# Patient Record
Sex: MALE | Race: WHITE | Employment: OTHER | URBAN - METROPOLITAN AREA
[De-identification: names, ages, dates, MRNs, and addresses within clinical notes are randomized per-mention and may not be internally consistent; named-entity substitution may affect disease eponyms.]

---

## 2017-01-20 ENCOUNTER — OFFICE VISIT (OUTPATIENT)
Dept: INTERNAL MEDICINE CLINIC | Age: 72
End: 2017-01-20

## 2017-01-20 VITALS
SYSTOLIC BLOOD PRESSURE: 134 MMHG | HEART RATE: 92 BPM | BODY MASS INDEX: 29.66 KG/M2 | WEIGHT: 223.8 LBS | TEMPERATURE: 98.2 F | HEIGHT: 73 IN | DIASTOLIC BLOOD PRESSURE: 88 MMHG | RESPIRATION RATE: 16 BRPM

## 2017-01-20 DIAGNOSIS — F32.A DEPRESSION, UNSPECIFIED DEPRESSION TYPE: ICD-10-CM

## 2017-01-20 DIAGNOSIS — G25.81 RESTLESS LEG SYNDROME: ICD-10-CM

## 2017-01-20 DIAGNOSIS — G89.29 CHRONIC BACK PAIN, UNSPECIFIED BACK LOCATION, UNSPECIFIED BACK PAIN LATERALITY: Primary | ICD-10-CM

## 2017-01-20 DIAGNOSIS — E78.5 HYPERLIPIDEMIA, UNSPECIFIED HYPERLIPIDEMIA TYPE: ICD-10-CM

## 2017-01-20 DIAGNOSIS — I10 ESSENTIAL HYPERTENSION: ICD-10-CM

## 2017-01-20 DIAGNOSIS — M54.9 CHRONIC BACK PAIN, UNSPECIFIED BACK LOCATION, UNSPECIFIED BACK PAIN LATERALITY: Primary | ICD-10-CM

## 2017-01-20 DIAGNOSIS — C67.9 MALIGNANT NEOPLASM OF URINARY BLADDER, UNSPECIFIED SITE (HCC): ICD-10-CM

## 2017-01-20 DIAGNOSIS — M19.90 ARTHRITIS: ICD-10-CM

## 2017-01-20 DIAGNOSIS — C67.9 RECURRENT BLADDER TRANSITIONAL CELL CARCINOMA (HCC): ICD-10-CM

## 2017-01-20 RX ORDER — TRAMADOL HYDROCHLORIDE 50 MG/1
100 TABLET ORAL
Qty: 120 TAB | Refills: 3 | Status: SHIPPED | OUTPATIENT
Start: 2017-01-20 | End: 2017-05-05 | Stop reason: SDUPTHER

## 2017-01-20 RX ORDER — BUPROPION HYDROCHLORIDE 150 MG/1
150 TABLET ORAL
Qty: 30 TAB | Refills: 5 | Status: SHIPPED | OUTPATIENT
Start: 2017-01-20 | End: 2017-05-24 | Stop reason: SDUPTHER

## 2017-01-20 RX ORDER — MELOXICAM 15 MG/1
15 TABLET ORAL DAILY
Qty: 90 TAB | Refills: 3 | Status: SHIPPED | OUTPATIENT
Start: 2017-01-20 | End: 2018-02-02 | Stop reason: SDUPTHER

## 2017-01-20 NOTE — MR AVS SNAPSHOT
Visit Information Date & Time Provider Department Dept. Phone Encounter #  
 1/20/2017  2:00 PM Prabhu Pena MD [facility] [phone] [id number] Follow-up Instructions Return in about 3 months (around 4/20/2017), or if symptoms worsen or fail to improve, for Medicare Wellness Visit. Upcoming Health Maintenance Date Due  
 GLAUCOMA SCREENING Q2Y 4/1/2017 MEDICARE YEARLY EXAM 4/21/2017 DTaP/Tdap/Td series (2 - Td) 11/19/2023 COLONOSCOPY 6/5/2024 Allergies as of 1/20/2017  Review Complete On: 1/20/2017 By: Prabhu Pena MD  
 No Known Allergies Current Immunizations  Reviewed on 1/20/2017 Name Date Influenza High Dose Vaccine PF 9/12/2016 Influenza Vaccine 9/9/2014, 11/20/2013, 9/11/2013 Influenza Vaccine (Quad) PF 8/12/2015 Pneumococcal Conjugate (PCV-13) 5/4/2015 Pneumococcal Vaccine (Unspecified Type) 10/29/2013 Tdap 11/19/2013 Zoster Vaccine, Live 9/20/2013 Reviewed by Prabhu Pena MD on 1/20/2017 at  3:06 PM  
You Were Diagnosed With   
  
 Codes Comments Chronic back pain, unspecified back location, unspecified back pain laterality    -  Primary ICD-10-CM: M54.9, G89.29 ICD-9-CM: 724.5, 338.29 Arthritis     ICD-10-CM: M19.90 ICD-9-CM: 716.90 Essential hypertension     ICD-10-CM: I10 
ICD-9-CM: 401.9 Hyperlipidemia, unspecified hyperlipidemia type     ICD-10-CM: E78.5 ICD-9-CM: 272.4 Restless leg syndrome     ICD-10-CM: G25.81 ICD-9-CM: 333.94 Malignant neoplasm of urinary bladder, unspecified site [facility])     ICD-10-CM: C67.9 ICD-9-CM: 188. 9 Recurrent bladder transitional cell carcinoma (HCC)     ICD-10-CM: C67.9 ICD-9-CM: 188. 9 Depression, unspecified depression type     ICD-10-CM: F32.9 ICD-9-CM: 388 Vitals BP Pulse Temp Resp Height(growth percentile) Weight(growth percentile) 134/88 92 98.2 °F (36.8 °C) (Oral) 16 6' 1\" (1.854 m) 223 lb 12.8 oz (101.5 kg) BMI Smoking Status 29.53 kg/m2 Former Smoker Vitals History BMI and BSA Data Body Mass Index Body Surface Area  
 29.53 kg/m 2 2.29 m 2 Preferred Pharmacy Pharmacy Name Phone [phone] [phone] Your Updated Medication List  
  
   
This list is accurate as of: 1/20/17  3:35 PM.  Always use your most recent med list.  
  
  
  
  
 ALPRAZolam 0.25 mg tablet Commonly known as:  Cody Hurtado Take 1 Tab by mouth two (2) times daily as needed for Anxiety. azelastine 137 mcg (0.1 %) nasal spray Commonly known as:  ASTELIN  
1 Spray by Both Nostrils route two (2) times a day. Use in each nostril as directed buPROPion  mg tablet Commonly known as:  Huang Mast Take 1 Tab by mouth every morning. escitalopram oxalate 20 mg tablet Commonly known as:  [place] Denier Take 1 Tab by mouth daily. fluticasone 50 mcg/actuation nasal spray Commonly known as:  Liz Shames 2 Sprays by Both Nostrils route daily. hydrOXYzine HCl 25 mg tablet Commonly known as:  ATARAX Take 1 Tab by mouth three (3) times daily as needed for Itching.  
  
 ketoconazole 2 % topical cream  
Commonly known as:  NIZORAL Apply  to affected area daily. lidocaine 5 % Commonly known as:  LIDODERM  
1 Patch by TransDERmal route every twenty-four (24) hours. lisinopril 10 mg tablet Commonly known as:  Arsen Sesar Take 1 Tab by mouth daily. meloxicam 15 mg tablet Commonly known as:  MOBIC Take 1 Tab by mouth daily. pramipexole 0.5 mg tablet Commonly known as:  MIRAPEX Take 1 Tab by mouth nightly. traMADol 50 mg tablet Commonly known as:  ULTRAM  
Take 2 Tabs by mouth every eight (8) hours as needed for Pain. Max Daily Amount: 300 mg.  
  
 traZODone 50 mg tablet Commonly known as:  Kai Serra Take 2 Tabs by mouth nightly. TYLENOL EXTRA STRENGTH PO Take  by mouth. VIAGRA 100 mg tablet Generic drug:  sildenafil citrate Prescriptions Printed Refills  
 traMADol (ULTRAM) 50 mg tablet 3 Sig: Take 2 Tabs by mouth every eight (8) hours as needed for Pain. Max Daily Amount: 300 mg. Class: Print Route: Oral  
  
Prescriptions Sent to Pharmacy Refills  
 meloxicam (MOBIC) 15 mg tablet 3 Sig: Take 1 Tab by mouth daily. Class: Normal  
 Pharmacy: [facility] [address] [facility] [address], [address] [place] [address] Ph #: [phone] Route: Oral  
 buPROPion XL (WELLBUTRIN XL) 150 mg tablet 5 Sig: Take 1 Tab by mouth every morning. Class: Normal  
 Pharmacy: [facility] [address] [facility] [address], [address] [place] [address] Ph #: [phone] Route: Oral  
  
Follow-up Instructions Return in about 3 months (around 4/20/2017), or if symptoms worsen or fail to improve, for Medicare Wellness Visit. Patient Instructions Maureen Donovan 1727 What is a living will? A living will is a legal form you use to write down the kind of care you want at the end of your life. It is used by the health professionals who will treat you if you aren't able to decide for yourself. If you put your wishes in writing, your loved ones and others will know what kind of care you want. They won't need to guess. This can ease your mind and be helpful to others. A living will is not the same as an estate or property will. An estate will explains what you want to happen with your money and property after you die. Is a living will a legal document? A living will is a legal document. Each state has its own laws about living pate. If you move to another state, make sure that your living will is legal in the state where you now live.  Or you might use a universal form that has been approved by many states. This kind of form can sometimes be completed and stored online. Your electronic copy will then be available wherever you have a connection to the Internet. In most cases, doctors will respect your wishes even if you have a form from a different state. · You don't need an  to complete a living will. But legal advice can be helpful if your state's laws are unclear, your health history is complicated, or your family can't agree on what should be in your living will. · You can change your living will at any time. Some people find that their wishes about end-of-life care change as their health changes. · In addition to making a living will, think about completing a medical power of  form. This form lets you name the person you want to make end-of-life treatment decisions for you (your \"health care agent\") if you're not able to. Many hospitals and nursing homes will give you the forms you need to complete a living will and a medical power of . · Your living will is used only if you can't make or communicate decisions for yourself anymore. If you become able to make decisions again, you can accept or refuse any treatment, no matter what you wrote in your living will. · Your state may offer an online registry. This is a place where you can store your living will online so the doctors and nurses who need to treat you can find it right away. What should you think about when creating a living will? Talk about your end-of-life wishes with your family members and your doctor. Let them know what you want. That way the people making decisions for you won't be surprised by your choices. Think about these questions as you make your living will: · Do you know enough about life support methods that might be used?  If not, talk to your doctor so you know what might be done if you can't breathe on your own, your heart stops, or you're unable to swallow. · What things would you still want to be able to do after you receive life-support methods? Would you want to be able to walk? To speak? To eat on your own? To live without the help of machines? · If you have a choice, where do you want to be cared for? In your home? At a hospital or nursing home? · Do you want certain Spiritism practices performed if you become very ill? · If you have a choice at the end of your life, where would you prefer to die? At home? In a hospital or nursing home? Somewhere else? · Would you prefer to be buried or cremated? · Do you want your organs to be donated after you die? What should you do with your living will? · Make sure that your family members and your health care agent have copies of your living will. · Give your doctor a copy of your living will to keep in your medical record. If you have more than one doctor, make sure that each one has a copy. · You may want to put a copy of your living will where it can be easily found. Where can you learn more? Go to http://julissa-jannette.info/. Enter L400 in the search box to learn more about \"Learning About Living Perroy. \" Current as of: February 24, 2016 Content Version: 11.1 © 3770-7303 [employer]. Care instructions adapted under license by [employer] (which disclaims liability or warranty for this information). If you have questions about a medical condition or this instruction, always ask your healthcare professional. [place] [address] any warranty or liability for your use of this information. Introducing \[facility]\"" & HEALTH SERVICES! Dear Meet Chapman: 
Thank you for requesting a [employer] account. Our records indicate that you already have an active [employer] account. You can access your account anytime at [email or web address] Did you know that you can access your hospital and ER discharge instructions at any time in Pixable? You can also review all of your test results from your hospital stay or ER visit. Additional Information If you have questions, please visit the Frequently Asked Questions section of the [employer]able website at [email or web address] Remember, Pixable is NOT to be used for urgent needs. For medical emergencies, dial 911. Now available from your iPhone and Android! Please provide this summary of care documentation to your next provider. Your primary care clinician is listed as [id number] If you have any questions after today's visit, please call [phone].

## 2017-01-20 NOTE — PROGRESS NOTES
HISTORY OF PRESENT ILLNESS  Jacky Dudley is a 66 y.o. male. HPI  Presents for f/u HTN, lipids, etc    Had  surgery - has followed up and result reported as good    Lidocaine patches denied again    Chronic back pain  Lidocaine helps, tramadol helps some  But, can be severe. Inquires re: adjustment in regimen  Current bid tramadol at 50 mg dose    Pt reported worsened depression following surgery  Exercise has not fully helped. Past medical, Social, and Family history reviewed  Medications reviewed and updated. ROS  Complete ROS reviewed and negative or stable except as noted in HPI. Physical Exam   Constitutional: He is oriented to person, place, and time. He appears well-nourished. No distress. HENT:   Head: Normocephalic and atraumatic. Mouth/Throat: Oropharynx is clear and moist. No oropharyngeal exudate. Eyes: EOM are normal. Pupils are equal, round, and reactive to light. No scleral icterus. Neck: Normal range of motion. Neck supple. No JVD present. No thyromegaly present. Cardiovascular: Normal rate, regular rhythm and normal heart sounds. Exam reveals no gallop and no friction rub. No murmur heard. Pulmonary/Chest: Effort normal and breath sounds normal. No respiratory distress. He has no wheezes. He has no rales. Abdominal: Soft. Bowel sounds are normal. He exhibits no distension. There is no tenderness. Musculoskeletal: Normal range of motion. He exhibits no edema. Lymphadenopathy:     He has no cervical adenopathy. Neurological: He is alert and oriented to person, place, and time. He exhibits normal muscle tone. Coordination normal.   Skin: Skin is warm. No rash noted. Psychiatric: He has a normal mood and affect. Nursing note and vitals reviewed. Prior labs reviewed. ASSESSMENT and PLAN    ICD-10-CM ICD-9-CM    1.  Chronic back pain, unspecified back location, unspecified back pain laterality M54.9 724.5 meloxicam (MOBIC) 15 mg tablet    G89.29 338.29 traMADol (ULTRAM) 50 mg tablet   2. Arthritis M19.90 716.90 meloxicam (MOBIC) 15 mg tablet      traMADol (ULTRAM) 50 mg tablet   3. Essential hypertension I10 401.9    4. Hyperlipidemia, unspecified hyperlipidemia type E78.5 272.4    5. Restless leg syndrome G25.81 333.94    6. Malignant neoplasm of urinary bladder, unspecified site (HCC) C67.9 188.9    7. Recurrent bladder transitional cell carcinoma (HCC) C67.9 188.9    8. Depression, unspecified depression type F32.9 311 buPROPion XL (WELLBUTRIN XL) 150 mg tablet     Follow-up Disposition:  Return in about 3 months (around 4/20/2017), or if symptoms worsen or fail to improve, for Medicare Wellness Visit. results and schedule of future studies reviewed with patient  reviewed diet, exercise and weight    cardiovascular risk and specific lipid/LDL goals reviewed  reviewed medications and side effects in detail   Increase mobic to 15 mg   May take tramadol at 100mg bid  Get PA for lidocaine patches  wellbutrin added to lexapro  Pt defers counseling at this point.   Monitor BP, adjust prn

## 2017-01-20 NOTE — PROGRESS NOTES
RM 15  Pt had surgery in October with Dr. Gisselle Marie at [facility]   Pt filled out medical release form  Chief Complaint   Patient presents with    Hypertension     follow up    Back Pain     follow up       1. Have you been to the ER, urgent care clinic since your last visit? Hospitalized since your last visit? No    2. Have you seen or consulted any other health care providers outside of the [address] [place] [address] since your last visit? Include any pap smears or colon screening.  Yes Where: [facility]      Health Maintenance Due   Topic Date Due    INFLUENZA AGE 9 TO ADULT  08/01/2016     Pt had flu vaccine at [facility]  9/16    Living will sent to pt AVS

## 2017-01-20 NOTE — PATIENT INSTRUCTIONS
Learning About [facility]  What is a living will? A living will is a legal form you use to write down the kind of care you want at the end of your life. It is used by the health professionals who will treat you if you aren't able to decide for yourself. If you put your wishes in writing, your loved ones and others will know what kind of care you want. They won't need to guess. This can ease your mind and be helpful to others. A living will is not the same as an estate or property will. An estate will explains what you want to happen with your money and property after you die. Is a living will a legal document? A living will is a legal document. Each state has its own laws about living pate. If you move to another state, make sure that your living will is legal in the state where you now live. Or you might use a universal form that has been approved by many states. This kind of form can sometimes be completed and stored online. Your electronic copy will then be available wherever you have a connection to the Internet. In most cases, doctors will respect your wishes even if you have a form from a different state. · You don't need an  to complete a living will. But legal advice can be helpful if your state's laws are unclear, your health history is complicated, or your family can't agree on what should be in your living will. · You can change your living will at any time. Some people find that their wishes about end-of-life care change as their health changes. · In addition to making a living will, think about completing a medical power of  form. This form lets you name the person you want to make end-of-life treatment decisions for you (your \"health care agent\") if you're not able to. Many hospitals and nursing homes will give you the forms you need to complete a living will and a medical power of .   · Your living will is used only if you can't make or communicate decisions for yourself anymore. If you become able to make decisions again, you can accept or refuse any treatment, no matter what you wrote in your living will. · Your state may offer an online registry. This is a place where you can store your living will online so the doctors and nurses who need to treat you can find it right away. What should you think about when creating a living will? Talk about your end-of-life wishes with your family members and your doctor. Let them know what you want. That way the people making decisions for you won't be surprised by your choices. Think about these questions as you make your living will:  · Do you know enough about life support methods that might be used? If not, talk to your doctor so you know what might be done if you can't breathe on your own, your heart stops, or you're unable to swallow. · What things would you still want to be able to do after you receive life-support methods? Would you want to be able to walk? To speak? To eat on your own? To live without the help of machines? · If you have a choice, where do you want to be cared for? In your home? At a hospital or nursing home? · Do you want certain Yazdanism practices performed if you become very ill? · If you have a choice at the end of your life, where would you prefer to die? At home? In a hospital or nursing home? Somewhere else? · Would you prefer to be buried or cremated? · Do you want your organs to be donated after you die? What should you do with your living will? · Make sure that your family members and your health care agent have copies of your living will. · Give your doctor a copy of your living will to keep in your medical record. If you have more than one doctor, make sure that each one has a copy. · You may want to put a copy of your living will where it can be easily found. Where can you learn more? Go to http://julissa-jannette.info/.   Enter H769 in the search box to learn more about \"Learning About Living Perroy. \"  Current as of: February 24, 2016  Content Version: 11.1  © 3632-3597 [employer]. Care instructions adapted under license by [employer] (which disclaims liability or warranty for this information). If you have questions about a medical condition or this instruction, always ask your healthcare professional. [address] any warranty or liability for your use of this information.

## 2017-02-20 RX ORDER — TRAZODONE HYDROCHLORIDE 50 MG/1
100 TABLET ORAL
Qty: 180 TAB | Refills: 3 | Status: SHIPPED | OUTPATIENT
Start: 2017-02-20 | End: 2018-02-04 | Stop reason: SDUPTHER

## 2017-05-05 DIAGNOSIS — M19.90 ARTHRITIS: ICD-10-CM

## 2017-05-05 DIAGNOSIS — M54.9 CHRONIC BACK PAIN, UNSPECIFIED BACK LOCATION, UNSPECIFIED BACK PAIN LATERALITY: ICD-10-CM

## 2017-05-05 DIAGNOSIS — G89.29 CHRONIC BACK PAIN, UNSPECIFIED BACK LOCATION, UNSPECIFIED BACK PAIN LATERALITY: ICD-10-CM

## 2017-05-06 RX ORDER — TRAMADOL HYDROCHLORIDE 50 MG/1
100 TABLET ORAL
Qty: 120 TAB | Refills: 3 | Status: SHIPPED | OUTPATIENT
Start: 2017-05-06 | End: 2017-08-05 | Stop reason: SDUPTHER

## 2017-05-08 ENCOUNTER — OFFICE VISIT (OUTPATIENT)
Dept: INTERNAL MEDICINE CLINIC | Age: 72
End: 2017-05-08

## 2017-05-24 ENCOUNTER — OFFICE VISIT (OUTPATIENT)
Dept: INTERNAL MEDICINE CLINIC | Age: 72
End: 2017-05-24

## 2017-05-24 VITALS
HEIGHT: 73 IN | TEMPERATURE: 97.8 F | HEART RATE: 93 BPM | RESPIRATION RATE: 16 BRPM | SYSTOLIC BLOOD PRESSURE: 149 MMHG | BODY MASS INDEX: 29.95 KG/M2 | WEIGHT: 226 LBS | OXYGEN SATURATION: 95 % | DIASTOLIC BLOOD PRESSURE: 92 MMHG

## 2017-05-24 DIAGNOSIS — F32.A DEPRESSION, UNSPECIFIED DEPRESSION TYPE: ICD-10-CM

## 2017-05-24 DIAGNOSIS — L30.9 ECZEMA, UNSPECIFIED TYPE: ICD-10-CM

## 2017-05-24 DIAGNOSIS — M48.062 LUMBAR STENOSIS WITH NEUROGENIC CLAUDICATION: Primary | ICD-10-CM

## 2017-05-24 DIAGNOSIS — R21 RASH AND NONSPECIFIC SKIN ERUPTION: ICD-10-CM

## 2017-05-24 DIAGNOSIS — M19.90 ARTHRITIS: ICD-10-CM

## 2017-05-24 RX ORDER — BUPROPION HYDROCHLORIDE 150 MG/1
150 TABLET ORAL
Qty: 30 TAB | Refills: 5 | Status: SHIPPED | OUTPATIENT
Start: 2017-05-24 | End: 2017-08-08

## 2017-05-24 RX ORDER — TRIAMCINOLONE ACETONIDE 1 MG/G
CREAM TOPICAL 2 TIMES DAILY
Qty: 60 G | Refills: 5 | Status: SHIPPED | OUTPATIENT
Start: 2017-05-24 | End: 2018-08-07

## 2017-05-24 NOTE — PROGRESS NOTES
HISTORY OF PRESENT ILLNESS  Loretta Nolasco is a 66 y.o. male. HPI  Presents for f/u back pain    Pt reports back pain has worsened  Also, worsening leg weakness  Slow and unsteady    Daughter encouraged pt to see pain specialist  Has appt tomorrow with Dr. Harley Alcaraz    Pt felt fuzzy on wellbutrin so stopped it  Pt still needs some additional depression control  Still on lexapro    Pt takes hydroxyzine for itch  Rash on trunk primarily    Past medical, Social, and Family history reviewed  Medications reviewed and updated. ROS  Complete ROS reviewed and negative or stable except as noted in HPI. Physical Exam   Constitutional: He is oriented to person, place, and time. He appears well-nourished. No distress. HENT:   Head: Normocephalic and atraumatic. Mouth/Throat: Oropharynx is clear and moist. No oropharyngeal exudate. Eyes: EOM are normal. Pupils are equal, round, and reactive to light. No scleral icterus. Neck: Normal range of motion. Neck supple. No JVD present. No thyromegaly present. Cardiovascular: Normal rate, regular rhythm and normal heart sounds. Exam reveals no gallop and no friction rub. No murmur heard. Pulmonary/Chest: Effort normal and breath sounds normal. No respiratory distress. He has no wheezes. He has no rales. Abdominal: Soft. Bowel sounds are normal. He exhibits no distension. There is no tenderness. Musculoskeletal: Normal range of motion. He exhibits no edema. Lymphadenopathy:     He has no cervical adenopathy. Neurological: He is alert and oriented to person, place, and time. He exhibits normal muscle tone. Coordination normal.   Skin: Skin is warm. Rash (eczematous appearing eruption on trunk) noted. Psychiatric: He has a normal mood and affect. Nursing note and vitals reviewed. Prior labs reviewed. ASSESSMENT and PLAN    ICD-10-CM ICD-9-CM    1. Lumbar stenosis with neurogenic claudication M48.06 724.03 MRI LUMB SPINE WO CONT   2.  Rash and nonspecific skin eruption R21 782.1    3. Eczema, unspecified type L30.9 692.9 triamcinolone acetonide (KENALOG) 0.1 % topical cream   4. Arthritis M19.90 716.90    5. Depression, unspecified depression type F32.9 311 buPROPion XL (WELLBUTRIN XL) 150 mg tablet     Follow-up Disposition:  Return in about 1 month (around 6/24/2017), or if symptoms worsen or fail to improve, for wellness visit, blood pressure, mood, skin.   results and schedule of future studies reviewed with patient  reviewed diet, exercise and weight     reviewed medications and side effects in detail   L spine MRI  See PM&R  Pt agrees to try wellbutrin again  Consider ref to psych  Topical steroid   skin care

## 2017-05-24 NOTE — PATIENT INSTRUCTIONS
Advance Care Planning: Care Instructions  Your Care Instructions  It can be hard to live with an illness that cannot be cured. But if your health is getting worse, you may want to make decisions about end-of-life care. Planning for the end of your life does not mean that you are giving up. It is a way to make sure that your wishes are met. Clearly stating your wishes can make it easier for your loved ones. Making plans while you are still able may also ease your mind and make your final days less stressful and more meaningful. Follow-up care is a key part of your treatment and safety. Be sure to make and go to all appointments, and call your doctor if you are having problems. It's also a good idea to know your test results and keep a list of the medicines you take. What can you do to plan for the end of life? · You can bring these issues up with your doctor. You do not need to wait until your doctor starts the conversation. You might start with \"I would not be willing to live with . Sidney Barthel Sidney Barthel Sidney Barthel \" When you complete this sentence it helps your doctor understand your wishes. · Talk openly and honestly with your doctor. This is the best way to understand the decisions you will need to make as your health changes. Know that you can always change your mind. · Ask your doctor about commonly used life-support measures. These include tube feedings, breathing machines, and fluids given through a vein (IV). Understanding these treatments will help you decide whether you want them. · You may choose to have these life-supporting treatments for a limited time. This allows a trial period to see whether they will help you. You may also decide that you want your doctor to take only certain measures to keep you alive. It is important to spell out these conditions so that your doctor and family understand them. · Talk to your doctor about how long you are likely to live.  He or she may be able to give you an idea of what usually happens with your specific illness. · Think about preparing papers that state your wishes. This way there will not be any confusion about what you want. You can change your instructions at any time. Which papers should you prepare? Advance directives are legal papers that tell doctors how you want to be cared for at the end of your life. You do not need a  to write these papers. Ask your doctor or your state health department for information on how to write your advance directives. They may have the forms for each of these types of papers. Make sure your doctor has a copy of these on file, and give a copy to a family member or close friend. · Consider a do-not-resuscitate order (DNR). This order asks that no extra treatments be done if your heart stops or you stop breathing. Extra treatments may include cardiopulmonary resuscitation (CPR), electrical shock to restart your heart, or a machine to breathe for you. If you decide to have a DNR order, ask your doctor to explain and write it. Place the order in your home where everyone can easily see it. · Consider a living will. A living will explains your wishes about life support and other treatments at the end of your life if you become unable to speak for yourself. Living pate tell doctors to use or not use treatments that would keep you alive. You must have one or two witnesses or a notary present when you sign this form. · Consider a durable power of  for health care. This allows you to name a person to make decisions about your care if you are not able to. Most people ask a close friend or family member. Talk to this person about the kinds of treatments you want and those that you do not want. Make sure this person understands your wishes. These legal papers are simple to change. Tell your doctor what you want to change, and ask him or her to make a note in your medical file. Give your family updated copies of the papers.   Where can you learn more?  Go to http://julissa-jannette.info/. Enter P184 in the search box to learn more about \"Advance Care Planning: Care Instructions. \"  Current as of: November 17, 2016  Content Version: 11.2  © 7026-2938 Portal[employer]. Care instructions adapted under license by [employer] (which disclaims liability or warranty for this information). If you have questions about a medical condition or this instruction, always ask your healthcare professional. [facility] [address] any warranty or liability for your use of this information. Atopic Dermatitis: Care Instructions  Your Care Instructions  Atopic dermatitis (also called eczema) is a skin problem that causes intense itching and a red, raised rash. In severe cases, the rash develops clear fluidfilled blisters. The rash is not contagious. People with this condition seem to have very sensitive immune systems that are likely to react to things that cause allergies. The immune system is the body's way of fighting infection. There is no cure for atopic dermatitis, but you may be able to control it with care at home. Follow-up care is a key part of your treatment and safety. Be sure to make and go to all appointments, and call your doctor if you are having problems. It's also a good idea to know your test results and keep a list of the medicines you take. How can you care for yourself at home? · Use moisturizer at least twice a day. · If your doctor prescribes a cream, use it as directed. If your doctor prescribes other medicine, take it exactly as directed. · Wash the affected area with water only. Soap can make dryness and itching worse. Pat dry. · Apply a moisturizer after bathing. Use a cream such as Lubriderm, Moisturel, or Cetaphil that does not irritate the skin or cause a rash. Apply the cream while your skin is still damp after lightly drying with a towel.   · Use cold, wet cloths to reduce itching. · Keep cool, and stay out of the sun. · If itching affects your normal activities, an over-the-counter antihistamine, such as diphenhydramine (Benadryl) or loratadine (Claritin) may help. Read and follow all instructions on the label. When should you call for help? Call your doctor now or seek immediate medical care if:  · Your rash gets worse and you have a fever. · You have new blisters or bruises, or the rash spreads and looks like a sunburn. · You have signs of infection, such as:  ¨ Increased pain, swelling, warmth, or redness. ¨ Red streaks leading from the rash. ¨ Pus draining from the rash. ¨ A fever. · You have crusting or oozing sores. · You have joint aches or body aches along with your rash. Watch closely for changes in your health, and be sure to contact your doctor if:  · Your rash does not clear up after 2 to 3 weeks of home treatment. · Itching interferes with your sleep or daily activities. Where can you learn more? Go to http://julissa-jannette.info/. Enter E150 in the search box to learn more about \"Atopic Dermatitis: Care Instructions. \"  Current as of: October 13, 2016  Content Version: 11.2  © 4530-8647 [employer]. Care instructions adapted under license by [employer] (which disclaims liability or warranty for this information). If you have questions about a medical condition or this instruction, always ask your healthcare professional. [place] [address] any warranty or liability for your use of this information.

## 2017-05-24 NOTE — MR AVS SNAPSHOT
Visit Information Date & Time Provider Department Dept. Phone Encounter #  
 5/24/2017  1:30 PM Roberto Carlos Smith, [address] [place] [address] and Internal Medicine [phone] [id number] Follow-up Instructions Return in about 1 month (around 6/24/2017), or if symptoms worsen or fail to improve, for wellness visit, blood pressure, mood, skin. Upcoming Health Maintenance Date Due  
 GLAUCOMA SCREENING Q2Y 4/1/2017 MEDICARE YEARLY EXAM 4/21/2017 INFLUENZA AGE 9 TO ADULT 8/1/2017 DTaP/Tdap/Td series (2 - Td) 11/19/2023 COLONOSCOPY 6/5/2024 Allergies as of 5/24/2017  Review Complete On: 5/24/2017 By: Roberto Carlos Smith MD  
 No Known Allergies Current Immunizations  Reviewed on 1/20/2017 Name Date Influenza High Dose Vaccine PF 9/12/2016 Influenza Vaccine 9/9/2014, 11/20/2013, 9/11/2013 Influenza Vaccine (Quad) PF 8/12/2015 Pneumococcal Conjugate (PCV-13) 5/4/2015 Pneumococcal Vaccine (Unspecified Type) 10/29/2013 Tdap 11/19/2013 Zoster Vaccine, Live 9/20/2013 Not reviewed this visit You Were Diagnosed With   
  
 Codes Comments Lumbar stenosis with neurogenic claudication    -  Primary ICD-10-CM: M48.06 
ICD-9-CM: 724.03 Rash and nonspecific skin eruption     ICD-10-CM: R21 
ICD-9-CM: 782.1 Eczema, unspecified type     ICD-10-CM: L30.9 ICD-9-CM: 692.9 Arthritis     ICD-10-CM: M19.90 ICD-9-CM: 716.90 Depression, unspecified depression type     ICD-10-CM: F32.9 ICD-9-CM: 163 Vitals BP Pulse Temp Resp Height(growth percentile) Weight(growth percentile) (!) 149/92 (BP 1 Location: Left arm, BP Patient Position: Sitting) 93 97.8 °F (36.6 °C) (Oral) 16 6' 1\" (1.854 m) 226 lb (102.5 kg) SpO2 BMI Smoking Status 95% 29.82 kg/m2 Former Smoker BMI and BSA Data Body Mass Index Body Surface Area  
 29.82 kg/m 2 2.3 m 2 Preferred Pharmacy Pharmacy Name Phone [facility] [phone] Your Updated Medication List  
  
   
This list is accurate as of: 5/24/17  2:34 PM.  Always use your most recent med list.  
  
  
  
  
 ALPRAZolam 0.25 mg tablet Commonly known as:  Chryl Rumble Take 1 Tab by mouth two (2) times daily as needed for Anxiety. azelastine 137 mcg (0.1 %) nasal spray Commonly known as:  ASTELIN  
1 Spray by Both Nostrils route two (2) times a day. Use in each nostril as directed buPROPion  mg tablet Commonly known as:  Almon Cocker Take 1 Tab by mouth every morning. escitalopram oxalate 20 mg tablet Commonly known as:  Allen Hane Take 1 Tab by mouth daily. fluticasone 50 mcg/actuation nasal spray Commonly known as:  Creasie Granite 2 Sprays by Both Nostrils route daily. hydrOXYzine HCl 25 mg tablet Commonly known as:  ATARAX Take 1 Tab by mouth three (3) times daily as needed for Itching.  
  
 ketoconazole 2 % topical cream  
Commonly known as:  NIZORAL Apply  to affected area daily. lidocaine 5 % Commonly known as:  LIDODERM  
1 Patch by TransDERmal route every twenty-four (24) hours. lisinopril 10 mg tablet Commonly known as:  Shirlie Holes Take 1 Tab by mouth daily. meloxicam 15 mg tablet Commonly known as:  MOBIC Take 1 Tab by mouth daily. pramipexole 0.5 mg tablet Commonly known as:  MIRAPEX Take 1 Tab by mouth nightly. traMADol 50 mg tablet Commonly known as:  ULTRAM  
Take 2 Tabs by mouth every eight (8) hours as needed for Pain. Max Daily Amount: 300 mg.  
  
 traZODone 50 mg tablet Commonly known as:  Shelia Foil Take 2 Tabs by mouth nightly. triamcinolone acetonide 0.1 % topical cream  
Commonly known as:  KENALOG Apply  to affected area two (2) times a day. use thin layer TYLENOL EXTRA STRENGTH PO Take  by mouth. VIAGRA 100 mg tablet Generic drug:  sildenafil citrate Prescriptions Sent to Pharmacy Refills  
 triamcinolone acetonide (KENALOG) 0.1 % topical cream 5 Sig: Apply  to affected area two (2) times a day. use thin layer Class: Normal  
 Pharmacy: [facility] [address], [address] Ph #: [phone] Route: Topical  
 buPROPion XL (WELLBUTRIN XL) 150 mg tablet 5 Sig: Take 1 Tab by mouth every morning. Class: Normal  
 Pharmacy: [facility] [address], [address] Ph #: [phone] Route: Oral  
  
Follow-up Instructions Return in about 1 month (around 6/24/2017), or if symptoms worsen or fail to improve, for wellness visit, blood pressure, mood, skin. To-Do List   
 05/26/2017 Imaging:  MRI LUMB SPINE WO CONT Patient Instructions Advance Care Planning: Care Instructions Your Care Instructions It can be hard to live with an illness that cannot be cured. But if your health is getting worse, you may want to make decisions about end-of-life care. Planning for the end of your life does not mean that you are giving up. It is a way to make sure that your wishes are met. Clearly stating your wishes can make it easier for your loved ones. Making plans while you are still able may also ease your mind and make your final days less stressful and more meaningful. Follow-up care is a key part of your treatment and safety. Be sure to make and go to all appointments, and call your doctor if you are having problems. It's also a good idea to know your test results and keep a list of the medicines you take. What can you do to plan for the end of life? · You can bring these issues up with your doctor. You do not need to wait until your doctor starts the conversation. You might start with \"I would not be willing to live with . Aleyda Manzo \" When you complete this sentence it helps your doctor understand your wishes. · Talk openly and honestly with your doctor. This is the best way to understand the decisions you will need to make as your health changes. Know that you can always change your mind. · Ask your doctor about commonly used life-support measures. These include tube feedings, breathing machines, and fluids given through a vein (IV). Understanding these treatments will help you decide whether you want them. · You may choose to have these life-supporting treatments for a limited time. This allows a trial period to see whether they will help you. You may also decide that you want your doctor to take only certain measures to keep you alive. It is important to spell out these conditions so that your doctor and family understand them. · Talk to your doctor about how long you are likely to live. He or she may be able to give you an idea of what usually happens with your specific illness. · Think about preparing papers that state your wishes. This way there will not be any confusion about what you want. You can change your instructions at any time. Which papers should you prepare? Advance directives are legal papers that tell doctors how you want to be cared for at the end of your life. You do not need a  to write these papers. Ask your doctor or your state [facility] department for information on how to write your advance directives. They may have the forms for each of these types of papers. Make sure your doctor has a copy of these on file, and give a copy to a family member or close friend. · Consider a do-not-resuscitate order (DNR). This order asks that no extra treatments be done if your heart stops or you stop breathing. Extra treatments may include cardiopulmonary resuscitation (CPR), electrical shock to restart your heart, or a machine to breathe for you. If you decide to have a DNR order, ask your doctor to explain and write it. Place the order in your home where everyone can easily see it. · Consider a living will. A living will explains your wishes about life support and other treatments at the end of your life if you become unable to speak for yourself. Living pate tell doctors to use or not use treatments that would keep you alive. You must have one or two witnesses or a notary present when you sign this form. · Consider a durable power of  for health care. This allows you to name a person to make decisions about your care if you are not able to. Most people ask a close friend or family member. Talk to this person about the kinds of treatments you want and those that you do not want. Make sure this person understands your wishes. These legal papers are simple to change. Tell your doctor what you want to change, and ask him or her to make a note in your medical file. Give your family updated copies of the papers. Where can you learn more? Go to http://julissa-jannette.info/. Enter P184 in the search box to learn more about \"Advance Care Planning: Care Instructions. \" Current as of: November 17, 2016 Content Version: 11.2 © 9940-4801 ev-social. Care instructions adapted under license by [employer] (which disclaims liability or warranty for this information). If you have questions about a medical condition or this instruction, always ask your healthcare professional. [address] any warranty or liability for your use of this information. Atopic Dermatitis: Care Instructions Your Care Instructions Atopic dermatitis (also called eczema) is a skin problem that causes intense itching and a red, raised rash. In severe cases, the rash develops clear fluidfilled blisters. The rash is not contagious. People with this condition seem to have very sensitive immune systems that are likely to react to things that cause allergies. The immune system is the body's way of fighting infection. There is no cure for atopic dermatitis, but you may be able to control it with care at home. Follow-up care is a key part of your treatment and safety. Be sure to make and go to all appointments, and call your doctor if you are having problems. It's also a good idea to know your test results and keep a list of the medicines you take. How can you care for yourself at home? · Use moisturizer at least twice a day. · If your doctor prescribes a cream, use it as directed. If your doctor prescribes other medicine, take it exactly as directed. · Wash the affected area with water only. Soap can make dryness and itching worse. Pat dry. · Apply a moisturizer after bathing. Use a cream such as Lubriderm, Moisturel, or Cetaphil that does not irritate the skin or cause a rash. Apply the cream while your skin is still damp after lightly drying with a towel. · Use cold, wet cloths to reduce itching. · Keep cool, and stay out of the sun. · If itching affects your normal activities, an over-the-counter antihistamine, such as diphenhydramine (Benadryl) or loratadine (Claritin) may help. Read and follow all instructions on the label. When should you call for help? Call your doctor now or seek immediate medical care if: 
· Your rash gets worse and you have a fever. · You have new blisters or bruises, or the rash spreads and looks like a sunburn. · You have signs of infection, such as: 
¨ Increased pain, swelling, warmth, or redness. ¨ Red streaks leading from the rash. ¨ Pus draining from the rash. ¨ A fever. · You have crusting or oozing sores. · You have joint aches or body aches along with your rash. Watch closely for changes in your health, and be sure to contact your doctor if: 
· Your rash does not clear up after 2 to 3 weeks of home treatment. · Itching interferes with your sleep or daily activities. Where can you learn more? Go to http://mamta.info/. Enter J238 in the search box to learn more about \"Atopic Dermatitis: Care Instructions. \" Current as of: October 13, 2016 Content Version: 11.2 © 6875-8419 KeraNetics. Care instructions adapted under license by [employer] (which disclaims liability or warranty for this information). If you have questions about a medical condition or this instruction, always ask your healthcare professional. [address] any warranty or liability for your use of this information. Introducing [facility]! Dear Rossi Vera: 
Thank you for requesting a Socialtyze account. Our records indicate that you already have an active Socialtyze account. You can access your account anytime at [email or web address] Did you know that you can access your hospital and ER discharge instructions at any time in Socialtyze? You can also review all of your test results from your hospital stay or ER visit. Additional Information If you have questions, please visit the Frequently Asked Questions section of the Socialtyze website at [email or web address] Remember, Socialtyze is NOT to be used for urgent needs. For medical emergencies, dial 911. Now available from your iPhone and Android! Please provide this summary of care documentation to your next provider. Your primary care clinician is listed as [address] If you have any questions after today's visit, please call [phone].

## 2017-05-24 NOTE — PROGRESS NOTES
rm 15  Chief Complaint   Patient presents with    Back Pain    Medication Management     1. Have you been to the ER, urgent care clinic since your last visit? Hospitalized since your last visit? No    2. Have you seen or consulted any other health care providers outside of the [address] [place] [address] since your last visit? Include any pap smears or colon screening. No    Health Maintenance Due   Topic Date Due    GLAUCOMA SCREENING Q2Y  04/01/2017    MEDICARE YEARLY EXAM  04/21/2017     Fall Risk Assessment, last 12 mths 5/24/2017   Able to walk? Yes   Fall in past 12 months?  No     PHQ over the last two weeks 5/24/2017   Little interest or pleasure in doing things Not at all   Feeling down, depressed or hopeless Not at all   Total Score PHQ 2 0         No living will on file, info given with AVS

## 2017-06-02 ENCOUNTER — HOSPITAL ENCOUNTER (OUTPATIENT)
Dept: MRI IMAGING | Age: 72
Discharge: HOME | End: 2017-06-02
Attending: INTERNAL MEDICINE
Payer: MEDICARE

## 2017-06-02 DIAGNOSIS — M48.062 LUMBAR STENOSIS WITH NEUROGENIC CLAUDICATION: ICD-10-CM

## 2017-06-02 PROCEDURE — 72148 MRI LUMBAR SPINE W/O DYE: CPT

## 2017-06-06 ENCOUNTER — TELEPHONE (OUTPATIENT)
Dept: INTERNAL MEDICINE CLINIC | Age: 72
End: 2017-06-06

## 2017-06-07 ENCOUNTER — OFFICE VISIT (OUTPATIENT)
Dept: INTERNAL MEDICINE CLINIC | Age: 72
End: 2017-06-07

## 2017-06-07 VITALS
TEMPERATURE: 98 F | HEIGHT: 73 IN | WEIGHT: 217.8 LBS | RESPIRATION RATE: 16 BRPM | BODY MASS INDEX: 28.86 KG/M2 | HEART RATE: 87 BPM | DIASTOLIC BLOOD PRESSURE: 79 MMHG | OXYGEN SATURATION: 93 % | SYSTOLIC BLOOD PRESSURE: 128 MMHG

## 2017-06-07 DIAGNOSIS — I10 ESSENTIAL HYPERTENSION: ICD-10-CM

## 2017-06-07 DIAGNOSIS — M48.061 SPINAL STENOSIS OF LUMBAR REGION: Primary | ICD-10-CM

## 2017-06-07 DIAGNOSIS — G89.29 CHRONIC BACK PAIN, UNSPECIFIED BACK LOCATION, UNSPECIFIED BACK PAIN LATERALITY: ICD-10-CM

## 2017-06-07 DIAGNOSIS — M54.9 CHRONIC BACK PAIN, UNSPECIFIED BACK LOCATION, UNSPECIFIED BACK PAIN LATERALITY: ICD-10-CM

## 2017-06-07 DIAGNOSIS — E78.5 HYPERLIPIDEMIA, UNSPECIFIED HYPERLIPIDEMIA TYPE: ICD-10-CM

## 2017-06-07 NOTE — TELEPHONE ENCOUNTER
I called to review. Pt prefers an appt to review. Please make a follow up for office visit appt for today or tomorrow.

## 2017-06-07 NOTE — PATIENT INSTRUCTIONS
Learning About Living Bryon  What is a living will? A living will is a legal form you use to write down the kind of care you want at the end of your life. It is used by the health professionals who will treat you if you aren't able to decide for yourself. If you put your wishes in writing, your loved ones and others will know what kind of care you want. They won't need to guess. This can ease your mind and be helpful to others. A living will is not the same as an estate or property will. An estate will explains what you want to happen with your money and property after you die. Is a living will a legal document? A living will is a legal document. Each state has its own laws about living pate. If you move to another state, make sure that your living will is legal in the state where you now live. Or you might use a universal form that has been approved by many states. This kind of form can sometimes be completed and stored online. Your electronic copy will then be available wherever you have a connection to the Internet. In most cases, doctors will respect your wishes even if you have a form from a different state. · You don't need an  to complete a living will. But legal advice can be helpful if your state's laws are unclear, your health history is complicated, or your family can't agree on what should be in your living will. · You can change your living will at any time. Some people find that their wishes about end-of-life care change as their health changes. · In addition to making a living will, think about completing a medical power of  form. This form lets you name the person you want to make end-of-life treatment decisions for you (your \"health care agent\") if you're not able to. Many hospitals and nursing homes will give you the forms you need to complete a living will and a medical power of .   · Your living will is used only if you can't make or communicate decisions for yourself anymore. If you become able to make decisions again, you can accept or refuse any treatment, no matter what you wrote in your living will. · Your state may offer an online registry. This is a place where you can store your living will online so the doctors and nurses who need to treat you can find it right away. What should you think about when creating a living will? Talk about your end-of-life wishes with your family members and your doctor. Let them know what you want. That way the people making decisions for you won't be surprised by your choices. Think about these questions as you make your living will:  · Do you know enough about life support methods that might be used? If not, talk to your doctor so you know what might be done if you can't breathe on your own, your heart stops, or you're unable to swallow. · What things would you still want to be able to do after you receive life-support methods? Would you want to be able to walk? To speak? To eat on your own? To live without the help of machines? · If you have a choice, where do you want to be cared for? In your home? At a hospital or nursing home? · Do you want certain Anabaptist practices performed if you become very ill? · If you have a choice at the end of your life, where would you prefer to die? At home? In a hospital or nursing home? Somewhere else? · Would you prefer to be buried or cremated? · Do you want your organs to be donated after you die? What should you do with your living will? · Make sure that your family members and your health care agent have copies of your living will. · Give your doctor a copy of your living will to keep in your medical record. If you have more than one doctor, make sure that each one has a copy. · You may want to put a copy of your living will where it can be easily found. Where can you learn more? Go to http://julissa-jannette.info/.   Enter E802 in the search box to learn more about \"Learning About Living Annette Campuzano. \"  Current as of: February 24, 2016  Content Version: 11.2  © 4085-6915 [employer]. Care instructions adapted under license by [employer] (which disclaims liability or warranty for this information). If you have questions about a medical condition or this instruction, always ask your healthcare professional. [address] any warranty or liability for your use of this information.

## 2017-06-07 NOTE — MR AVS SNAPSHOT
Visit Information Date & Time Provider Department Dept. Phone Encounter #  
 6/7/2017 11:30 AM Holly Victoria MD [address] [facility] and Internal Medicine [phone] [id number] Follow-up Instructions Return if symptoms worsen or fail to improve. Upcoming Health Maintenance Date Due  
 GLAUCOMA SCREENING Q2Y 4/1/2017 MEDICARE YEARLY EXAM 4/21/2017 INFLUENZA AGE 9 TO ADULT 8/1/2017 DTaP/Tdap/Td series (2 - Td) 11/19/2023 COLONOSCOPY 6/5/2024 Allergies as of 6/7/2017  Review Complete On: 6/7/2017 By: Holly Victoria MD  
 No Known Allergies Current Immunizations  Reviewed on 1/20/2017 Name Date Influenza High Dose Vaccine PF 9/12/2016 Influenza Vaccine 9/9/2014, 11/20/2013, 9/11/2013 Influenza Vaccine (Quad) PF 8/12/2015 Pneumococcal Conjugate (PCV-13) 5/4/2015 Pneumococcal Vaccine (Unspecified Type) 10/29/2013 Tdap 11/19/2013 Zoster Vaccine, Live 9/20/2013 Not reviewed this visit You Were Diagnosed With   
  
 Codes Comments Spinal stenosis of lumbar region    -  Primary ICD-10-CM: M48.06 
ICD-9-CM: 724.02 Chronic back pain, unspecified back location, unspecified back pain laterality     ICD-10-CM: M54.9, G89.29 ICD-9-CM: 724.5, 338.29 Essential hypertension     ICD-10-CM: I10 
ICD-9-CM: 401.9 Hyperlipidemia, unspecified hyperlipidemia type     ICD-10-CM: E78.5 ICD-9-CM: 272.4 Vitals BP Pulse Temp Resp Height(growth percentile) Weight(growth percentile) 128/79 (BP 1 Location: Left arm, BP Patient Position: Sitting) 87 98 °F (36.7 °C) (Oral) 16 6' 1\" (1.854 m) 217 lb 12.8 oz (98.8 kg) SpO2 BMI Smoking Status 93% 28.74 kg/m2 Former Smoker Vitals History BMI and BSA Data Body Mass Index Body Surface Area 28.74 kg/m 2 2.26 m 2 Preferred Pharmacy Pharmacy Name Phone [facility], [address] [phone] Your Updated Medication List  
  
   
This list is accurate as of: 6/7/17 11:59 AM.  Always use your most recent med list.  
  
  
  
  
 ALPRAZolam 0.25 mg tablet Commonly known as:  Marietta Wayzata Take 1 Tab by mouth two (2) times daily as needed for Anxiety. azelastine 137 mcg (0.1 %) nasal spray Commonly known as:  ASTELIN  
1 Spray by Both Nostrils route two (2) times a day. Use in each nostril as directed buPROPion  mg tablet Commonly known as:  Merkenzie Ceci Take 1 Tab by mouth every morning. escitalopram oxalate 20 mg tablet Commonly known as:  Shan Suches Take 1 Tab by mouth daily. fluticasone 50 mcg/actuation nasal spray Commonly known as:  Snow Earnest 2 Sprays by Both Nostrils route daily. hydrOXYzine HCl 25 mg tablet Commonly known as:  ATARAX Take 1 Tab by mouth three (3) times daily as needed for Itching.  
  
 ketoconazole 2 % topical cream  
Commonly known as:  NIZORAL Apply  to affected area daily. lidocaine 5 % Commonly known as:  LIDODERM  
1 Patch by TransDERmal route every twenty-four (24) hours. lisinopril 10 mg tablet Commonly known as:  Sydna Lies Take 1 Tab by mouth daily. meloxicam 15 mg tablet Commonly known as:  MOBIC Take 1 Tab by mouth daily. pramipexole 0.5 mg tablet Commonly known as:  MIRAPEX Take 1 Tab by mouth nightly. traMADol 50 mg tablet Commonly known as:  ULTRAM  
Take 2 Tabs by mouth every eight (8) hours as needed for Pain. Max Daily Amount: 300 mg.  
  
 traZODone 50 mg tablet Commonly known as:  Mordecatania Hoang Take 2 Tabs by mouth nightly. triamcinolone acetonide 0.1 % topical cream  
Commonly known as:  KENALOG Apply  to affected area two (2) times a day. use thin layer TYLENOL EXTRA STRENGTH PO Take  by mouth. VIAGRA 100 mg tablet Generic drug:  sildenafil citrate We Performed the Following REFERRAL TO PHYSICAL THERAPY [TMQ76 Custom] Comments:  
 Please evaluate patient for spinal stenosis and neurogenic claudication. Follow-up Instructions Return if symptoms worsen or fail to improve. Referral Information Referral ID Referred By Referred To  
  
 [id number] Amy Conrad Not Available Visits Status Start Date End Date 1 New Request 6/7/17 6/7/18 If your referral has a status of pending review or denied, additional information will be sent to support the outcome of this decision. Patient Instructions Maureen Donovan 1721 What is a living will? A living will is a legal form you use to write down the kind of care you want at the end of your life. It is used by the health professionals who will treat you if you aren't able to decide for yourself. If you put your wishes in writing, your loved ones and others will know what kind of care you want. They won't need to guess. This can ease your mind and be helpful to others. A living will is not the same as an estate or property will. An estate will explains what you want to happen with your money and property after you die. Is a living will a legal document? A living will is a legal document. Each state has its own laws about living pate. If you move to another state, make sure that your living will is legal in the state where you now live. Or you might use a universal form that has been approved by many states. This kind of form can sometimes be completed and stored online. Your electronic copy will then be available wherever you have a connection to the Internet. In most cases, doctors will respect your wishes even if you have a form from a different state. · You don't need an  to complete a living will. But legal advice can be helpful if your state's laws are unclear, your health history is complicated, or your family can't agree on what should be in your living will. · You can change your living will at any time. Some people find that their wishes about end-of-life care change as their health changes. · In addition to making a living will, think about completing a medical power of  form. This form lets you name the person you want to make end-of-life treatment decisions for you (your \"health care agent\") if you're not able to. Many hospitals and nursing homes will give you the forms you need to complete a living will and a medical power of . · Your living will is used only if you can't make or communicate decisions for yourself anymore. If you become able to make decisions again, you can accept or refuse any treatment, no matter what you wrote in your living will. · Your state may offer an online registry. This is a place where you can store your living will online so the doctors and nurses who need to treat you can find it right away. What should you think about when creating a living will? Talk about your end-of-life wishes with your family members and your doctor. Let them know what you want. That way the people making decisions for you won't be surprised by your choices. Think about these questions as you make your living will: · Do you know enough about life support methods that might be used? If not, talk to your doctor so you know what might be done if you can't breathe on your own, your heart stops, or you're unable to swallow. · What things would you still want to be able to do after you receive life-support methods? Would you want to be able to walk? To speak? To eat on your own? To live without the help of machines? · If you have a choice, where do you want to be cared for? In your home? At a hospital or nursing home? · Do you want certain Hindu practices performed if you become very ill? · If you have a choice at the end of your life, where would you prefer to die? At home? In a hospital or nursing home? Somewhere else? · Would you prefer to be buried or cremated? · Do you want your organs to be donated after you die? What should you do with your living will? · Make sure that your family members and your health care agent have copies of your living will. · Give your doctor a copy of your living will to keep in your medical record. If you have more than one doctor, make sure that each one has a copy. · You may want to put a copy of your living will where it can be easily found. Where can you learn more? Go to http://julissa-jannette.info/. Enter F590 in the search box to learn more about \"Learning About Living [place]. \" Current as of: February 24, 2016 Content Version: 11.2 © 6015-5695 [employer]. Care instructions adapted under license by [employer] (which disclaims liability or warranty for this information). If you have questions about a medical condition or this instruction, always ask your healthcare professional. [place] [address] any warranty or liability for your use of this information. Introducing [facility] & HEALTH SERVICES! Dear Cole Mendiola: 
Thank you for requesting a [employer] account. Our records indicate that you already have an active [employer] account. You can access your account anytime at [email or web address] Did you know that you can access your hospital and ER discharge instructions at any time in [employer]? You can also review all of your test results from your hospital stay or ER visit. Additional Information If you have questions, please visit the Frequently Asked Questions section of the [employer] website at [email or web address] Remember, [employer] is NOT to be used for urgent needs. For medical emergencies, dial 911. Now available from your iPhone and Android! Please provide this summary of care documentation to your next provider. Your primary care clinician is listed as [address] If you have any questions after today's visit, please call [phone].

## 2017-06-07 NOTE — PROGRESS NOTES
RM 13    Chief Complaint   Patient presents with    Results     discuss MRI results       1. Have you been to the ER, urgent care clinic since your last visit? Hospitalized since your last visit? No    2. Have you seen or consulted any other health care providers outside of the [facility] since your last visit? Include any pap smears or colon screening.  No    Health Maintenance Due   Topic Date Due    GLAUCOMA SCREENING Q2Y  04/01/2017    MEDICARE YEARLY EXAM  04/21/2017     Living will sent to alejandra KAPOOR

## 2017-06-07 NOTE — PROGRESS NOTES
HISTORY OF PRESENT ILLNESS  Johnnie Winslow is a 66 y.o. male. HPI  Presents for f/u results    Pt had MRI  Desires review in person    Past medical, Social, and Family history reviewed  Medications reviewed and updated. ROS  Complete ROS reviewed and negative or stable except as noted in HPI. Physical Exam   Constitutional: He is oriented to person, place, and time. He appears well-nourished. No distress. HENT:   Head: Normocephalic and atraumatic. Mouth/Throat: Oropharynx is clear and moist. No oropharyngeal exudate. Eyes: EOM are normal. Pupils are equal, round, and reactive to light. No scleral icterus. Neck: Normal range of motion. Neck supple. No JVD present. No thyromegaly present. Cardiovascular: Normal rate, regular rhythm and normal heart sounds. Exam reveals no gallop and no friction rub. No murmur heard. Pulmonary/Chest: Effort normal and breath sounds normal. No respiratory distress. He has no wheezes. He has no rales. Abdominal: Soft. Bowel sounds are normal. He exhibits no distension. There is no tenderness. Musculoskeletal: Normal range of motion. He exhibits no edema. Lymphadenopathy:     He has no cervical adenopathy. Neurological: He is alert and oriented to person, place, and time. He exhibits normal muscle tone. Coordination normal.   Skin: Skin is warm. No rash noted. Psychiatric: He has a normal mood and affect. Nursing note and vitals reviewed. Reviewed images with pt  Prior labs reviewed. ASSESSMENT and PLAN    ICD-10-CM ICD-9-CM    1. Spinal stenosis of lumbar region M48.06 724.02 REFERRAL TO PHYSICAL THERAPY   2. Chronic back pain, unspecified back location, unspecified back pain laterality M54.9 724.5     G89.29 338.29    3. Essential hypertension I10 401.9    4. Hyperlipidemia, unspecified hyperlipidemia type E78.5 272.4      Follow-up Disposition:  Return if symptoms worsen or fail to improve.   results and schedule of future studies reviewed with patient  reviewed diet, exercise and weight    cardiovascular risk and specific lipid/LDL goals reviewed  reviewed medications and side effects in detail   Ref to PT  Pt defers surgical consultation at this time       >25 minutes time spent with >50% in counseling and coordination of care

## 2017-06-27 ENCOUNTER — HOSPITAL ENCOUNTER (OUTPATIENT)
Dept: LAB | Age: 72
Discharge: HOME | End: 2017-06-27
Payer: MEDICARE

## 2017-06-27 ENCOUNTER — OFFICE VISIT (OUTPATIENT)
Dept: INTERNAL MEDICINE CLINIC | Age: 72
End: 2017-06-27

## 2017-06-27 VITALS
DIASTOLIC BLOOD PRESSURE: 81 MMHG | HEIGHT: 73 IN | BODY MASS INDEX: 27.7 KG/M2 | RESPIRATION RATE: 18 BRPM | TEMPERATURE: 98.5 F | SYSTOLIC BLOOD PRESSURE: 119 MMHG | HEART RATE: 96 BPM | OXYGEN SATURATION: 93 % | WEIGHT: 209 LBS

## 2017-06-27 DIAGNOSIS — R82.90 ABNORMAL URINE FINDINGS: ICD-10-CM

## 2017-06-27 DIAGNOSIS — M54.50 ACUTE BILATERAL LOW BACK PAIN WITHOUT SCIATICA: ICD-10-CM

## 2017-06-27 DIAGNOSIS — I10 ESSENTIAL HYPERTENSION: ICD-10-CM

## 2017-06-27 DIAGNOSIS — R39.89 ABNORMAL URINE COLOR: ICD-10-CM

## 2017-06-27 DIAGNOSIS — R32 URINARY INCONTINENCE, UNSPECIFIED TYPE: Primary | ICD-10-CM

## 2017-06-27 DIAGNOSIS — E87.20 ACIDOSIS: ICD-10-CM

## 2017-06-27 DIAGNOSIS — K75.9 HEPATITIS: ICD-10-CM

## 2017-06-27 DIAGNOSIS — R63.4 WEIGHT LOSS: ICD-10-CM

## 2017-06-27 LAB
BILIRUB UR QL STRIP: NEGATIVE
GLUCOSE UR-MCNC: NEGATIVE MG/DL
KETONES P FAST UR STRIP-MCNC: NORMAL MG/DL
PH UR STRIP: 5.5 [PH] (ref 4.6–8)
PROT UR QL STRIP: NORMAL MG/DL
SP GR UR STRIP: 1.01 (ref 1–1.03)
UA UROBILINOGEN AMB POC: NORMAL (ref 0.2–1)
URINALYSIS CLARITY POC: NORMAL
URINALYSIS COLOR POC: NORMAL
URINE BLOOD POC: NORMAL
URINE LEUKOCYTES POC: NORMAL
URINE NITRITES POC: NEGATIVE

## 2017-06-27 PROCEDURE — 80074 ACUTE HEPATITIS PANEL: CPT

## 2017-06-27 PROCEDURE — 87086 URINE CULTURE/COLONY COUNT: CPT

## 2017-06-27 PROCEDURE — 87088 URINE BACTERIA CULTURE: CPT

## 2017-06-27 RX ORDER — CIPROFLOXACIN 500 MG/1
500 TABLET ORAL 2 TIMES DAILY
Qty: 21 TAB | Refills: 0 | Status: SHIPPED | OUTPATIENT
Start: 2017-06-27 | End: 2017-07-07

## 2017-06-27 NOTE — MR AVS SNAPSHOT
Visit Information Date & Time Provider Department Dept. Phone Encounter #  
 6/27/2017  2:15 PM Krzysztof Gavin, [address] [place] [address] and Internal Medicine [phone] [id number] Follow-up Instructions Return in about 4 weeks (around 7/25/2017) for back pain, weight. Upcoming Health Maintenance Date Due  
 GLAUCOMA SCREENING Q2Y 4/1/2017 MEDICARE YEARLY EXAM 4/21/2017 INFLUENZA AGE 9 TO ADULT 8/1/2017 DTaP/Tdap/Td series (2 - Td) 11/19/2023 COLONOSCOPY 6/5/2024 Allergies as of 6/27/2017  Review Complete On: 6/27/2017 By: Sudha Amaya No Known Allergies Current Immunizations  Reviewed on 1/20/2017 Name Date Influenza High Dose Vaccine PF 9/12/2016 Influenza Vaccine 9/9/2014, 11/20/2013, 9/11/2013 Influenza Vaccine (Quad) PF 8/12/2015 Pneumococcal Conjugate (PCV-13) 5/4/2015 Pneumococcal Vaccine (Unspecified Type) 10/29/2013 Tdap 11/19/2013 Zoster Vaccine, Live 9/20/2013 Not reviewed this visit You Were Diagnosed With   
  
 Codes Comments Urinary tract infection with hematuria, site unspecified    -  Primary ICD-10-CM: N39.0, R31.9 ICD-9-CM: 599.0 Weight loss     ICD-10-CM: R63.4 ICD-9-CM: 783.21 Essential hypertension     ICD-10-CM: I10 
ICD-9-CM: 401.9 Vitals BP Pulse Temp Resp Height(growth percentile) Weight(growth percentile) 119/81 (BP 1 Location: Right arm, BP Patient Position: Sitting) 96 98.5 °F (36.9 °C) (Oral) 18 6' 0.99\" (1.854 m) 209 lb (94.8 kg) SpO2 BMI Smoking Status 93% 27.58 kg/m2 Former Smoker BMI and BSA Data Body Mass Index Body Surface Area  
 27.58 kg/m 2 2.21 m 2 Preferred Pharmacy Pharmacy Name Phone [facility], [address] [phone] Your Updated Medication List  
  
   
This list is accurate as of: 6/27/17  3:12 PM.  Always use your most recent med list.  
  
  
  
  
 ALPRAZolam 0.25 mg tablet Commonly known as:  Larines Moreno Take 1 Tab by mouth two (2) times daily as needed for Anxiety. azelastine 137 mcg (0.1 %) nasal spray Commonly known as:  ASTELIN  
1 Spray by Both Nostrils route two (2) times a day. Use in each nostril as directed buPROPion  mg tablet Commonly known as:  Graceann Jeans Take 1 Tab by mouth every morning. ciprofloxacin HCl 500 mg tablet Commonly known as:  CIPRO Take 1 Tab by mouth two (2) times a day for 10 days. escitalopram oxalate 20 mg tablet Commonly known as:  Anya Luna Take 1 Tab by mouth daily. fluticasone 50 mcg/actuation nasal spray Commonly known as:  Ruthe Para 2 Sprays by Both Nostrils route daily. hydrOXYzine HCl 25 mg tablet Commonly known as:  ATARAX Take 1 Tab by mouth three (3) times daily as needed for Itching.  
  
 ketoconazole 2 % topical cream  
Commonly known as:  NIZORAL Apply  to affected area daily. lidocaine 5 % Commonly known as:  LIDODERM  
1 Patch by TransDERmal route every twenty-four (24) hours. lisinopril 10 mg tablet Commonly known as:  Velton Pike Take 1 Tab by mouth daily. meloxicam 15 mg tablet Commonly known as:  MOBIC Take 1 Tab by mouth daily. pramipexole 0.5 mg tablet Commonly known as:  MIRAPEX Take 1 Tab by mouth nightly. traMADol 50 mg tablet Commonly known as:  ULTRAM  
Take 2 Tabs by mouth every eight (8) hours as needed for Pain. Max Daily Amount: 300 mg.  
  
 traZODone 50 mg tablet Commonly known as:  Samule Grills Take 2 Tabs by mouth nightly. triamcinolone acetonide 0.1 % topical cream  
Commonly known as:  KENALOG Apply  to affected area two (2) times a day. use thin layer TYLENOL EXTRA STRENGTH PO Take  by mouth. VIAGRA 100 mg tablet Generic drug:  sildenafil citrate Prescriptions Sent to Pharmacy  Refills  
 ciprofloxacin HCl (CIPRO) 500 mg tablet 0  
 Sig: Take 1 Tab by mouth two (2) times a day for 10 days. Class: Normal  
 Pharmacy: [id number] [address], [address] [place] [address] #: [phone] Route: Oral  
  
We Performed the Following AMB POC URINALYSIS DIP STICK AUTO W/O MICRO [83263 CPT(R)] CBC WITH AUTOMATED DIFF [79960 CPT(R)] CULTURE, URINE [id number] CPT(R)] METABOLIC PANEL, COMPREHENSIVE [42234 CPT(R)] Follow-up Instructions Return in about 4 weeks (around 7/25/2017) for back pain, weight. Patient Instructions Do not take hydroxyzine while you are on antibiotic Increase water intake to a least 6 glasses daily Introducing [facility] & HEALTH SERVICES! Dear Anette Boswell: 
Thank you for requesting a [employer] account. Our records indicate that you already have an active [employer] account. You can access your account anytime at [email or web address] Guides Did you know that you can access your hospital and ER discharge instructions at any time in [employer]? You can also review all of your test results from your hospital stay or ER visit. Additional Information If you have questions, please visit the Frequently Asked Questions section of the [employer] website at [email or web address] Remember, [employer] is NOT to be used for urgent needs. For medical emergencies, dial 911. Now available from your iPhone and Android! Please provide this summary of care documentation to your next provider. Your primary care clinician is listed as [address] If you have any questions after today's visit, please call [phone].

## 2017-06-27 NOTE — PATIENT INSTRUCTIONS
Do not take hydroxyzine while you are on antibiotic    Increase water intake to a least 6 glasses daily

## 2017-06-27 NOTE — PROGRESS NOTES
RM#7  Chief Complaint   Patient presents with    Enuresis     brownish urine generally not feeling well     Results for orders placed or performed in visit on 06/27/17   AMB POC URINALYSIS DIP STICK AUTO W/O MICRO   Result Value Ref Range    Color (UA POC) Red     Clarity (UA POC) Cloudy     Glucose (UA POC) Negative Negative    Bilirubin (UA POC) Negative Negative    Ketones (UA POC) 4+ Negative    Specific gravity (UA POC) 1.015 1.001 - 1.035    Blood (UA POC) Trace Negative    pH (UA POC) 5.5 4.6 - 8.0    Protein (UA POC) Trace Negative mg/dL    Urobilinogen (UA POC) 4 mg/dL 0.2 - 1    Nitrites (UA POC) Negative Negative    Leukocyte esterase (UA POC) 2+ Negative       1. Have you been to the ER, urgent care clinic since your last visit? Hospitalized since your last visit? No    2. Have you seen or consulted any other health care providers outside of the [address] [place] [address] since your last visit? Include any pap smears or colon screening.  No  Health Maintenance Due   Topic Date Due    GLAUCOMA SCREENING Q2Y  04/01/2017    MEDICARE YEARLY EXAM  04/21/2017

## 2017-06-28 LAB
ALBUMIN SERPL-MCNC: 4.8 G/DL (ref 3.5–4.8)
ALBUMIN/GLOB SERPL: 1.7 {RATIO} (ref 1.2–2.2)
ALP SERPL-CCNC: 194 IU/L (ref 39–117)
ALT SERPL-CCNC: 133 IU/L (ref 0–44)
AST SERPL-CCNC: 265 IU/L (ref 0–40)
BASOPHILS # BLD AUTO: 0 X10E3/UL (ref 0–0.2)
BASOPHILS NFR BLD AUTO: 1 %
BILIRUB SERPL-MCNC: 1.3 MG/DL (ref 0–1.2)
BUN SERPL-MCNC: 10 MG/DL (ref 8–27)
BUN/CREAT SERPL: 12 (ref 10–24)
CALCIUM SERPL-MCNC: 9.7 MG/DL (ref 8.6–10.2)
CHLORIDE SERPL-SCNC: 92 MMOL/L (ref 96–106)
CO2 SERPL-SCNC: 13 MMOL/L (ref 18–29)
CREAT SERPL-MCNC: 0.85 MG/DL (ref 0.76–1.27)
EOSINOPHIL # BLD AUTO: 0 X10E3/UL (ref 0–0.4)
EOSINOPHIL NFR BLD AUTO: 1 %
ERYTHROCYTE [DISTWIDTH] IN BLOOD BY AUTOMATED COUNT: 14.3 % (ref 12.3–15.4)
GLOBULIN SER CALC-MCNC: 2.8 G/DL (ref 1.5–4.5)
GLUCOSE SERPL-MCNC: 80 MG/DL (ref 65–99)
HCT VFR BLD AUTO: 42.7 % (ref 37.5–51)
HGB BLD-MCNC: 15 G/DL (ref 12.6–17.7)
IMM GRANULOCYTES # BLD: 0 X10E3/UL (ref 0–0.1)
IMM GRANULOCYTES NFR BLD: 1 %
LYMPHOCYTES # BLD AUTO: 1.2 X10E3/UL (ref 0.7–3.1)
LYMPHOCYTES NFR BLD AUTO: 19 %
MCH RBC QN AUTO: 33.8 PG (ref 26.6–33)
MCHC RBC AUTO-ENTMCNC: 35.1 G/DL (ref 31.5–35.7)
MCV RBC AUTO: 96 FL (ref 79–97)
MONOCYTES # BLD AUTO: 0.6 X10E3/UL (ref 0.1–0.9)
MONOCYTES NFR BLD AUTO: 10 %
NEUTROPHILS # BLD AUTO: 4.4 X10E3/UL (ref 1.4–7)
NEUTROPHILS NFR BLD AUTO: 68 %
PLATELET # BLD AUTO: 231 X10E3/UL (ref 150–379)
POTASSIUM SERPL-SCNC: 4.5 MMOL/L (ref 3.5–5.2)
PROT SERPL-MCNC: 7.6 G/DL (ref 6–8.5)
RBC # BLD AUTO: 4.44 X10E6/UL (ref 4.14–5.8)
SODIUM SERPL-SCNC: 136 MMOL/L (ref 134–144)
WBC # BLD AUTO: 6.4 X10E3/UL (ref 3.4–10.8)

## 2017-06-28 NOTE — PROGRESS NOTES
HISTORY OF PRESENT ILLNESS  Felisa Forrester is a 66 y.o. male presents for acute visit  HPI  He reports malaise, weight loss, poor appetite, urinary incontinence and low back pain unlike pain r/t spinal stenosis and dark urine for > 1 week    Denies fever, chills, new medication       Past Medical History:   Diagnosis Date    Alcohol abuse, in remission     Arthritis     Back pain     Bladder cancer (HCC)      - in [place], [place]    C. difficile colitis 2010    Cancer involving bladder by direct extension from endometrium ([facility] 10/2016    Depression     Horseshoe kidney     HTN (hypertension) 12/5/2013    Hyperlipidemia     Recurrent bladder transitional cell carcinoma (HCC)     Restless leg syndrome     Situational anxiety     ie air travel    Spinal stenosis, lumbar     Thrombocytopenia (HCC)     Transitional cell carcinoma of left ureter ([facility]     Dr. Marzena Hurd       Current Outpatient Prescriptions on File Prior to Visit   Medication Sig Dispense Refill    triamcinolone acetonide (KENALOG) 0.1 % topical cream Apply  to affected area two (2) times a day. use thin layer 60 g 5    buPROPion XL (WELLBUTRIN XL) 150 mg tablet Take 1 Tab by mouth every morning. 30 Tab 5    traMADol (ULTRAM) 50 mg tablet Take 2 Tabs by mouth every eight (8) hours as needed for Pain. Max Daily Amount: 300 mg. 120 Tab 3    hydrOXYzine HCl (ATARAX) 25 mg tablet Take 1 Tab by mouth three (3) times daily as needed for Itching. 90 Tab 3    traZODone (DESYREL) 50 mg tablet Take 2 Tabs by mouth nightly. 180 Tab 3    meloxicam (MOBIC) 15 mg tablet Take 1 Tab by mouth daily. 90 Tab 3    pramipexole (MIRAPEX) 0.5 mg tablet Take 1 Tab by mouth nightly. 90 Tab 3    lidocaine (LIDODERM) 5 % 1 Patch by TransDERmal route every twenty-four (24) hours. 30 Each 5    azelastine (ASTELIN) 137 mcg (0.1 %) nasal spray 1 [place] by Both Nostrils route two (2) times a day.  Use in each nostril as directed 1 Bottle 5    escitalopram oxalate (LEXAPRO) 20 mg tablet Take 1 Tab by mouth daily. 90 Tab 3    lisinopril (PRINIVIL, ZESTRIL) 10 mg tablet Take 1 Tab by mouth daily. 90 Tab 3    ketoconazole (NIZORAL) 2 % topical cream Apply  to affected area daily. 30 g 2    ALPRAZolam (XANAX) 0.25 mg tablet Take 1 Tab by mouth two (2) times daily as needed for Anxiety. 15 Tab 1    VIAGRA 100 mg tablet   0    fluticasone (FLONASE) 50 mcg/actuation nasal spray 2 Sprays by Both Nostrils route daily. 1 Bottle 5    ACETAMINOPHEN (TYLENOL EXTRA STRENGTH PO) Take  by mouth. No current facility-administered medications on file prior to visit. Review of Systems   Constitutional: Positive for malaise/fatigue and weight loss. Negative for chills, diaphoresis and fever. Eyes: Negative. Respiratory: Negative. Cardiovascular: Negative. Gastrointestinal: Negative. Genitourinary: Negative for dysuria, flank pain, frequency, hematuria and urgency. Musculoskeletal: Positive for back pain and myalgias. Skin: Negative. Neurological: Negative for dizziness and weakness. Physical Exam   Constitutional: He is oriented to person, place, and time. He appears well-developed and well-nourished. He appears ill. No distress. HENT:   Right Ear: External ear normal.   Left Ear: External ear normal.   Nose: Nose normal.   Mouth/Throat: Oropharynx is clear and moist. No oropharyngeal exudate. Eyes: Conjunctivae are normal. Right eye exhibits no discharge. Left eye exhibits no discharge. No scleral icterus. Cardiovascular: Normal rate, regular rhythm and normal heart sounds. Pulmonary/Chest: Effort normal and breath sounds normal.   Abdominal: Soft. Bowel sounds are normal. He exhibits no distension and no mass. There is no tenderness. There is no rebound and no guarding. Musculoskeletal: He exhibits no edema, tenderness or deformity. Lymphadenopathy:     He has no cervical adenopathy.    Neurological: He is alert and oriented to person, place, and time. No cranial nerve deficit. Skin: Skin is warm and dry. No rash noted. He is not diaphoretic. No erythema. Psychiatric: He has a normal mood and affect. His behavior is normal. Judgment and thought content normal.       ASSESSMENT and PLAN    ICD-10-CM ICD-9-CM    1. Urinary incontinence, unspecified type R32 788.30 AMB POC URINALYSIS DIP STICK AUTO W/O MICRO   2. Abnormal urine color R39.89 791.9 AMB POC URINALYSIS DIP STICK AUTO W/O MICRO   3. Acute bilateral low back pain without sciatica M54.5 724.2 AMB POC URINALYSIS DIP STICK AUTO W/O MICRO     338.19    4. Weight loss R63.4 783.21 CBC WITH AUTOMATED DIFF      METABOLIC PANEL, COMPREHENSIVE   5. Abnormal urine findings R82.90 791.9 CULTURE, URINE      ciprofloxacin HCl (CIPRO) 500 mg tablet   6. Essential hypertension W07 928.1 METABOLIC PANEL, COMPREHENSIVE   7. Hepatitis K75.9 573.3 US ABD COMP      HEPATITIS PANEL, ACUTE      ACETAMINOPHEN   8. Acidosis  E87.2 276.2 ACETAMINOPHEN     Follow-up Disposition:  Return in about 4 weeks (around 7/25/2017) for back pain, weight. current treatment plan is effective, no change in therapy  lab results and schedule of future lab studies reviewed with patient  reviewed medications and side effects in detail  radiology results and schedule of future radiology studies reviewed with patient    Urine dip 2+ leukocytes, 4+ ketones. Discussed with patient possible bladder infection. He elects to be treated before urine culture results.  Strongly encouraged to increase hydration    Addendum: abnormal liver enzymes and abdominal US and hepatitis screening ordered

## 2017-06-29 LAB
HAV IGM SERPL QL IA: NEGATIVE
HBV CORE IGM SERPL QL IA: NEGATIVE
HBV SURFACE AG SERPL QL IA: NEGATIVE
HCV AB S/CO SERPL IA: 0.1 S/CO RATIO (ref 0–0.9)
SPECIMEN STATUS REPORT, ROLRST: NORMAL

## 2017-06-30 ENCOUNTER — TELEPHONE (OUTPATIENT)
Dept: INTERNAL MEDICINE CLINIC | Age: 72
End: 2017-06-30

## 2017-06-30 LAB — BACTERIA UR CULT: ABNORMAL

## 2017-06-30 NOTE — TELEPHONE ENCOUNTER
Patient advised of urine culture results. He reports feeling better, believes antibiotic is working. He has not checked messages from Dr Keila Cornejo on [address] Messages read to patient. Advise will consult with Dr. Keila Cornejo re. further use of antibiotic in light of urine culture result and get back with him. Dr. Keila Cornejo recommends competion of antibiotic therapy and for patient to get abdominal ultrasound    Spouse advised. Patient was called to schedule ultrasound but did not because. he was unaware this was ordered.      Office staff will call central scheduling on Monday to request that they call patient

## 2017-07-03 ENCOUNTER — DOCUMENTATION ONLY (OUTPATIENT)
Dept: INTERNAL MEDICINE CLINIC | Age: 72
End: 2017-07-03

## 2017-07-03 NOTE — PROGRESS NOTES
Called scheduling @[phone] and advised that pt needs to schedule abdominal ultrasound, April from scheduling states she will call Mr. Ramesh Mcelroy to set up appt.

## 2017-07-06 ENCOUNTER — HOSPITAL ENCOUNTER (OUTPATIENT)
Dept: ULTRASOUND IMAGING | Age: 72
Discharge: HOME | End: 2017-07-06
Payer: MEDICARE

## 2017-07-06 DIAGNOSIS — K75.9 HEPATITIS: ICD-10-CM

## 2017-07-06 PROCEDURE — 76700 US EXAM ABDOM COMPLETE: CPT

## 2017-07-27 DIAGNOSIS — N39.0 URINARY TRACT INFECTION WITHOUT HEMATURIA, SITE UNSPECIFIED: Primary | ICD-10-CM

## 2017-07-27 RX ORDER — CIPROFLOXACIN 500 MG/1
500 TABLET ORAL 2 TIMES DAILY
Qty: 20 TAB | Refills: 0 | Status: SHIPPED | OUTPATIENT
Start: 2017-07-27 | End: 2017-08-01 | Stop reason: SDUPTHER

## 2017-07-31 LAB
BACTERIA UR CULT: ABNORMAL
BACTERIA UR CULT: ABNORMAL

## 2017-08-01 ENCOUNTER — LAB ONLY (OUTPATIENT)
Dept: INTERNAL MEDICINE CLINIC | Age: 72
End: 2017-08-01

## 2017-08-01 DIAGNOSIS — R35.0 FREQUENCY OF URINATION: Primary | ICD-10-CM

## 2017-08-01 DIAGNOSIS — N39.0 URINARY TRACT INFECTION WITHOUT HEMATURIA, SITE UNSPECIFIED: ICD-10-CM

## 2017-08-01 LAB
BILIRUB UR QL STRIP: NEGATIVE
GLUCOSE UR-MCNC: NEGATIVE MG/DL
KETONES P FAST UR STRIP-MCNC: NEGATIVE MG/DL
PH UR STRIP: 6.5 [PH] (ref 4.6–8)
PROT UR QL STRIP: NEGATIVE MG/DL
SP GR UR STRIP: 1.01 (ref 1–1.03)
UA UROBILINOGEN AMB POC: NORMAL (ref 0.2–1)
URINALYSIS CLARITY POC: NORMAL
URINALYSIS COLOR POC: YELLOW
URINE BLOOD POC: NORMAL
URINE LEUKOCYTES POC: NORMAL
URINE NITRITES POC: NEGATIVE

## 2017-08-01 RX ORDER — CIPROFLOXACIN 500 MG/1
500 TABLET ORAL 2 TIMES DAILY
Qty: 28 TAB | Refills: 0 | Status: SHIPPED | OUTPATIENT
Start: 2017-08-01 | End: 2017-08-02 | Stop reason: ALTCHOICE

## 2017-08-02 DIAGNOSIS — N39.0 URINARY TRACT INFECTION WITHOUT HEMATURIA, SITE UNSPECIFIED: ICD-10-CM

## 2017-08-02 LAB
ALBUMIN SERPL-MCNC: 3.9 G/DL (ref 3.5–4.8)
ALBUMIN/GLOB SERPL: 1.3 {RATIO} (ref 1.2–2.2)
ALP SERPL-CCNC: 184 IU/L (ref 39–117)
ALT SERPL-CCNC: 35 IU/L (ref 0–44)
AST SERPL-CCNC: 52 IU/L (ref 0–40)
BASOPHILS # BLD AUTO: 0 X10E3/UL (ref 0–0.2)
BASOPHILS NFR BLD AUTO: 0 %
BILIRUB SERPL-MCNC: 0.6 MG/DL (ref 0–1.2)
BUN SERPL-MCNC: 11 MG/DL (ref 8–27)
BUN/CREAT SERPL: 14 (ref 10–24)
CALCIUM SERPL-MCNC: 9.6 MG/DL (ref 8.6–10.2)
CHLORIDE SERPL-SCNC: 99 MMOL/L (ref 96–106)
CO2 SERPL-SCNC: 23 MMOL/L (ref 18–29)
CREAT SERPL-MCNC: 0.8 MG/DL (ref 0.76–1.27)
EOSINOPHIL # BLD AUTO: 0.2 X10E3/UL (ref 0–0.4)
EOSINOPHIL NFR BLD AUTO: 2 %
ERYTHROCYTE [DISTWIDTH] IN BLOOD BY AUTOMATED COUNT: 14.2 % (ref 12.3–15.4)
GLOBULIN SER CALC-MCNC: 3.1 G/DL (ref 1.5–4.5)
GLUCOSE SERPL-MCNC: 78 MG/DL (ref 65–99)
HCT VFR BLD AUTO: 39.7 % (ref 37.5–51)
HGB BLD-MCNC: 13.7 G/DL (ref 12.6–17.7)
IMM GRANULOCYTES # BLD: 0 X10E3/UL (ref 0–0.1)
IMM GRANULOCYTES NFR BLD: 0 %
LYMPHOCYTES # BLD AUTO: 1.3 X10E3/UL (ref 0.7–3.1)
LYMPHOCYTES NFR BLD AUTO: 14 %
MCH RBC QN AUTO: 33.6 PG (ref 26.6–33)
MCHC RBC AUTO-ENTMCNC: 34.5 G/DL (ref 31.5–35.7)
MCV RBC AUTO: 97 FL (ref 79–97)
MONOCYTES # BLD AUTO: 0.7 X10E3/UL (ref 0.1–0.9)
MONOCYTES NFR BLD AUTO: 7 %
NEUTROPHILS # BLD AUTO: 6.9 X10E3/UL (ref 1.4–7)
NEUTROPHILS NFR BLD AUTO: 77 %
PLATELET # BLD AUTO: 367 X10E3/UL (ref 150–379)
POTASSIUM SERPL-SCNC: 5 MMOL/L (ref 3.5–5.2)
PROT SERPL-MCNC: 7 G/DL (ref 6–8.5)
RBC # BLD AUTO: 4.08 X10E6/UL (ref 4.14–5.8)
SODIUM SERPL-SCNC: 139 MMOL/L (ref 134–144)
WBC # BLD AUTO: 9.1 X10E3/UL (ref 3.4–10.8)

## 2017-08-02 RX ORDER — FLUCONAZOLE 200 MG/1
200 TABLET ORAL DAILY
Qty: 14 TAB | Refills: 0 | Status: SHIPPED | OUTPATIENT
Start: 2017-08-02 | End: 2017-08-16

## 2017-08-03 DIAGNOSIS — N39.0 URINARY TRACT INFECTION WITHOUT HEMATURIA, SITE UNSPECIFIED: Primary | ICD-10-CM

## 2017-08-03 LAB
BACTERIA UR CULT: ABNORMAL
BACTERIA UR CULT: ABNORMAL

## 2017-08-03 RX ORDER — AMOXICILLIN 500 MG/1
500 CAPSULE ORAL 3 TIMES DAILY
Qty: 21 CAP | Refills: 0 | Status: SHIPPED | OUTPATIENT
Start: 2017-08-03 | End: 2017-08-10

## 2017-08-08 ENCOUNTER — OFFICE VISIT (OUTPATIENT)
Dept: INTERNAL MEDICINE CLINIC | Age: 72
End: 2017-08-08

## 2017-08-08 VITALS
TEMPERATURE: 98.5 F | HEART RATE: 98 BPM | OXYGEN SATURATION: 95 % | BODY MASS INDEX: 27.16 KG/M2 | HEIGHT: 71 IN | DIASTOLIC BLOOD PRESSURE: 72 MMHG | SYSTOLIC BLOOD PRESSURE: 108 MMHG | WEIGHT: 194 LBS

## 2017-08-08 DIAGNOSIS — Z00.00 ROUTINE GENERAL MEDICAL EXAMINATION AT A HEALTH CARE FACILITY: Primary | ICD-10-CM

## 2017-08-08 DIAGNOSIS — M54.9 CHRONIC BACK PAIN, UNSPECIFIED BACK LOCATION, UNSPECIFIED BACK PAIN LATERALITY: ICD-10-CM

## 2017-08-08 DIAGNOSIS — Z91.81 RISK FOR FALLS: ICD-10-CM

## 2017-08-08 DIAGNOSIS — F41.8 SITUATIONAL ANXIETY: ICD-10-CM

## 2017-08-08 DIAGNOSIS — G89.29 CHRONIC BACK PAIN, UNSPECIFIED BACK LOCATION, UNSPECIFIED BACK PAIN LATERALITY: ICD-10-CM

## 2017-08-08 DIAGNOSIS — C67.9 MALIGNANT NEOPLASM OF URINARY BLADDER, UNSPECIFIED SITE (HCC): ICD-10-CM

## 2017-08-08 DIAGNOSIS — E78.5 HYPERLIPIDEMIA, UNSPECIFIED HYPERLIPIDEMIA TYPE: ICD-10-CM

## 2017-08-08 DIAGNOSIS — Z13.39 SCREENING FOR ALCOHOLISM: ICD-10-CM

## 2017-08-08 DIAGNOSIS — M48.061 SPINAL STENOSIS, LUMBAR: ICD-10-CM

## 2017-08-08 DIAGNOSIS — I10 ESSENTIAL HYPERTENSION: ICD-10-CM

## 2017-08-08 RX ORDER — CIPROFLOXACIN 500 MG/1
500 TABLET ORAL 2 TIMES DAILY
Refills: 0 | COMMUNITY
Start: 2017-07-27 | End: 2017-08-08 | Stop reason: ALTCHOICE

## 2017-08-08 NOTE — PROGRESS NOTES
This is an Initial Medicare Annual Wellness Exam (AWV) (Performed 12 months after IPPE or effective date of Medicare Part B enrollment, Once in a lifetime)    I have reviewed the patient's medical history in detail and updated the computerized patient record. History     Past Medical History:   Diagnosis Date    Alcohol abuse, in remission     Arthritis     Back pain     Bladder cancer (HCC)      - in [place], [place]    C. difficile colitis 2010    Cancer involving bladder by direct extension from endometrium ([facility] 10/2016    Depression     Horseshoe kidney     HTN (hypertension) 12/5/2013    Hyperlipidemia     Recurrent bladder transitional cell carcinoma (HCC)     Restless leg syndrome     Situational anxiety     ie air travel    Spinal stenosis, lumbar     Thrombocytopenia (HCC)     Transitional cell carcinoma of left ureter ([facility]     Dr. Musa Chen      Past Surgical History:   Procedure Laterality Date    BLADDER CANCER FISH      endoscopic    HX UROLOGICAL       Current Outpatient Prescriptions   Medication Sig Dispense Refill    traMADol (ULTRAM) 50 mg tablet take 2 tablets by mouth every 8 hours if needed for pain 120 Tab 3    amoxicillin (AMOXIL) 500 mg capsule Take 1 Cap by mouth three (3) times daily for 7 days. 21 Cap 0    fluconazole (DIFLUCAN) 200 mg tablet Take 1 Tab by mouth daily for 14 days. 14 Tab 0    triamcinolone acetonide (KENALOG) 0.1 % topical cream Apply  to affected area two (2) times a day. use thin layer 60 g 5    traZODone (DESYREL) 50 mg tablet Take 2 Tabs by mouth nightly. 180 Tab 3    meloxicam (MOBIC) 15 mg tablet Take 1 Tab by mouth daily. 90 Tab 3    pramipexole (MIRAPEX) 0.5 mg tablet Take 1 Tab by mouth nightly. 90 Tab 3    lidocaine (LIDODERM) 5 % 1 Patch by TransDERmal route every twenty-four (24) hours. 30 Each 5    azelastine (ASTELIN) 137 mcg (0.1 %) nasal spray 1 [place] by Both Nostrils route two (2) times a day.  Use in each nostril as directed 1 Bottle 5    escitalopram oxalate (LEXAPRO) 20 mg tablet Take 1 Tab by mouth daily. 90 Tab 3    lisinopril (PRINIVIL, ZESTRIL) 10 mg tablet Take 1 Tab by mouth daily. 90 Tab 3    ALPRAZolam (XANAX) 0.25 mg tablet Take 1 Tab by mouth two (2) times daily as needed for Anxiety. 15 Tab 1    VIAGRA 100 mg tablet   0    ketoconazole (NIZORAL) 2 % topical cream Apply  to affected area daily. 30 g 2    fluticasone (FLONASE) 50 mcg/actuation nasal spray 2 Sprays by Both Nostrils route daily. 1 Bottle 5    ACETAMINOPHEN (TYLENOL EXTRA STRENGTH PO) Take  by mouth. No Known Allergies  Family History   Problem Relation Age of Onset    Heart Disease Mother     Hypertension Mother     Cancer Father      lung ca     Social History   Substance Use Topics    Smoking status: Former Smoker    Smokeless tobacco: Never Used    Alcohol use No     Patient Active Problem List   Diagnosis Code    Depression F32.9    Arthritis M19.90    Situational anxiety F41.8    Restless leg syndrome G25.81    Hyperlipidemia E78.5    HTN (hypertension) I10    Bladder cancer ([facility] [facility] C67.9    Back pain M54.9    Advanced directives, counseling/discussion Z71.89    Recurrent bladder transitional cell carcinoma ([facility] [address] [facility] C67.9    Horseshoe kidney Q63.1    Spinal stenosis, lumbar M48.06    Spinal stenosis of lumbar region M48.06         Depression Risk Factor Screening:     PHQ over the last two weeks 8/8/2017   Little interest or pleasure in doing things Not at all   Feeling down, depressed or hopeless Several days   Total Score PHQ 2 1     Alcohol Risk Factor Screening: On any occasion during the past 3 months, have you had more than 4 drinks containing alcohol? No    Do you average more than 14 drinks per week? No    But, +prior history and recent episode of self medicating with etoh    Functional Ability and Level of Safety:     Hearing Loss   normal-to-mild    Activities of Daily Living   Self-care.    Requires assistance with: using cane but no ADLs limitation    Fall Risk     Fall Risk Assessment, last 12 mths 8/8/2017   Able to walk? Yes   Fall in past 12 months? No     Using cane along with slow deliberate gait due to weakness from spinal stenosis. Abuse Screen   Patient is not abused    Review of Systems   A comprehensive review of systems was negative except for that written in the HPI. See other    Physical Examination     No exam data present    Evaluation of Cognitive Function:  Mood/affect:  neutral  Appearance: age appropriate  Family member/caregiver input: NA    Visit Vitals    /72 (BP 1 Location: Left arm, BP Patient Position: Sitting)    Pulse 98    Temp 98.5 °F (36.9 °C) (Oral)    Ht 5' 10.5\" (1.791 m)    Wt 194 lb (88 kg)    SpO2 95%    BMI 27.44 kg/m2     General appearance: alert, cooperative, no distress, appears stated age  Lungs: clear to auscultation bilaterally  Heart: regular rate and rhythm, S1, S2 normal, no murmur, click, rub or gallop  Abdomen: soft, non-tender. Bowel sounds normal. No masses,  no organomegaly  Neurologic: Gait: Abnormal    Patient Care Team:  Kenrick Moody MD as PCP - General (Internal Medicine)  Mono Martinez MD (Urology)    Advice/Referrals/Counseling   Education and counseling provided:  Are appropriate based on today's review and evaluation      Assessment/Plan       ICD-10-CM ICD-9-CM    1. Routine general medical examination at a health care facility Z00.00 V70.0    2. Malignant neoplasm of urinary bladder, unspecified site (HCC) C67.9 188.9    3. Situational anxiety F41.8 300.09    4. Essential hypertension I10 401.9    5. Hyperlipidemia, unspecified hyperlipidemia type E78.5 272.4    6. Spinal stenosis, lumbar M48.06 724.02 REFERRAL TO ORTHOPEDICS   7. Screening for alcoholism Z13.89 V79.1    8. Risk for falls Z91.81 V15.88      Follow-up Disposition:  Return in about 3 months (around 11/8/2017) for back pain.     results and schedule of future studies reviewed with patient  reviewed diet  and weight    cardiovascular risk and specific lipid/LDL goals reviewed  reviewed medications and side effects in detail.

## 2017-08-08 NOTE — PROGRESS NOTES
Rm #15    Chief Complaint   Patient presents with    Annual Wellness Visit     Fall Risk Assessment, last 12 mths 8/8/2017   Able to walk? Yes   Fall in past 12 months? No     Abuse Screening Questionnaire 8/8/2017   Do you ever feel afraid of your partner? N   Are you in a relationship with someone who physically or mentally threatens you? N   Is it safe for you to go home? Y     ADL Assessment 8/8/2017   Feeding yourself No Help Needed   Getting from bed to chair No Help Needed   Getting dressed No Help Needed   Bathing or showering No Help Needed   Walk across the room (includes cane/walker) No Help Needed   Using the telphone No Help Needed   Taking your medications No Help Needed   Preparing meals No Help Needed   Managing money (expenses/bills) No Help Needed   Moderately strenuous housework (laundry) No Help Needed   Shopping for personal items (toiletries/medicines) No Help Needed   Shopping for groceries No Help Needed   Driving No Help Needed   Climbing a flight of stairs No Help Needed   Getting to places beyond walking distances No Help Needed     PHQ over the last two weeks 8/8/2017   Little interest or pleasure in doing things Not at all   Feeling down, depressed or hopeless Not at all   Total Score PHQ 2 0     Patient states had Cologuard done approximately 1 year ago and last  eye exam done last year. Patient does have advance directive in place, asked to provide copy to this practice.

## 2017-08-08 NOTE — PROGRESS NOTES
HISTORY OF PRESENT ILLNESS  Lonne Kussmaul is a 66 y.o. male. HPI  Presents for f/u back pain, spinal stenosis, UTI, etc    Taking diflucan and amox for UTI    Stopped tylenol and etoh    Still having back pain - but a little better  Still has leg weakness  Getting PT and getting stronger and more functional, though. Tramadol helps with the pain - 2 tab 2-3 x per day    Pt had a cruise scheduled and had to cancel due to leg weakness and fall risk, etc    Mood improved as well despite not taking the wellbutrin    Past medical, Social, and Family history reviewed  Medications reviewed and updated. ROS  Complete ROS reviewed and negative or stable except as noted in HPI. Physical Exam   Constitutional: He is oriented to person, place, and time. He appears well-nourished. No distress. HENT:   Head: Normocephalic and atraumatic. Mouth/Throat: Oropharynx is clear and moist. No oropharyngeal exudate. Eyes: EOM are normal. Pupils are equal, round, and reactive to light. No scleral icterus. Neck: Normal range of motion. Neck supple. No JVD present. No thyromegaly present. Cardiovascular: Normal rate, regular rhythm and normal heart sounds. Exam reveals no gallop and no friction rub. No murmur heard. Pulmonary/Chest: Effort normal and breath sounds normal. No respiratory distress. He has no wheezes. He has no rales. Abdominal: Soft. Bowel sounds are normal. He exhibits no distension. There is no tenderness. Musculoskeletal: Normal range of motion. He exhibits no edema. Slow deliberate gait, use of cane required for ambulation. Lymphadenopathy:     He has no cervical adenopathy. Neurological: He is alert and oriented to person, place, and time. He exhibits normal muscle tone. Coordination normal.   Skin: Skin is warm. No rash noted. Psychiatric: He has a normal mood and affect. Nursing note and vitals reviewed. Prior labs reviewed.   Reviewed prior imaging report    ASSESSMENT and PLAN ICD-10-CM ICD-9-CM    1. Spinal stenosis, lumbar M48.06 724.02 REFERRAL TO ORTHOPEDICS   2. Malignant neoplasm of urinary bladder, unspecified site (HCC) C67.9 188.9    3. Situational anxiety F41.8 300.09    4. Essential hypertension I10 401.9    5. Hyperlipidemia, unspecified hyperlipidemia type E78.5 272.4    6. Routine general medical examination at a health care facility Z00.00 V70.0    7. Screening for alcoholism Z13.89 V79.1    8. Risk for falls Z91.81 V15.88      Follow-up Disposition:  Return in about 3 months (around 11/8/2017) for back pain. results and schedule of future studies reviewed with patient  reviewed diet  and weight   cardiovascular risk and specific lipid/LDL goals reviewed  reviewed medications and side effects in detail   Dc cipro  Complete amox and diflucan  Review UTI with urology in Sept as scheduled  Complete PT  Consider ortho spine referral pending PT benefit  Fill out physician statement re: cruise and vacation insurance  I agree that travel on the cruise in his condition was not medically advisable and that his condition abruptly worsened prompting this decision.

## 2017-08-08 NOTE — MR AVS SNAPSHOT
Visit Information Date & Time Provider Department Dept. Phone Encounter #  
 8/8/2017  4:00 PM Vineet Mcelroy, [address] [place] [address] and Internal Medicine [phone] [id number] Follow-up Instructions Return in about 3 months (around 11/8/2017) for back pain. Upcoming Health Maintenance Date Due  
 MEDICARE YEARLY EXAM 4/21/2017 INFLUENZA AGE 9 TO ADULT 8/1/2017 GLAUCOMA SCREENING Q2Y 4/1/2018 DTaP/Tdap/Td series (2 - Td) 11/19/2023 COLONOSCOPY 6/5/2024 Allergies as of 8/8/2017  Review Complete On: 8/8/2017 By: Vineet Mcelroy MD  
 No Known Allergies Current Immunizations  Reviewed on 8/8/2017 Name Date Influenza High Dose Vaccine PF 9/12/2016 Influenza Vaccine 9/9/2014, 11/20/2013, 9/11/2013 Influenza Vaccine (Quad) PF 8/12/2015 Pneumococcal Conjugate (PCV-13) 5/4/2015 Pneumococcal Vaccine (Unspecified Type) 10/29/2013 Tdap 11/19/2013 Zoster Vaccine, Live 9/20/2013 Reviewed by Vineet Mcelroy MD on 8/8/2017 at  5:37 PM  
You Were Diagnosed With   
  
 Codes Comments Spinal stenosis, lumbar    -  Primary ICD-10-CM: M48.06 
ICD-9-CM: 724.02 Malignant neoplasm of urinary bladder, unspecified site [facility])     ICD-10-CM: C67.9 ICD-9-CM: 188.9 Situational anxiety     ICD-10-CM: F41.8 ICD-9-CM: 300.09 Essential hypertension     ICD-10-CM: I10 
ICD-9-CM: 401.9 Hyperlipidemia, unspecified hyperlipidemia type     ICD-10-CM: E78.5 ICD-9-CM: 272.4 Routine general medical examination at a health care facility     ICD-10-CM: Z00.00 ICD-9-CM: V70.0 Screening for alcoholism     ICD-10-CM: Z13.89 ICD-9-CM: V79.1 Vitals BP Pulse Temp Height(growth percentile) Weight(growth percentile) SpO2  
 108/72 (BP 1 Location: Left arm, BP Patient Position: Sitting) 98 98.5 °F (36.9 °C) (Oral) 5' 10.5\" (1.791 m) 194 lb (88 kg) 95% BMI Smoking Status 27.44 kg/m2 Former Smoker BMI and BSA Data Body Mass Index Body Surface Area  
 27.44 kg/m 2 2.09 m 2 Preferred Pharmacy Pharmacy Name Phone [facility] [phone] Your Updated Medication List  
  
   
This list is accurate as of: 8/8/17  5:50 PM.  Always use your most recent med list.  
  
  
  
  
 ALPRAZolam 0.25 mg tablet Commonly known as:  Sanders Marlon Take 1 Tab by mouth two (2) times daily as needed for Anxiety. amoxicillin 500 mg capsule Commonly known as:  AMOXIL Take 1 Cap by mouth three (3) times daily for 7 days. azelastine 137 mcg (0.1 %) nasal spray Commonly known as:  ASTELIN  
1 Spray by Both Nostrils route two (2) times a day. Use in each nostril as directed  
  
 escitalopram oxalate 20 mg tablet Commonly known as:  Vanesa Roof Take 1 Tab by mouth daily. fluconazole 200 mg tablet Commonly known as:  DIFLUCAN Take 1 Tab by mouth daily for 14 days. fluticasone 50 mcg/actuation nasal spray Commonly known as:  [place] Hoot 2 Sprays by Both Nostrils route daily. ketoconazole 2 % topical cream  
Commonly known as:  NIZORAL Apply  to affected area daily. lidocaine 5 % Commonly known as:  LIDODERM  
1 Patch by TransDERmal route every twenty-four (24) hours. lisinopril 10 mg tablet Commonly known as:  Allen Handing Take 1 Tab by mouth daily. meloxicam 15 mg tablet Commonly known as:  MOBIC Take 1 Tab by mouth daily. pramipexole 0.5 mg tablet Commonly known as:  MIRAPEX Take 1 Tab by mouth nightly. traMADol 50 mg tablet Commonly known as:  ULTRAM  
take 2 tablets by mouth every 8 hours if needed for pain  
  
 traZODone 50 mg tablet Commonly known as:  Eward Sheets Take 2 Tabs by mouth nightly. triamcinolone acetonide 0.1 % topical cream  
Commonly known as:  KENALOG Apply  to affected area two (2) times a day. use thin layer TYLENOL EXTRA STRENGTH PO Take  by mouth. VIAGRA 100 mg tablet Generic drug:  sildenafil citrate We Performed the Following REFERRAL TO ORTHOPEDICS [KXU285 Custom] Comments:  
 Spinal stenosis Follow-up Instructions Return in about 3 months (around 11/8/2017) for back pain. Referral Information Referral ID Referred By Referred To  
  
 [id number] [facility] [id number], [address], [address] [place] Visits Status Start Date End Date 1 New Request 8/8/17 8/8/18 If your referral has a status of pending review or denied, additional information will be sent to support the outcome of this decision. Introducing [facility] & HEALTH SERVICES! Dear Angeline Purcell: 
Thank you for requesting a Eye-Q account. Our records indicate that you already have an active Eye-Q account. You can access your account anytime at [email or web address] Did you know that you can access your hospital and ER discharge instructions at any time in Eye-Q? You can also review all of your test results from your hospital stay or ER visit. Additional Information If you have questions, please visit the Frequently Asked Questions section of the Eye-Q website at [email or web address] Remember, Eye-Q is NOT to be used for urgent needs. For medical emergencies, dial 911. Now available from your iPhone and Android! Please provide this summary of care documentation to your next provider. Your primary care clinician is listed as [address] If you have any questions after today's visit, please call [phone].

## 2017-08-10 ENCOUNTER — DOCUMENTATION ONLY (OUTPATIENT)
Dept: INTERNAL MEDICINE CLINIC | Age: 72
End: 2017-08-10

## 2017-09-06 ENCOUNTER — PATIENT MESSAGE (OUTPATIENT)
Dept: INTERNAL MEDICINE CLINIC | Age: 72
End: 2017-09-06

## 2017-09-06 DIAGNOSIS — M48.061 SPINAL STENOSIS, LUMBAR: Primary | ICD-10-CM

## 2017-09-06 DIAGNOSIS — G89.29 CHRONIC BACK PAIN, UNSPECIFIED BACK LOCATION, UNSPECIFIED BACK PAIN LATERALITY: ICD-10-CM

## 2017-09-06 DIAGNOSIS — M19.90 ARTHRITIS: ICD-10-CM

## 2017-09-06 DIAGNOSIS — M54.9 CHRONIC BACK PAIN, UNSPECIFIED BACK LOCATION, UNSPECIFIED BACK PAIN LATERALITY: ICD-10-CM

## 2017-09-11 DIAGNOSIS — M19.90 ARTHRITIS: ICD-10-CM

## 2017-09-12 ENCOUNTER — TELEPHONE (OUTPATIENT)
Dept: INTERNAL MEDICINE CLINIC | Age: 72
End: 2017-09-12

## 2017-09-12 RX ORDER — LIDOCAINE 50 MG/G
PATCH TOPICAL
Qty: 30 PATCH | Refills: 5 | Status: SHIPPED | OUTPATIENT
Start: 2017-09-12 | End: 2018-08-07

## 2017-09-12 NOTE — TELEPHONE ENCOUNTER
Kye Kang  [phone]    Mr. Tiana Baez dropped off a pre-op form from [address] [place] [address] but declined scheduling a pre-op exam.  He stated that he wants to verify with Dr. Kamaljit Roldan that he will need a pre-op prior to scheduling. Pre-op form scanned in [facility].

## 2017-09-12 NOTE — TELEPHONE ENCOUNTER
[employer] message sent    Please verify pt received it and understands that this is a regulatory requirement intended for patient safety.

## 2017-09-14 NOTE — TELEPHONE ENCOUNTER
Spoke with patient after verifying name and  regarding Dr. Viraj Luke recommendation. Patient stated he will go to the hospital and complete the process. Patient given an opportunity to ask questions, repeated information, and verbalized understanding. Per my chart message Last read by Jose Miguel Reddy at 8:22 PM on 2017.

## 2017-09-26 DIAGNOSIS — I10 ESSENTIAL HYPERTENSION WITH GOAL BLOOD PRESSURE LESS THAN 140/90: ICD-10-CM

## 2017-09-27 RX ORDER — LISINOPRIL 10 MG/1
TABLET ORAL
Qty: 90 TAB | Refills: 3 | Status: SHIPPED | OUTPATIENT
Start: 2017-09-27 | End: 2018-11-19 | Stop reason: SDUPTHER

## 2017-10-18 ENCOUNTER — OFFICE VISIT (OUTPATIENT)
Dept: INTERNAL MEDICINE CLINIC | Age: 72
End: 2017-10-18

## 2017-10-18 VITALS
HEIGHT: 71 IN | WEIGHT: 219.6 LBS | TEMPERATURE: 99.1 F | HEART RATE: 81 BPM | RESPIRATION RATE: 16 BRPM | SYSTOLIC BLOOD PRESSURE: 126 MMHG | OXYGEN SATURATION: 95 % | BODY MASS INDEX: 30.74 KG/M2 | DIASTOLIC BLOOD PRESSURE: 77 MMHG

## 2017-10-18 DIAGNOSIS — G25.81 RESTLESS LEG SYNDROME: ICD-10-CM

## 2017-10-18 DIAGNOSIS — I10 ESSENTIAL HYPERTENSION: ICD-10-CM

## 2017-10-18 DIAGNOSIS — M54.9 CHRONIC BACK PAIN, UNSPECIFIED BACK LOCATION, UNSPECIFIED BACK PAIN LATERALITY: ICD-10-CM

## 2017-10-18 DIAGNOSIS — M48.062 SPINAL STENOSIS OF LUMBAR REGION WITH NEUROGENIC CLAUDICATION: Primary | ICD-10-CM

## 2017-10-18 DIAGNOSIS — G89.29 CHRONIC BACK PAIN, UNSPECIFIED BACK LOCATION, UNSPECIFIED BACK PAIN LATERALITY: ICD-10-CM

## 2017-10-18 DIAGNOSIS — E78.5 HYPERLIPIDEMIA, UNSPECIFIED HYPERLIPIDEMIA TYPE: ICD-10-CM

## 2017-10-18 DIAGNOSIS — Q63.1 HORSESHOE KIDNEY: ICD-10-CM

## 2017-10-18 DIAGNOSIS — Z01.818 PREOP EXAMINATION: ICD-10-CM

## 2017-10-18 DIAGNOSIS — M19.90 ARTHRITIS: ICD-10-CM

## 2017-10-18 NOTE — PROGRESS NOTES
Rm 14    Chief Complaint   Patient presents with    Pre-op Exam     back surgery 10/23/17   form to be completed by doctor      1. Have you been to the ER, urgent care clinic since your last visit? Hospitalized since your last visit? No    2. Have you seen or consulted any other health care providers outside of the [facility] since your last visit? Include any pap smears or colon screening. No    Health Maintenance Due   Topic Date Due    INFLUENZA AGE 9 TO ADULT  08/01/2017     Flu shot done at [facility] 8/10/17  Fall Risk Assessment, last 12 mths 8/8/2017   Able to walk? Yes   Fall in past 12 months?  No

## 2017-10-18 NOTE — MR AVS SNAPSHOT
Visit Information Date & Time Provider Department Dept. Phone Encounter #  
 10/18/2017 11:45 AM Eric Wood, [address] [place] [address] and Internal Medicine [phone] [id number] Follow-up Instructions Return in about 4 months (around 2/18/2018), or if symptoms worsen or fail to improve, for blood pressure. Upcoming Health Maintenance Date Due  
 GLAUCOMA SCREENING Q2Y 4/1/2018 MEDICARE YEARLY EXAM 8/9/2018 DTaP/Tdap/Td series (2 - Td) 11/19/2023 COLONOSCOPY 6/5/2024 Allergies as of 10/18/2017  Review Complete On: 10/18/2017 By: Eric Wood MD  
 No Known Allergies Current Immunizations  Reviewed on 10/18/2017 Name Date Influenza High Dose Vaccine PF 8/10/2017, 9/12/2016 Influenza Vaccine 9/9/2014, 11/20/2013, 9/11/2013 Influenza Vaccine (Quad) PF 8/12/2015 Pneumococcal Conjugate (PCV-13) 5/4/2015 Pneumococcal Vaccine (Unspecified Type) 10/29/2013 Tdap 11/19/2013 Zoster Vaccine, Live 9/20/2013 Reviewed by Eric Wood MD on 10/18/2017 at 12:34 PM  
Vitals BP Pulse Temp Resp Height(growth percentile) 126/77 (BP 1 Location: Right arm, BP Patient Position: Sitting) 81 99.1 °F (37.3 °C) (Temporal) 16 5' 10.5\" (1.791 m) Weight(growth percentile) SpO2 BMI Smoking Status 219 lb 9.6 oz (99.6 kg) 95% 31.06 kg/m2 Former Smoker BMI and BSA Data Body Mass Index Body Surface Area 31.06 kg/m 2 2.23 m 2 Preferred Pharmacy Pharmacy Name Phone [phone], [address] [phone] Your Updated Medication List  
  
   
This list is accurate as of: 10/18/17 12:36 PM.  Always use your most recent med list.  
  
  
  
  
 ALPRAZolam 0.25 mg tablet Commonly known as:  Brinaemmanuel Benitezace Take 1 Tab by mouth two (2) times daily as needed for Anxiety. azelastine 137 mcg (0.1 %) nasal spray Commonly known as:  ASTELIN  
 1 [place] by Both Nostrils route two (2) times a day. Use in each nostril as directed  
  
 escitalopram oxalate 20 mg tablet Commonly known as:  Lukas Legacy Take 1 Tab by mouth daily. fluticasone 50 mcg/actuation nasal spray Commonly known as:  Vanessa Jumper 2 Sprays by Both Nostrils route daily. ketoconazole 2 % topical cream  
Commonly known as:  NIZORAL Apply  to affected area daily. lidocaine 5 % Commonly known as:  LIDODERM  
apply 1 patch every 24 hours  
  
 lisinopril 10 mg tablet Commonly known as:  PRINIVIL, ZESTRIL  
take 1 tablet by mouth once daily  
  
 meloxicam 15 mg tablet Commonly known as:  MOBIC Take 1 Tab by mouth daily. pramipexole 0.5 mg tablet Commonly known as:  MIRAPEX Take 1 Tab by mouth nightly. traMADol 50 mg tablet Commonly known as:  ULTRAM  
take 2 tablets by mouth every 8 hours if needed for pain  
  
 traZODone 50 mg tablet Commonly known as:  Laura Singh Take 2 Tabs by mouth nightly. triamcinolone acetonide 0.1 % topical cream  
Commonly known as:  KENALOG Apply  to affected area two (2) times a day. use thin layer TYLENOL EXTRA STRENGTH PO Take  by mouth. VIAGRA 100 mg tablet Generic drug:  sildenafil citrate Follow-up Instructions Return in about 4 months (around 2/18/2018), or if symptoms worsen or fail to improve, for blood pressure. Introducing [facility] & HEALTH SERVICES! Dear Abril Pollock: 
Thank you for requesting a [employer] account. Our records indicate that you already have an active [employer] account. You can access your account anytime at [email or web address] Did you know that you can access your hospital and ER discharge instructions at any time in [employer]? You can also review all of your test results from your hospital stay or ER visit. Additional Information If you have questions, please visit the Frequently Asked Questions section of the appAttach website at [email or web address] Remember, appAttach is NOT to be used for urgent needs. For medical emergencies, dial 911. Now available from your iPhone and Android! Please provide this summary of care documentation to your next provider. Your primary care clinician is listed as [address] If you have any questions after today's visit, please call [phone].

## 2017-10-18 NOTE — PROGRESS NOTES
HISTORY OF PRESENT ILLNESS  Jacky Dudley is a 66 y.o. male. HPI  Presents for f/u pre-op for back surgery    Pre-op testing last week at hospital    Plan for laminectomy 10/23/17    Pt looking forward to the prospect of better function and less back pain    No illness  No CP, SOB, neuro sx     Had general anesthesia for bladder surgery  No anesthesia or bleeding problem  No latex allergy    Past medical, Social, and Family history reviewed  Medications reviewed and updated. ROS  Complete ROS reviewed and negative or stable except as noted in HPI. Physical Exam   Constitutional: He is oriented to person, place, and time. He appears well-nourished. No distress. HENT:   Head: Normocephalic and atraumatic. Mouth/Throat: Oropharynx is clear and moist. No oropharyngeal exudate. Eyes: EOM are normal. Pupils are equal, round, and reactive to light. No scleral icterus. Neck: Normal range of motion. Neck supple. No JVD present. No thyromegaly present. Cardiovascular: Normal rate, regular rhythm and normal heart sounds. Exam reveals no gallop and no friction rub. No murmur heard. Pulmonary/Chest: Effort normal and breath sounds normal. No respiratory distress. He has no wheezes. He has no rales. Abdominal: Soft. Bowel sounds are normal. He exhibits no distension. There is no tenderness. Musculoskeletal: Normal range of motion. He exhibits no edema. Slow deliberate gait, use of cane required for ambulation. Lymphadenopathy:     He has no cervical adenopathy. Neurological: He is alert and oriented to person, place, and time. He exhibits normal muscle tone. Coordination normal.   Skin: Skin is warm. No rash noted. Psychiatric: He has a normal mood and affect. Nursing note and vitals reviewed. Prior labs reviewed. Pre-op labs - stable  EKG - reviewed - NSR, no ST/T changes, +/- inferior Q's - no prior to compare    ASSESSMENT and PLAN    ICD-10-CM ICD-9-CM    1.  Spinal stenosis of lumbar region with neurogenic claudication M48.062 724.03    2. Preop examination Z01.818 V72.84    3. 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 Essential hypertension I10 401.9    5. Hyperlipidemia, unspecified hyperlipidemia type E78.5 272.4    6. Restless leg syndrome G25.81 333.94    7. Chronic back pain, unspecified back location, unspecified back pain laterality M54.9 724.5     G89.29 338.29    8. Arthritis M19.90 716.90      Follow-up Disposition:  Return in about 4 months (around 2/18/2018), or if symptoms worsen or fail to improve, for blood pressure.   results and schedule of future studies reviewed with patient  reviewed diet and weight   cardiovascular risk and specific lipid/LDL goals reviewed  reviewed medications and side effects in detail   Stable for procedure  Continue current medications

## 2017-11-06 DIAGNOSIS — M19.90 ARTHRITIS: ICD-10-CM

## 2017-11-06 DIAGNOSIS — G89.29 CHRONIC BACK PAIN, UNSPECIFIED BACK LOCATION, UNSPECIFIED BACK PAIN LATERALITY: ICD-10-CM

## 2017-11-06 DIAGNOSIS — M54.9 CHRONIC BACK PAIN, UNSPECIFIED BACK LOCATION, UNSPECIFIED BACK PAIN LATERALITY: ICD-10-CM

## 2017-11-07 RX ORDER — TRAMADOL HYDROCHLORIDE 50 MG/1
TABLET ORAL
Qty: 120 TAB | Refills: 3 | Status: SHIPPED | OUTPATIENT
Start: 2017-11-07 | End: 2018-02-28 | Stop reason: SDUPTHER

## 2017-11-07 NOTE — TELEPHONE ENCOUNTER
Rx called into pharmacy on file per Dr. Victor Manuel Prasad. Hard copy voided and prescription destroyed by this nurse.

## 2017-11-12 DIAGNOSIS — F32.A DEPRESSION, UNSPECIFIED DEPRESSION TYPE: ICD-10-CM

## 2017-11-13 RX ORDER — ESCITALOPRAM OXALATE 20 MG/1
TABLET ORAL
Qty: 90 TAB | Refills: 3 | Status: SHIPPED | OUTPATIENT
Start: 2017-11-13 | End: 2018-11-14 | Stop reason: SDUPTHER

## 2017-11-20 DIAGNOSIS — G25.81 RESTLESS LEG SYNDROME: ICD-10-CM

## 2017-11-20 RX ORDER — PRAMIPEXOLE DIHYDROCHLORIDE 0.5 MG/1
TABLET ORAL
Qty: 90 TAB | Refills: 3 | Status: SHIPPED | OUTPATIENT
Start: 2017-11-20 | End: 2018-04-05 | Stop reason: SDUPTHER

## 2018-01-31 ENCOUNTER — OFFICE VISIT (OUTPATIENT)
Dept: INTERNAL MEDICINE CLINIC | Age: 73
End: 2018-01-31

## 2018-01-31 VITALS
SYSTOLIC BLOOD PRESSURE: 132 MMHG | DIASTOLIC BLOOD PRESSURE: 74 MMHG | HEIGHT: 71 IN | RESPIRATION RATE: 16 BRPM | WEIGHT: 227.4 LBS | BODY MASS INDEX: 31.84 KG/M2 | HEART RATE: 86 BPM | TEMPERATURE: 97.9 F | OXYGEN SATURATION: 96 %

## 2018-01-31 DIAGNOSIS — M48.062 SPINAL STENOSIS OF LUMBAR REGION WITH NEUROGENIC CLAUDICATION: ICD-10-CM

## 2018-01-31 DIAGNOSIS — E78.5 HYPERLIPIDEMIA, UNSPECIFIED HYPERLIPIDEMIA TYPE: ICD-10-CM

## 2018-01-31 DIAGNOSIS — I10 ESSENTIAL HYPERTENSION: ICD-10-CM

## 2018-01-31 DIAGNOSIS — E55.9 VITAMIN D DEFICIENCY: ICD-10-CM

## 2018-01-31 DIAGNOSIS — L29.9 ITCH: ICD-10-CM

## 2018-01-31 DIAGNOSIS — R21 RASH: Primary | ICD-10-CM

## 2018-01-31 RX ORDER — PREDNISONE 10 MG/1
TABLET ORAL
Qty: 21 TAB | Refills: 0 | Status: SHIPPED | OUTPATIENT
Start: 2018-01-31 | End: 2018-07-18 | Stop reason: ALTCHOICE

## 2018-01-31 RX ORDER — HYDROXYZINE 25 MG/1
25 TABLET, FILM COATED ORAL
Qty: 30 TAB | Refills: 0 | Status: SHIPPED | OUTPATIENT
Start: 2018-01-31 | End: 2018-08-07

## 2018-01-31 RX ORDER — TRIAMCINOLONE ACETONIDE 1 MG/G
CREAM TOPICAL 2 TIMES DAILY
Qty: 45 G | Refills: 1 | Status: SHIPPED | OUTPATIENT
Start: 2018-01-31 | End: 2018-07-02 | Stop reason: SDUPTHER

## 2018-01-31 NOTE — PROGRESS NOTES
Rm 13    Chief Complaint   Patient presents with    Rash     back and bilateral arms, ongoing for a while and recently got worse, per pt    Blood Pressure Check     1. Have you been to the ER, urgent care clinic since your last visit? Hospitalized since your last visit? No    2. Have you seen or consulted any other health care providers outside of the [address] [place] [address] [facility] since your last visit? Include any pap smears or colon screening. No    There are no preventive care reminders to display for this patient. Fall Risk Assessment, last 12 mths 1/31/2018   Able to walk? Yes   Fall in past 12 months?  No

## 2018-01-31 NOTE — PROGRESS NOTES
HISTORY OF PRESENT ILLNESS  Coco Rouse is a 67 y.o. male. HPI  Presents for f/u HTN, rash    2-3 months of progressive rash  Lower back and arms  +itch  Has tried various different moisturizers without benefit or relief    S/p back surgery  Pain improved. But, still leg weakness and easily fatigued  +neurogenic claudication    Pt has signed up for exercise program at the gym  Pt had used up insurance based PT appts    Past medical, Social, and Family history reviewed  Medications reviewed and updated. ROS  Complete ROS reviewed and negative or stable except as noted in HPI. Physical Exam   Constitutional: He is oriented to person, place, and time. He appears well-nourished. No distress. HENT:   Head: Normocephalic and atraumatic. Mouth/Throat: Oropharynx is clear and moist. No oropharyngeal exudate. Eyes: EOM are normal. Pupils are equal, round, and reactive to light. No scleral icterus. Neck: Normal range of motion. Neck supple. No JVD present. No thyromegaly present. Cardiovascular: Normal rate, regular rhythm and normal heart sounds. Exam reveals no gallop and no friction rub. No murmur heard. Pulmonary/Chest: Effort normal and breath sounds normal. No respiratory distress. He has no wheezes. He has no rales. Abdominal: Soft. Bowel sounds are normal. He exhibits no distension. There is no tenderness. Musculoskeletal: Normal range of motion. He exhibits no edema. Slow deliberate gait. Lymphadenopathy:     He has no cervical adenopathy. Neurological: He is alert and oriented to person, place, and time. He exhibits normal muscle tone. Coordination normal.   Skin: Skin is warm. Rash (confluent area involving entire lumbar area with erythema, slightly raised with slight scale, other more scattered eruption on lower arms bilat with scale on erythematous base.) noted. Psychiatric: He has a normal mood and affect. Nursing note and vitals reviewed.       Prior labs reviewed. ASSESSMENT and PLAN  Unclear etiology for rash    ICD-10-CM ICD-9-CM    1. Rash R21 782.1 REFERRAL TO DERMATOLOGY      predniSONE (DELTASONE) 10 mg tablet      triamcinolone acetonide (KENALOG) 0.1 % topical cream      CBC WITH AUTOMATED DIFF      T4, FREE      SED RATE (ESR)      TSH 3RD GENERATION   2. Spinal stenosis of lumbar region with neurogenic claudication M48.062 724.03    3. Essential hypertension I10 401.9 CBC WITH AUTOMATED DIFF   4. Hyperlipidemia, unspecified hyperlipidemia type E78.5 272.4 LIPID PANEL      METABOLIC PANEL, COMPREHENSIVE   5. Itch L29.9 698.9 hydrOXYzine HCl (ATARAX) 25 mg tablet   6. Vitamin D deficiency E55.9 268.9 VITAMIN D, 25 HYDROXY     Follow-up Disposition:  Return in about 3 months (around 4/30/2018), or if symptoms worsen or fail to improve, for rash, blood pressure.    results and schedule of future studies reviewed with patient  reviewed diet, exercise and weight   cardiovascular risk and specific lipid/LDL goals reviewed  reviewed medications and side effects in detail   Continue leg exercises  Topical steroid   pred taper  antipruritic  Ref to Derm for further eval and rec's  Return for fasting labs

## 2018-01-31 NOTE — MR AVS SNAPSHOT
[address] [facility] [address] [facility] [address] 
[phone] Patient: Isidra Will MRN: [id number] :1945 Visit Information Date & Time Provider Department Dept. Phone Encounter #  
 2018  8:00 AM Korina Lakhani MD [address] and Internal Medicine [id number] Follow-up Instructions Return in about 3 months (around 2018), or if symptoms worsen or fail to improve, for rash, blood pressure. Upcoming Health Maintenance Date Due  
 GLAUCOMA SCREENING Q2Y 2018 MEDICARE YEARLY EXAM 2018 DTaP/Tdap/Td series (2 - Td) 2023 COLONOSCOPY 2024 Allergies as of 2018  Review Complete On: 2018 By: Korina Lakhani MD  
 No Known Allergies Current Immunizations  Reviewed on 2018 Name Date Influenza High Dose Vaccine PF 8/10/2017, 2016 Influenza Vaccine 2014, 2013, 2013 Influenza Vaccine (Quad) PF 2015 Pneumococcal Conjugate (PCV-13) 2015 Pneumococcal Vaccine (Unspecified Type) 10/29/2013 Tdap 2013 Zoster Vaccine, Live 2013 Reviewed by Korina Lakhani MD on 2018 at  8:34 AM  
You Were Diagnosed With   
  
 Codes Comments Rash    -  Primary ICD-10-CM: R21 
ICD-9-CM: 782.1 Spinal stenosis of lumbar region with neurogenic claudication     ICD-10-CM: M48.062 
ICD-9-CM: 724.03 Essential hypertension     ICD-10-CM: I10 
ICD-9-CM: 401.9 Hyperlipidemia, unspecified hyperlipidemia type     ICD-10-CM: E78.5 ICD-9-CM: 272.4 Itch     ICD-10-CM: L29.9 ICD-9-CM: 698.9 Vitamin D deficiency     ICD-10-CM: E55.9 ICD-9-CM: 268.9 Vitals BP Pulse Temp Resp Height(growth percentile) Weight(growth percentile) 132/74 (BP 1 Location: Left arm, BP Patient Position: Sitting) 86 97.9 °F (36.6 °C) (Oral) 16 5' 10.5\" (1.791 m) 227 lb 6.4 oz (103.1 kg) SpO2 BMI Smoking Status 96% 32.17 kg/m2 Former Smoker BMI and BSA Data Body Mass Index Body Surface Area  
 32.17 kg/m 2 2.26 m 2 Preferred Pharmacy Pharmacy Name Phone [facility], [place] [address] [phone] Your Updated Medication List  
  
   
This list is accurate as of: 1/31/18  8:37 AM.  Always use your most recent med list.  
  
  
  
  
 ALPRAZolam 0.25 mg tablet Commonly known as:  Orville Boeck Take 1 Tab by mouth two (2) times daily as needed for Anxiety. azelastine 137 mcg (0.1 %) nasal spray Commonly known as:  ASTELIN  
1 Spray by Both Nostrils route two (2) times a day. Use in each nostril as directed  
  
 escitalopram oxalate 20 mg tablet Commonly known as:  LEXAPRO  
take 1 tablet by mouth once daily  
  
 fluticasone 50 mcg/actuation nasal spray Commonly known as:  Aubery Cables 2 Sprays by Both Nostrils route daily. hydrOXYzine HCl 25 mg tablet Commonly known as:  ATARAX Take 1 Tab by mouth three (3) times daily as needed for Itching.  
  
 ketoconazole 2 % topical cream  
Commonly known as:  NIZORAL Apply  to affected area daily. lidocaine 5 % Commonly known as:  LIDODERM  
apply 1 patch every 24 hours  
  
 lisinopril 10 mg tablet Commonly known as:  PRINIVIL, ZESTRIL  
take 1 tablet by mouth once daily  
  
 meloxicam 15 mg tablet Commonly known as:  MOBIC Take 1 Tab by mouth daily. pramipexole 0.5 mg tablet Commonly known as:  MIRAPEX  
take 1 tablet by mouth NIGHTLY  
  
 predniSONE 10 mg tablet Commonly known as:  Juanis Li Taper daily. 60mg x1, 50mg x 1, 40mg x 1, 30mg x 1, 20mg x 1, 10mg x 1  
  
 traMADol 50 mg tablet Commonly known as:  ULTRAM  
take 2 tablets by mouth every 8 hours if needed for pain  
  
 traZODone 50 mg tablet Commonly known as:  Mosetta Theodore Take 2 Tabs by mouth nightly.   
  
 * triamcinolone acetonide 0.1 % topical cream  
 Commonly known as:  KENALOG Apply  to affected area two (2) times a day. use thin layer * triamcinolone acetonide 0.1 % topical cream  
Commonly known as:  KENALOG Apply  to affected area two (2) times a day. use thin layer TYLENOL EXTRA STRENGTH PO Take  by mouth. VIAGRA 100 mg tablet Generic drug:  sildenafil citrate * Notice: This list has 2 medication(s) that are the same as other medications prescribed for you. Read the directions carefully, and ask your doctor or other care provider to review them with you. Prescriptions Sent to Pharmacy Refills  
 predniSONE (DELTASONE) 10 mg tablet 0 Sig: Taper daily. 60mg x1, 50mg x 1, 40mg x 1, 30mg x 1, 20mg x 1, 10mg x 1 Class: Normal  
 Pharmacy: [address] Ph #: [phone]  
 triamcinolone acetonide (KENALOG) 0.1 % topical cream 1 Sig: Apply  to affected area two (2) times a day. use thin layer Class: Normal  
 Pharmacy: [address] Ph #: [phone] Route: Topical  
 hydrOXYzine HCl (ATARAX) 25 mg tablet 0 Sig: Take 1 Tab by mouth three (3) times daily as needed for Itching. Class: Normal  
 Pharmacy: [address] Ph #: [phone] Route: Oral  
  
We Performed the Following REFERRAL TO DERMATOLOGY [REF19 Custom] Follow-up Instructions Return in about 3 months (around 4/30/2018), or if symptoms worsen or fail to improve, for rash, blood pressure. To-Do List   
 Around 02/07/2018 Lab:  CBC WITH AUTOMATED DIFF Around 02/07/2018 Lab:  LIPID PANEL Around 02/07/2018 Lab:  METABOLIC PANEL, COMPREHENSIVE Around 02/07/2018 Lab:  SED RATE (ESR) Around 02/07/2018 Lab:  T4, FREE Around 02/07/2018 Lab:  TSH 3RD GENERATION Around 02/07/2018 Lab: VITAMIN D, 25 HYDROXY Referral Information Referral ID Referred By Referred To [id number] [facility], [place] [address] Visits Status Start Date End Date 1 New Request 1/31/18 1/31/19 If your referral has a status of pending review or denied, additional information will be sent to support the outcome of this decision. Introducing [facility] & HEALTH SERVICES! Dear Rebel Forrest: 
Thank you for requesting a Quirky account. Our records indicate that you already have an active Quirky account. You can access your account anytime at [email or web address] Did you know that you can access your hospital and ER discharge instructions at any time in Quirky? You can also review all of your test results from your hospital stay or ER visit. Additional Information If you have questions, please visit the Frequently Asked Questions section of the Quirky website at [email or web address] Remember, Quirky is NOT to be used for urgent needs. For medical emergencies, dial 911. Now available from your iPhone and Android! Please provide this summary of care documentation to your next provider. Your primary care clinician is listed as [address] If you have any questions after today's visit, please call [phone].

## 2018-02-02 DIAGNOSIS — M54.9 CHRONIC BACK PAIN, UNSPECIFIED BACK LOCATION, UNSPECIFIED BACK PAIN LATERALITY: ICD-10-CM

## 2018-02-02 DIAGNOSIS — G89.29 CHRONIC BACK PAIN, UNSPECIFIED BACK LOCATION, UNSPECIFIED BACK PAIN LATERALITY: ICD-10-CM

## 2018-02-02 DIAGNOSIS — M19.90 ARTHRITIS: ICD-10-CM

## 2018-02-02 RX ORDER — MELOXICAM 15 MG/1
TABLET ORAL
Qty: 90 TAB | Refills: 3 | Status: SHIPPED | OUTPATIENT
Start: 2018-02-02 | End: 2018-08-07

## 2018-02-04 RX ORDER — TRAZODONE HYDROCHLORIDE 50 MG/1
TABLET ORAL
Qty: 180 TAB | Refills: 3 | Status: SHIPPED | OUTPATIENT
Start: 2018-02-04 | End: 2019-01-21 | Stop reason: SDUPTHER

## 2018-02-28 DIAGNOSIS — M54.9 CHRONIC BACK PAIN, UNSPECIFIED BACK LOCATION, UNSPECIFIED BACK PAIN LATERALITY: ICD-10-CM

## 2018-02-28 DIAGNOSIS — G89.29 CHRONIC BACK PAIN, UNSPECIFIED BACK LOCATION, UNSPECIFIED BACK PAIN LATERALITY: ICD-10-CM

## 2018-02-28 DIAGNOSIS — M19.90 ARTHRITIS: ICD-10-CM

## 2018-03-01 RX ORDER — TRAMADOL HYDROCHLORIDE 50 MG/1
TABLET ORAL
Qty: 120 TAB | Refills: 3 | Status: SHIPPED | OUTPATIENT
Start: 2018-03-01 | End: 2018-06-20 | Stop reason: SDUPTHER

## 2018-03-01 NOTE — TELEPHONE ENCOUNTER
Rx for Tramadol called into pharmacy on file per Dr. Giacomo Williamson. Hard copy voided and prescription destroyed by this nurse.

## 2018-03-02 ENCOUNTER — OFFICE VISIT (OUTPATIENT)
Dept: INTERNAL MEDICINE CLINIC | Age: 73
End: 2018-03-02

## 2018-03-02 VITALS
HEART RATE: 72 BPM | WEIGHT: 220 LBS | SYSTOLIC BLOOD PRESSURE: 130 MMHG | OXYGEN SATURATION: 94 % | RESPIRATION RATE: 16 BRPM | HEIGHT: 71 IN | TEMPERATURE: 98.5 F | DIASTOLIC BLOOD PRESSURE: 80 MMHG | BODY MASS INDEX: 30.8 KG/M2

## 2018-03-02 DIAGNOSIS — E78.5 HYPERLIPIDEMIA, UNSPECIFIED HYPERLIPIDEMIA TYPE: ICD-10-CM

## 2018-03-02 DIAGNOSIS — Z12.11 COLON CANCER SCREENING: ICD-10-CM

## 2018-03-02 DIAGNOSIS — M48.062 SPINAL STENOSIS OF LUMBAR REGION WITH NEUROGENIC CLAUDICATION: ICD-10-CM

## 2018-03-02 DIAGNOSIS — F41.8 SITUATIONAL ANXIETY: ICD-10-CM

## 2018-03-02 DIAGNOSIS — I10 ESSENTIAL HYPERTENSION: ICD-10-CM

## 2018-03-02 DIAGNOSIS — F32.A DEPRESSION, UNSPECIFIED DEPRESSION TYPE: Primary | ICD-10-CM

## 2018-03-02 RX ORDER — BUPROPION HYDROCHLORIDE 150 MG/1
150 TABLET ORAL
Qty: 30 TAB | Refills: 5 | Status: SHIPPED | OUTPATIENT
Start: 2018-03-02 | End: 2018-04-05 | Stop reason: SDUPTHER

## 2018-03-02 RX ORDER — ALPRAZOLAM 0.25 MG/1
0.25 TABLET ORAL
Qty: 15 TAB | Refills: 1 | Status: SHIPPED | OUTPATIENT
Start: 2018-03-02 | End: 2018-08-07

## 2018-03-02 NOTE — PROGRESS NOTES
HPI:  Presents for f/u leg weakness, etc    Pt inquires re: mood  Still depression despite lexapro    Pt acknowledges lack of activity and involvement in engaging activities. Trouble with writing (which he has done before) - less focused. Rare xanax use     cologuard due - pt had before and would like to repeat it    Past medical, Social, and Family history reviewed    Prior to Admission medications    Medication Sig Start Date End Date Taking? Authorizing Provider   traMADol (ULTRAM) 50 mg tablet take 2 tablets by mouth every 8 hours if needed for pain 3/1/18  Yes Stewart Chen MD   traZODone (DESYREL) 50 mg tablet take 2 tablets by mouth NIGHTLY 2/4/18  Yes Stewart Chen MD   meloxicam [facility]) 15 mg tablet take 1 tablet by mouth once daily 2/2/18  Yes Stewart Chen MD   triamcinolone acetonide (KENALOG) 0.1 % topical cream Apply  to affected area two (2) times a day. use thin layer 1/31/18  Yes Stewart Chen MD   hydrOXYzine HCl (ATARAX) 25 mg tablet Take 1 Tab by mouth three (3) times daily as needed for Itching. 1/31/18  Yes Stewart Chen MD   pramipexole (MIRAPEX) 0.5 mg tablet take 1 tablet by mouth NIGHTLY 11/20/17  Yes Stewart Chen MD   escitalopram oxalate (LEXAPRO) 20 mg tablet take 1 tablet by mouth once daily 11/13/17  Yes Stewart Chen MD   lisinopril (PRINIVIL, ZESTRIL) 10 mg tablet take 1 tablet by mouth once daily 9/27/17  Yes Stewart Chen MD   lidocaine (LIDODERM) 5 % apply 1 patch every 24 hours 9/12/17  Yes Stewart Chen MD   triamcinolone acetonide (KENALOG) 0.1 % topical cream Apply  to affected area two (2) times a day. use thin layer 5/24/17  Yes Stewart Chen MD   azelastine (ASTELIN) 137 mcg (0.1 %) nasal spray 1 [place] by Both Nostrils route two (2) times a day. Use in each nostril as directed 9/12/16  Yes Stewart Chen MD   ketoconazole (NIZORAL) 2 % topical cream Apply  to affected area daily.  8/24/16  Yes Stewart Chen MD   ALPRAZolam Ether [place]) 0.25 mg tablet Take 1 Tab by mouth two (2) times daily as needed for Anxiety. 8/24/16  Yes Lucille Perez MD   VIAGRA 100 mg tablet  9/1/15  Yes Historical Provider   fluticasone (FLONASE) 50 mcg/actuation nasal spray 2 Sprays by Both Nostrils route daily. 5/6/15  Yes Lucille Perez MD   ACETAMINOPHEN (TYLENOL EXTRA STRENGTH PO) Take  by mouth. Yes Historical Provider   predniSONE (DELTASONE) 10 mg tablet Taper daily. 60mg x1, 50mg x 1, 40mg x 1, 30mg x 1, 20mg x 1, 10mg x 1 1/31/18   Lucille Perez MD          ROS  Complete ROS reviewed and negative or stable except as noted in HPI. Physical Exam   Constitutional: He is oriented to person, place, and time. He appears well-nourished. No distress. HENT:   Head: Normocephalic and atraumatic. Mouth/Throat: Oropharynx is clear and moist. No oropharyngeal exudate. Eyes: EOM are normal. Pupils are equal, round, and reactive to light. No scleral icterus. Neck: Normal range of motion. Neck supple. No JVD present. No thyromegaly present. Cardiovascular: Normal rate, regular rhythm and normal heart sounds. Exam reveals no gallop and no friction rub. No murmur heard. Pulmonary/Chest: Effort normal and breath sounds normal. No respiratory distress. He has no wheezes. He has no rales. Abdominal: Soft. Bowel sounds are normal. He exhibits no distension. There is no tenderness. Musculoskeletal: Normal range of motion. He exhibits no edema. Slow deliberate gait, use of cane required for ambulation. Lymphadenopathy:     He has no cervical adenopathy. Neurological: He is alert and oriented to person, place, and time. He exhibits normal muscle tone. Coordination normal.   Skin: Skin is warm. No rash noted. Psychiatric: He has a normal mood and affect. Nursing note and vitals reviewed. Prior labs reviewed. Assessment/Plan:    ICD-10-CM ICD-9-CM    1.  Depression, unspecified depression type F32.9 311 TESTOSTERONE, TOTAL, ADULT MALE      buPROPion XL (WELLBUTRIN XL) 150 mg tablet   2. Essential hypertension I10 401.9    3. Hyperlipidemia, unspecified hyperlipidemia type E78.5 272.4    4. Spinal stenosis of lumbar region with neurogenic claudication M48.062 724.03    5. Situational anxiety F41.8 300.09 ALPRAZolam (XANAX) 0.25 mg tablet   6. Colon cancer screening Z12.11 V76.51 COLOGUARD TEST (FECAL DNA COLORECTAL CANCER SCREENING)     Follow-up Disposition:  Return in about 6 weeks (around 4/13/2018), or if symptoms worsen or fail to improve, for mood.   lab results and schedule of future studies reviewed with patient  reviewed diet, exercise and weight  cardiovascular risk and specific lipid/LDL goals reviewed  reviewed medications and side effects in detail   Return for labs  Check testosterone  Add wellbutrin XL   Pt requests cologuard as CRC screening - agreed

## 2018-03-02 NOTE — PROGRESS NOTES
Rm 14    Chief Complaint   Patient presents with    Extremity Weakness     legs   pt states that he has more concerns but wants to discuss with provider    1. Have you been to the ER, urgent care clinic since your last visit? Hospitalized since your last visit? No    2. Have you seen or consulted any other health care providers outside of the [address] [place] [address] since your last visit? Include any pap smears or colon screening. No    Health Maintenance Due   Topic Date Due    GLAUCOMA SCREENING Q2Y  04/01/2018     Fall Risk Assessment, last 12 mths 3/2/2018   Able to walk? Yes   Fall in past 12 months?  No

## 2018-03-02 NOTE — MR AVS SNAPSHOT
[address] [facility] E \[facility] [place] [facility]\"" [address] 
[phone] Patient: Maicol Forbes MRN: [id number] :1945 Visit Information Date & Time Provider Department Dept. Phone Encounter #  
 3/2/2018  4:00 PM [facility] [phone] [id number] Follow-up Instructions Return in about 6 weeks (around 2018), or if symptoms worsen or fail to improve, for mood. Upcoming Health Maintenance Date Due  
 GLAUCOMA SCREENING Q2Y 2018 MEDICARE YEARLY EXAM 2018 DTaP/Tdap/Td series (2 - Td) 2023 COLONOSCOPY 2024 Allergies as of 3/2/2018  Review Complete On: 3/2/2018 By: Venice Bingham MD  
 No Known Allergies Current Immunizations  Reviewed on 2018 Name Date Influenza High Dose Vaccine PF 8/10/2017, 2016 Influenza Vaccine 2014, 2013, 2013 Influenza Vaccine (Quad) PF 2015 Pneumococcal Conjugate (PCV-13) 2015 Pneumococcal Vaccine (Unspecified Type) 10/29/2013 Tdap 2013 Zoster Vaccine, Live 2013 Not reviewed this visit You Were Diagnosed With   
  
 Codes Comments Depression, unspecified depression type    -  Primary ICD-10-CM: F32.9 ICD-9-CM: 167 Essential hypertension     ICD-10-CM: I10 
ICD-9-CM: 401.9 Hyperlipidemia, unspecified hyperlipidemia type     ICD-10-CM: E78.5 ICD-9-CM: 272.4 Spinal stenosis of lumbar region with neurogenic claudication     ICD-10-CM: M48.062 
ICD-9-CM: 724.03 Situational anxiety     ICD-10-CM: F41.8 ICD-9-CM: 300.09 Colon cancer screening     ICD-10-CM: Z12.11 ICD-9-CM: V76.51 Vitals BP Pulse Temp Resp Height(growth percentile) Weight(growth percentile) 130/80 (BP 1 Location: Left arm, BP Patient Position: Sitting) 72 98.5 °F (36.9 °C) (Oral) 16 5' 10.5\" (1.791 m) 220 lb (99.8 kg) SpO2 BMI Smoking Status 94% 31.12 kg/m2 Former Smoker BMI and BSA Data Body Mass Index Body Surface Area  
 31.12 kg/m 2 2.23 m 2 Preferred Pharmacy Pharmacy Name Phone [facility], [place] [address] [phone] Your Updated Medication List  
  
   
This list is accurate as of 3/2/18  4:53 PM.  Always use your most recent med list.  
  
  
  
  
 ALPRAZolam 0.25 mg tablet Commonly known as:  Porsha Monday Take 1 Tab by mouth two (2) times daily as needed for Anxiety. azelastine 137 mcg (0.1 %) nasal spray Commonly known as:  ASTELIN  
1 Spray by Both Nostrils route two (2) times a day. Use in each nostril as directed buPROPion  mg tablet Commonly known as:  Viktoriya Gills Take 1 Tab by mouth every morning. escitalopram oxalate 20 mg tablet Commonly known as:  LEXAPRO  
take 1 tablet by mouth once daily  
  
 fluticasone 50 mcg/actuation nasal spray Commonly known as:  Deandre Oats 2 Sprays by Both Nostrils route daily. hydrOXYzine HCl 25 mg tablet Commonly known as:  ATARAX Take 1 Tab by mouth three (3) times daily as needed for Itching.  
  
 ketoconazole 2 % topical cream  
Commonly known as:  NIZORAL Apply  to affected area daily. lidocaine 5 % Commonly known as:  LIDODERM  
apply 1 patch every 24 hours  
  
 lisinopril 10 mg tablet Commonly known as:  PRINIVIL, ZESTRIL  
take 1 tablet by mouth once daily  
  
 meloxicam 15 mg tablet Commonly known as:  MOBIC  
take 1 tablet by mouth once daily  
  
 pramipexole 0.5 mg tablet Commonly known as:  MIRAPEX  
take 1 tablet by mouth NIGHTLY  
  
 predniSONE 10 mg tablet Commonly known as:  Shannon Maureen Taper daily. 60mg x1, 50mg x 1, 40mg x 1, 30mg x 1, 20mg x 1, 10mg x 1  
  
 traMADol 50 mg tablet Commonly known as:  ULTRAM  
take 2 tablets by mouth every 8 hours if needed for pain  
  
 traZODone 50 mg tablet Commonly known as:  DESYREL  
take 2 tablets by mouth NIGHTLY * triamcinolone acetonide 0.1 % topical cream  
Commonly known as:  KENALOG Apply  to affected area two (2) times a day. use thin layer * triamcinolone acetonide 0.1 % topical cream  
Commonly known as:  KENALOG Apply  to affected area two (2) times a day. use thin layer TYLENOL EXTRA STRENGTH PO Take  by mouth. VIAGRA 100 mg tablet Generic drug:  sildenafil citrate * Notice: This list has 2 medication(s) that are the same as other medications prescribed for you. Read the directions carefully, and ask your doctor or other care provider to review them with you. Prescriptions Printed Refills ALPRAZolam (XANAX) 0.25 mg tablet 1 Sig: Take 1 Tab by mouth two (2) times daily as needed for Anxiety. Class: Print Route: Oral  
  
Prescriptions Sent to Pharmacy Refills buPROPion XL (WELLBUTRIN XL) 150 mg tablet 5 Sig: Take 1 Tab by mouth every morning. Class: Normal  
 Pharmacy: [facility] [place] [address] [address] [place] [address] #: [phone] Route: Oral  
  
We Performed the Following COLOGUARD TEST (FECAL DNA COLORECTAL CANCER SCREENING) [57445 CPT(R)] Follow-up Instructions Return in about 6 weeks (around 4/13/2018), or if symptoms worsen or fail to improve, for mood. To-Do List   
 03/09/2018 Lab:  TESTOSTERONE, TOTAL, ADULT MALE Introducing [facility] & [facility] SERVICES! Dear Hanh Rosales: 
Thank you for requesting a PROTEIN LOUNGE account. Our records indicate that you already have an active PROTEIN LOUNGE account. You can access your account anytime at [email or web address] Did you know that you can access your hospital and ER discharge instructions at any time in PROTEIN LOUNGE? You can also review all of your test results from your hospital stay or ER visit. Additional Information If you have questions, please visit the Frequently Asked Questions section of the [email or web address] website at [email or web address] Remember, MetaCert is NOT to be used for urgent needs. For medical emergencies, dial 911. Now available from your iPhone and Android! Please provide this summary of care documentation to your next provider. Your primary care clinician is listed as [address] If you have any questions after today's visit, please call [phone].

## 2018-04-05 DIAGNOSIS — G25.81 RESTLESS LEG SYNDROME: ICD-10-CM

## 2018-04-05 DIAGNOSIS — F32.A DEPRESSION, UNSPECIFIED DEPRESSION TYPE: ICD-10-CM

## 2018-04-05 RX ORDER — PRAMIPEXOLE DIHYDROCHLORIDE 0.5 MG/1
0.5 TABLET ORAL
Qty: 90 TAB | Refills: 3 | Status: SHIPPED | OUTPATIENT
Start: 2018-04-05 | End: 2018-05-14 | Stop reason: SDUPTHER

## 2018-04-05 RX ORDER — BUPROPION HYDROCHLORIDE 150 MG/1
150 TABLET ORAL
Qty: 30 TAB | Refills: 5 | Status: SHIPPED | OUTPATIENT
Start: 2018-04-05 | End: 2018-05-14 | Stop reason: SINTOL

## 2018-04-05 NOTE — TELEPHONE ENCOUNTER
From: Tony Baker  To: Xavier Beckford MD  Sent: 4/5/2018 11:44 AM EDT  Subject: Medication Renewal Request    Original authorizing provider: MD Tony Ware would like a refill of the following medications:  pramipexole (MIRAPEX) 0.5 mg tablet Xavier Beckford MD]    Preferred pharmacy: [id number] [facility] [address], [address]:

## 2018-04-05 NOTE — TELEPHONE ENCOUNTER
From: Tony Baker  To: Xavier Beckford MD  Sent: 4/5/2018 11:39 AM EDT  Subject: Medication Renewal Request    Original authorizing provider: MD Tony Ware would like a refill of the following medications:  buPROPion XL (WELLBUTRIN XL) 150 mg tablet Xavier Beckford MD]    Preferred pharmacy: [place] [id number] [place] [address]:

## 2018-04-16 ENCOUNTER — OFFICE VISIT (OUTPATIENT)
Dept: INTERNAL MEDICINE CLINIC | Age: 73
End: 2018-04-16

## 2018-04-16 VITALS
RESPIRATION RATE: 16 BRPM | OXYGEN SATURATION: 96 % | HEART RATE: 98 BPM | SYSTOLIC BLOOD PRESSURE: 133 MMHG | BODY MASS INDEX: 30.55 KG/M2 | HEIGHT: 71 IN | WEIGHT: 218.2 LBS | TEMPERATURE: 98.4 F | DIASTOLIC BLOOD PRESSURE: 73 MMHG

## 2018-04-16 DIAGNOSIS — F41.8 SITUATIONAL ANXIETY: ICD-10-CM

## 2018-04-16 DIAGNOSIS — I10 ESSENTIAL HYPERTENSION: ICD-10-CM

## 2018-04-16 DIAGNOSIS — E53.8 B12 DEFICIENCY: ICD-10-CM

## 2018-04-16 DIAGNOSIS — M79.651 RIGHT THIGH PAIN: ICD-10-CM

## 2018-04-16 DIAGNOSIS — S43.101S: ICD-10-CM

## 2018-04-16 DIAGNOSIS — E78.5 HYPERLIPIDEMIA, UNSPECIFIED HYPERLIPIDEMIA TYPE: ICD-10-CM

## 2018-04-16 DIAGNOSIS — M48.062 SPINAL STENOSIS OF LUMBAR REGION WITH NEUROGENIC CLAUDICATION: ICD-10-CM

## 2018-04-16 DIAGNOSIS — F32.A DEPRESSION, UNSPECIFIED DEPRESSION TYPE: Primary | ICD-10-CM

## 2018-04-16 DIAGNOSIS — G25.81 RESTLESS LEG SYNDROME: ICD-10-CM

## 2018-04-16 DIAGNOSIS — R29.898 WEAKNESS OF LOWER EXTREMITY, UNSPECIFIED LATERALITY: ICD-10-CM

## 2018-04-16 PROBLEM — S43.101A DISLOCATION OF RIGHT ACROMIOCLAVICULAR JOINT: Status: ACTIVE | Noted: 2018-04-16

## 2018-04-16 NOTE — PATIENT INSTRUCTIONS
[address]. [email or web address]    [facility] Psychiatry    [facility] - [email or web address] [email or web address] [facility] Hospital. [employer]       ,

## 2018-04-16 NOTE — PROGRESS NOTES
HPI:   Presents for f/u fall, leg weakness and right shoulder injury    Fell and dislocated right AC joint  Saw Dr Ruther Sever    Recently started wellbutrin   Not clearly associated with any new weakness. Right thigh pain since fall. Pt and wife do not think a right hip specific Xray was done. Past medical, Social, and Family history reviewed    Prior to Admission medications    Medication Sig Start Date End Date Taking? Authorizing Provider   buPROPion XL (WELLBUTRIN XL) 150 mg tablet Take 1 Tab by mouth every morning. 4/5/18  Yes Ismael Greene MD   pramipexole (MIRAPEX) 0.5 mg tablet Take 1 Tab by mouth nightly. 4/5/18  Yes Ismael Greene MD   ALPRAZolam Marthann Navin) 0.25 mg tablet Take 1 Tab by mouth two (2) times daily as needed for Anxiety. 3/2/18  Yes Ismael Greene MD   traMADol Adelbert Poot) 50 mg tablet take 2 tablets by mouth every 8 hours if needed for pain 3/1/18  Yes Ismael Greene MD   traZODone (DESYREL) 50 mg tablet take 2 tablets by mouth NIGHTLY 2/4/18  Yes Ismael Greene MD   meloxicam [facility]) 15 mg tablet take 1 tablet by mouth once daily 2/2/18  Yes Ismael Greene MD   escitalopram oxalate (LEXAPRO) 20 mg tablet take 1 tablet by mouth once daily 11/13/17  Yes Ismael Greene MD   lisinopril (PRINIVIL, ZESTRIL) 10 mg tablet take 1 tablet by mouth once daily 9/27/17  Yes Ismael Greene MD   VIAGRA 100 mg tablet  9/1/15  Yes Historical Provider   ACETAMINOPHEN (TYLENOL EXTRA STRENGTH PO) Take  by mouth. Yes Historical Provider   predniSONE (DELTASONE) 10 mg tablet Taper daily. 60mg x1, 50mg x 1, 40mg x 1, 30mg x 1, 20mg x 1, 10mg x 1 1/31/18   Ismael Greene MD   triamcinolone acetonide (KENALOG) 0.1 % topical cream Apply  to affected area two (2) times a day.  use thin layer 1/31/18   Ismael Greene MD   hydrOXYzine HCl (ATARAX) 25 mg tablet Take 1 Tab by mouth three (3) times daily as needed for Itching. 1/31/18   Ismael Greene MD   lidocaine (LIDODERM) 5 % apply 1 patch every 24 hours 9/12/17   Shelly Sánchez MD   triamcinolone acetonide (KENALOG) 0.1 % topical cream Apply  to affected area two (2) times a day. use thin layer 5/24/17   Shelly Sánchez MD   azelastine (ASTELIN) 137 mcg (0.1 %) nasal spray 1 Cocoa by Both Nostrils route two (2) times a day. Use in each nostril as directed 9/12/16   Shelly Sánchez MD   ketoconazole (NIZORAL) 2 % topical cream Apply  to affected area daily. 8/24/16   Shelly Sánchez MD   fluticasone (FLONASE) 50 mcg/actuation nasal spray 2 Sprays by Both Nostrils route daily. 5/6/15   Shelly Sánchez MD          ROS  Complete ROS reviewed and negative or stable except as noted in HPI. Physical Exam   Constitutional: He is oriented to person, place, and time. He appears well-nourished. No distress. HENT:   Head: Normocephalic and atraumatic. Mouth/Throat: Oropharynx is clear and moist. No oropharyngeal exudate. Eyes: EOM are normal. Pupils are equal, round, and reactive to light. No scleral icterus. Neck: Normal range of motion. Neck supple. No JVD present. No thyromegaly present. Cardiovascular: Normal rate, regular rhythm and normal heart sounds. Exam reveals no gallop and no friction rub. No murmur heard. Pulmonary/Chest: Effort normal and breath sounds normal. No respiratory distress. He has no wheezes. He has no rales. Abdominal: Soft. Bowel sounds are normal. He exhibits no distension. There is no tenderness. Musculoskeletal: Normal range of motion. He exhibits no edema. Slow deliberate gait, use of cane required for ambulation. Lymphadenopathy:     He has no cervical adenopathy. Neurological: He is alert and oriented to person, place, and time. He exhibits normal muscle tone. Coordination normal.   Skin: Skin is warm. No rash noted. Psychiatric: He has a normal mood and affect. Nursing note and vitals reviewed. Prior labs reviewed.       Assessment/Plan:    ICD-10-CM ICD-9-CM    1. Depression, unspecified depression type F32.9 311 REFERRAL TO PSYCHOLOGY   2. Situational anxiety F41.8 300.09 REFERRAL TO PSYCHOLOGY   3. Restless leg syndrome G25.81 333.94    4. Hyperlipidemia, unspecified hyperlipidemia type E78.5 272.4    5. Spinal stenosis of lumbar region with neurogenic claudication M48.062 724.03    6. Weakness of lower extremity, unspecified laterality R29.898 729.89    7. Dislocation of right acromioclavicular joint, sequela S43.101S 905.6    8. Essential hypertension I10 401.9    9. Right thigh pain M79.651 729.5 XR HIP RT W OR WO PELV 2-3 VWS   10. B12 deficiency E53.8 266.2 VITAMIN B12      METHYLMALONIC ACID     Follow-up Disposition:  Return in about 4 weeks (around 5/14/2018), or if symptoms worsen or fail to improve, for leg weakness, mood. results and schedule of future studies reviewed with patient  reviewed diet and weight   cardiovascular risk and specific lipid/LDL goals reviewed  reviewed medications and side effects in detail   Ref to counseling  Since it was the most recent med added, agree to hold wellbutrin per pt's preference  I offered to consider holding mirapex as primary change - could consider this.   PT as scheduled  Check B12

## 2018-04-16 NOTE — MR AVS SNAPSHOT
[facility] [address] [facility] [address] 
[phone] Patient: Felisa Forrester MRN: [id number] :1945 Visit Information Date & Time Provider Department Dept. Phone Encounter #  
 2018 11:15 AM MD Michaelle Cardenas [facility] Internal [facility] [phone] [id number] Follow-up Instructions Return in about 4 weeks (around 2018), or if symptoms worsen or fail to improve, for leg weakness, mood. Your Appointments 2018  2:00 PM  
ROUTINE CARE with MD Michaelle Cardenas Pediatrics and Internal Medicine [facility]) Appt Note: ER HD fuv from 3/22-fell and dislocated shoulder; appt confirmed lsm; r/s  
 [address] [facility] [address] [facility] [address]  
[place] [id number] [place] [address]  [facility] [place] [address] Upcoming Health Maintenance Date Due  
 GLAUCOMA SCREENING Q2Y 2018 MEDICARE YEARLY EXAM 2018 DTaP/Tdap/Td series (2 - Td) 2023 Allergies as of 2018  Review Complete On: 2018 By: Licha Rdz MD  
 No Known Allergies Current Immunizations  Reviewed on 2018 Name Date Influenza High Dose Vaccine PF 8/10/2017, 2016 Influenza Vaccine 2014, 2013, 2013 Influenza Vaccine (Quad) PF 2015 Pneumococcal Conjugate (PCV-13) 2015 Pneumococcal Vaccine (Unspecified Type) 10/29/2013 Tdap 2013 Zoster Vaccine, Live 2013 Not reviewed this visit You Were Diagnosed With   
  
 Codes Comments Depression, unspecified depression type    -  Primary ICD-10-CM: F32.9 ICD-9-CM: 774 Situational anxiety     ICD-10-CM: F41.8 ICD-9-CM: 300.09 Restless leg syndrome     ICD-10-CM: G25.81 ICD-9-CM: 333.94 Hyperlipidemia, unspecified hyperlipidemia type     ICD-10-CM: E78.5 ICD-9-CM: 272.4 Spinal stenosis of lumbar region with neurogenic claudication     ICD-10-CM: M48.062 
ICD-9-CM: 724.03 Weakness of lower extremity, unspecified laterality     ICD-10-CM: R29.898 ICD-9-CM: 729.89 Dislocation of right acromioclavicular joint, sequela     ICD-10-CM: S43.101S ICD-9-CM: 905.6 Essential hypertension     ICD-10-CM: I10 
ICD-9-CM: 401.9 Right thigh pain     ICD-10-CM: M26.944 ICD-9-CM: 729.5 B12 deficiency     ICD-10-CM: E53.8 ICD-9-CM: 266.2 Vitals BP Pulse Temp Resp Height(growth percentile) Weight(growth percentile) 133/73 (BP 1 Location: Left arm, BP Patient Position: Sitting) 98 98.4 °F (36.9 °C) (Oral) 16 5' 10.5\" (1.791 m) 218 lb 3.2 oz (99 kg) SpO2 BMI Smoking Status 96% 30.87 kg/m2 Former Smoker Vitals History BMI and BSA Data Body Mass Index Body Surface Area  
 30.87 kg/m 2 2.22 m 2 Preferred Pharmacy Pharmacy Name Phone [facility], [address] [phone] Your Updated Medication List  
  
   
This list is accurate as of 4/16/18 12:29 PM.  Always use your most recent med list.  
  
  
  
  
 ALPRAZolam 0.25 mg tablet Commonly known as:  Larrie Sark Take 1 Tab by mouth two (2) times daily as needed for Anxiety. azelastine 137 mcg (0.1 %) nasal spray Commonly known as:  ASTELIN  
1 Spray by Both Nostrils route two (2) times a day. Use in each nostril as directed buPROPion  mg tablet Commonly known as:  Graceann Jeans Take 1 Tab by mouth every morning. escitalopram oxalate 20 mg tablet Commonly known as:  LEXAPRO  
take 1 tablet by mouth once daily  
  
 fluticasone 50 mcg/actuation nasal spray Commonly known as:  Ruthe Para 2 Sprays by Both Nostrils route daily. hydrOXYzine HCl 25 mg tablet Commonly known as:  ATARAX Take 1 Tab by mouth three (3) times daily as needed for Itching.  
  
 ketoconazole 2 % topical cream  
 Commonly known as:  NIZORAL Apply  to affected area daily. lidocaine 5 % Commonly known as:  LIDODERM  
apply 1 patch every 24 hours  
  
 lisinopril 10 mg tablet Commonly known as:  PRINIVIL, ZESTRIL  
take 1 tablet by mouth once daily  
  
 meloxicam 15 mg tablet Commonly known as:  MOBIC  
take 1 tablet by mouth once daily  
  
 pramipexole 0.5 mg tablet Commonly known as:  MIRAPEX Take 1 Tab by mouth nightly. predniSONE 10 mg tablet Commonly known as:  Lennart Spearing Taper daily. 60mg x1, 50mg x 1, 40mg x 1, 30mg x 1, 20mg x 1, 10mg x 1  
  
 traMADol 50 mg tablet Commonly known as:  ULTRAM  
take 2 tablets by mouth every 8 hours if needed for pain  
  
 traZODone 50 mg tablet Commonly known as:  DESYREL  
take 2 tablets by mouth NIGHTLY * triamcinolone acetonide 0.1 % topical cream  
Commonly known as:  KENALOG Apply  to affected area two (2) times a day. use thin layer * triamcinolone acetonide 0.1 % topical cream  
Commonly known as:  KENALOG Apply  to affected area two (2) times a day. use thin layer TYLENOL EXTRA STRENGTH PO Take  by mouth. VIAGRA 100 mg tablet Generic drug:  sildenafil citrate * Notice: This list has 2 medication(s) that are the same as other medications prescribed for you. Read the directions carefully, and ask your doctor or other care provider to review them with you. We Performed the Following METHYLMALONIC ACID [79619 CPT(R)] REFERRAL TO PSYCHOLOGY [QHN70 Custom] Comments:  
 Counseling for depression VITAMIN B12 [id number] CPT(R)] Follow-up Instructions Return in about 4 weeks (around 5/14/2018), or if symptoms worsen or fail to improve, for leg weakness, mood. To-Do List   
 04/17/2018 Imaging:  XR HIP RT W OR WO PELV 2-3 VWS Referral Information  Referral ID Referred By Referred To  
  
 [id number] Peg GONZALES AT [facility]   
 [address] [facility], [address], [place], [place] Phone: [phone] Fax: [phone] Visits Status Start Date End Date 1 New Request 4/16/18 4/16/19 If your referral has a status of pending review or denied, additional information will be sent to support the outcome of this decision. Patient Instructions [address]. [email or web address] [facility] Psychiatry [facility] - [email or web address]. [facility] Introducing [facility] SERVICES! Dear Jean Spatz: 
Thank you for requesting a Memoir Systems account. Our records indicate that you already have an active Memoir Systems account. You can access your account anytime at [email or web address] Did you know that you can access your hospital and ER discharge instructions at any time in Memoir Systems? You can also review all of your test results from your hospital stay or ER visit. Additional Information If you have questions, please visit the Frequently Asked Questions section of the Memoir Systems website at [email or web address] Remember, Memoir Systems is NOT to be used for urgent needs. For medical emergencies, dial 911. Now available from your iPhone and Android! Please provide this summary of care documentation to your next provider. Your primary care clinician is listed as [address] If you have any questions after today's visit, please call [phone].

## 2018-04-16 NOTE — PROGRESS NOTES
Rm 13    Chief Complaint   Patient presents with    Follow-up     Mood, and fall   pt would like to know if any of the medications he is taking could be causing his leg weakness or balance issues  xrays were done at [facility]    1. Have you been to the ER, urgent care clinic since your last visit? Hospitalized since your last visit? 3/22/18, HD[facility], fall    2. Have you seen or consulted any other health care providers outside of the [address] [place] [address] [place] since your last visit? Include any pap smears or colon screening. No    Health Maintenance Due   Topic Date Due    GLAUCOMA SCREENING Q2Y  04/01/2018     Fall Risk Assessment, last 12 mths 4/16/2018   Able to walk? Yes   Fall in past 12 months? Yes   Fall with injury?  Yes   Number of falls in past 12 months 6   Fall Risk Score 7         Learning Assessment 10/18/2017   PRIMARY LEARNER Patient   HIGHEST LEVEL OF EDUCATION - PRIMARY LEARNER  -   BARRIERS PRIMARY LEARNER NONE   PRIMARY LANGUAGE ENGLISH   LEARNER PREFERENCE PRIMARY READING   ANSWERED BY patient   RELATIONSHIP SELF

## 2018-04-18 LAB
METHYLMALONATE SERPL-SCNC: 194 NMOL/L (ref 0–378)
VIT B12 SERPL-MCNC: 300 PG/ML (ref 232–1245)

## 2018-04-18 NOTE — PROGRESS NOTES
B12 is in the normal range and so is the methylmalonic acid. Therefore, B12 deficiency is unlikely contributing to your symptoms. A trial of daily B12 supplement would safe to try, but not clearly indicated.

## 2018-05-14 ENCOUNTER — OFFICE VISIT (OUTPATIENT)
Dept: INTERNAL MEDICINE CLINIC | Age: 73
End: 2018-05-14

## 2018-05-14 VITALS
OXYGEN SATURATION: 94 % | WEIGHT: 219.6 LBS | BODY MASS INDEX: 30.74 KG/M2 | HEART RATE: 89 BPM | TEMPERATURE: 98.1 F | HEIGHT: 71 IN | SYSTOLIC BLOOD PRESSURE: 112 MMHG | RESPIRATION RATE: 16 BRPM | DIASTOLIC BLOOD PRESSURE: 60 MMHG

## 2018-05-14 DIAGNOSIS — M54.9 CHRONIC BACK PAIN, UNSPECIFIED BACK LOCATION, UNSPECIFIED BACK PAIN LATERALITY: ICD-10-CM

## 2018-05-14 DIAGNOSIS — E78.5 HYPERLIPIDEMIA, UNSPECIFIED HYPERLIPIDEMIA TYPE: ICD-10-CM

## 2018-05-14 DIAGNOSIS — F32.A DEPRESSION, UNSPECIFIED DEPRESSION TYPE: ICD-10-CM

## 2018-05-14 DIAGNOSIS — I10 ESSENTIAL HYPERTENSION: ICD-10-CM

## 2018-05-14 DIAGNOSIS — M19.90 ARTHRITIS: ICD-10-CM

## 2018-05-14 DIAGNOSIS — M48.062 SPINAL STENOSIS OF LUMBAR REGION WITH NEUROGENIC CLAUDICATION: ICD-10-CM

## 2018-05-14 DIAGNOSIS — G89.29 CHRONIC BACK PAIN, UNSPECIFIED BACK LOCATION, UNSPECIFIED BACK PAIN LATERALITY: ICD-10-CM

## 2018-05-14 DIAGNOSIS — G25.81 RESTLESS LEG SYNDROME: Primary | ICD-10-CM

## 2018-05-14 RX ORDER — PRAMIPEXOLE DIHYDROCHLORIDE 0.75 MG/1
0.75 TABLET ORAL
Qty: 90 TAB | Refills: 1 | Status: SHIPPED | OUTPATIENT
Start: 2018-05-14 | End: 2018-11-01 | Stop reason: SDUPTHER

## 2018-05-14 NOTE — PROGRESS NOTES
Rm 14    Chief Complaint   Patient presents with    Follow-up     ED visit 3/22/18, fall,    pt would like to discuss medications    1. Have you been to the ER, urgent care clinic since your last visit? Hospitalized since your last visit? ED, 3/22/18, fall    2. Have you seen or consulted any other health care providers outside of the [address] [place] [address] since your last visit? Include any pap smears or colon screening. No    Health Maintenance Due   Topic Date Due    GLAUCOMA SCREENING Q2Y  04/01/2018     Fall Risk Assessment, last 12 mths 4/16/2018   Able to walk? Yes   Fall in past 12 months? Yes   Fall with injury?  Yes   Number of falls in past 12 months 6   Fall Risk Score 7   new fall 3/22/18    Learning Assessment 10/18/2017   PRIMARY LEARNER Patient   HIGHEST LEVEL OF EDUCATION - PRIMARY LEARNER  -   BARRIERS PRIMARY LEARNER NONE   PRIMARY LANGUAGE ENGLISH   LEARNER PREFERENCE PRIMARY READING   ANSWERED BY patient   RELATIONSHIP SELF

## 2018-05-14 NOTE — PROGRESS NOTES
HPI:  Presents for f/u fall risk and mood    Pt has not had a fall in the interim. Very little change off wellbutrin    Mood apparently unchanged on wellbutrin. Pt going to PT  Pt feels it is both physically and psychologically therapeutic  Pt reports greater hope and improved mood with improved functional status    Pt has made behavioral modifications to limit circumstances related to falls. Not seeing a counselor. Restless legs not adequately controlled with 0.5 mg mirapex dose qhs    Past medical, Social, and Family history reviewed    Prior to Admission medications    Medication Sig Start Date End Date Taking? Authorizing Provider   buPROPion XL (WELLBUTRIN XL) 150 mg tablet Take 1 Tab by mouth every morning. 4/5/18  Yes Josh Paige MD   pramipexole (MIRAPEX) 0.5 mg tablet Take 1 Tab by mouth nightly. 4/5/18  Yes Josh Paige MD   ALPRAZolam Cleda Qureshi) 0.25 mg tablet Take 1 Tab by mouth two (2) times daily as needed for Anxiety. 3/2/18  Yes Josh Paige MD   traMADol Darvon Trinidad) 50 mg tablet take 2 tablets by mouth every 8 hours if needed for pain 3/1/18  Yes Josh Paige MD   traZODone (DESYREL) 50 mg tablet take 2 tablets by mouth NIGHTLY 2/4/18  Yes Josh Paige MD   meloxicam CHRISTIAN ZAMORA [facility]) 15 mg tablet take 1 tablet by mouth once daily 2/2/18  Yes Josh Paige MD   predniSONE (DELTASONE) 10 mg tablet Taper daily. 60mg x1, 50mg x 1, 40mg x 1, 30mg x 1, 20mg x 1, 10mg x 1 1/31/18  Yes Josh Paige MD   triamcinolone acetonide (KENALOG) 0.1 % topical cream Apply  to affected area two (2) times a day.  use thin layer 1/31/18  Yes Josh Paige MD   hydrOXYzine HCl (ATARAX) 25 mg tablet Take 1 Tab by mouth three (3) times daily as needed for Itching. 1/31/18  Yes Josh Paige MD   escitalopram oxalate (LEXAPRO) 20 mg tablet take 1 tablet by mouth once daily 11/13/17  Yes Josh Paige MD   lisinopril (PRINIVIL, ZESTRIL) 10 mg tablet take 1 tablet by mouth once daily 9/27/17  Yes Reyes Curling, MD   lidocaine (LIDODERM) 5 % apply 1 patch every 24 hours 9/12/17  Yes Reyes Curling, MD   triamcinolone acetonide (KENALOG) 0.1 % topical cream Apply  to affected area two (2) times a day. use thin layer 5/24/17  Yes Reyes Curling, MD   azelastine (ASTELIN) 137 mcg (0.1 %) nasal spray 1 [place] by Both Nostrils route two (2) times a day. Use in each nostril as directed 9/12/16  Yes Reyes Curling, MD   ketoconazole (NIZORAL) 2 % topical cream Apply  to affected area daily. 8/24/16  Yes Reyes Curling, MD   VIAGRA 100 mg tablet  9/1/15  Yes Historical Provider   fluticasone (FLONASE) 50 mcg/actuation nasal spray 2 Sprays by Both Nostrils route daily. 5/6/15  Yes Reyes Curling, MD   ACETAMINOPHEN (TYLENOL EXTRA STRENGTH PO) Take  by mouth. Yes Historical Provider          ROS  Complete ROS reviewed and negative or stable except as noted in HPI. Physical Exam   Constitutional: He is oriented to person, place, and time. He appears well-nourished. No distress. HENT:   Head: Normocephalic and atraumatic. Mouth/Throat: Oropharynx is clear and moist. No oropharyngeal exudate. Eyes: EOM are normal. Pupils are equal, round, and reactive to light. No scleral icterus. Neck: Normal range of motion. Neck supple. No JVD present. No thyromegaly present. Cardiovascular: Normal rate, regular rhythm and normal heart sounds. Exam reveals no gallop and no friction rub. No murmur heard. Pulmonary/Chest: Effort normal and breath sounds normal. No respiratory distress. He has no wheezes. He has no rales. Abdominal: Soft. Bowel sounds are normal. He exhibits no distension. There is no tenderness. Musculoskeletal: Normal range of motion. He exhibits no edema. Slow deliberate gait, use of cane required for ambulation. Lymphadenopathy:     He has no cervical adenopathy. Neurological: He is alert and oriented to person, place, and time.  He exhibits normal muscle tone. Coordination normal.   Skin: Skin is warm. No rash noted. Psychiatric: He has a normal mood and affect. Nursing note and vitals reviewed. Prior labs reviewed. Assessment/Plan:     ICD-10-CM ICD-9-CM    1. Restless leg syndrome G25.81 333.94 pramipexole (MIRAPEX) 0.75 mg tablet   2. Essential hypertension I10 401.9    3. Hyperlipidemia, unspecified hyperlipidemia type E78.5 272.4    4. Spinal stenosis of lumbar region with neurogenic claudication M48.062 724.03    5. Chronic back pain, unspecified back location, unspecified back pain laterality M54.9 724.5 9-DRUG SCREEN + OXY, UR    G89.29 338.29    6. Arthritis M19.90 716.90    7. Depression, unspecified depression type F32.9 311      Follow-up Disposition:  Return in about 4 months (around 9/14/2018), or if symptoms worsen or fail to improve, for Medicare Wellness Visit, blood pressure.   lab results and schedule of future lab studies reviewed with patient  reviewed diet, exercise and weight control  cardiovascular risk and specific lipid/LDL goals reviewed  reviewed medications and side effects in detail   Pain contract and drug testing per regulations due to tramadol use  Increase mirapex  Continue other current medications and care

## 2018-05-14 NOTE — MR AVS SNAPSHOT
[address] [facility] [address] 
[phone] Patient: Amelia Thomason MRN: [id number] :1945 Visit Information Date & Time Provider Department Dept. Phone Encounter #  
 2018  2:00 PM Herson Rodgers [place] [address] and Internal Medicine [phone] [id number] Follow-up Instructions Return in about 4 months (around 2018), or if symptoms worsen or fail to improve, for Medicare Wellness Visit, blood pressure. Upcoming Health Maintenance Date Due  
 GLAUCOMA SCREENING Q2Y 2018 Influenza Age 5 to Adult 2018 MEDICARE YEARLY EXAM 2018 DTaP/Tdap/Td series (2 - Td) 2023 Allergies as of 2018  Review Complete On: 2018 By: Chandler Uribe MD  
 No Known Allergies Current Immunizations  Reviewed on 2018 Name Date Influenza High Dose Vaccine PF 8/10/2017, 2016 Influenza Vaccine 2014, 2013, 2013 Influenza Vaccine (Quad) PF 2015 Pneumococcal Conjugate (PCV-13) 2015 Pneumococcal Vaccine (Unspecified Type) 10/29/2013 Tdap 2013 Zoster Vaccine, Live 2013 Reviewed by Chandler Uribe MD on 2018 at  2:45 PM  
You Were Diagnosed With   
  
 Codes Comments Restless leg syndrome    -  Primary ICD-10-CM: G25.81 ICD-9-CM: 333.94 Essential hypertension     ICD-10-CM: I10 
ICD-9-CM: 401.9 Hyperlipidemia, unspecified hyperlipidemia type     ICD-10-CM: E78.5 ICD-9-CM: 272.4 Spinal stenosis of lumbar region with neurogenic claudication     ICD-10-CM: M48.062 
ICD-9-CM: 724.03 Chronic back pain, unspecified back location, unspecified back pain laterality     ICD-10-CM: M54.9, G89.29 ICD-9-CM: 724.5, 338.29 Arthritis     ICD-10-CM: M19.90 ICD-9-CM: 716.90 Depression, unspecified depression type     ICD-10-CM: F32.9 ICD-9-CM: 173 Vitals BP Pulse Temp Resp Height(growth percentile) Weight(growth percentile) 112/60 (BP 1 Location: Left arm, BP Patient Position: Sitting) 89 98.1 °F (36.7 °C) 16 5' 10.5\" (1.791 m) 219 lb 9.6 oz (99.6 kg) SpO2 BMI Smoking Status 94% 31.06 kg/m2 Former Smoker BMI and BSA Data Body Mass Index Body Surface Area 31.06 kg/m 2 2.23 m 2 Preferred Pharmacy Pharmacy Name Phone [facility], [address] [phone] Your Updated Medication List  
  
   
This list is accurate as of 5/14/18  2:46 PM.  Always use your most recent med list.  
  
  
  
  
 ALPRAZolam 0.25 mg tablet Commonly known as:  Nevaeh Drafts Take 1 Tab by mouth two (2) times daily as needed for Anxiety. azelastine 137 mcg (0.1 %) nasal spray Commonly known as:  ASTELIN  
1 Spray by Both Nostrils route two (2) times a day. Use in each nostril as directed  
  
 escitalopram oxalate 20 mg tablet Commonly known as:  LEXAPRO  
take 1 tablet by mouth once daily  
  
 fluticasone 50 mcg/actuation nasal spray Commonly known as:  Arlys Pock 2 Sprays by Both Nostrils route daily. hydrOXYzine HCl 25 mg tablet Commonly known as:  ATARAX Take 1 Tab by mouth three (3) times daily as needed for Itching.  
  
 ketoconazole 2 % topical cream  
Commonly known as:  NIZORAL Apply  to affected area daily. lidocaine 5 % Commonly known as:  LIDODERM  
apply 1 patch every 24 hours  
  
 lisinopril 10 mg tablet Commonly known as:  PRINIVIL, ZESTRIL  
take 1 tablet by mouth once daily  
  
 meloxicam 15 mg tablet Commonly known as:  MOBIC  
take 1 tablet by mouth once daily  
  
 pramipexole 0.75 mg tablet Commonly known as:  MIRAPEX Take 1 Tab by mouth nightly. predniSONE 10 mg tablet Commonly known as:  Franki Fast Taper daily. 60mg x1, 50mg x 1, 40mg x 1, 30mg x 1, 20mg x 1, 10mg x 1  
  
 traMADol 50 mg tablet Commonly known as:  ULTRAM  
take 2 tablets by mouth every 8 hours if needed for pain  
  
 traZODone 50 mg tablet Commonly known as:  DESYREL  
take 2 tablets by mouth NIGHTLY * triamcinolone acetonide 0.1 % topical cream  
Commonly known as:  KENALOG Apply  to affected area two (2) times a day. use thin layer * triamcinolone acetonide 0.1 % topical cream  
Commonly known as:  KENALOG Apply  to affected area two (2) times a day. use thin layer TYLENOL EXTRA STRENGTH PO Take  by mouth. VIAGRA 100 mg tablet Generic drug:  sildenafil citrate * Notice: This list has 2 medication(s) that are the same as other medications prescribed for you. Read the directions carefully, and ask your doctor or other care provider to review them with you. Prescriptions Sent to Pharmacy Refills  
 pramipexole (MIRAPEX) 0.75 mg tablet 1 Sig: Take 1 Tab by mouth nightly. Class: Normal  
 Pharmacy: [address] #: [phone] Route: Oral  
  
We Performed the Following 9-DRUG SCREEN + OXY, UR [id number] CPT(R)] Follow-up Instructions Return in about 4 months (around 9/14/2018), or if symptoms worsen or fail to improve, for Medicare Wellness Visit, blood pressure. Introducing [facility]! Dear Lydia Graff: 
Thank you for requesting a [employer] account. Our records indicate that you already have an active [employer] account. You can access your account anytime at [email or web address] Did you know that you can access your hospital and ER discharge instructions at any time in [employer]? You can also review all of your test results from your hospital stay or ER visit. Additional Information If you have questions, please visit the Frequently Asked Questions section of the [employer] website at [email or web address] Remember, MyChart is NOT to be used for urgent needs. For medical emergencies, dial 911. Now available from your iPhone and Android! Please provide this summary of care documentation to your next provider. Your primary care clinician is listed as [id number] If you have any questions after today's visit, please call [phone].

## 2018-05-17 LAB
AMPHETAMINES UR QL SCN: NEGATIVE NG/ML
BARBITURATES UR QL SCN: NEGATIVE NG/ML
BENZODIAZ UR QL SCN: NEGATIVE NG/ML
BZE UR QL SCN: NEGATIVE NG/ML
CANNABINOIDS UR QL SCN: NEGATIVE NG/ML
CREAT UR-MCNC: 49.9 MG/DL (ref 20–300)
METHADONE UR QL SCN: NEGATIVE NG/ML
OPIATES UR QL SCN: NEGATIVE NG/ML
OXYCODONE+OXYMORPHONE UR QL SCN: NEGATIVE NG/ML
PCP UR QL: NEGATIVE NG/ML
PH UR: 6 [PH] (ref 4.5–8.9)
PLEASE NOTE:, 733163: NORMAL
PROPOXYPH UR QL SCN: NEGATIVE NG/ML

## 2018-06-20 DIAGNOSIS — M19.90 ARTHRITIS: ICD-10-CM

## 2018-06-20 DIAGNOSIS — G89.29 CHRONIC BACK PAIN, UNSPECIFIED BACK LOCATION, UNSPECIFIED BACK PAIN LATERALITY: ICD-10-CM

## 2018-06-20 DIAGNOSIS — M54.9 CHRONIC BACK PAIN, UNSPECIFIED BACK LOCATION, UNSPECIFIED BACK PAIN LATERALITY: ICD-10-CM

## 2018-06-20 RX ORDER — TRAMADOL HYDROCHLORIDE 50 MG/1
TABLET ORAL
Qty: 120 TAB | Refills: 3 | Status: SHIPPED | OUTPATIENT
Start: 2018-06-20 | End: 2018-11-01 | Stop reason: SDUPTHER

## 2018-06-22 NOTE — TELEPHONE ENCOUNTER
Rx for Tramadol called into [facility] pharmacy on file per Dr. Dedra German. Hard copy voided and prescription destroyed by this nurse.

## 2018-07-02 ENCOUNTER — OFFICE VISIT (OUTPATIENT)
Dept: INTERNAL MEDICINE CLINIC | Age: 73
End: 2018-07-02

## 2018-07-02 VITALS
HEIGHT: 71 IN | RESPIRATION RATE: 16 BRPM | WEIGHT: 229.2 LBS | SYSTOLIC BLOOD PRESSURE: 121 MMHG | HEART RATE: 92 BPM | TEMPERATURE: 99 F | DIASTOLIC BLOOD PRESSURE: 74 MMHG | BODY MASS INDEX: 32.09 KG/M2 | OXYGEN SATURATION: 93 %

## 2018-07-02 DIAGNOSIS — M48.062 SPINAL STENOSIS OF LUMBAR REGION WITH NEUROGENIC CLAUDICATION: ICD-10-CM

## 2018-07-02 DIAGNOSIS — G25.81 RESTLESS LEG SYNDROME: ICD-10-CM

## 2018-07-02 DIAGNOSIS — G47.9 SLEEP DISTURBANCE: ICD-10-CM

## 2018-07-02 DIAGNOSIS — E55.9 VITAMIN D DEFICIENCY: ICD-10-CM

## 2018-07-02 DIAGNOSIS — R26.81 GAIT INSTABILITY: ICD-10-CM

## 2018-07-02 DIAGNOSIS — R63.0 POOR APPETITE: ICD-10-CM

## 2018-07-02 DIAGNOSIS — F32.A DEPRESSION, UNSPECIFIED DEPRESSION TYPE: Primary | ICD-10-CM

## 2018-07-02 DIAGNOSIS — I10 ESSENTIAL HYPERTENSION: ICD-10-CM

## 2018-07-02 DIAGNOSIS — E53.8 B12 DEFICIENCY: ICD-10-CM

## 2018-07-02 RX ORDER — MIRTAZAPINE 7.5 MG/1
7.5 TABLET, FILM COATED ORAL
Qty: 30 TAB | Refills: 5 | Status: SHIPPED | OUTPATIENT
Start: 2018-07-02 | End: 2018-08-09 | Stop reason: SINTOL

## 2018-07-02 NOTE — PROGRESS NOTES
Exam Room #15  Geoff Sabillon is a 67 y.o. male  Chief Complaint   Patient presents with    Decreased Appetite     Reports not being able to eat as much    Sleep Problem     Rerports not getting enough rest        1. Have you been to the ER, urgent care clinic since your last visit? Hospitalized since your last visit? No    2. Have you seen or consulted any other health care providers outside of the [address] [place] [address] since your last visit? Include any pap smears or colon screening.  No      Visit Vitals    /74 (BP 1 Location: Left arm, BP Patient Position: Sitting)    Pulse 92    Temp 99 °F (37.2 °C) (Oral)    Resp 16    Ht 5' 10.5\" (1.791 m)    Wt 229 lb 3.2 oz (104 kg)    SpO2 93%    BMI 32.42 kg/m2

## 2018-07-02 NOTE — PROGRESS NOTES
HPI:  Presents for f/u poor appetite, poor sleep    No abd pain, no bloating, no early satiety  Just poor appetite, not interested x a couple of weeks  Drinking fluids, but not eating much    NL BMs, denies constipation     Pt acknowledges depression. Still taking lexapro  No longer on wellbutrin  Still using mirapex and trazodone    No SI    Poor sleep at night  Though, will at times sleep during the day    No longer in PT  Gait and mobility is limiting and frustrating.  +great benefit from PT previously - both physically and emotionally. Past medical, Social, and Family history reviewed    Prior to Admission medications    Medication Sig Start Date End Date Taking? Authorizing Provider   traMADol (ULTRAM) 50 mg tablet take 2 tablets by mouth every 8 hours if needed for pain 6/20/18  Yes Isaak Miner MD   pramipexole (MIRAPEX) 0.75 mg tablet Take 1 Tab by mouth nightly. 5/14/18  Yes Isaak Miner MD   ALPRAZolam Shante Broaden) 0.25 mg tablet Take 1 Tab by mouth two (2) times daily as needed for Anxiety. 3/2/18  Yes Isaak Miner MD   traZODone (DESYREL) 50 mg tablet take 2 tablets by mouth NIGHTLY 2/4/18  Yes Isaak Miner MD   meloxicam CHRISTIAN ZAMORA [facility]) 15 mg tablet take 1 tablet by mouth once daily 2/2/18  Yes Isaak Miner MD   predniSONE (DELTASONE) 10 mg tablet Taper daily. 60mg x1, 50mg x 1, 40mg x 1, 30mg x 1, 20mg x 1, 10mg x 1  Patient taking differently: as needed. Taper daily.   60mg x1, 50mg x 1, 40mg x 1, 30mg x 1, 20mg x 1, 10mg x 1 1/31/18  Yes Isaak Miner MD   hydrOXYzine HCl (ATARAX) 25 mg tablet Take 1 Tab by mouth three (3) times daily as needed for Itching. 1/31/18  Yes Isaak Miner MD   escitalopram oxalate (LEXAPRO) 20 mg tablet take 1 tablet by mouth once daily 11/13/17  Yes Isaak Miner MD   lisinopril (PRINIVIL, ZESTRIL) 10 mg tablet take 1 tablet by mouth once daily 9/27/17  Yes Isaak Miner MD   lidocaine (LIDODERM) 5 % apply 1 patch every 24 hours 9/12/17 Yes Isaak Miner MD   triamcinolone acetonide (KENALOG) 0.1 % topical cream Apply  to affected area two (2) times a day. use thin layer 5/24/17  Yes Isaak Miner MD   azelastine (ASTELIN) 137 mcg (0.1 %) nasal spray 1 [place] by Both Nostrils route two (2) times a day. Use in each nostril as directed 9/12/16  Yes Isaak Miner MD   ketoconazole (NIZORAL) 2 % topical cream Apply  to affected area daily. Patient taking differently: Apply  to affected area as needed. 8/24/16  Yes Isaak Miner MD   VIAGRA 100 mg tablet  9/1/15  Yes Historical Provider   fluticasone (FLONASE) 50 mcg/actuation nasal spray 2 Sprays by Both Nostrils route daily. 5/6/15  Yes Isaak Miner MD   ACETAMINOPHEN (TYLENOL EXTRA STRENGTH PO) Take  by mouth. Yes Historical Provider          ROS  Complete ROS reviewed and negative or stable except as noted in HPI. Physical Exam   Constitutional: He is oriented to person, place, and time. He appears well-nourished. No distress. HENT:   Head: Normocephalic and atraumatic. Mouth/Throat: Oropharynx is clear and moist. No oropharyngeal exudate. Eyes: EOM are normal. Pupils are equal, round, and reactive to light. No scleral icterus. Neck: Normal range of motion. Neck supple. No JVD present. No thyromegaly present. Cardiovascular: Normal rate, regular rhythm and normal heart sounds. Exam reveals no gallop and no friction rub. No murmur heard. Pulmonary/Chest: Effort normal and breath sounds normal. No respiratory distress. He has no wheezes. He has no rales. Abdominal: Soft. Bowel sounds are normal. He exhibits no distension and no mass. There is no tenderness. There is no rebound and no guarding. Musculoskeletal: Normal range of motion. He exhibits no edema. Slow deliberate gait, use of cane required for ambulation. Lymphadenopathy:     He has no cervical adenopathy. Neurological: He is alert and oriented to person, place, and time.  He exhibits normal muscle tone. Coordination normal.   Skin: Skin is warm. No rash noted. Psychiatric: He has a normal mood and affect. Nursing note and vitals reviewed. Prior labs reviewed. Assessment/Plan:  Depression may be the primary issue  Multifactorial sleep disorder - mood + poor schedule + RLS + other    ICD-10-CM ICD-9-CM    1. Depression, unspecified depression type F32.9 311 mirtazapine (REMERON) 7.5 mg tablet      SED RATE (ESR)      T4, FREE      TSH 3RD GENERATION   2. Poor appetite R63.0 783.0 mirtazapine (REMERON) 7.5 mg tablet      CBC WITH AUTOMATED DIFF      METABOLIC PANEL, COMPREHENSIVE   3. Restless leg syndrome G25.81 333.94 SED RATE (ESR)   4. Spinal stenosis of lumbar region with neurogenic claudication M48.062 724.03 SED RATE (ESR)      REFERRAL TO PHYSICAL THERAPY   5. Essential hypertension I10 401.9    6. Sleep disturbance G47.9 780.50 mirtazapine (REMERON) 7.5 mg tablet      T4, FREE      TSH 3RD GENERATION   7. Vitamin D deficiency E55.9 268.9 VITAMIN D, 25 HYDROXY   8. B12 deficiency E53.8 266.2 VITAMIN B12   9. Gait instability R26.81 781.2 REFERRAL TO PHYSICAL THERAPY     Follow-up Disposition:  Return in about 6 weeks (around 8/13/2018), or if symptoms worsen or fail to improve, for Medicare Wellness Visit, mood, appetite.    results and schedule of future studies reviewed with patient  reviewed diet, exercise and weight   cardiovascular risk and specific lipid/LDL goals reviewed  reviewed medications and side effects in detail  Labs   Trial of remeron - consider titrate to 15 mg in 1-2 weeks if tolerating  Consider add melatonin to address sleep-wake cycle concern  Resume PT

## 2018-07-02 NOTE — MR AVS SNAPSHOT
[address] [place] [address] [facility] [address] 
[phone] Patient: Meenakshi Jacobs MRN: [id number] :1945 Visit Information Date & Time Provider Department Dept. Phone Encounter #  
 2018 12:00 PM Star Guillory, [address] [place] [address] and Internal Medicine [phone] [id number] Follow-up Instructions Return in about 6 weeks (around 2018), or if symptoms worsen or fail to improve, for Medicare Wellness Visit, mood, appetite. Upcoming Health Maintenance Date Due  
 GLAUCOMA SCREENING Q2Y 2018 Influenza Age 5 to Adult 2018 MEDICARE YEARLY EXAM 2018 DTaP/Tdap/Td series (2 - Td) 2023 Allergies as of 2018  Review Complete On: 2018 By: Star Guillory MD  
 No Known Allergies Current Immunizations  Reviewed on 2018 Name Date Influenza High Dose Vaccine PF 8/10/2017, 2016 Influenza Vaccine 2014, 2013, 2013 Influenza Vaccine (Quad) PF 2015 Pneumococcal Conjugate (PCV-13) 2015 Pneumococcal Vaccine (Unspecified Type) 10/29/2013 Tdap 2013 Zoster Vaccine, Live 2013 Not reviewed this visit You Were Diagnosed With   
  
 Codes Comments Depression, unspecified depression type    -  Primary ICD-10-CM: F32.9 ICD-9-CM: 367 Poor appetite     ICD-10-CM: R63.0 ICD-9-CM: 783.0 Restless leg syndrome     ICD-10-CM: G25.81 ICD-9-CM: 333.94 Spinal stenosis of lumbar region with neurogenic claudication     ICD-10-CM: M48.062 
ICD-9-CM: 724.03 Essential hypertension     ICD-10-CM: I10 
ICD-9-CM: 401.9 Sleep disturbance     ICD-10-CM: G47.9 ICD-9-CM: 780.50 Vitamin D deficiency     ICD-10-CM: E55.9 ICD-9-CM: 268.9 B12 deficiency     ICD-10-CM: E53.8 ICD-9-CM: 266.2 Gait instability     ICD-10-CM: R26.81 
ICD-9-CM: 164. 2 Vitals BP Pulse Temp Resp Height(growth percentile) Weight(growth percentile) 121/74 (BP 1 Location: Left arm, BP Patient Position: Sitting) 92 99 °F (37.2 °C) (Oral) 16 5' 10.5\" (1.791 m) 229 lb 3.2 oz (104 kg) SpO2 BMI Smoking Status 93% 32.42 kg/m2 Former Smoker BMI and BSA Data Body Mass Index Body Surface Area  
 32.42 kg/m 2 2.27 m 2 Preferred Pharmacy Pharmacy Name Phone [facility], [address] [phone] Your Updated Medication List  
  
   
This list is accurate as of 7/2/18  1:20 PM.  Always use your most recent med list.  
  
  
  
  
 ALPRAZolam 0.25 mg tablet Commonly known as:  Wendall Baptiste Take 1 Tab by mouth two (2) times daily as needed for Anxiety. azelastine 137 mcg (0.1 %) nasal spray Commonly known as:  ASTELIN  
1 Spray by Both Nostrils route two (2) times a day. Use in each nostril as directed  
  
 escitalopram oxalate 20 mg tablet Commonly known as:  LEXAPRO  
take 1 tablet by mouth once daily  
  
 fluticasone 50 mcg/actuation nasal spray Commonly known as:  Harriet Earlham 2 Sprays by Both Nostrils route daily. hydrOXYzine HCl 25 mg tablet Commonly known as:  ATARAX Take 1 Tab by mouth three (3) times daily as needed for Itching.  
  
 ketoconazole 2 % topical cream  
Commonly known as:  NIZORAL Apply  to affected area daily. lidocaine 5 % Commonly known as:  LIDODERM  
apply 1 patch every 24 hours  
  
 lisinopril 10 mg tablet Commonly known as:  PRINIVIL, ZESTRIL  
take 1 tablet by mouth once daily  
  
 meloxicam 15 mg tablet Commonly known as:  MOBIC  
take 1 tablet by mouth once daily  
  
 mirtazapine 7.5 mg tablet Commonly known as:  Charyl Winslow Take 1 Tab by mouth nightly. pramipexole 0.75 mg tablet Commonly known as:  MIRAPEX Take 1 Tab by mouth nightly. predniSONE 10 mg tablet Commonly known as:  Sedricksole Jorgensen  
 Taper daily. 60mg x1, 50mg x 1, 40mg x 1, 30mg x 1, 20mg x 1, 10mg x 1  
  
 traMADol 50 mg tablet Commonly known as:  ULTRAM  
take 2 tablets by mouth every 8 hours if needed for pain  
  
 traZODone 50 mg tablet Commonly known as:  DESYREL  
take 2 tablets by mouth NIGHTLY  
  
 triamcinolone acetonide 0.1 % topical cream  
Commonly known as:  KENALOG Apply  to affected area two (2) times a day. use thin layer TYLENOL EXTRA STRENGTH PO Take  by mouth. VIAGRA 100 mg tablet Generic drug:  sildenafil citrate Prescriptions Sent to Pharmacy Refills  
 mirtazapine (REMERON) 7.5 mg tablet 5 Sig: Take 1 Tab by mouth nightly. Class: Normal  
 Pharmacy: [address] #: [phone] Route: Oral  
  
We Performed the Following CBC WITH AUTOMATED DIFF [95839 CPT(R)] METABOLIC PANEL, COMPREHENSIVE [41549 CPT(R)] REFERRAL TO PHYSICAL THERAPY [OZQ38 Custom] Comments:  
 eval and treat - spinal stenosis, gait stability SED RATE (ESR) [id number] CPT(R)] T4, FREE [id number] CPT(R)] TSH 3RD GENERATION [68888 CPT(R)] VITAMIN B12 [id number] CPT(R)] VITAMIN D, 25 HYDROXY [id number] CPT(R)] Follow-up Instructions Return in about 6 weeks (around 8/13/2018), or if symptoms worsen or fail to improve, for Medicare Wellness Visit, mood, appetite. Referral Information Referral ID Referred By Referred To  
  
 [id number] Apple Metcalf Not Available Visits Status Start Date End Date 1 New Request 7/2/18 7/2/19 If your referral has a status of pending review or denied, additional information will be sent to support the outcome of this decision. Introducing [facility]! Dear Dharmesh Hastings: 
Thank you for requesting a [employer] account. Our records indicate that you already have an active [employer] account. You can access your account anytime at [email or web address] Did you know that you can access your hospital and ER discharge instructions at any time in [employer]? You can also review all of your test results from your hospital stay or ER visit. Additional Information If you have questions, please visit the Frequently Asked Questions section of the [employer] website at [email or web address] Remember, [employer] is NOT to be used for urgent needs. For medical emergencies, dial 911. Now available from your iPhone and Android! Please provide this summary of care documentation to your next provider. Your primary care clinician is listed as [address] If you have any questions after today's visit, please call [phone].

## 2018-07-18 ENCOUNTER — OFFICE VISIT (OUTPATIENT)
Dept: INTERNAL MEDICINE CLINIC | Age: 73
End: 2018-07-18

## 2018-07-18 VITALS
WEIGHT: 190.4 LBS | SYSTOLIC BLOOD PRESSURE: 110 MMHG | BODY MASS INDEX: 26.65 KG/M2 | HEIGHT: 71 IN | TEMPERATURE: 97.6 F | HEART RATE: 75 BPM | DIASTOLIC BLOOD PRESSURE: 72 MMHG | RESPIRATION RATE: 16 BRPM | OXYGEN SATURATION: 98 %

## 2018-07-18 DIAGNOSIS — R62.7 FTT (FAILURE TO THRIVE) IN ADULT: ICD-10-CM

## 2018-07-18 DIAGNOSIS — R63.4 UNEXPLAINED WEIGHT LOSS: Primary | ICD-10-CM

## 2018-07-18 DIAGNOSIS — E56.9 VITAMIN DEFICIENCY: ICD-10-CM

## 2018-07-18 DIAGNOSIS — F32.A DEPRESSION, UNSPECIFIED DEPRESSION TYPE: ICD-10-CM

## 2018-07-18 DIAGNOSIS — E78.5 HYPERLIPIDEMIA, UNSPECIFIED HYPERLIPIDEMIA TYPE: ICD-10-CM

## 2018-07-18 DIAGNOSIS — M48.062 SPINAL STENOSIS OF LUMBAR REGION WITH NEUROGENIC CLAUDICATION: ICD-10-CM

## 2018-07-18 DIAGNOSIS — I10 ESSENTIAL HYPERTENSION: ICD-10-CM

## 2018-07-18 DIAGNOSIS — S43.101S: ICD-10-CM

## 2018-07-18 DIAGNOSIS — C67.9 RECURRENT BLADDER TRANSITIONAL CELL CARCINOMA (HCC): ICD-10-CM

## 2018-07-18 PROBLEM — F33.9 RECURRENT DEPRESSION (HCC): Status: ACTIVE | Noted: 2018-07-18

## 2018-07-18 NOTE — MR AVS SNAPSHOT
[place] [address] [address] [place] [address] 
[phone] Patient: Pamela Nicole MRN: [id number] :1945 Visit Information Date & Time Provider Department Dept. Phone Encounter #  
 2018  4:00 PM Hemal Ward, [address] [place] [address] and Internal Medicine [phone] [id number] Follow-up Instructions Return in about 2 weeks (around 2018), or if symptoms worsen or fail to improve, for weight loss , FTT. Upcoming Health Maintenance Date Due  
 GLAUCOMA SCREENING Q2Y 2018 Influenza Age 5 to Adult 2018 MEDICARE YEARLY EXAM 2018 DTaP/Tdap/Td series (2 - Td) 2023 Allergies as of 2018  Review Complete On: 2018 By: Hemal Ward MD  
 No Known Allergies Current Immunizations  Reviewed on 2018 Name Date Influenza High Dose Vaccine PF 8/10/2017, 2016 Influenza Vaccine 2014, 2013, 2013 Influenza Vaccine (Quad) PF 2015 Pneumococcal Conjugate (PCV-13) 2015 Pneumococcal Vaccine (Unspecified Type) 10/29/2013 Tdap 2013 Zoster Vaccine, Live 2013 Not reviewed this visit You Were Diagnosed With   
  
 Codes Comments Unexplained weight loss    -  Primary ICD-10-CM: R63.4 ICD-9-CM: 783.21 Recurrent bladder transitional cell carcinoma (HCC)     ICD-10-CM: C67.9 ICD-9-CM: 188.9 Dislocation of right acromioclavicular joint, sequela     ICD-10-CM: S43.101S ICD-9-CM: 905.6 Spinal stenosis of lumbar region with neurogenic claudication     ICD-10-CM: M48.062 
ICD-9-CM: 724.03 Essential hypertension     ICD-10-CM: I10 
ICD-9-CM: 401.9 Hyperlipidemia, unspecified hyperlipidemia type     ICD-10-CM: E78.5 ICD-9-CM: 272.4 Depression, unspecified depression type     ICD-10-CM: F32.9 ICD-9-CM: 708 Vitamin deficiency     ICD-10-CM: E56.9 ICD-9-CM: 269.2 Vitals BP Pulse Temp Resp Height(growth percentile) Weight(growth percentile) 110/72 (BP 1 Location: Left arm, BP Patient Position: Sitting) 75 97.6 °F (36.4 °C) (Oral) 16 5' 10.5\" (1.791 m) 186 lb 12.8 oz (84.7 kg) SpO2 BMI Smoking Status 98% 26.42 kg/m2 Former Smoker BMI and BSA Data Body Mass Index Body Surface Area  
 26.42 kg/m 2 2.05 m 2 Preferred Pharmacy Pharmacy Name Phone [facility] [address] [phone] Your Updated Medication List  
  
   
This list is accurate as of 7/18/18  5:31 PM.  Always use your most recent med list.  
  
  
  
  
 ALPRAZolam 0.25 mg tablet Commonly known as:  Nicole Gail Take 1 Tab by mouth two (2) times daily as needed for Anxiety. azelastine 137 mcg (0.1 %) nasal spray Commonly known as:  ASTELIN  
1 Spray by Both Nostrils route two (2) times a day. Use in each nostril as directed  
  
 escitalopram oxalate 20 mg tablet Commonly known as:  LEXAPRO  
take 1 tablet by mouth once daily  
  
 fluticasone 50 mcg/actuation nasal spray Commonly known as:  Tony Riser 2 Sprays by Both Nostrils route daily. hydrOXYzine HCl 25 mg tablet Commonly known as:  ATARAX Take 1 Tab by mouth three (3) times daily as needed for Itching.  
  
 ketoconazole 2 % topical cream  
Commonly known as:  NIZORAL Apply  to affected area daily. lidocaine 5 % Commonly known as:  LIDODERM  
apply 1 patch every 24 hours  
  
 lisinopril 10 mg tablet Commonly known as:  PRINIVIL, ZESTRIL  
take 1 tablet by mouth once daily  
  
 meloxicam 15 mg tablet Commonly known as:  MOBIC  
take 1 tablet by mouth once daily  
  
 mirtazapine 7.5 mg tablet Commonly known as:  Consuella Cullens Take 1 Tab by mouth nightly. pramipexole 0.75 mg tablet Commonly known as:  MIRAPEX Take 1 Tab by mouth nightly. traMADol 50 mg tablet Commonly known as:  Dirk Bora  
 take 2 tablets by mouth every 8 hours if needed for pain  
  
 traZODone 50 mg tablet Commonly known as:  DESYREL  
take 2 tablets by mouth NIGHTLY  
  
 triamcinolone acetonide 0.1 % topical cream  
Commonly known as:  KENALOG Apply  to affected area two (2) times a day. use thin layer TYLENOL EXTRA STRENGTH PO Take  by mouth. VIAGRA 100 mg tablet Generic drug:  sildenafil citrate VITAMIN B-12 PO Take  by mouth. Follow-up Instructions Return in about 2 weeks (around 8/1/2018), or if symptoms worsen or fail to improve, for weight loss , FTT. To-Do List   
 Around 07/19/2018 Lab:  SRIDHAR W/REFLEX Around 07/19/2018 Lab:  C REACTIVE PROTEIN, QT Around 07/19/2018 Lab:  CBC WITH AUTOMATED DIFF Around 07/19/2018 Lab:  METABOLIC PANEL, COMPREHENSIVE Around 07/19/2018 Microbiology:  QUANTIFERON TB GOLD Around 07/19/2018 Lab:  SED RATE (ESR) Around 07/19/2018 Lab:  T4, FREE Around 07/19/2018 Lab:  TSH 3RD GENERATION Around 07/19/2018 Lab:  VITAMIN B12 Around 07/19/2018 Lab:  VITAMIN D, 25 HYDROXY   
  
 07/20/2018 Imaging:  CT CHEST ABD PELV W CONT Introducing [facility]! Dear Get [employer]: 
Thank you for requesting a [employer] account. Our records indicate that you already have an active [employer] account. You can access your account anytime at [email or web address] Did you know that you can access your hospital and ER discharge instructions at any time in [employer]? You can also review all of your test results from your hospital stay or ER visit. Additional Information If you have questions, please visit the Frequently Asked Questions section of the [employer] website at [email or web address] Remember, [employer] is NOT to be used for urgent needs. For medical emergencies, dial 911. Now available from your iPhone and Android! Please provide this summary of care documentation to your next provider. Your primary care clinician is listed as [id number] If you have any questions after today's visit, please call [phone].

## 2018-07-18 NOTE — PROGRESS NOTES
HPI:  Presents for f/u weight loss, weakness    Wife present today. Persistent poor appetite    Confusion on a day when had missed meds for a couple of days  But, not since then. No localizing sx  No f/c  No N/V/D  No abd pain  No SOB  No leg swelling  Only chronic back pain - unchanged    Increased generalized weakness, again nonfocal.    Past medical, Social, and Family history reviewed    Prior to Admission medications    Medication Sig Start Date End Date Taking? Authorizing Provider   cyanocobalamin, vitamin B-12, (VITAMIN B-12 PO) Take  by mouth. Yes Historical Provider   traMADol (ULTRAM) 50 mg tablet take 2 tablets by mouth every 8 hours if needed for pain 6/20/18  Yes Anna Rm MD   pramipexole (MIRAPEX) 0.75 mg tablet Take 1 Tab by mouth nightly. 5/14/18  Yes Anna Rm MD   ALPRAZolam Malgorzata Abo) 0.25 mg tablet Take 1 Tab by mouth two (2) times daily as needed for Anxiety. 3/2/18  Yes Anna Rm MD   traZODone (DESYREL) 50 mg tablet take 2 tablets by mouth NIGHTLY 2/4/18  Yes Anna Rm MD   hydrOXYzine HCl (ATARAX) 25 mg tablet Take 1 Tab by mouth three (3) times daily as needed for Itching. 1/31/18  Yes Anna Rm MD   escitalopram oxalate (LEXAPRO) 20 mg tablet take 1 tablet by mouth once daily 11/13/17  Yes Anna mR MD   lisinopril (PRINIVIL, ZESTRIL) 10 mg tablet take 1 tablet by mouth once daily 9/27/17  Yes Anna Rm MD   lidocaine (LIDODERM) 5 % apply 1 patch every 24 hours  Patient taking differently: apply 1 patch every 24 hours as needed 9/12/17  Yes Anna Rm MD   fluticasone (FLONASE) 50 mcg/actuation nasal spray 2 Sprays by Both Nostrils route daily. 5/6/15  Yes Anna Rm MD   ACETAMINOPHEN (TYLENOL EXTRA STRENGTH PO) Take  by mouth. Yes Historical Provider   mirtazapine (REMERON) 7.5 mg tablet Take 1 Tab by mouth nightly.  7/2/18   Anna Rm MD   meloxicam (MOBIC) 15 mg tablet take 1 tablet by mouth once daily 2/2/18   Star Guillory MD   triamcinolone acetonide (KENALOG) 0.1 % topical cream Apply  to affected area two (2) times a day. use thin layer 5/24/17   Star Guillory MD   azelastine (ASTELIN) 137 mcg (0.1 %) nasal spray 1 [place] by Both Nostrils route two (2) times a day. Use in each nostril as directed 9/12/16   Star Guillory MD   ketoconazole (NIZORAL) 2 % topical cream Apply  to affected area daily. Patient taking differently: Apply  to affected area as needed. 8/24/16   Star Guillory MD   VIAGRA 100 mg tablet  9/1/15   Historical Provider          ROS  Complete ROS reviewed and negative or stable except as noted in HPI. Physical Exam   Constitutional: He is oriented to person, place, and time. No distress. Visible weight loss. HENT:   Head: Normocephalic and atraumatic. Mouth/Throat: Oropharynx is clear and moist. No oropharyngeal exudate. Eyes: EOM are normal. Pupils are equal, round, and reactive to light. No scleral icterus. Neck: Normal range of motion. Neck supple. No JVD present. No thyromegaly present. Cardiovascular: Normal rate, regular rhythm and normal heart sounds. Exam reveals no gallop and no friction rub. No murmur heard. Pulmonary/Chest: Effort normal and breath sounds normal. No respiratory distress. He has no wheezes. He has no rales. Abdominal: Soft. Bowel sounds are normal. He exhibits no distension and no mass. There is no tenderness. There is no rebound and no guarding. Musculoskeletal: Normal range of motion. He exhibits no edema. Slow deliberate gait, use of cane required for ambulation. Lymphadenopathy:     He has no cervical adenopathy. Neurological: He is alert and oriented to person, place, and time. He exhibits normal muscle tone. Coordination normal.   Skin: Skin is warm. No rash noted. Psychiatric: He has a normal mood and affect. Nursing note and vitals reviewed. Prior labs reviewed.   Reviewed prior imaging reports      Assessment/Plan:  ?degree of weight loss - 30-40 lbs in 2 weeks or at least 2 months even if 7/2/18 was an error  Concerned for malignancy - hx bladder/transitional cell Ca vs new   R/o thyroid, metabolic, rheumatologic, TB -  but not symptomatically c/w these  ?med WD assoc with confusion state    ICD-10-CM ICD-9-CM    1. Unexplained weight loss R63.4 783.21 QUANTIFERON TB GOLD      C REACTIVE PROTEIN, QT      SRIDHAR W/REFLEX      SED RATE (ESR)      T4, FREE      TSH 3RD GENERATION      CBC WITH AUTOMATED DIFF      METABOLIC PANEL, COMPREHENSIVE      CORTISOL      CANCELED: CT CHEST ABD PELV W CONT   2. Recurrent bladder transitional cell carcinoma (HCC) C67.9 188.9 CANCELED: CT CHEST ABD PELV W CONT   3. Dislocation of right acromioclavicular joint, sequela S43.101S 905.6    4. Spinal stenosis of lumbar region with neurogenic claudication M48.062 724.03    5. Essential hypertension I10 401.9    6. Hyperlipidemia, unspecified hyperlipidemia type E78.5 272.4    7. Depression, unspecified depression type F32.9 311    8. Vitamin deficiency E56.9 269.2 VITAMIN B12      VITAMIN D, 25 HYDROXY   9. FTT (failure to thrive) in adult R62.7 783.7      Follow-up Disposition:  Return in about 2 weeks (around 8/1/2018), or if symptoms worsen or fail to improve, for weight loss , FTT.  results and schedule of future studies reviewed with patient, wife  reviewed diet and weight   reviewed medications and side effects in detail   Chest/abd/pelvic CT  Return for labs this week  Continue current medications for now  Close f/u      Addendum:  Pt's wife sent me a letter - see scanned. She reports pt is drink etoh again in the form of white wine. Her note does not quantify his usage though. Prior hospital admission for alcoholic hepatitis and ataxia. Stay complicated by C.  Diff colitis, duodenal ulcer with bleeding      Addendum:  Markedly elevated CRP  CT unremarkable except for bladder wall thickening  Wife now reports more consistent confusion. See telephone encounter - ref to neuro, brain MRI ordered.

## 2018-07-18 NOTE — PROGRESS NOTES
Exam Room #14  Hemalatha Araya is a 67 y.o. male  Chief Complaint   Patient presents with    Sleep Problem     The patient has been experiencing all of the following     Decreased Appetite    Altered mental status    Extremity Weakness    LOW BACK PAIN    Shoulder Dislocation     says he had a fall in march causing his right shoulder to be dislocated, says his right shoulder now appears to protrude.  Weight Loss     from last visit on 7.2.18, patient has lost roughly 40 pounds     1. Have you been to the ER, urgent care clinic since your last visit? Hospitalized since your last visit? No    2. Have you seen or consulted any other health care providers outside of the [address] [place] [address] since your last visit? Include any pap smears or colon screening.  No

## 2018-07-24 ENCOUNTER — HOSPITAL ENCOUNTER (OUTPATIENT)
Dept: CT IMAGING | Age: 73
Discharge: HOME | End: 2018-07-24
Attending: INTERNAL MEDICINE
Payer: MEDICARE

## 2018-07-24 ENCOUNTER — TELEPHONE (OUTPATIENT)
Dept: INTERNAL MEDICINE CLINIC | Age: 73
End: 2018-07-24

## 2018-07-24 DIAGNOSIS — R62.7 FTT (FAILURE TO THRIVE) IN ADULT: ICD-10-CM

## 2018-07-24 DIAGNOSIS — R41.0 CONFUSION WITH NON-FOCAL NEURO EXAM: Primary | ICD-10-CM

## 2018-07-24 DIAGNOSIS — C67.9 RECURRENT BLADDER TRANSITIONAL CELL CARCINOMA (HCC): ICD-10-CM

## 2018-07-24 DIAGNOSIS — R63.4 UNEXPLAINED WEIGHT LOSS: ICD-10-CM

## 2018-07-24 DIAGNOSIS — R79.82 ELEVATED C-REACTIVE PROTEIN (CRP): ICD-10-CM

## 2018-07-24 DIAGNOSIS — G93.40 ENCEPHALOPATHY: ICD-10-CM

## 2018-07-24 PROCEDURE — 74011000258 HC RX REV CODE- 258: Performed by: INTERNAL MEDICINE

## 2018-07-24 PROCEDURE — 71260 CT THORAX DX C+: CPT

## 2018-07-24 PROCEDURE — 74177 CT ABD & PELVIS W/CONTRAST: CPT

## 2018-07-24 PROCEDURE — 74011636320 HC RX REV CODE- 636/320: Performed by: INTERNAL MEDICINE

## 2018-07-24 RX ORDER — SODIUM CHLORIDE 0.9 % (FLUSH) 0.9 %
10 SYRINGE (ML) INJECTION
Status: COMPLETED | OUTPATIENT
Start: 2018-07-24 | End: 2018-07-24

## 2018-07-24 RX ADMIN — SODIUM CHLORIDE 100 ML: 900 INJECTION, SOLUTION INTRAVENOUS at 11:47

## 2018-07-24 RX ADMIN — Medication 10 ML: at 11:47

## 2018-07-24 RX ADMIN — IOPAMIDOL 100 ML: 755 INJECTION, SOLUTION INTRAVENOUS at 11:47

## 2018-07-24 NOTE — TELEPHONE ENCOUNTER
Per pts wife, pts computer is currently not working they would like a call back with his lab results from 7/20/18 when available [phone]

## 2018-07-25 DIAGNOSIS — R63.4 WEIGHT LOSS: Primary | ICD-10-CM

## 2018-07-25 DIAGNOSIS — R62.7 FTT (FAILURE TO THRIVE) IN ADULT: ICD-10-CM

## 2018-07-25 DIAGNOSIS — R79.82 CRP ELEVATED: ICD-10-CM

## 2018-07-25 DIAGNOSIS — R76.8 ANA POSITIVE: ICD-10-CM

## 2018-07-25 NOTE — PROGRESS NOTES
Please notify pt of results    CT scan shows no obvious reason for the wt loss and malaise. The bladder wall is thickened, though. So, pt needs to see the urologist to review this regarding his history of bladder or transitional cell cancer. Also, pt needs to see the Rheumatologist due to +SRIDHAR and markedly elevated CRP level. Please facilitate pt getting an appt soon with the rheumatologist - Dr. Raffaele Casanova or Bruce Méndez.

## 2018-07-26 NOTE — TELEPHONE ENCOUNTER
Spoke with patient after verifying name and  regarding Dr. Aura Butts recommendations. Writer informed patient's wife of Dr. Aura Butts recommendations. Patient's wife given an opportunity to ask questions, repeated information, and verbalized understanding. Patient's wife is requesting a phone call from Dr. Soraya Mendoza. Patient's wife stated that the patient is confused a lot and doesn't know where he is.

## 2018-07-26 NOTE — TELEPHONE ENCOUNTER
I called and spoke with the pt's wife. She inquired re: potential hepatic encephalopathy given his past history. But, pt was jaundiced at that time, which he is not currently. I explained that an outpatient ammonia level would not be accurate. She adds that he has not been drinking etoh for 10+ days now. No tremulousness or anxiety to suggest WD. I reviewed my concern that his confusion and markedly elevated CRP may represent and auto-immune encephalitis. We agree to try an urgent outpatient neuro consultation and order an MRI of the brain. But, if his confusion progressed or his condition otherwise deteriorated, then they should go to the ER. If pt sees neuro and they feel an admission for w/u is warranted, then may be able to facilitate it.

## 2018-07-27 NOTE — TELEPHONE ENCOUNTER
Pt's wife called wanting pcp to know the earliest they were able to get in w/ neurologist was 8/7/18.  Can call w/ any questions or concerns    [phone]  wife's [phone]

## 2018-07-30 NOTE — TELEPHONE ENCOUNTER
Since MRI is scheduled for 8/6/18, then 8/7/18 neurology visit is ok as long as pt's condition remains stable. If his status is declining then he may need to be evaluated in the ER so consider an inpatient work up.

## 2018-07-30 NOTE — TELEPHONE ENCOUNTER
Spoke with patient after verifying name and  regarding Dr. Chapito Pascual recommendations. Writer informed patient of Dr. Chapito Pascual recommendations. Patient given an opportunity to ask questions, repeated information, and verbalized understanding.

## 2018-08-06 ENCOUNTER — HOSPITAL ENCOUNTER (OUTPATIENT)
Dept: MRI IMAGING | Age: 73
Discharge: HOME | End: 2018-08-06
Attending: INTERNAL MEDICINE
Payer: MEDICARE

## 2018-08-06 VITALS — BODY MASS INDEX: 26.74 KG/M2 | WEIGHT: 189 LBS

## 2018-08-06 DIAGNOSIS — R79.82 ELEVATED C-REACTIVE PROTEIN (CRP): ICD-10-CM

## 2018-08-06 DIAGNOSIS — G93.40 ENCEPHALOPATHY: ICD-10-CM

## 2018-08-06 DIAGNOSIS — R62.7 FTT (FAILURE TO THRIVE) IN ADULT: ICD-10-CM

## 2018-08-06 DIAGNOSIS — C67.9 RECURRENT BLADDER TRANSITIONAL CELL CARCINOMA (HCC): ICD-10-CM

## 2018-08-06 DIAGNOSIS — R41.0 CONFUSION WITH NON-FOCAL NEURO EXAM: ICD-10-CM

## 2018-08-06 PROCEDURE — 74011636320 HC RX REV CODE- 636/320: Performed by: INTERNAL MEDICINE

## 2018-08-06 PROCEDURE — 70553 MRI BRAIN STEM W/O & W/DYE: CPT

## 2018-08-06 PROCEDURE — A9575 INJ GADOTERATE MEGLUMI 0.1ML: HCPCS | Performed by: INTERNAL MEDICINE

## 2018-08-06 RX ADMIN — GADOTERATE MEGLUMINE 17 ML: 376.9 INJECTION INTRAVENOUS at 21:00

## 2018-08-07 ENCOUNTER — OFFICE VISIT (OUTPATIENT)
Dept: NEUROLOGY | Age: 73
End: 2018-08-07

## 2018-08-07 VITALS
RESPIRATION RATE: 18 BRPM | HEART RATE: 78 BPM | OXYGEN SATURATION: 98 % | SYSTOLIC BLOOD PRESSURE: 104 MMHG | DIASTOLIC BLOOD PRESSURE: 60 MMHG

## 2018-08-07 DIAGNOSIS — F03.90 DEMENTIA WITHOUT BEHAVIORAL DISTURBANCE, UNSPECIFIED DEMENTIA TYPE: Primary | ICD-10-CM

## 2018-08-07 DIAGNOSIS — F32.A DEPRESSION, UNSPECIFIED DEPRESSION TYPE: ICD-10-CM

## 2018-08-07 DIAGNOSIS — C67.9 MALIGNANT NEOPLASM OF URINARY BLADDER, UNSPECIFIED SITE (HCC): ICD-10-CM

## 2018-08-07 DIAGNOSIS — M48.062 SPINAL STENOSIS OF LUMBAR REGION WITH NEUROGENIC CLAUDICATION: ICD-10-CM

## 2018-08-07 RX ORDER — TAMSULOSIN HYDROCHLORIDE 0.4 MG/1
0.4 CAPSULE ORAL DAILY
COMMUNITY
End: 2021-01-01 | Stop reason: SINTOL

## 2018-08-07 RX ORDER — LORATADINE 10 MG/1
10 TABLET ORAL DAILY
COMMUNITY

## 2018-08-07 RX ORDER — MEMANTINE HYDROCHLORIDE 5 MG/1
5 TABLET ORAL DAILY
Qty: 30 TAB | Refills: 2 | Status: SHIPPED | OUTPATIENT
Start: 2018-08-07 | End: 2018-09-12 | Stop reason: DRUGHIGH

## 2018-08-07 NOTE — PROGRESS NOTES
[facility]   NEW PATIENT EVALUATION/CONSULTATION       PATIENT NAME: Michelle Childs    MRN: [id number]    REASON FOR CONSULTATION: Memory impairment    08/07/18      Previous records (physician notes, laboratory reports, and radiology reports) and imaging studies were reviewed and summarized. My recommendations will be communicated back to the patient's physician(s) via electronic medical record and/or [facility]. The patient was accompanied by his wife. HISTORY OF PRESENT ILLNESS:  Michelle Childs is a 67 y.o. right handed male presenting for evaluation of memory impairment. Onset and progression: 1 month, stable since onset although family reports slight progression. They note that he was started on Remeron 7/2/18 and sx started shortly thereafter. Also has a h/o EtoH abuse per his wife (stopped 2 weeks ago).       Neuropsychiatric symptoms    By Family members account:    Problems with judgment:No   Reduced interest in hobbies/activities: Yes   Repeats questions, stories, or statements: Yes    Trouble recalling people's names: No   Trouble learning how to use a tool or appliance: No   Forgetting the correct month or year: No   Difficulty handling financial affairs (bill-paying, taxes): Yes   Difficulty remembering appointments:Yes    Memory: short term recall  Language: no word finding difficulty   Change in personality: no  Socially inappropriate behavior: none  Change in eating habits: Not eating well  Physical changes: +weight loss, severe  Depressive symptoms: +depression  Apathy: mild  Hallucinations/Delusions: none    Ability to function:  Driving: Not driving for several months  Finances: Handled by his wife for years  Cooking: No  Manages own medication: Managed by his wife x 2 months  Residing: Living with his wife at home      Prior work-up: MRI Brain completed yesterday-finalized results pending    Prior treatments: None      PAST MEDICAL HISTORY:  Past Medical History: Diagnosis Date    Alcohol abuse, in remission     Arthritis     Back pain     Bladder cancer (HCC)      - in [place], [place]    C. difficile colitis 2010    Cancer involving bladder by direct extension from endometrium ([facility] [address] 10/2016    Depression     Horseshoe kidney     HTN (hypertension) 12/5/2013    Hyperlipidemia     Recurrent bladder transitional cell carcinoma (HCC)     Restless leg syndrome     Screening for colorectal cancer 03/14/2018    cologuard - negative    Situational anxiety     ie air travel    Spinal stenosis, lumbar     Thrombocytopenia (HCC)     Transitional cell carcinoma of left ureter ([facility] [address]     Dr. Rg Galaviz HISTORY:  Past Surgical History:   Procedure Laterality Date    BLADDER CANCER FISH      endoscopic    HX UROLOGICAL         FAMILY HISTORY:   Family History   Problem Relation Age of Onset    Heart Disease Mother     Hypertension Mother     Cancer Father      lung ca         SOCIAL HISTORY:  Social History     Social History    Marital status:      Spouse name: N/A    Number of children: N/A    Years of education: N/A     Social History Main Topics    Smoking status: Former Smoker    Smokeless tobacco: Never Used    Alcohol use No    Drug use: No    Sexual activity: Yes     Partners: Female     Other Topics Concern    None     Social History Narrative         MEDICATIONS:   Current Outpatient Prescriptions   Medication Sig Dispense Refill    tamsulosin (FLOMAX) 0.4 mg capsule Take 0.4 mg by mouth daily.  loratadine (CLARITIN) 10 mg tablet Take 10 mg by mouth.  OTHER Vitamin D 25mcg 1 cap a day.  cyanocobalamin, vitamin B-12, (VITAMIN B-12 PO) Take  by mouth.  mirtazapine (REMERON) 7.5 mg tablet Take 1 Tab by mouth nightly. 30 Tab 5    traMADol (ULTRAM) 50 mg tablet take 2 tablets by mouth every 8 hours if needed for pain 120 Tab 3    pramipexole (MIRAPEX) 0.75 mg tablet Take 1 Tab by mouth nightly.  90 Tab 1  traZODone (DESYREL) 50 mg tablet take 2 tablets by mouth NIGHTLY 180 Tab 3    escitalopram oxalate (LEXAPRO) 20 mg tablet take 1 tablet by mouth once daily 90 Tab 3    lisinopril (PRINIVIL, ZESTRIL) 10 mg tablet take 1 tablet by mouth once daily 90 Tab 3         ALLERGIES:  No Known Allergies      REVIEW OF SYSTEMS:  10 point ROS reviewed with patient. Please see scanned document under media. PHYSICAL EXAM:  Vital Signs:   Visit Vitals    /60    Pulse 78    Resp 18    SpO2 98%        General Medical Exam:  General:  Well appearing, comfortable, in no apparent distress. Eyes/ENT: see cranial nerve examination. Neck: No masses appreciated. Full range of motion without tenderness. Respiratory:  Clear to auscultation, good air entry bilaterally. Cardiac:  Regular rate and rhythm, no murmur. GI:  Soft, non-tender, non-distended abdomen. Bowel sounds normal. No masses, organomegaly. Extremities:  No deformities, edema, or skin discoloration. Skin:  No rashes or lesions. Neurological:  · Mental Status:  Alert and oriented to person, place, and time with fluent speech. · MOCA: 18/30  · Cranial Nerves:   CNII/III/IV/VI: visual fields full to confrontation, EOMI, PERRL, no ptosis or nystagmus. CN V: Facial sensation intact bilaterally, masseter 5/5   CN VII: Facial muscles symmetric and strong   CN VIII: Hears finger rub well bilaterally, intact vestibular function   CN IX/X: Normal palatal movement   CN XI: Full strength shoulder shrug bilaterally   CN XII: Tongue protrusion full and midline without fasciculation or atrophy  · Motor: Normal tone and muscle bulk with no pronator drift. No atrophy or fasciculations present on examination.   Individual muscle group testing:  Shoulder abduction:   Left:5/5   Right : 5/5    Shoulder adduction:   Left:5/5   Right : 5/5    Elbow flexion:      Left:5/5   Right : 5/5  Elbow extension:    Left:5/5   Right : 5/5   Wrist flexion:    Left:5/5   Right : 5/5  Wrist extension:    Left:5/5   Right : 5/5  Arm pronation:   Left:5/5   Right : 5/5  Arm supination:   Left:5/5   Right : 5/5    Finger flexion:    Left:5/5   Right : 5/5    Finger extension:   Left:5/5   Right : 5/5   Finger abduction:  Left:5/5   Right : 5/5   Finger adduction:   Left:5/5   Right : 5/5  Hip flexion:     Left:5/5   Right : 5/5         Hip extension:   Left:5/5   Right : 5/5    Knee flexion:     Left:5/5   Right : 5/5    Knee extension:   Left:5/5   Right : 5/5    Dorsiflexion:     Left:5/5   Right : 5/5  Plantar flexion:    Left:5/5   Right : 5/5      · MSRs: No crossed adductors or clonus. RIGHT  LEFT   Brachioradialis 1+ 1+   Biceps 2+ 2+   Triceps 1+ 1+   Knee 2+ 2+   Achilles 1+ 1+        Plantar response Downward Downward          · Sensation: Normal and symmetric perception of pinprick, temperature, light touch, proprioception, and vibration; (-) Romberg. · Coordination: No dysmetria. Normal rapid alternating movements; finger-to-nose and heel-to- shin testing are within normal limits. Tremor b/l UE action/postural.    · Gait: hunched posture, slightly unsteady    PERTINENT DATA:  INTERNAL RECORDS:  The patient's electronic medical record was reviewed. The relevant details include:  Component      Latest Ref Rng & Units 7/20/2018 7/20/2018 7/20/2018           9:04 AM  9:04 AM  9:04 AM   T4, Free      0.82 - 1.77 ng/dL   1.33   TSH      0.450 - 4.500 uIU/mL  1.360    Vitamin B12      232 - 1245 pg/mL >2000 (H)         ASSESSMENT:      ICD-10-CM ICD-9-CM    1. Dementia without behavioral disturbance, unspecified dementia type F03.90 294.20 tamsulosin (FLOMAX) 0.4 mg capsule      loratadine (CLARITIN) 10 mg tablet      OTHER      AMMONIA      memantine (NAMENDA) 5 mg tablet      REFERRAL TO NEUROPSYCHOLOGY   2. Depression, unspecified depression type F32.9 311 REFERRAL TO NEUROPSYCHOLOGY   3. Spinal stenosis of lumbar region with neurogenic claudication M48.062 724.03    4. Malignant neoplasm of urinary bladder, unspecified site [facility]) C67.9 25.9    67year old male with a h/o HTN, HPL, bladder CA, depression, EtOH abuse, transaminitis, lumbar spinal stenosis presenting with approximately 1 month of cognitive decline. MOCA 18/30 and c/w moderate dementia with significantly impaired delayed recall, orientation, executive functioning and to a lesser extent naming. MRI Brain completed 8/6/18 and independently reviewed with pt/family today without evidence of vascular dementia, mild to moderate cortical atrophy, no acute process identified. Given baseline transaminitis likely associated with h/o chronic ETOH abuse, will obtain ammonia levels to exclude contributing metabolic encephalopathy. Chronic EtOH abuse may also contribute to underlying memory impairment. I suspect some degree of cognitive impairment was present for much longer than the family is aware. We discussed obtaining a formal neuropsychologic assessment to exclude any contributing mood disorders/pseudodementia given his comorbid depression. Acetylcholinesterase inhibitors and NMDA receptor antagonist may be used to assist with attention and recall. Discussed that he would benefit from designation of a POA to assist with executive decision making. Finances and medication administration should continue to be monitored to ensure accuracy and prevent errors. Patient currently has appropriate social/caregiver support in place. PLAN:  Labs: Ammonia  Abstain from further EtOH  Neuropsychologic testing  Start Namenda 5mg/d  Heart healthy diet and exercise  Regular scheduled cognitive and social engagement. Follow-up Disposition: ~3 months/after neuropsychologic testing completed      Preston Majano.  Keyon Powell DO  Staff Neurologist  Diplomate, [address] [place] [address] Board of Psychiatry & Neurology       CC Referring provider:    Eric Wood MD

## 2018-08-07 NOTE — PATIENT INSTRUCTIONS

## 2018-08-07 NOTE — MR AVS SNAPSHOT
[facility] [address] [place] [address] 
[phone] Patient: Sirena Marroquin MRN: [id number] :1945 Visit Information Date & Time Provider Department Dept. Phone Encounter #  
 2018 10:00 AM Meenakshi HendersnoAmilcar [facility] at [address] [id number] Your Appointments 2018  9:00 AM  
New Patient with Maura Henry MD  
[address] ([facility]) Appt Note: NP - Referred by Frieda Mesa- Test for Autoimmune disease - KD 18  
 [address] [place] [address]  
[phone]  
  
   
 [address] [facility] [address] Upcoming Health Maintenance Date Due  
 GLAUCOMA SCREENING Q2Y 2018 Influenza Age 5 to Adult 2018 MEDICARE YEARLY EXAM 2018 DTaP/Tdap/Td series (2 - Td) 2023 Allergies as of 2018  Review Complete On: 2018 By: Meenakshi Henderson DO No Known Allergies Current Immunizations  Reviewed on 2018 Name Date Influenza High Dose Vaccine PF 8/10/2017, 2016 Influenza Vaccine 2014, 2013, 2013 Influenza Vaccine (Quad) PF 2015 Pneumococcal Conjugate (PCV-13) 2015 Pneumococcal Vaccine (Unspecified Type) 10/29/2013 Tdap 2013 Zoster Vaccine, Live 2013 Not reviewed this visit You Were Diagnosed With   
  
 Codes Comments Dementia without behavioral disturbance, unspecified dementia type    -  Primary ICD-10-CM: F03.90 ICD-9-CM: 294.20 Depression, unspecified depression type     ICD-10-CM: F32.9 ICD-9-CM: 070 Spinal stenosis of lumbar region with neurogenic claudication     ICD-10-CM: M48.062 
ICD-9-CM: 724.03 Malignant neoplasm of urinary bladder, unspecified site [facility])     ICD-10-CM: C67.9 ICD-9-CM: 188. 9 Vitals BP Pulse Resp SpO2 Smoking Status 104/60 78 18 98% Former Smoker Preferred Pharmacy Pharmacy Name Phone [facility], [address] [phone] Your Updated Medication List  
  
   
This list is accurate as of 8/7/18 10:43 AM.  Always use your most recent med list.  
  
  
  
  
 CLARITIN 10 mg tablet Generic drug:  loratadine Take 10 mg by mouth.  
  
 escitalopram oxalate 20 mg tablet Commonly known as:  LEXAPRO  
take 1 tablet by mouth once daily  
  
 lisinopril 10 mg tablet Commonly known as:  PRINIVIL, ZESTRIL  
take 1 tablet by mouth once daily  
  
 memantine 5 mg tablet Commonly known as:  Bettemeggan Brenton Take 1 Tab by mouth daily. mirtazapine 7.5 mg tablet Commonly known as:  Charyl Franklin Take 1 Tab by mouth nightly. OTHER Vitamin D 25mcg 1 cap a day. pramipexole 0.75 mg tablet Commonly known as:  MIRAPEX Take 1 Tab by mouth nightly. tamsulosin 0.4 mg capsule Commonly known as:  FLOMAX Take 0.4 mg by mouth daily. traMADol 50 mg tablet Commonly known as:  ULTRAM  
take 2 tablets by mouth every 8 hours if needed for pain  
  
 traZODone 50 mg tablet Commonly known as:  DESYREL  
take 2 tablets by mouth NIGHTLY  
  
 VITAMIN B-12 PO Take  by mouth. Prescriptions Sent to Pharmacy Refills  
 memantine (NAMENDA) 5 mg tablet 2 Sig: Take 1 Tab by mouth daily. Class: Normal  
 [facility]acy: [facility] [place] [address] [address], [address] #: [phone] Route: Oral  
  
We Performed the Following AMMONIA [id number] CPT(R)] REFERRAL TO NEUROPSYCHOLOGY [JRM83 Custom] Comments:  
 Please evaluate patient for cognitive decline Referral Information Referral ID Referred By Referred To  
  
 [id number] Cheryl Vargas Not Available Visits Status Start Date End Date 1 New Request 8/7/18 8/7/19 If your referral has a status of pending review or denied, additional information will be sent to support the outcome of this decision. Patient Instructions A Healthy Lifestyle: Care Instructions Your Care Instructions A healthy lifestyle can help you feel good, stay at a healthy weight, and have plenty of energy for both work and play. A healthy lifestyle is something you can share with your whole family. A healthy lifestyle also can lower your risk for serious health problems, such as high blood pressure, heart disease, and diabetes. You can follow a few steps listed below to improve your health and the health of your family. Follow-up care is a key part of your treatment and safety. Be sure to make and go to all appointments, and call your doctor if you are having problems. It's also a good idea to know your test results and keep a list of the medicines you take. How can you care for yourself at home? · Do not eat too much sugar, fat, or fast foods. You can still have dessert and treats now and then. The goal is moderation. · Start small to improve your eating habits. Pay attention to portion sizes, drink less juice and soda pop, and eat more fruits and vegetables. ¨ Eat a healthy amount of food. A 3-ounce serving of meat, for example, is about the size of a deck of cards. Fill the rest of your plate with vegetables and whole grains. ¨ Limit the amount of soda and sports drinks you have every day. Drink more water when you are thirsty. ¨ Eat at least 5 servings of fruits and vegetables every day. It may seem like a lot, but it is not hard to reach this goal. A serving or helping is 1 piece of fruit, 1 cup of vegetables, or 2 cups of leafy, raw vegetables. Have an apple or some carrot sticks as an afternoon snack instead of a candy bar. Try to have fruits and/or vegetables at every meal. 
· Make exercise part of your daily routine.  You may want to start with simple activities, such as walking, bicycling, or slow swimming. Try to be active 30 to 60 minutes every day. You do not need to do all 30 to 60 minutes all at once. For example, you can exercise 3 times a day for 10 or 20 minutes. Moderate exercise is safe for most people, but it is always a good idea to talk to your doctor before starting an exercise program. 
· Keep moving. Sita Dove the lawn, work in the garden, or [employer]. Take the stairs instead of the elevator at work. · If you smoke, quit. People who smoke have an increased risk for heart attack, stroke, cancer, and other lung illnesses. Quitting is hard, but there are ways to boost your chance of quitting tobacco for good. ¨ Use nicotine gum, patches, or lozenges. ¨ Ask your doctor about stop-smoking programs and medicines. ¨ Keep trying. In addition to reducing your risk of diseases in the future, you will notice some benefits soon after you stop using tobacco. If you have shortness of breath or asthma symptoms, they will likely get better within a few weeks after you quit. · Limit how much alcohol you drink. Moderate amounts of alcohol (up to 2 drinks a day for men, 1 drink a day for women) are okay. But drinking too much can lead to liver problems, high blood pressure, and other health problems. Family health If you have a family, there are many things you can do together to improve your health. · Eat meals together as a family as often as possible. · Eat healthy foods. This includes fruits, vegetables, lean meats and dairy, and whole grains. · Include your family in your fitness plan. Most people think of activities such as jogging or tennis as the way to fitness, but there are many ways you and your family can be more active. Anything that makes you breathe hard and gets your heart pumping is exercise. Here are some tips: 
¨ Walk to do errands or to take your child to school or the bus. ¨ Go for a family bike ride after dinner instead of watching TV. Where can you learn more? Go to http://julissa-jannette.info/. Enter O941 in the search box to learn more about \"A Healthy Lifestyle: Care Instructions. \" Current as of: December 7, 2017 Content Version: 11.7 © 3526-1206 [employer]. Care instructions adapted under license by [employer] (which disclaims liability or warranty for this information). If you have questions about a medical condition or this instruction, always ask your healthcare professional. [address] any warranty or liability for your use of this information. Introducing [facility]! Truong Mcqueen introduces [employer] patient portal. Now you can access parts of your medical record, email your doctor's office, and request medication refills online. 1. In your internet browser, go to [email or web address] 2. Click on the First Time User? Click Here link in the Sign In box. You will see the New Member Sign Up page. 3. Enter your [employer] Access Code exactly as it appears below. You will not need to use this code after youve completed the sign-up process. If you do not sign up before the expiration date, you must request a new code. · [employer] Access Code: [email or web address] Expires: 11/4/2018  7:39 PM 
 
4. Enter the last four digits of your Social Security Number (xxxx) and Date of Birth (mm/dd/yyyy) as indicated and click Submit. You will be taken to the next sign-up page. 5. Create a Keepcont ID. This will be your [employer] login ID and cannot be changed, so think of one that is secure and easy to remember. 6. Create a Keepcont password. You can change your password at any time. 7. Enter your Password Reset Question and Answer. This can be used at a later time if you forget your password. 8. Enter your e-mail address.  You will receive e-mail notification when new information is available in [facility]. 9. Click Sign Up. You can now view and download portions of your medical record. 10. Click the Download Summary menu link to download a portable copy of your medical information. If you have questions, please visit the Frequently Asked Questions section of the [facility] [facility] website. Remember, [facility] is NOT to be used for urgent needs. For medical emergencies, dial 911. Now available from your iPhone and Android! Please provide this summary of care documentation to your next provider. Your primary care clinician is listed as [address] If you have any questions after today's visit, please call [phone].

## 2018-08-08 ENCOUNTER — TELEPHONE (OUTPATIENT)
Dept: NEUROLOGY | Age: 73
End: 2018-08-08

## 2018-08-08 LAB — AMMONIA PLAS-MCNC: 120 UG/DL (ref 27–102)

## 2018-08-09 NOTE — TELEPHONE ENCOUNTER
Spoke with , informed her of ammonia level. Informed her to follow up with Rashid Mabry in regards to this, will fax lab to ' office.

## 2018-08-09 NOTE — TELEPHONE ENCOUNTER
----- Message from [address] sent at 8/9/2018  1:41 PM EDT -----  Contact: [phone]  Patient saw dr. Zana Lewis (nuerologist) and had labs done. Per wife, the labs came back elevated and stated the pt needs to see his provider asap. Any way to work pt in next week?

## 2018-08-10 NOTE — TELEPHONE ENCOUNTER
S/w wife and notified of changes in medication and recommendations per PCP. Wife could not clarify if STAT labs were done or not, but did add labs were draw just across the ferro after visit.

## 2018-08-10 NOTE — PROGRESS NOTES
Please notify pt's wife of results     Since his symptoms seemed to worsened with remeron (mirtazapine), he may stop this medication. Also, ammonia level measures high. Must add lactulose 2-3 times per day to bind up and lower this ammonia level, which comes from a decline in liver function. The lactulose may be limited by diarrhea, but will need to be taken at least bid - titrate to 2-3 bowel movements daily.       Also, please clarify with  how to get an accurate, frozen STAT ammonia level that we can rely on the result. If the sample sits it will falsely elevate the level.

## 2018-08-10 NOTE — TELEPHONE ENCOUNTER
Please clarify what our protocol would be to get an accurate ammonia level for future repeat values.

## 2018-08-17 ENCOUNTER — OFFICE VISIT (OUTPATIENT)
Dept: INTERNAL MEDICINE CLINIC | Age: 73
End: 2018-08-17

## 2018-08-17 VITALS
SYSTOLIC BLOOD PRESSURE: 101 MMHG | TEMPERATURE: 97.8 F | WEIGHT: 191 LBS | OXYGEN SATURATION: 97 % | HEART RATE: 86 BPM | RESPIRATION RATE: 18 BRPM | BODY MASS INDEX: 26.74 KG/M2 | DIASTOLIC BLOOD PRESSURE: 63 MMHG | HEIGHT: 71 IN

## 2018-08-17 DIAGNOSIS — M54.9 CHRONIC BACK PAIN, UNSPECIFIED BACK LOCATION, UNSPECIFIED BACK PAIN LATERALITY: ICD-10-CM

## 2018-08-17 DIAGNOSIS — F33.9 RECURRENT DEPRESSION (HCC): ICD-10-CM

## 2018-08-17 DIAGNOSIS — E78.5 HYPERLIPIDEMIA, UNSPECIFIED HYPERLIPIDEMIA TYPE: ICD-10-CM

## 2018-08-17 DIAGNOSIS — I10 ESSENTIAL HYPERTENSION: ICD-10-CM

## 2018-08-17 DIAGNOSIS — C67.9 RECURRENT BLADDER TRANSITIONAL CELL CARCINOMA (HCC): ICD-10-CM

## 2018-08-17 DIAGNOSIS — M48.062 SPINAL STENOSIS OF LUMBAR REGION WITH NEUROGENIC CLAUDICATION: ICD-10-CM

## 2018-08-17 DIAGNOSIS — M19.90 ARTHRITIS: ICD-10-CM

## 2018-08-17 DIAGNOSIS — Z00.00 MEDICARE ANNUAL WELLNESS VISIT, SUBSEQUENT: Primary | ICD-10-CM

## 2018-08-17 DIAGNOSIS — R11.2 NAUSEA AND VOMITING, INTRACTABILITY OF VOMITING NOT SPECIFIED, UNSPECIFIED VOMITING TYPE: ICD-10-CM

## 2018-08-17 DIAGNOSIS — K76.82 HEPATIC ENCEPHALOPATHY: ICD-10-CM

## 2018-08-17 DIAGNOSIS — G89.29 CHRONIC BACK PAIN, UNSPECIFIED BACK LOCATION, UNSPECIFIED BACK PAIN LATERALITY: ICD-10-CM

## 2018-08-17 RX ORDER — LIDOCAINE 50 MG/G
PATCH TOPICAL
Qty: 30 PATCH | Refills: 5 | Status: SHIPPED | OUTPATIENT
Start: 2018-08-17 | End: 2019-04-17 | Stop reason: SDUPTHER

## 2018-08-17 RX ORDER — LANOLIN ALCOHOL/MO/W.PET/CERES
100 CREAM (GRAM) TOPICAL DAILY
Qty: 30 TAB | Refills: 5 | Status: SHIPPED | OUTPATIENT
Start: 2018-08-17 | End: 2019-02-18 | Stop reason: SDUPTHER

## 2018-08-17 RX ORDER — ONDANSETRON 4 MG/1
4 TABLET, ORALLY DISINTEGRATING ORAL
Qty: 60 TAB | Refills: 5 | Status: SHIPPED | OUTPATIENT
Start: 2018-08-17 | End: 2019-09-12 | Stop reason: SDUPTHER

## 2018-08-17 NOTE — PROGRESS NOTES
HPI:  Presents for f/u weight loss, confusion, etc.    Accompanied by wife     Started nausea with lactulose yest  But taking it for a week tid  Daily BMs     +/- improvement in status  No major change    Though weight has improved. Rheum appt not until 11/29/18    To see Urology 9/26/18    To see Dr Henny Roque 8/20/18    Neuro f/u on 9/4/18 - Dr. Gabbie Wisdom    Past medical, Social, and Family history reviewed    Prior to Admission medications    Medication Sig Start Date End Date Taking? Authorizing Provider   lactulose (CHRONULAC) 10 gram/15 mL solution Take 30 mL by mouth three (3) times daily. 8/9/18  Yes Rosio Arizmendi MD   tamsulosin [facility]) 0.4 mg capsule Take 0.4 mg by mouth daily. Yes Historical Provider   loratadine (CLARITIN) 10 mg tablet Take 10 mg by mouth. Yes Historical Provider   OTHER Vitamin D 25mcg 1 cap a day. Yes Historical Provider   memantine (NAMENDA) 5 mg tablet Take 1 Tab by mouth daily. 8/7/18  Yes Melani Haque,    cyanocobalamin, vitamin B-12, (VITAMIN B-12 PO) Take  by mouth. Yes Historical Provider   traMADol (ULTRAM) 50 mg tablet take 2 tablets by mouth every 8 hours if needed for pain 6/20/18  Yes Rosio Arizmendi MD   pramipexole (MIRAPEX) 0.75 mg tablet Take 1 Tab by mouth nightly. 5/14/18  Yes Rosio Arizmendi MD   traZODone (DESYREL) 50 mg tablet take 2 tablets by mouth NIGHTLY 2/4/18  Yes Rosio Arizmendi MD   escitalopram oxalate (LEXAPRO) 20 mg tablet take 1 tablet by mouth once daily 11/13/17  Yes Rosio Arizmendi MD   lisinopril (PRINIVIL, ZESTRIL) 10 mg tablet take 1 tablet by mouth once daily 9/27/17  Yes Rosio Arizmendi MD          ROS  Complete ROS reviewed and negative or stable except as noted in HPI. Physical Exam   Constitutional: He is oriented to person, place, and time. No distress. HENT:   Head: Normocephalic and atraumatic. Mouth/Throat: Oropharynx is clear and moist. No oropharyngeal exudate.    Eyes: EOM are normal. Pupils are equal, round, and reactive to light. No scleral icterus. Neck: Normal range of motion. Neck supple. No JVD present. No thyromegaly present. Cardiovascular: Normal rate, regular rhythm and normal heart sounds. Exam reveals no gallop and no friction rub. No murmur heard. Pulmonary/Chest: Effort normal and breath sounds normal. No respiratory distress. He has no wheezes. He has no rales. Abdominal: Soft. Bowel sounds are normal. He exhibits no distension and no mass. There is no tenderness. There is no rebound and no guarding. Musculoskeletal: Normal range of motion. He exhibits no edema. Slow deliberate gait, use of cane required for ambulation. Lymphadenopathy:     He has no cervical adenopathy. Neurological: He is alert and oriented to person, place, and time. He exhibits normal muscle tone. Coordination normal.   Skin: Skin is warm. No rash noted. Psychiatric: He has a normal mood and affect. Nursing note and vitals reviewed. Prior labs reviewed. Reviewed prior imaging reports  Reviewed specialist notes      Assessment/Plan:    ICD-10-CM ICD-9-CM    1. Hepatic encephalopathy (HCC) K72.90 572.2 AMMONIA      REFERRAL TO LIVER HEPATOLOGY      thiamine HCL (B-1) 100 mg tablet   2. Recurrent bladder transitional cell carcinoma (HCC) C67.9 188.9    3. Recurrent depression (HCC) F33.9 296.30    4. Hyperlipidemia, unspecified hyperlipidemia type E78.5 272.4    5. Essential hypertension I10 401.9    6. Chronic back pain, unspecified back location, unspecified back pain laterality M54.9 724.5     G89.29 338.29    7. Spinal stenosis of lumbar region with neurogenic claudication M48.062 724.03    8. Nausea and vomiting, intractability of vomiting not specified, unspecified vomiting type R11.2 787.01 ondansetron (ZOFRAN ODT) 4 mg disintegrating tablet   9. Arthritis M19.90 716.90 lidocaine (LIDODERM) 5 %   10.  Medicare annual wellness visit, subsequent Z00.00 V70.0      Follow-up Disposition:  Return in about 4 weeks (around 9/14/2018), or if symptoms worsen or fail to improve, for weakness, weight,  visits. results and schedule of future studies reviewed with patient and wife  reviewed diet, exercise and weight    cardiovascular risk and specific lipid/LDL goals reviewed  reviewed medications and side effects in detail   zofran   Lactulose tid - to titrate to 2-3 BMs per day  Ref to Hepatology  Repeat ammonia level today  Add thiamine supplement  See specialists as scheduled  Ok to wait for Rheum since CRP may be liver related.   We'll see what Hepatology thinks  Refill lidocaine patches for back pain

## 2018-08-17 NOTE — PROGRESS NOTES
Health Maintenance Due   Topic Date Due    GLAUCOMA SCREENING Q2Y  04/01/2018    Influenza Age 5 to Adult  08/01/2018    MEDICARE YEARLY EXAM  08/09/2018       Chief Complaint   Patient presents with    Annual Wellness Visit    Fatigue    Back Pain    Altered mental status       1. Have you been to the ER, urgent care clinic since your last visit? Hospitalized since your last visit? No    2. Have you seen or consulted any other health care providers outside of the [facility] since your last visit? Include any pap smears or colon screening. No    3) Do you have an Advance Directive on file? yes    4) Are you interested in receiving information on Advance Directives? YES      Patient is accompanied by self I have received verbal consent from Nga Marin to discuss any/all medical information while they are present in the room. Nga Marin is a 67 y.o. male and presents for annual Medicare Wellness Visit. Problem List: Reviewed with patient and discussed risk factors.     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    Spinal stenosis, lumbar M48.061    Spinal stenosis of lumbar region M48.061    Dislocation of right acromioclavicular joint S43.101A    Recurrent depression ([address] F33.9       Current medical providers:  Patient Care Team:  Lainey Keyes MD as PCP - General (Internal Medicine)  Constantino Wolff MD (Urology)    PSH: Reviewed with patient  Past Surgical History:   Procedure Laterality Date    BLADDER CANCER FISH      endoscopic    HX UROLOGICAL          SH: Reviewed with patient  Social History   Substance Use Topics  Smoking status: Former Smoker    Smokeless tobacco: Never Used    Alcohol use No       FH: Reviewed with patient  Family History   Problem Relation Age of Onset    Heart Disease Mother     Hypertension Mother     Cancer Father      lung ca       Medications/Allergies: Reviewed with patient  Current Outpatient Prescriptions on File Prior to Visit   Medication Sig Dispense Refill    lactulose (CHRONULAC) 10 gram/15 mL solution Take 30 mL by mouth three (3) times daily. 950 mL 5    tamsulosin (FLOMAX) 0.4 mg capsule Take 0.4 mg by mouth daily.  loratadine (CLARITIN) 10 mg tablet Take 10 mg by mouth.  OTHER Vitamin D 25mcg 1 cap a day.  memantine (NAMENDA) 5 mg tablet Take 1 Tab by mouth daily. 30 Tab 2    cyanocobalamin, vitamin B-12, (VITAMIN B-12 PO) Take  by mouth.  traMADol (ULTRAM) 50 mg tablet take 2 tablets by mouth every 8 hours if needed for pain 120 Tab 3    pramipexole (MIRAPEX) 0.75 mg tablet Take 1 Tab by mouth nightly. 90 Tab 1    traZODone (DESYREL) 50 mg tablet take 2 tablets by mouth NIGHTLY 180 Tab 3    escitalopram oxalate (LEXAPRO) 20 mg tablet take 1 tablet by mouth once daily 90 Tab 3    lisinopril (PRINIVIL, ZESTRIL) 10 mg tablet take 1 tablet by mouth once daily 90 Tab 3     No current facility-administered medications on file prior to visit. No Known Allergies    Objective:  Visit Vitals    Ht 5' 10.5\" (1.791 m)    Wt 191 lb (86.6 kg)    BMI 27.02 kg/m2    Body mass index is 27.02 kg/(m^2). Assessment of cognitive impairment: Alert and oriented x 3    Depression Screen:   PHQ over the last two weeks 8/8/2017   Little interest or pleasure in doing things Not at all   Feeling down, depressed, irritable, or hopeless Several days   Total Score PHQ 2 1       Fall Risk Assessment:    Fall Risk Assessment, last 12 mths 8/17/2018   Able to walk? Yes   Fall in past 12 months?  No   Fall with injury? -   Number of falls in past 12 months -   Fall Risk Score -       Functional Ability:    Does the patient exhibit a steady gait?  no   How long did it take the patient to get up and walk from a sitting position? 4   Is the patient self reliant?  (ie can do own laundry, meals, household chores)  no     Does the patient handle his/her own medications?  no     Does the patient handle his/her own money? no     Is the patients home safe (ie good lighting, handrails on stairs and bath, etc.)? yes     Did you notice or did patient express any hearing difficulties? no     Did you notice or did patient express any vision difficulties? YES     Were distance and reading eye charts used? no       Advance Care Planning:   Patient was offered the opportunity to discuss advance care planning:  yes     Does patient have an Advance Directive:  yes   If no, did you provide information on Caring Connections?  no       Plan:      No orders of the defined types were placed in this encounter. Health Maintenance   Topic Date Due    GLAUCOMA SCREENING Q2Y  04/01/2018    Influenza Age 5 to Adult  08/01/2018    MEDICARE YEARLY EXAM  08/09/2018    DTaP/Tdap/Td series (2 - Td) 11/19/2023    ZOSTER VACCINE AGE 60>  Completed    Pneumococcal 65+ High/Highest Risk  Completed       *Patient verbalized understanding and agreement with the plan. A copy of the After Visit Summary with personalized health plan was given to the patient today.

## 2018-08-17 NOTE — MR AVS SNAPSHOT
[address]  [address] [place] [address] 
[phone] Patient: Pedro Wong MRN: [id number] :1945 Visit Information Date & Time Provider Department Dept. Phone Encounter #  
 2018  1:30 PM Nilson Esquivel, [address] [place] [address], Ne and Internal Medicine [phone] [id number] Follow-up Instructions Return in about 4 weeks (around 2018), or if symptoms worsen or fail to improve, for weakness, weight,  visits. Your Appointments 2018  1:30 PM  
Follow Up with Luma Holm DO [facility]) Appt Note: f/u 2018   
 [address] [facility] [address] [facility] [address]  
[phone]  
  
   
 [facility] [address]  
  
    
 2018  9:00 AM  
New Patient with Carmine Garcia MD  
[facility]) Appt Note: NP - Referred by Luz Hall- Test for Autoimmune disease - KD 18; . ... [address] [place] [id number]  
[address] [place] [id number] Upcoming Health Maintenance Date Due  
 GLAUCOMA SCREENING Q2Y 2018 Influenza Age 5 to Adult 2018 MEDICARE YEARLY EXAM 2019 DTaP/Tdap/Td series (2 - Td) 2023 Allergies as of 2018  Review Complete On: 2018 By: Nilson Esquivel MD  
 No Known Allergies Current Immunizations  Reviewed on 2018 Name Date Influenza High Dose Vaccine PF 8/10/2017, 2016 Influenza Vaccine 2014, 2013, 2013 Influenza Vaccine (Quad) PF 2015 Pneumococcal Conjugate (PCV-13) 2015 Pneumococcal Vaccine (Unspecified Type) 10/29/2013 Tdap 2013 Zoster Vaccine, Live 2013  Reviewed by Nilson Esquivel MD on 2018 at  2:43 PM  
 You Were Diagnosed With   
  
 Codes Comments Hepatic encephalopathy ([facility]    -  Primary ICD-10-CM: K72.90 ICD-9-CM: 572.2 Recurrent bladder transitional cell carcinoma (HCC)     ICD-10-CM: C67.9 ICD-9-CM: 188. 9 Recurrent depression ([facility]     ICD-10-CM: F33.9 ICD-9-CM: 296.30 Hyperlipidemia, unspecified hyperlipidemia type     ICD-10-CM: E78.5 ICD-9-CM: 272.4 Essential hypertension     ICD-10-CM: I10 
ICD-9-CM: 401.9 Chronic back pain, unspecified back location, unspecified back pain laterality     ICD-10-CM: M54.9, G89.29 ICD-9-CM: 724.5, 338.29 Spinal stenosis of lumbar region with neurogenic claudication     ICD-10-CM: M48.062 
ICD-9-CM: 724.03 Nausea and vomiting, intractability of vomiting not specified, unspecified vomiting type     ICD-10-CM: R11.2 ICD-9-CM: 787.01 Arthritis     ICD-10-CM: M19.90 ICD-9-CM: 716.90 Medicare annual wellness visit, subsequent     ICD-10-CM: Z00.00 ICD-9-CM: V70.0 Vitals BP Pulse Temp Resp Height(growth percentile) Weight(growth percentile) 101/63 (BP 1 Location: Right arm, BP Patient Position: Sitting) 86 97.8 °F (36.6 °C) (Oral) 18 5' 10.5\" (1.791 m) 191 lb (86.6 kg) SpO2 BMI Smoking Status 97% 27.02 kg/m2 Former Smoker Vitals History BMI and BSA Data Body Mass Index Body Surface Area  
 27.02 kg/m 2 2.08 m 2 Preferred Pharmacy Pharmacy Name Phone [facility] [phone] Your Updated Medication List  
  
   
This list is accurate as of 8/17/18  2:58 PM.  Always use your most recent med list.  
  
  
  
  
 CLARITIN 10 mg tablet Generic drug:  loratadine Take 10 mg by mouth.  
  
 escitalopram oxalate 20 mg tablet Commonly known as:  LEXAPRO  
take 1 tablet by mouth once daily  
  
 lactulose 10 gram/15 mL solution Commonly known as:  Ch Minks Take 30 mL by mouth three (3) times daily. lidocaine 5 % Commonly known as:  LIDODERM  
apply 1 patch every 24 hours as needed  
  
 lisinopril 10 mg tablet Commonly known as:  PRINIVIL, ZESTRIL  
take 1 tablet by mouth once daily  
  
 memantine 5 mg tablet Commonly known as:  Bray Anchors Take 1 Tab by mouth daily. ondansetron 4 mg disintegrating tablet Commonly known as:  ZOFRAN ODT Take 1 Tab by mouth every eight (8) hours as needed for Nausea. OTHER Vitamin D 25mcg 1 cap a day. pramipexole 0.75 mg tablet Commonly known as:  MIRAPEX Take 1 Tab by mouth nightly. tamsulosin 0.4 mg capsule Commonly known as:  FLOMAX Take 0.4 mg by mouth daily. thiamine  mg tablet Commonly known as:  B-1 Take 1 Tab by mouth daily. traMADol 50 mg tablet Commonly known as:  ULTRAM  
take 2 tablets by mouth every 8 hours if needed for pain  
  
 traZODone 50 mg tablet Commonly known as:  DESYREL  
take 2 tablets by mouth NIGHTLY  
  
 VITAMIN B-12 PO Take  by mouth. Prescriptions Sent to Pharmacy Refills  
 thiamine HCL (B-1) 100 mg tablet 5 Sig: Take 1 Tab by mouth daily. Class: Normal  
 Pharmacy: [address] Ph #: [phone] Route: Oral  
 ondansetron (ZOFRAN ODT) 4 mg disintegrating tablet 5 Sig: Take 1 Tab by mouth every eight (8) hours as needed for Nausea. Class: Normal  
 Pharmacy: [address] Ph #: [phone] Route: Oral  
 lidocaine (LIDODERM) 5 % 5 Sig: apply 1 patch every 24 hours as needed Class: Normal  
 Pharmacy: [address] Ph #: [phone] We Performed the Following AMMONIA [id number] CPT(R)] REFERRAL TO LIVER HEPATOLOGY [JUN079 Custom] Follow-up Instructions  Return in about 4 weeks (around 9/14/2018), or if symptoms worsen or fail to improve, for weakness, weight,  visits. Referral Information Referral ID Referred By Referred To  
  
 [id number] Regan Huston MD   
   [address] [facility] [address] Phone: [phone] Fax: [phone] Visits Status Start Date End Date 1 New Request 8/17/18 8/17/19 If your referral has a status of pending review or denied, additional information will be sent to support the outcome of this decision. Patient Instructions Medicare Wellness Visit, Male The best way to live healthy is to have a lifestyle where you eat a well-balanced diet, exercise regularly, limit alcohol use, and quit all forms of tobacco/nicotine, if applicable. Regular preventive services are another way to keep healthy. Preventive services (vaccines, screening tests, monitoring & exams) can help personalize your care plan, which helps you manage your own care. Screening tests can find health problems at the earliest stages, when they are easiest to treat. [facility] follows the current, evidence-based guidelines published by the [facility] (USPSTF) when recommending preventive services for our patients. Because we follow these guidelines, sometimes recommendations change over time as research supports it. (For example, a prostate screening blood test is no longer routinely recommended for men with no symptoms.) Of course, you and your provider may decide to screen more often for some diseases, based on your risk and co-morbidities (chronic disease you are already diagnosed with). Preventive services for you include: - Medicare offers their members a free annual wellness visit, which is time for you and your primary care provider to discuss and plan for your preventive service needs. Take advantage of this benefit every year! -All people over age 72 should receive the recommended pneumonia vaccines. Current USPSTF guidelines recommend a series of two vaccines for the best pneumonia protection.  
 
-All adults should have a yearly flu vaccine and a tetanus vaccine every 10 years. All adults age 61 years should receive a shingles vaccine once in their lifetime.   
 
-All adults age 38-68 years who are overweight should have a diabetes screening test once every three years.  
 
-Other screening tests & preventive services for persons with diabetes include: an eye exam to screen for diabetic retinopathy, a kidney function test, a foot exam, and stricter control over your cholesterol.  
 
-Cardiovascular screening for adults with routine risk involves an electrocardiogram (ECG) at intervals determined by the provider.  
 
-Colorectal cancer screenings should be done for adults age 54-65 years with normal risk. There are a number of acceptable methods of screening for this type of cancer. Each test has its own benefits and drawbacks. Discuss with your provider what is most appropriate for you during your annual wellness visit. The different tests include: colonoscopy (considered the best screening method), a fecal occult blood test, a fecal DNA test, and sigmoidoscopy. 
 
-All adults born between [facility] should be screened once for Hepatitis C. 
 
-An Abdominal Aortic Aneurysm (AAA) Screening is recommended for men age 73-68 who has ever smoked in their lifetime. Here is a list of your current Health Maintenance items (your personalized list of preventive services) with a due date: 
Health Maintenance Due Topic Date Due  Glaucoma Screening   04/01/2018  Flu Vaccine  08/01/2018 Introducing [facility]! [facility] introduces [employer] patient portal. Now you can access parts of your medical record, email your doctor's office, and request medication refills online.    
 
1. In your internet browser, go to [email or web address] 2. Click on the First Time User? Click Here link in the Sign In box. You will see the New Member Sign Up page. 3. Enter your [employer] Access Code exactly as it appears below. You will not need to use this code after youve completed the sign-up process. If you do not sign up before the expiration date, you must request a new code. · [employer] Access Code: [email or web address] Expires: 11/4/2018  7:39 PM 
 
4. Enter the last four digits of your Social Security Number (xxxx) and Date of Birth (mm/dd/yyyy) as indicated and click Submit. You will be taken to the next sign-up page. 5. Create a [employer] ID. This will be your [employer]versity login ID and cannot be changed, so think of one that is secure and easy to remember. 6. Create a [employer] password. You can change your password at any time. 7. Enter your Password Reset Question and Answer. This can be used at a later time if you forget your password. 8. Enter your e-mail address. You will receive e-mail notification when new information is available in [address] 9. Click Sign Up. You can now view and download portions of your medical record. 10. Click the Download Summary menu link to download a portable copy of your medical information. If you have questions, please visit the Frequently Asked Questions section of the [employer] website. Remember, [employer] is NOT to be used for urgent needs. For medical emergencies, dial 911. Now available from your iPhone and Android! Please provide this summary of care documentation to your next provider. Your primary care clinician is listed as [address] If you have any questions after today's visit, please call [phone].

## 2018-08-17 NOTE — PROGRESS NOTES
This is the Subsequent Medicare Annual Wellness Exam, performed 12 months or more after the Initial AWV or the last Subsequent AWV    I have reviewed the patient's medical history in detail and updated the computerized patient record. History     Past Medical History:   Diagnosis Date    Alcohol abuse, in remission     Arthritis     Back pain     Bladder cancer (HCC)      - in [place], [place]    C. difficile colitis 2010    Cancer involving bladder by direct extension from endometrium ([facility] 10/2016    Depression     Horseshoe kidney     HTN (hypertension) 12/5/2013    Hyperlipidemia     Recurrent bladder transitional cell carcinoma (HCC)     Restless leg syndrome     Screening for colorectal cancer 03/14/2018    cologuard - negative    Situational anxiety     ie air travel    Spinal stenosis, lumbar     Thrombocytopenia (HCC)     Transitional cell carcinoma of left ureter ([facility]     Dr. Deni Borrego      Past Surgical History:   Procedure Laterality Date    BLADDER CANCER FISH      endoscopic    HX UROLOGICAL       Current Outpatient Prescriptions   Medication Sig Dispense Refill    thiamine HCL (B-1) 100 mg tablet Take 1 Tab by mouth daily. 30 Tab 5    ondansetron (ZOFRAN ODT) 4 mg disintegrating tablet Take 1 Tab by mouth every eight (8) hours as needed for Nausea. 60 Tab 5    lidocaine (LIDODERM) 5 % apply 1 patch every 24 hours as needed 30 Patch 5    lactulose (CHRONULAC) 10 gram/15 mL solution Take 30 mL by mouth three (3) times daily. 950 mL 5    tamsulosin (FLOMAX) 0.4 mg capsule Take 0.4 mg by mouth daily.  loratadine (CLARITIN) 10 mg tablet Take 10 mg by mouth.  OTHER Vitamin D 25mcg 1 cap a day.  memantine (NAMENDA) 5 mg tablet Take 1 Tab by mouth daily. 30 Tab 2    cyanocobalamin, vitamin B-12, (VITAMIN B-12 PO) Take  by mouth.       traMADol (ULTRAM) 50 mg tablet take 2 tablets by mouth every 8 hours if needed for pain 120 Tab 3    pramipexole (MIRAPEX) 0.75 mg tablet Take 1 Tab by mouth nightly. 90 Tab 1    traZODone (DESYREL) 50 mg tablet take 2 tablets by mouth NIGHTLY 180 Tab 3    escitalopram oxalate (LEXAPRO) 20 mg tablet take 1 tablet by mouth once daily 90 Tab 3    lisinopril (PRINIVIL, ZESTRIL) 10 mg tablet take 1 tablet by mouth once daily 90 Tab 3     No Known Allergies  Family History   Problem Relation Age of Onset    Heart Disease Mother     Hypertension Mother     Cancer Father      lung ca     Social History   Substance Use Topics    Smoking status: Former Smoker    Smokeless tobacco: Never Used    Alcohol use No     Patient Active Problem List   Diagnosis Code    Depression F32.9    Arthritis M19.90    Situational anxiety F41.8    Restless leg syndrome G25.81    Hyperlipidemia E78.5    HTN (hypertension) I10    Bladder cancer (HCC) C67.9    Back pain M54.9    Advanced directives, counseling/discussion Z71.89    Recurrent bladder transitional cell carcinoma ([facility] C67.9    Horseshoe kidney Q63.1    Spinal stenosis, lumbar M48.061    Spinal stenosis of lumbar region M48.061    Dislocation of right acromioclavicular joint S43.101A    Recurrent depression ([facility] F33.9       Depression Risk Factor Screening:     PHQ over the last two weeks 8/8/2017   Little interest or pleasure in doing things Not at all   Feeling down, depressed, irritable, or hopeless Several days   Total Score PHQ 2 1     Alcohol Risk Factor Screening: You average more than 14 drinks a week. - +previously    Currently, not drinking alcohol    Functional Ability and Level of Safety:   Hearing Loss  Hearing is good. Activities of Daily Living  The home contains: no safety equipment. Patient does total self care    Fall Risk  Fall Risk Assessment, last 12 mths 8/17/2018   Able to walk? Yes   Fall in past 12 months?  No   Fall with injury? -   Number of falls in past 12 months -   Fall Risk Score -       Abuse Screen  Patient is not abused    Cognitive Screening Evaluation of Cognitive Function:  Has your family/caregiver stated any concerns about your memory: yes    neuropsych appt 8/20/18    Patient Care Team   Patient Care Team:  Barbara Garcia MD as PCP - General (Internal Medicine)  Mary Carmen Lazaro MD (Urology)    Assessment/Plan   Education and counseling provided:  Are appropriate based on today's review and evaluation    ICD-10-CM ICD-9-CM    1. Medicare annual wellness visit, subsequent Z00.00 V70.0    2. Hepatic encephalopathy (HCC) K72.90 572.2 AMMONIA      REFERRAL TO LIVER HEPATOLOGY      thiamine HCL (B-1) 100 mg tablet   3. Recurrent bladder transitional cell carcinoma (HCC) C67.9 188.9    4. Recurrent depression (HCC) F33.9 296.30    5. Hyperlipidemia, unspecified hyperlipidemia type E78.5 272.4    6. Essential hypertension I10 401.9    7. Chronic back pain, unspecified back location, unspecified back pain laterality M54.9 724.5     G89.29 338.29    8. Spinal stenosis of lumbar region with neurogenic claudication M48.062 724.03    9. Nausea and vomiting, intractability of vomiting not specified, unspecified vomiting type R11.2 787.01 ondansetron (ZOFRAN ODT) 4 mg disintegrating tablet   10. Arthritis M19.90 716.90 lidocaine (LIDODERM) 5 %     Follow-up Disposition:  Return in about 4 weeks (around 9/14/2018), or if symptoms worsen or fail to improve, for weakness, weight,  visits.    results and schedule of future studies reviewed with patient  reviewed diet, exercise and weight   cardiovascular risk and specific lipid/LDL goals reviewed  reviewed medications and side effects in detail    shingrix at pharmacy

## 2018-08-17 NOTE — PATIENT INSTRUCTIONS
Medicare Wellness Visit, Male    The best way to live healthy is to have a lifestyle where you eat a well-balanced diet, exercise regularly, limit alcohol use, and quit all forms of tobacco/nicotine, if applicable. Regular preventive services are another way to keep healthy. Preventive services (vaccines, screening tests, monitoring & exams) can help personalize your care plan, which helps you manage your own care. Screening tests can find health problems at the earliest stages, when they are easiest to treat. [address] follows the current, evidence-based guidelines published by the [facility] ([facility]) when recommending preventive services for our patients. Because we follow these guidelines, sometimes recommendations change over time as research supports it. (For example, a prostate screening blood test is no longer routinely recommended for men with no symptoms.)    Of course, you and your provider may decide to screen more often for some diseases, based on your risk and co-morbidities (chronic disease you are already diagnosed with). Preventive services for you include:    - Medicare offers their members a free annual wellness visit, which is time for you and your primary care provider to discuss and plan for your preventive service needs. Take advantage of this benefit every year!    -All people over age 72 should receive the recommended pneumonia vaccines. Current USPSTF guidelines recommend a series of two vaccines for the best pneumonia protection.     -All adults should have a yearly flu vaccine and a tetanus vaccine every 10 years.  All adults age 61 years should receive a shingles vaccine once in their lifetime.      -All adults age 38-68 years who are overweight should have a diabetes screening test once every three years.     -Other screening tests & preventive services for persons with diabetes include: an eye exam to screen for diabetic retinopathy, a kidney function test, a foot exam, and stricter control over your cholesterol.     -Cardiovascular screening for adults with routine risk involves an electrocardiogram (ECG) at intervals determined by the provider.     -Colorectal cancer screenings should be done for adults age 54-65 years with normal risk. There are a number of acceptable methods of screening for this type of cancer. Each test has its own benefits and drawbacks. Discuss with your provider what is most appropriate for you during your annual wellness visit. The different tests include: colonoscopy (considered the best screening method), a fecal occult blood test, a fecal DNA test, and sigmoidoscopy.    -All adults born between [facility] should be screened once for Hepatitis C.    -An Abdominal Aortic Aneurysm (AAA) Screening is recommended for men age 73-68 who has ever smoked in their lifetime.      Here is a list of your current Health Maintenance items (your personalized list of preventive services) with a due date:  Health Maintenance Due   Topic Date Due    Glaucoma Screening   04/01/2018    Flu Vaccine  08/01/2018

## 2018-08-18 LAB — AMMONIA PLAS-MCNC: 52 UG/DL (ref 27–102)

## 2018-08-18 NOTE — PROGRESS NOTES
Please notify pt and wife of results. Ammonia level at 52 is now normal.  Continue current lactulose dosing. All other specialty appts and referrals should be kept.

## 2018-08-21 ENCOUNTER — OFFICE VISIT (OUTPATIENT)
Dept: HEMATOLOGY | Age: 73
End: 2018-08-21

## 2018-08-21 VITALS
HEART RATE: 67 BPM | HEIGHT: 70 IN | WEIGHT: 196 LBS | OXYGEN SATURATION: 99 % | SYSTOLIC BLOOD PRESSURE: 96 MMHG | BODY MASS INDEX: 28.06 KG/M2 | DIASTOLIC BLOOD PRESSURE: 70 MMHG | TEMPERATURE: 97.8 F

## 2018-08-21 DIAGNOSIS — R74.8 ELEVATED LIVER ENZYMES: Primary | ICD-10-CM

## 2018-08-21 DIAGNOSIS — R94.5 ABNORMAL RESULTS OF LIVER FUNCTION STUDIES: ICD-10-CM

## 2018-08-21 PROBLEM — Z98.890 S/P LUMBAR LAMINECTOMY: Status: ACTIVE | Noted: 2018-08-21

## 2018-08-21 PROBLEM — M48.061 SPINAL STENOSIS OF LUMBAR REGION: Status: RESOLVED | Noted: 2017-06-07 | Resolved: 2018-08-21

## 2018-08-21 NOTE — PROGRESS NOTES
Yoh[address], MD, Brooke Fairbanks, [address], [address] [facility]       April LAY Holder PA-C Lianne Mano, [facility]   LAY Kenrs NP [address] [facility] [address]    at [facility]    [facility], [address] [place] [address], [facility].    [phone]    FAX: [address] [place] [address]    at [address], [address], [address]    [phone]    FAX: [phone]         Patient Care Team:  Nilson Esquivel MD as PCP - General (Internal Medicine)  Jordan Avila MD (Urology)      Problem List  Date Reviewed: 8/21/2018          Codes Class Noted    S/P lumbar laminectomy ICD-10-CM: Z98.890  ICD-9-CM: V45.89  8/21/2018        Recurrent depression ([facility] ICD-10-CM: F33.9  ICD-9-CM: 296.30  7/18/2018        Dislocation of right acromioclavicular joint ICD-10-CM: S43.101A  ICD-9-CM: 831.04  4/16/2018        Horseshoe kidney ICD-10-CM: Q63.1  ICD-9-CM: 753.3  Unknown        Recurrent bladder transitional cell carcinoma ([facility] ICD-10-CM: C67.9  ICD-9-CM: 188.9  Unknown        HTN (hypertension) ICD-10-CM: I10  ICD-9-CM: 401.9  12/5/2013        Hyperlipidemia ICD-10-CM: E78.5  ICD-9-CM: 272.4  Unknown        Restless leg syndrome ICD-10-CM: G25.81  ICD-9-CM: 333.94  Unknown        Arthritis ICD-10-CM: M19.90  ICD-9-CM: 716.90  Unknown        Situational anxiety ICD-10-CM: F41.8  ICD-9-CM: 300.09  Unknown    Overview Signed 5/14/2012  9:51 AM by Nilson Esquivel MD     ie air travel                   The clinicians listed above have asked me to see Vasquez Judith in consultation regarding elevated liver enzymes and its management. All medical records sent by the referring physicians were reviewed including imaging studies     The patient is a 67 y.o.   male who was first noted to have abnormalities in liver transaminases in 2/2017. Serologic evaluation for markers of chronic liver disease were negative for HCV, HBV,And were positive for SRIDHAR,    CT scan of the liver was performed in 7/2017. The results of the imaging demonstrated a normal appearing liver. An assessment of liver fibrosis with biopsy or elastography has not been performed. The patient notes some fatigue, problems concentrating,     The patient has not experienced pain in the right side over the liver, swelling of the abdomen, swelling of the lower extremities, hematemesis, hematochezia. The patient completes all daily activities without any functional limitations. All of the issues listed in the Assessment and Plan were discussed with the patient. All questions were answered. The patient expressed a clear understanding of the above. [facility] [place] [address] in 4 weeks for Fibroscan to review all data and determine the treatment plan. ASSESSMENT AND PLAN:  Persistent elevation in liver transaminases which are now normal.  Elevation in alkaline phosphatase which is improved and near normal.  The most recent laboratory studies indicate the liver transaminases are normal, ALP is elevated, tests of hepatic synthetic and metabolic function are normal, and the platelet count is normal.  Based upon laboratory studies and imaging the patient does not appear to have significant liver injury  Serologic testing for causes of chronic liver disease were negative. The most likely causes for the liver chemistry abnormalities were discussed with the patient and include alcohol. The liver enzymes are improving because he is now abstinent from alcohol. The need to perform an assessment of liver fibrosis was discussed with the patient. The Fibroscan can assess liver fibrosis and determine if a patient has advanced fibrosis or cirrhosis without the need for liver biopsy. The Fibroscan is currently available at liver [place]. This will be performed at the next office visit. The Fibroscan can be repeated annually to assess for fibrosis progression or regression. There is no reason to perform liver biopsy at this time even if the Fibroscan suggests cirrhosis since all serology are negative and the liver enzymes have improved with abstinence. Treatment of other medical problems in patients with chronic liver disease  There are no contraindications for the patient to take any medications that are necessary for treatment of other medical issues. Counseling for alcohol in patients with chronic liver disease  The patient was counseled regarding alcohol consumption and the effect of alcohol on chronic liver disease. The patient has not consumed alcohol since 3/2018. Vaccinations   The need for vaccination against viral hepatitis A and B will be assessed with serologic and instituted as appropriate. Routine vaccinations against other bacterial and viral agents can be performed as indicated. Annual flu vaccination should be administered if indicated. Screening for Hepatocellular Carcinoma  HCC screening has recently been performed and does not suggest [address] The next liver imaging study will be performed in 1/2019. ALLERGIES  No Known Allergies    MEDICATIONS  Current Outpatient Prescriptions   Medication Sig    thiamine HCL (B-1) 100 mg tablet Take 1 Tab by mouth daily.  ondansetron (ZOFRAN ODT) 4 mg disintegrating tablet Take 1 Tab by mouth every eight (8) hours as needed for Nausea.  lidocaine (LIDODERM) 5 % apply 1 patch every 24 hours as needed    lactulose (CHRONULAC) 10 gram/15 mL solution Take 30 mL by mouth three (3) times daily.  tamsulosin (FLOMAX) 0.4 mg capsule Take 0.4 mg by mouth daily.  loratadine (CLARITIN) 10 mg tablet Take 10 mg by mouth.  OTHER Vitamin D 25mcg 1 cap a day.     memantine (NAMENDA) 5 mg tablet Take 1 Tab by mouth daily.    cyanocobalamin, vitamin B-12, (VITAMIN B-12 PO) Take  by mouth.  traMADol (ULTRAM) 50 mg tablet take 2 tablets by mouth every 8 hours if needed for pain    pramipexole (MIRAPEX) 0.75 mg tablet Take 1 Tab by mouth nightly.  traZODone (DESYREL) 50 mg tablet take 2 tablets by mouth NIGHTLY    escitalopram oxalate (LEXAPRO) 20 mg tablet take 1 tablet by mouth once daily    lisinopril (PRINIVIL, ZESTRIL) 10 mg tablet take 1 tablet by mouth once daily     No current facility-administered medications for this visit. SYSTEM REVIEW NOT RELATED TO LIVER DISEASE OR REVIEWED ABOVE:  Constitution systems: Negative for fever, chills, weight gain, weight loss. Eyes: Negative for visual changes. ENT: Negative for sore throat, painful swallowing. Respiratory: Negative for cough, hemoptysis, SOB. Cardiology: Negative for chest pain, palpitations. GI:  Negative for constipation or diarrhea. : Negative for urinary frequency, dysuria, hematuria, nocturia. Skin: Negative for rash. Hematology: Negative for easy bruising, blood clots. Musculo-skelatal: Negative for back pain, muscle pain, weakness. Neurologic: Negative for headaches, dizziness, vertigo, memory problems not related to HE. Psychology: Negative for anxiety, depression. FAMILY HISTORY:  The father  of cancer. The mother  of CHF. There is no family history of liver disease. SOCIAL HISTORY:  The patient is . The patient has 3 children, and 3 grandchildren. The patient stopped using tobacco products in 1970s   The patient has previously consumed alcohol in excess. The patient has been abstinent from alcohol since 2018. The patient used to work as a professor special education.         PHYSICAL EXAMINATION:  Visit Vitals    BP 96/70 (BP 1 Location: Left arm, BP Patient Position: Sitting)    Pulse 67    Temp 97.8 °F (36.6 °C) (Tympanic)    Ht 5' 10\" (1.778 m)    Wt 196 lb (88.9 kg)    SpO2 99%    BMI 28.12 kg/m2     General: No acute distress. Eyes: Sclera anicteric. ENT: No oral lesions. Thyroid normal.  Nodes: No adenopathy. Skin: No spider angiomata. No jaundice. No palmar erythema. Respiratory: Lungs clear to auscultation. Cardiovascular: Regular heart rate. No murmurs. No JVD. Abdomen: Soft non-tender. Liver size normal to percussion/palpation. Spleen not palpable. No obvious ascites. Extremities: No edema. No muscle wasting. No gross arthritic changes. Neurologic: Alert and oriented. Cranial nerves grossly intact. No asterixis. LABORATORY STUDIES:  Liver [place] [address] Units 8/21/2018   WBC 3.4 - 10.8 x10E3/uL 7.3   ANC 1.4 - 7.0 x10E3/uL 5.3   HGB 13.0 - 17.7 g/dL 13.2    - 379 x10E3/uL 288   INR 0.8 - 1.2 1.0   AST 0 - 40 IU/L 19   ALT 0 - 44 IU/L 12   Alk Phos 39 - 117 IU/L 124 (H)   Bili, Total 0.0 - 1.2 mg/dL 0.4   Bili, Direct 0.00 - 0.40 mg/dL 0.16   Albumin 3.5 - 4.8 g/dL 4.4   BUN 8 - 27 mg/dL 9   Creat 0.76 - 1.27 mg/dL 0.80   Na 134 - 144 mmol/L 142   K 3.5 - 5.2 mmol/L 4.2   Cl 96 - 106 mmol/L 104   CO2 20 - 29 mmol/L 21   Glucose 65 - 99 mg/dL 81   Magnesium 1.6 - 2.6 mg/dL    Ammonia 27 - 102 ug/dL 61     SEROLOGIES:  Serologies Latest Ref Rng & Units 8/21/2018 7/20/2018   Ferritin 30 - 400 ng/mL 384    Iron % Saturation 15 - 55 % 26    SRIDHAR Ab, Direct Negative  Positive (A)   ASMCA 0 - 19 Units 6    Alpha-1 antitrypsin level 90 - 200 mg/dL 188      Serologies Latest Ref Rng & Units 6/27/2017 12/5/2013   Hep B Surface Ag Negative Negative Negative   Hep C Ab 0.0 - 0.9 s/co ratio  <0.1     LIVER HISTOLOGY:  Not available or performed    ENDOSCOPIC PROCEDURES:  Not available or performed    RADIOLOGY:  7/2018. CT scan abdomen without IV contrast.  Normal appearing liver. No liver mass lesions. Normal spleen. No ascites.     OTHER TESTING:  Not available or performed      MD Meghana Lundberg Liver [facility] [employer] [facility] [address] [address] [address]  [address]  [address].  [phone]

## 2018-08-21 NOTE — MR AVS SNAPSHOT
2700 [facility] [phone] 1400 [address] [place] [address] 
[phone] Patient: Romana Sherman MRN: [id number] :1945 Visit Information Date & Time Provider Department Dept. Phone Encounter #  
 2018  9:30 AM [place] [place] [address] [id number] Follow-up Instructions Return in about 4 weeks (around 2018) for NP with FS. Your Appointments 2018  1:30 PM  
Follow Up with Brian Singh DO [facility] at [address] [facility]) Appt Note: f/u 2018 CW  
 [address] [address]  
[phone]  
  
   
 200 [facility] [address]  
  
    
 2018  9:45 AM  
ROUTINE CARE with Barbara Garcia MD  
[facility] Jackelin Escort) Appt Note: 4 week f/u - weaknes, weight, f/u specialist visits 401 [facility] [address] [facility] [address]  
220 [facility] [address]  
  
    
 2018  9:00 AM  
New Patient with Anabela Garcia MD  
[facility]) Appt Note: NP - Referred by Erick Melendez- Test for Autoimmune disease - KD 18; . ... [address] [facility] [address] [place] [address]  
[address] [place] [phone] Upcoming Health Maintenance Date Due  
 GLAUCOMA SCREENING Q2Y 2018 Influenza Age 5 to Adult 2018 MEDICARE YEARLY EXAM 2019 DTaP/Tdap/Td series (2 - Td) 2023 Allergies as of 2018  Review Complete On: 2018 By: Daniel Patel MD  
 No Known Allergies Current Immunizations  Reviewed on 2018 Name Date Influenza High Dose Vaccine PF 8/10/2017, 2016 Influenza Vaccine 2014, 2013, 2013 Influenza Vaccine (Quad) PF 2015 Pneumococcal Conjugate (PCV-13) 5/4/2015 Pneumococcal Vaccine (Unspecified Type) 10/29/2013 Tdap 11/19/2013 Zoster Vaccine, Live 9/20/2013 Not reviewed this visit You Were Diagnosed With   
  
 Codes Comments Elevated liver enzymes    -  Primary ICD-10-CM: R74.8 ICD-9-CM: 790.5 Abnormal results of liver function studies     ICD-10-CM: R94.5 ICD-9-CM: 794.8 Vitals BP Pulse Temp Height(growth percentile) Weight(growth percentile) SpO2  
 96/70 (BP 1 Location: Left arm, BP Patient Position: Sitting) 67 97.8 °F (36.6 °C) (Tympanic) 5' 10\" (1.778 m) 196 lb (88.9 kg) 99% BMI Smoking Status 28.12 kg/m2 Former Smoker Vitals History BMI and BSA Data Body Mass Index Body Surface Area  
 28.12 kg/m 2 2.1 m 2 Preferred Pharmacy Pharmacy Name Phone [facility], [address] [phone] Your Updated Medication List  
  
   
This list is accurate as of 8/21/18 10:24 AM.  Always use your most recent med list.  
  
  
  
  
 CLARITIN 10 mg tablet Generic drug:  loratadine Take 10 mg by mouth.  
  
 escitalopram oxalate 20 mg tablet Commonly known as:  LEXAPRO  
take 1 tablet by mouth once daily  
  
 lactulose 10 gram/15 mL solution Commonly known as:  Sherre Fonder Take 30 mL by mouth three (3) times daily. lidocaine 5 % Commonly known as:  LIDODERM  
apply 1 patch every 24 hours as needed  
  
 lisinopril 10 mg tablet Commonly known as:  PRINIVIL, ZESTRIL  
take 1 tablet by mouth once daily  
  
 memantine 5 mg tablet Commonly known as:  Campbell Net Take 1 Tab by mouth daily. ondansetron 4 mg disintegrating tablet Commonly known as:  ZOFRAN ODT Take 1 Tab by mouth every eight (8) hours as needed for Nausea. OTHER Vitamin D 25mcg 1 cap a day. pramipexole 0.75 mg tablet Commonly known as:  MIRAPEX Take 1 Tab by mouth nightly. tamsulosin 0.4 mg capsule Commonly known as:  FLOMAX Take 0.4 mg by mouth daily. thiamine  mg tablet Commonly known as:  B-1 Take 1 Tab by mouth daily. traMADol 50 mg tablet Commonly known as:  ULTRAM  
take 2 tablets by mouth every 8 hours if needed for pain  
  
 traZODone 50 mg tablet Commonly known as:  DESYREL  
take 2 tablets by mouth NIGHTLY  
  
 VITAMIN B-12 PO Take  by mouth. We Performed the Following ACTIN (SMOOTH MUSCLE) ANTIBODY C0585880 CPT(R)] AFP WITH AFP-L3% [KLE67319 Custom] ALPHA-1-ANTITRYPSIN, TOTAL [61493 CPT(R)] AMMONIA F277494 CPT(R)] CBC WITH AUTOMATED DIFF [01397 CPT(R)] FERRITIN [09377 CPT(R)] HEPATIC FUNCTION PANEL [09168 CPT(R)] IRON PROFILE [id number] CPT(R)] METABOLIC PANEL, BASIC [70994 CPT(R)] PROTHROMBIN TIME + INR [56276 CPT(R)] Follow-up Instructions Return in about 4 weeks (around 9/18/2018) for NP with FS. To-Do List   
 09/18/2018 12:45 PM  
  Appointment with Madison Naqvi Ace, NP at [place] [address] ([phone]) Introducing [facility] & [facility] SERVICES! Hansel Odom introduces Arrayent patient portal. Now you can access parts of your medical record, email your doctor's office, and request medication refills online. 1. In your internet browser, go to [email or web address] 2. Click on the First Time User? Click Here link in the Sign In box. You will see the New Member Sign Up page. 3. Enter your Arrayent Access Code exactly as it appears below. You will not need to use this code after youve completed the sign-up process. If you do not sign up before the expiration date, you must request a new code. · Arrayent Access Code: [email or web address] Expires: 11/4/2018  7:39 PM 
 
4. Enter the last four digits of your Social Security Number (xxxx) and Date of Birth (mm/dd/yyyy) as indicated and click Submit. You will be taken to the next sign-up page. 5. Create a Emergent One ID. This will be your Emergent One login ID and cannot be changed, so think of one that is secure and easy to remember. 6. Create a Emergent One password. You can change your password at any time. 7. Enter your Password Reset Question and Answer. This can be used at a later time if you forget your password. 8. Enter your e-mail address. You will receive e-mail notification when new information is available in [address] 9. Click Sign Up. You can now view and download portions of your medical record. 10. Click the Download Summary menu link to download a portable copy of your medical information. If you have questions, please visit the Frequently Asked Questions section of the Emergent One website. Remember, Emergent One is NOT to be used for urgent needs. For medical emergencies, dial 911. Now available from your iPhone and Android! Please provide this summary of care documentation to your next provider. Your primary care clinician is listed as [address] If you have any questions after today's visit, please call [phone].

## 2018-08-21 NOTE — PROGRESS NOTES
Chief Complaint   Patient presents with   174 [facility] Patient     Visit Vitals    BP 96/70 (BP 1 Location: Left arm, BP Patient Position: Sitting)    Pulse 67    Temp 97.8 °F (36.6 °C) (Tympanic)    Ht 5' 10\" (1.778 m)    Wt 196 lb (88.9 kg)    SpO2 99%    BMI 28.12 kg/m2     PHQ over the last two weeks 8/8/2017   Little interest or pleasure in doing things Not at all   Feeling down, depressed, irritable, or hopeless Several days   Total Score PHQ 2 1

## 2018-08-22 LAB
A1AT SERPL-MCNC: 188 MG/DL (ref 90–200)
ACTIN IGG SERPL-ACNC: 6 UNITS (ref 0–19)
AFP L3 MFR SERPL: NORMAL % (ref 0–9.9)
AFP SERPL-MCNC: 5.5 NG/ML (ref 0–8)
ALBUMIN SERPL-MCNC: 4.4 G/DL (ref 3.5–4.8)
ALP SERPL-CCNC: 124 IU/L (ref 39–117)
ALT SERPL-CCNC: 12 IU/L (ref 0–44)
AMMONIA PLAS-MCNC: 61 UG/DL (ref 27–102)
AST SERPL-CCNC: 19 IU/L (ref 0–40)
BASOPHILS # BLD AUTO: 0 X10E3/UL (ref 0–0.2)
BASOPHILS NFR BLD AUTO: 0 %
BILIRUB DIRECT SERPL-MCNC: 0.16 MG/DL (ref 0–0.4)
BILIRUB SERPL-MCNC: 0.4 MG/DL (ref 0–1.2)
BUN SERPL-MCNC: 9 MG/DL (ref 8–27)
BUN/CREAT SERPL: 11 (ref 10–24)
CALCIUM SERPL-MCNC: 9.2 MG/DL (ref 8.6–10.2)
CHLORIDE SERPL-SCNC: 104 MMOL/L (ref 96–106)
CO2 SERPL-SCNC: 21 MMOL/L (ref 20–29)
CREAT SERPL-MCNC: 0.8 MG/DL (ref 0.76–1.27)
EOSINOPHIL # BLD AUTO: 0.2 X10E3/UL (ref 0–0.4)
EOSINOPHIL NFR BLD AUTO: 3 %
ERYTHROCYTE [DISTWIDTH] IN BLOOD BY AUTOMATED COUNT: 13.8 % (ref 12.3–15.4)
FERRITIN SERPL-MCNC: 384 NG/ML (ref 30–400)
GLUCOSE SERPL-MCNC: 81 MG/DL (ref 65–99)
HCT VFR BLD AUTO: 38.9 % (ref 37.5–51)
HGB BLD-MCNC: 13.2 G/DL (ref 13–17.7)
IMM GRANULOCYTES # BLD: 0 X10E3/UL (ref 0–0.1)
IMM GRANULOCYTES NFR BLD: 0 %
INR PPP: 1 (ref 0.8–1.2)
IRON SATN MFR SERPL: 26 % (ref 15–55)
IRON SERPL-MCNC: 68 UG/DL (ref 38–169)
LYMPHOCYTES # BLD AUTO: 1.2 X10E3/UL (ref 0.7–3.1)
LYMPHOCYTES NFR BLD AUTO: 17 %
MCH RBC QN AUTO: 33.7 PG (ref 26.6–33)
MCHC RBC AUTO-ENTMCNC: 33.9 G/DL (ref 31.5–35.7)
MCV RBC AUTO: 99 FL (ref 79–97)
MONOCYTES # BLD AUTO: 0.5 X10E3/UL (ref 0.1–0.9)
MONOCYTES NFR BLD AUTO: 7 %
MORPHOLOGY BLD-IMP: ABNORMAL
NEUTROPHILS # BLD AUTO: 5.3 X10E3/UL (ref 1.4–7)
NEUTROPHILS NFR BLD AUTO: 73 %
PLATELET # BLD AUTO: 288 X10E3/UL (ref 150–379)
POTASSIUM SERPL-SCNC: 4.2 MMOL/L (ref 3.5–5.2)
PROT SERPL-MCNC: 6.7 G/DL (ref 6–8.5)
PROTHROMBIN TIME: 10.3 SEC (ref 9.1–12)
RBC # BLD AUTO: 3.92 X10E6/UL (ref 4.14–5.8)
SODIUM SERPL-SCNC: 142 MMOL/L (ref 134–144)
TIBC SERPL-MCNC: 266 UG/DL (ref 250–450)
UIBC SERPL-MCNC: 198 UG/DL (ref 111–343)
WBC # BLD AUTO: 7.3 X10E3/UL (ref 3.4–10.8)

## 2018-08-27 NOTE — PROGRESS NOTES
Spoke with patient after verifying name and  regarding Dr. Ramon Vega recommendations. Writer informed patient of Dr. Ramon Vega recommendations. Patient given an opportunity to ask questions, repeated information, and verbalized understanding.

## 2018-09-12 ENCOUNTER — OFFICE VISIT (OUTPATIENT)
Dept: NEUROLOGY | Age: 73
End: 2018-09-12

## 2018-09-12 VITALS
DIASTOLIC BLOOD PRESSURE: 60 MMHG | HEART RATE: 82 BPM | OXYGEN SATURATION: 97 % | RESPIRATION RATE: 18 BRPM | SYSTOLIC BLOOD PRESSURE: 100 MMHG

## 2018-09-12 DIAGNOSIS — F03.90 DEMENTIA WITHOUT BEHAVIORAL DISTURBANCE, UNSPECIFIED DEMENTIA TYPE: Primary | ICD-10-CM

## 2018-09-12 DIAGNOSIS — E72.20 HYPERAMMONEMIA (HCC): ICD-10-CM

## 2018-09-12 DIAGNOSIS — F32.A DEPRESSION, UNSPECIFIED DEPRESSION TYPE: ICD-10-CM

## 2018-09-12 RX ORDER — MEMANTINE HYDROCHLORIDE 10 MG/1
10 TABLET ORAL DAILY
Qty: 90 TAB | Refills: 1 | Status: SHIPPED | OUTPATIENT
Start: 2018-09-12 | End: 2019-03-12 | Stop reason: DRUGHIGH

## 2018-09-12 NOTE — MR AVS SNAPSHOT
[address] 
[phone] Patient: Kelli Motley MRN: [id number] :1945 Visit Information Date & Time Provider Department Dept. Phone Encounter #  
 2018 10:00 AM Sole Mora, [address] [facility] at [address] [id number] Follow-up Instructions Return in about 6 months (around 3/12/2019). Your Appointments 2018  9:45 AM  
ROUTINE CARE with Scottie Montgomery MD  
[facility] Pediatric[facility] and Internal Medicine [facility]) Appt Note: 4 week f/u - weaknes, weight, f/u specialist visits 401 [facility] [address] [facility] [address]  
220 [facility] [address]  
  
    
 2018  9:00 AM  
New Patient with Adarsh Fishman MD  
[facility]) Appt Note: NP - Referred by Che Buckley- Test for Autoimmune disease - KD 18; . ... [facility] [place] [address]  
[address] [place], [place] [phone] Upcoming Health Maintenance Date Due  
 GLAUCOMA SCREENING Q2Y 2018 Influenza Age 5 to Adult 2018 MEDICARE YEARLY EXAM 2019 DTaP/Tdap/Td series (2 - Td) 2023 Allergies as of 2018  Review Complete On: 2018 By: Sole Mora, DO No Known Allergies Current Immunizations  Reviewed on 2018 Name Date Influenza High Dose Vaccine PF 8/10/2017, 2016 Influenza Vaccine 2014, 2013, 2013 Influenza Vaccine (Quad) PF 2015 Pneumococcal Conjugate (PCV-13) 2015 Pneumococcal Vaccine (Unspecified Type) 10/29/2013 Tdap 2013 Zoster Vaccine, Live 2013 Not reviewed this visit You Were Diagnosed With   
  
 Codes Comments Dementia without behavioral disturbance, unspecified dementia type    -  Primary ICD-10-CM: F03.90 ICD-9-CM: 294.20 Hyperammonemia (HCC)     ICD-10-CM: B55.67 ICD-9-CM: 270.6 Depression, unspecified depression type     ICD-10-CM: F32.9 ICD-9-CM: 918 Vitals BP Pulse Resp SpO2 Smoking Status 100/60 82 18 97% Former Smoker Preferred Pharmacy Pharmacy Name Phone [facility], [address] [phone] Your Updated Medication List  
  
   
This list is accurate as of 9/12/18 10:34 AM.  Always use your most recent med list.  
  
  
  
  
 CLARITIN 10 mg tablet Generic drug:  loratadine Take 10 mg by mouth.  
  
 escitalopram oxalate 20 mg tablet Commonly known as:  LEXAPRO  
take 1 tablet by mouth once daily  
  
 lactulose 10 gram/15 mL solution Commonly known as:  Jose Frost Take 30 mL by mouth three (3) times daily. lidocaine 5 % Commonly known as:  LIDODERM  
apply 1 patch every 24 hours as needed  
  
 lisinopril 10 mg tablet Commonly known as:  PRINIVIL, ZESTRIL  
take 1 tablet by mouth once daily  
  
 memantine 10 mg tablet Commonly known as:  Babetta Darner Take 1 Tab by mouth daily. ondansetron 4 mg disintegrating tablet Commonly known as:  ZOFRAN ODT Take 1 Tab by mouth every eight (8) hours as needed for Nausea. OTHER Vitamin D 25mcg 1 cap a day. pramipexole 0.75 mg tablet Commonly known as:  MIRAPEX Take 1 Tab by mouth nightly. tamsulosin 0.4 mg capsule Commonly known as:  FLOMAX Take 0.4 mg by mouth daily. thiamine  mg tablet Commonly known as:  B-1 Take 1 Tab by mouth daily. traMADol 50 mg tablet Commonly known as:  ULTRAM  
take 2 tablets by mouth every 8 hours if needed for pain  
  
 traZODone 50 mg tablet Commonly known as:  DESYREL  
take 2 tablets by mouth NIGHTLY  
  
 VITAMIN B-12 PO Take  by mouth. Prescriptions Sent to Pharmacy Refills  
 memantine (NAMENDA) 10 mg tablet 1 Sig: Take 1 Tab by mouth daily. Class: Normal  
 Pharmacy: [id number] [address] [facility] [address], [address] #: [phone] Route: Oral  
  
Follow-up Instructions Return in about 6 months (around 3/12/2019). To-Do List   
 09/18/2018 12:45 PM  
  Appointment with Madison Sanches NP at [place] [address] ([phone]) Patient Instructions A Healthy Lifestyle: Care Instructions Your Care Instructions A healthy lifestyle can help you feel good, stay at a healthy weight, and have plenty of energy for both work and play. A healthy lifestyle is something you can share with your whole family. A healthy lifestyle also can lower your risk for serious health problems, such as high blood pressure, heart disease, and diabetes. You can follow a few steps listed below to improve your health and the health of your family. Follow-up care is a key part of your treatment and safety. Be sure to make and go to all appointments, and call your doctor if you are having problems. It's also a good idea to know your test results and keep a list of the medicines you take. How can you care for yourself at home? · Do not eat too much sugar, fat, or fast foods. You can still have dessert and treats now and then. The goal is moderation. · Start small to improve your eating habits. Pay attention to portion sizes, drink less juice and soda pop, and eat more fruits and vegetables. ¨ Eat a healthy amount of food. A 3-ounce serving of meat, for example, is about the size of a deck of cards. Fill the rest of your plate with vegetables and whole grains. ¨ Limit the amount of soda and sports drinks you have every day. Drink more water when you are thirsty. ¨ Eat at least 5 servings of fruits and vegetables every day.  It may seem like a lot, but it is not hard to reach this goal. A serving or helping is 1 piece of fruit, 1 cup of vegetables, or 2 cups of leafy, raw vegetables. Have an apple or some carrot sticks as an afternoon snack instead of a candy bar. Try to have fruits and/or vegetables at every meal. 
· Make exercise part of your daily routine. You may want to start with simple activities, such as walking, bicycling, or slow swimming. Try to be active 30 to 60 minutes every day. You do not need to do all 30 to 60 minutes all at once. For example, you can exercise 3 times a day for 10 or 20 minutes. Moderate exercise is safe for most people, but it is always a good idea to talk to your doctor before starting an exercise program. 
· Keep moving. Tanvi Limb the lawn, work in the garden, or [employer]. Take the stairs instead of the elevator at work. · If you smoke, quit. People who smoke have an increased risk for heart attack, stroke, cancer, and other lung illnesses. Quitting is hard, but there are ways to boost your chance of quitting tobacco for good. ¨ Use nicotine gum, patches, or lozenges. ¨ Ask your doctor about stop-smoking programs and medicines. ¨ Keep trying. In addition to reducing your risk of diseases in the future, you will notice some benefits soon after you stop using tobacco. If you have shortness of breath or asthma symptoms, they will likely get better within a few weeks after you quit. · Limit how much alcohol you drink. Moderate amounts of alcohol (up to 2 drinks a day for men, 1 drink a day for women) are okay. But drinking too much can lead to liver problems, high blood pressure, and other health problems. Family health If you have a family, there are many things you can do together to improve your health. · Eat meals together as a family as often as possible. · Eat healthy foods. This includes fruits, vegetables, lean meats and dairy, and whole grains. · Include your family in your fitness plan. Most people think of activities such as jogging or tennis as the way to fitness, but there are many ways you and your family can be more active. Anything that makes you breathe hard and gets your heart pumping is exercise. Here are some tips: 
¨ Walk to do errands or to take your child to school or the bus. ¨ Go for a family bike ride after dinner instead of watching TV. Where can you learn more? Go to http://julissa-jannette.info/. Enter I880 in the search box to learn more about \"A Healthy Lifestyle: Care Instructions. \" Current as of: December 7, 2017 Content Version: 11.7 © 4567-4561 [employer]. Care instructions adapted under license by [employer] (which disclaims liability or warranty for this information). If you have questions about a medical condition or this instruction, always ask your healthcare professional. [place] [address] any warranty or liability for your use of this information. Introducing hospitals & HEALTH SERVICES! [employer] introduces Reactful patient portal. Now you can access parts of your medical record, email your doctor's office, and request medication refills online. 1. In your internet browser, go to [email or web address] 2. Click on the First Time User? Click Here link in the Sign In box. You will see the New Member Sign Up page. 3. Enter your Reactful Access Code exactly as it appears below. You will not need to use this code after youve completed the sign-up process. If you do not sign up before the expiration date, you must request a new code. · Reactful Access Code: [email or web address] Expires: 11/4/2018  7:39 PM 
 
4. Enter the last four digits of your Social Security Number (xxxx) and Date of Birth (mm/dd/yyyy) as indicated and click Submit. You will be taken to the next sign-up page. 5. Create a American TV 2 Go ID. This will be your American TV [employer] Go login ID and cannot be changed, so think of one that is secure and easy to remember. 6. Create a American TV [employer] Go password. You can change your password at any time. 7. Enter your Password Reset Question and Answer. This can be used at a later time if you forget your password. 8. Enter your e-mail address. You will receive e-mail notification when new information is available in [address] 9. Click Sign Up. You can now view and download portions of your medical record. 10. Click the Download Summary menu link to download a portable copy of your medical information. If you have questions, please visit the Frequently Asked Questions section of the American [employer] Go website. Remember, American TV [employer] Go is NOT to be used for urgent needs. For medical emergencies, dial 911. Now available from your iPhone and Android! Please provide this summary of care documentation to your next provider. Your primary care clinician is listed as [address] If you have any questions after today's visit, please call [phone].

## 2018-09-12 NOTE — PATIENT INSTRUCTIONS

## 2018-09-12 NOTE — PROGRESS NOTES
Neurology Clinic Follow up Note    Patient ID:  Theda Bence  [id number]  77 y.o.  1945      Mr. Jacquelyn Reyes is here for follow up today of  Chief Complaint   Patient presents with    Results          Last Appointment With Me:  8/7/2018       Interval History:   Family reports some improvement in memory over the past month. On Namenda and doing well with this. No reported side effects. Ammonia elevated- on lactulose and being managed by his PCP  Ability to function:  Driving: Not driving for several months  Finances: Handled by his wife for years  Cooking: No  Manages own medication: Managed by his wife  Residing: Living with his wife at home    PMHx/ PSHx/ FHx/ SHx:  Reviewed and unchanged previous visit. Past Medical History:   Diagnosis Date    Alcohol abuse, in remission     Arthritis     Back pain     Bladder cancer (HCC)      - in [place], [place]    C. difficile colitis 2010    Cancer involving bladder by direct extension from endometrium ([facility] [address] 10/2016    Depression     Horseshoe kidney     HTN (hypertension) 12/5/2013    Hyperlipidemia     Recurrent bladder transitional cell carcinoma (HCC)     Restless leg syndrome     Screening for colorectal cancer 03/14/2018    cologuard - negative    Situational anxiety     ie air travel    Spinal stenosis, lumbar     Thrombocytopenia (HCC)     Transitional cell carcinoma of left ureter ([facility] [address] [facility]     Dr. Arlen Gonzáles         ROS:  Comprehensive review of systems negative except for as noted above. Objective:       Meds:  Current Outpatient Prescriptions   Medication Sig Dispense Refill    thiamine HCL (B-1) 100 mg tablet Take 1 Tab by mouth daily. 30 Tab 5    ondansetron (ZOFRAN ODT) 4 mg disintegrating tablet Take 1 Tab by mouth every eight (8) hours as needed for Nausea. 60 Tab 5    lidocaine (LIDODERM) 5 % apply 1 patch every 24 hours as needed 30 Patch 5    lactulose (CHRONULAC) 10 gram/15 mL solution Take 30 mL by mouth three (3) times daily. 950 mL 5    tamsulosin (FLOMAX) 0.4 mg capsule Take 0.4 mg by mouth daily.  loratadine (CLARITIN) 10 mg tablet Take 10 mg by mouth.  OTHER Vitamin D 25mcg 1 cap a day.  memantine (NAMENDA) 5 mg tablet Take 1 Tab by mouth daily. 30 Tab 2    cyanocobalamin, vitamin B-12, (VITAMIN B-12 PO) Take  by mouth.  traMADol (ULTRAM) 50 mg tablet take 2 tablets by mouth every 8 hours if needed for pain 120 Tab 3    pramipexole (MIRAPEX) 0.75 mg tablet Take 1 Tab by mouth nightly. 90 Tab 1    traZODone (DESYREL) 50 mg tablet take 2 tablets by mouth NIGHTLY 180 Tab 3    escitalopram oxalate (LEXAPRO) 20 mg tablet take 1 tablet by mouth once daily 90 Tab 3    lisinopril (PRINIVIL, ZESTRIL) 10 mg tablet take 1 tablet by mouth once daily 90 Tab 3       Exam:  Visit Vitals    /60    Pulse 82    Resp 18    SpO2 97%     NEUROLOGICAL EXAM:  General: Awake, alert, speech fluent. Oriented to year, not date. [place], [place]". Naming and serial 7's intact. Delayed recall impaired (2/5)  CN: PERRL, EOMI without nystagmus, VFF to confrontation, facial sensation and strength are normal and symmetric, hearing is intact to finger rub bilaterally, palate and tongue movements are intact and symmetric. Motor: Normal tone, bulk and strength bilaterally. Reflexes: 1/4 and symmetric, plantar stimulation is flexor. Coordination: No dysmetria. Normal rapid alternating movements; finger-to-nose and heel-to- shin testing are within normal limits. Tremor b/l UE action/postural.    Gait: hunched posture, slightly unsteady      LABS  Results for orders placed or performed in visit on 08/21/18   HEPATIC FUNCTION PANEL   Result Value Ref Range    Protein, total 6.7 6.0 - 8.5 g/dL    Albumin 4.4 3.5 - 4.8 g/dL    Bilirubin, total 0.4 0.0 - 1.2 mg/dL    Bilirubin, direct 0.16 0.00 - 0.40 mg/dL    Alk.  phosphatase 124 (H) 39 - 117 IU/L    AST (SGOT) 19 0 - 40 IU/L    ALT (SGPT) 12 0 - 44 IU/L   METABOLIC PANEL, BASIC Result Value Ref Range    Glucose 81 65 - 99 mg/dL    BUN 9 8 - 27 mg/dL    Creatinine 0.80 0.76 - 1.27 mg/dL    GFR est non-AA 89 >59 mL/min/1.73    GFR est  >59 mL/min/1.73    BUN/Creatinine ratio 11 10 - 24    Sodium 142 134 - 144 mmol/L    Potassium 4.2 3.5 - 5.2 mmol/L    Chloride 104 96 - 106 mmol/L    CO2 21 20 - 29 mmol/L    Calcium 9.2 8.6 - 10.2 mg/dL   CBC WITH AUTOMATED DIFF   Result Value Ref Range    WBC 7.3 3.4 - 10.8 x10E3/uL    RBC 3.92 (L) 4.14 - 5.80 x10E6/uL    HGB 13.2 13.0 - 17.7 g/dL    HCT 38.9 37.5 - 51.0 %    MCV 99 (H) 79 - 97 fL    MCH 33.7 (H) 26.6 - 33.0 pg    MCHC 33.9 31.5 - 35.7 g/dL    RDW 13.8 12.3 - 15.4 %    PLATELET 305 475 - 508 x10E3/uL    NEUTROPHILS 73 Not Estab. %    Lymphocytes 17 Not Estab. %    MONOCYTES 7 Not Estab. %    EOSINOPHILS 3 Not Estab. %    BASOPHILS 0 Not Estab. %    ABS. NEUTROPHILS 5.3 1.4 - 7.0 x10E3/uL    Abs Lymphocytes 1.2 0.7 - 3.1 x10E3/uL    ABS. MONOCYTES 0.5 0.1 - 0.9 x10E3/uL    ABS. EOSINOPHILS 0.2 0.0 - 0.4 x10E3/uL    ABS. BASOPHILS 0.0 0.0 - 0.2 x10E3/uL    IMMATURE GRANULOCYTES 0 Not Estab. %    ABS. IMM.  GRANS. 0.0 0.0 - 0.1 x10E3/uL    Hematology comments: Note:    PROTHROMBIN TIME + INR   Result Value Ref Range    INR 1.0 0.8 - 1.2    Prothrombin time 10.3 9.1 - 12.0 sec   AFP WITH AFP-L3%   Result Value Ref Range    AFP, serum 5.5 0.0 - 8.0 ng/mL    AFP-L3%, Serum Comment 0.0 - 9.9 %   AMMONIA   Result Value Ref Range    Ammonia, Plasma 61 27 - 102 ug/dL   FERRITIN   Result Value Ref Range    Ferritin 384 30 - 400 ng/mL   IRON PROFILE   Result Value Ref Range    TIBC 266 250 - 450 ug/dL    UIBC 198 111 - 343 ug/dL    Iron 68 38 - 169 ug/dL    Iron % saturation 26 15 - 55 %   ALPHA-1-ANTITRYPSIN, TOTAL   Result Value Ref Range    Alpha-1 Antitrypsin 188 90 - 200 mg/dL   ACTIN (SMOOTH MUSCLE) ANTIBODY   Result Value Ref Range    Actin (Smooth Muscle) Ab 6 0 - 19 Units       IMAGING:  MRI Results (most recent):    Results from Hospital Encounter encounter on 08/06/18   MRI BRAIN W WO CONT   Narrative EXAM:  MRI BRAIN W WO CONT  INDICATION:  Encephalopathy, confusion, R 41.0, recurrent bladder transitional  cell carcinoma, C 67.9, G 93.40,  TECHNIQUE: Sagittal T1, axial FLAIR, T2, T1 and gradient echo T2-weighted images  of the head were obtained followed by intravenous infusion 17 mL Dotarem repeat  axial and coronal T1-weighted images and axial diffusion weighted images. COMPARISON: None available. FINDINGS:  Generalized ventricular and sulcal prominence consistent with cerebral volume  loss somewhat greater than expected for age. Minimal white matter T2 hyperintensity within the range of normal for age. No abnormal areas of intracranial enhancement. No abnormal diffusion. No evidence of intracranial hemorrhage, infarct, mass or abnormal extra-axial  fluid collections. Flow voids are present in the vertebral, basilar and carotid artery systems. The craniocervical junction is unremarkable. The structures of the cranial base including paranasal sinuses are   unremarkable. Impression IMPRESSION:   1. Generalized cerebral volume loss somewhat greater than expected for age. 2. No acute intracranial abnormality demonstrated. .              Assessment:     Encounter Diagnoses     ICD-10-CM ICD-9-CM   1. Dementia without behavioral disturbance, unspecified dementia type F03.90 294.20   2. Hyperammonemia (HCC) E72.20 270.6   3. Depression, unspecified depression type F289 32   67year old male with a h/o HTN, HPL, bladder CA, depression, EtOH abuse, transaminitis, lumbar spinal stenosis here for f/u of cognitive decline. MOCA 18/30 and c/w moderate dementia with significantly impaired delayed recall, orientation, executive functioning and to a lesser extent naming.     MRI Brain completed 8/6/18 and independently reviewed with pt/family today without evidence of vascular dementia, mild to moderate cortical atrophy, no acute process identified. Given baseline transaminitis likely associated with h/o chronic ETOH abuse, will obtain ammonia levels to exclude contributing metabolic encephalopathy. Chronic EtOH abuse may also contribute to underlying memory impairment. I suspect some degree of cognitive impairment was present for much longer than the family is aware. Neuropsychologic assessment completed without evidence of pseudodementia. Presentation is c/w evolving moderate dementia, likely AD. Hyperammonemia noted on recent labs- corrected with lactulose. Cognitive deficits appear somewhat improved from initial visit after correction of hyperammonemia and with addition of Namenda. Finances and medication administration should continue to be monitored to ensure accuracy and prevent errors. Patient currently has appropriate social/caregiver support in place. Plan:   Increase Namenda to 10mg/daily  Heart healthy diet and exercise  Regular scheduled cognitive and social engagement. Follow-up Disposition:  Return in about 6 months (around 3/12/2019).     Signed:  Melani Haque DO  9/12/2018  10:20 AM

## 2018-09-17 ENCOUNTER — OFFICE VISIT (OUTPATIENT)
Dept: INTERNAL MEDICINE CLINIC | Age: 73
End: 2018-09-17

## 2018-09-17 VITALS
RESPIRATION RATE: 16 BRPM | BODY MASS INDEX: 28.69 KG/M2 | HEART RATE: 77 BPM | DIASTOLIC BLOOD PRESSURE: 61 MMHG | OXYGEN SATURATION: 97 % | HEIGHT: 70 IN | TEMPERATURE: 97.7 F | SYSTOLIC BLOOD PRESSURE: 116 MMHG | WEIGHT: 200.4 LBS

## 2018-09-17 DIAGNOSIS — F03.90 DEMENTIA WITHOUT BEHAVIORAL DISTURBANCE, UNSPECIFIED DEMENTIA TYPE: Primary | ICD-10-CM

## 2018-09-17 DIAGNOSIS — I10 ESSENTIAL HYPERTENSION: ICD-10-CM

## 2018-09-17 DIAGNOSIS — R23.3 EASY BRUISING: ICD-10-CM

## 2018-09-17 DIAGNOSIS — Z98.890 S/P LUMBAR LAMINECTOMY: ICD-10-CM

## 2018-09-17 DIAGNOSIS — E78.5 HYPERLIPIDEMIA, UNSPECIFIED HYPERLIPIDEMIA TYPE: ICD-10-CM

## 2018-09-17 DIAGNOSIS — R79.82 ELEVATED C-REACTIVE PROTEIN (CRP): ICD-10-CM

## 2018-09-17 DIAGNOSIS — R76.8 ANA POSITIVE: ICD-10-CM

## 2018-09-17 DIAGNOSIS — C67.9 RECURRENT BLADDER TRANSITIONAL CELL CARCINOMA (HCC): ICD-10-CM

## 2018-09-17 DIAGNOSIS — F33.9 RECURRENT DEPRESSION (HCC): ICD-10-CM

## 2018-09-17 DIAGNOSIS — E72.20 HYPERAMMONEMIA (HCC): ICD-10-CM

## 2018-09-17 NOTE — PROGRESS NOTES
HPI:  Presents for f/u weight, fatigue, back pain    Pt's primary c/o back pain    Seeing neuro  Dx dementia  Started on namenda and titrated to 10 mg     Wife reports good days and bad days    Wife agrees lactulose is likely helping  BMs x 1-2 per day    To [facility] to [facility] tomorrow for the fibroscan    Bruising more easily in recent weeks      Past medical, Social, and Family history reviewed    Prior to Admission medications    Medication Sig Start Date End Date Taking? Authorizing Provider   memantine (NAMENDA) 10 mg tablet Take 1 Tab by mouth daily. 9/12/18  Yes Wily Thomas,    thiamine HCL (B-1) 100 mg tablet Take 1 Tab by mouth daily. 8/17/18  Yes Shen Harding MD   ondansetron (ZOFRAN ODT) 4 mg disintegrating tablet Take 1 Tab by mouth every eight (8) hours as needed for Nausea. 8/17/18  Yes Shen Harding MD   lidocaine (LIDODERM) 5 % apply 1 patch every 24 hours as needed 8/17/18  Yes Shen Harding MD   lactulose (CHRONULAC) 10 gram/15 mL solution Take 30 mL by mouth three (3) times daily. 8/9/18  Yes Shen Harding MD   tamsulosin [facility]) 0.4 mg capsule Take 0.4 mg by mouth daily. Yes Historical Provider   loratadine (CLARITIN) 10 mg tablet Take 10 mg by mouth. Yes Historical Provider   OTHER Vitamin D 25mcg 1 cap a day. Yes Historical Provider   cyanocobalamin, vitamin B-12, (VITAMIN B-12 PO) Take  by mouth. Yes Historical Provider   traMADol (ULTRAM) 50 mg tablet take 2 tablets by mouth every 8 hours if needed for pain 6/20/18  Yes Shen Harding MD   pramipexole (MIRAPEX) 0.75 mg tablet Take 1 Tab by mouth nightly.  5/14/18  Yes Shen Harding MD   traZODone (DESYREL) 50 mg tablet take 2 tablets by mouth NIGHTLY 2/4/18  Yes Shen Harding MD   escitalopram oxalate (LEXAPRO) 20 mg tablet take 1 tablet by mouth once daily 11/13/17  Yes Shen Harding MD   lisinopril (PRINIVIL, ZESTRIL) 10 mg tablet take 1 tablet by mouth once daily 9/27/17  Yes London Rivera Victor Manuel Prasad MD          ROS  Complete ROS reviewed and negative or stable except as noted in HPI. Physical Exam   Constitutional: He is oriented to person, place, and time. No distress. HENT:   Head: Normocephalic and atraumatic. Mouth/Throat: Oropharynx is clear and moist. No oropharyngeal exudate. Eyes: EOM are normal. Pupils are equal, round, and reactive to light. No scleral icterus. Neck: Normal range of motion. Neck supple. No JVD present. No thyromegaly present. Cardiovascular: Normal rate, regular rhythm and normal heart sounds. Exam reveals no gallop and no friction rub. No murmur heard. Pulmonary/Chest: Effort normal and breath sounds normal. No respiratory distress. He has no wheezes. He has no rales. Abdominal: Soft. Bowel sounds are normal. He exhibits no distension and no mass. There is no tenderness. There is no rebound and no guarding. Musculoskeletal: Normal range of motion. He exhibits no edema. Slow deliberate gait, use of cane required for ambulation. Lymphadenopathy:     He has no cervical adenopathy. Neurological: He is alert and oriented to person, place, and time. He exhibits normal muscle tone. Coordination normal.   More interactive today. Skin: Skin is warm. No rash noted. Psychiatric: He has a normal mood and affect. Nursing note and vitals reviewed. Prior labs reviewed. Assessment/Plan:    ICD-10-CM ICD-9-CM    1. Dementia without behavioral disturbance, unspecified dementia type F03.90 294.20    2. Recurrent depression (HCC) F33.9 296.30    3. Hyperlipidemia, unspecified hyperlipidemia type E78.5 272.4    4. Essential hypertension I10 401.9    5. S/P lumbar laminectomy Z98.890 V45.89    6. Recurrent bladder transitional cell carcinoma (HCC) C67.9 188.9    7.  Hyperammonemia (HCC) E72.20 270.6 CBC WITH AUTOMATED DIFF      METABOLIC PANEL, COMPREHENSIVE      AMMONIA   8. Easy bruising R23.8 782.9 CBC WITH AUTOMATED DIFF      METABOLIC PANEL, COMPREHENSIVE      PROTHROMBIN TIME + INR   9. SRIDHAR positive R76.8 795.79 SED RATE (ESR)      C REACTIVE PROTEIN, QT   10. Elevated C-reactive protein (CRP) R79.82 790.95 SED RATE (ESR)      C REACTIVE PROTEIN, QT     Follow-up Disposition:  Return in about 3 months (around 12/17/2018), or if symptoms worsen or fail to improve, for dementia, weight, other. results and schedule of future studies reviewed with patient  reviewed diet, exercise and weight   cardiovascular risk and specific lipid/LDL goals reviewed  reviewed medications and side effects in detail   Repeat ammonia, CBC, CMP  See hepatology as scheduled  If fibroscan unremarkable, could consider taper off lactulose to try to clarify whether it is needed. ?was the ammonia level that was elevated related to acute hepatic dysfunction vs chronic dysfunction? See rheum for opinion as scheduled.   Pt to get flu shot at the pharmacy

## 2018-09-17 NOTE — MR AVS SNAPSHOT
[address] [facility] [place] [address] 
[phone] Patient: Michelle Childs MRN: [id number] :1945 Visit Information Date & Time Provider Department Dept. Phone Encounter #  
 2018  9:45 AM Miacela Cleaning MD [facility] Medicine [phone] [id number] Follow-up Instructions Return in about 3 months (around 2018), or if symptoms worsen or fail to improve, for dementia, weight, other. Your Appointments 2018  9:00 AM  
New Patient with Kathya Ye MD  
[place] [id number] [facility]) Appt Note: NP - Referred by Danica Kelly- Test for Autoimmune disease - KD 18; . ... [facility] [id number]  
[address] [place] [id number]  
  
    
 3/12/2019 11:40 AM  
Follow Up with DO Anish Pulido [facility] at [facility] [address] [facility]) Appt Note: 6 month f/u memory loss [address] [facility] [address]  
[phone]  
  
   
 [address] [address] Upcoming Health Maintenance Date Due  
 GLAUCOMA SCREENING Q2Y 2018 Influenza Age 5 to Adult 2018 MEDICARE YEARLY EXAM 2019 DTaP/Tdap/Td series (2 - Td) 2023 Allergies as of 2018  Review Complete On: 2018 By: Micaela Cleaning MD  
 No Known Allergies Current Immunizations  Reviewed on 2018 Name Date Influenza High Dose Vaccine PF 8/10/2017, 2016 Influenza Vaccine 2014, 2013, 2013 Influenza Vaccine (Quad) PF 2015 Pneumococcal Conjugate (PCV-13) 2015 Pneumococcal Vaccine (Unspecified Type) 10/29/2013 Tdap 2013 Zoster Vaccine, Live 2013  Reviewed by Micaela Cleaning MD on 2018 at 10:39 AM  
 Reviewed by Madie Gutierrez MD on 9/17/2018 at 10:39 AM  
You Were Diagnosed With   
  
 Codes Comments Dementia without behavioral disturbance, unspecified dementia type    -  Primary ICD-10-CM: F03.90 ICD-9-CM: 294.20 Recurrent depression ([facility] [address]     ICD-10-CM: F33.9 ICD-9-CM: 296.30 Hyperlipidemia, unspecified hyperlipidemia type     ICD-10-CM: E78.5 ICD-9-CM: 272.4 Essential hypertension     ICD-10-CM: I10 
ICD-9-CM: 401.9 S/P lumbar laminectomy     ICD-10-CM: E59.072 ICD-9-CM: V45.89 Recurrent bladder transitional cell carcinoma (HCC)     ICD-10-CM: C67.9 ICD-9-CM: 188.9 Hyperammonemia (HCC)     ICD-10-CM: Z53.07 ICD-9-CM: 270.6 Easy bruising     ICD-10-CM: R23.8 ICD-9-CM: 782.9 SRIDHAR positive     ICD-10-CM: R76.8 ICD-9-CM: 795.79 Elevated C-reactive protein (CRP)     ICD-10-CM: C64.85 ICD-9-CM: 790.95 Vitals BP Pulse Temp Resp Height(growth percentile) Weight(growth percentile) 116/61 (BP 1 Location: Left arm, BP Patient Position: Sitting) 77 97.7 °F (36.5 °C) (Oral) 16 5' 10\" (1.778 m) 200 lb 6.4 oz (90.9 kg) SpO2 BMI Smoking Status 97% 28.75 kg/m2 Former Smoker BMI and BSA Data Body Mass Index Body Surface Area 28.75 kg/m 2 2.12 m 2 Preferred Pharmacy Pharmacy Name Phone [facility] [phone] Your Updated Medication List  
  
   
This list is accurate as of 9/17/18 10:44 AM.  Always use your most recent med list.  
  
  
  
  
 CLARITIN 10 mg tablet Generic drug:  loratadine Take 10 mg by mouth.  
  
 escitalopram oxalate 20 mg tablet Commonly known as:  LEXAPRO  
take 1 tablet by mouth once daily  
  
 lactulose 10 gram/15 mL solution Commonly known as:  Poston Libman Take 30 mL by mouth three (3) times daily. lidocaine 5 % Commonly known as:  LIDODERM  
apply 1 patch every 24 hours as needed lisinopril 10 mg tablet Commonly known as:  PRINIVIL, ZESTRIL  
take 1 tablet by mouth once daily  
  
 memantine 10 mg tablet Commonly known as:  Juan Sorrow Take 1 Tab by mouth daily. ondansetron 4 mg disintegrating tablet Commonly known as:  ZOFRAN ODT Take 1 Tab by mouth every eight (8) hours as needed for Nausea. OTHER Vitamin D 25mcg 1 cap a day. pramipexole 0.75 mg tablet Commonly known as:  MIRAPEX Take 1 Tab by mouth nightly. tamsulosin 0.4 mg capsule Commonly known as:  FLOMAX Take 0.4 mg by mouth daily. thiamine  mg tablet Commonly known as:  B-1 Take 1 Tab by mouth daily. traMADol 50 mg tablet Commonly known as:  ULTRAM  
take 2 tablets by mouth every 8 hours if needed for pain  
  
 traZODone 50 mg tablet Commonly known as:  DESYREL  
take 2 tablets by mouth NIGHTLY  
  
 VITAMIN B-12 PO Take  by mouth. We Performed the Following AMMONIA [id number] CPT(R)] C REACTIVE PROTEIN, QT [04487 CPT(R)] CBC WITH AUTOMATED DIFF [00625 CPT(R)] METABOLIC PANEL, COMPREHENSIVE [93756 CPT(R)] PROTHROMBIN TIME + INR [19027 CPT(R)] SED RATE (ESR) [id number] CPT(R)] Follow-up Instructions Return in about 3 months (around 12/17/2018), or if symptoms worsen or fail to improve, for dementia, weight, other. To-Do List   
 09/18/2018 12:45 PM  
  Appointment with Madison Vaughan NP at [place] [address] ([phone]) Introducing [facility]! [employer] introduces [employer] patient portal. Now you can access parts of your medical record, email your doctor's office, and request medication refills online. 1. In your internet browser, go to [email or web address] 2. Click on the First Time User? Click Here link in the Sign In box. You will see the New Member Sign Up page. 3. Enter your [employer] Access Code exactly as it appears below.  You will not need to use this code after youve completed the sign-up process. If you do not sign up before the expiration date, you must request a new code. · [employer] Access Code: [email or web address] Expires: 11/4/2018  7:39 PM 
 
4. Enter the last four digits of your Social Security Number (xxxx) and Date of Birth (mm/dd/yyyy) as indicated and click Submit. You will be taken to the next sign-up page. 5. Create a [employer] ID. This will be your [employer] login ID and cannot be changed, so think of one that is secure and easy to remember. 6. Create a [employer] password. You can change your password at any time. 7. Enter your Password Reset Question and Answer. This can be used at a later time if you forget your password. 8. Enter your e-mail address. You will receive e-mail notification when new information is available in [address] 9. Click Sign Up. You can now view and download portions of your medical record. 10. Click the Download Summary menu link to download a portable copy of your medical information. If you have questions, please visit the Frequently Asked Questions section of the [employer] website. Remember, [employer] is NOT to be used for urgent needs. For medical emergencies, dial 911. Now available from your iPhone and Android! Please provide this summary of care documentation to your next provider. Your primary care clinician is listed as [address] If you have any questions after today's visit, please call [phone].

## 2018-09-17 NOTE — PROGRESS NOTES
Rm 14    Chief Complaint   Patient presents with    Fatigue     weakness f/u    Weight Management     f/u    Referral Follow Up     1. Have you been to the ER, urgent care clinic since your last visit? Hospitalized since your last visit? No    2. Have you seen or consulted any other health care providers outside of the [facility] since your last visit? Include any pap smears or colon screening.  No    Health Maintenance Due   Topic Date Due    GLAUCOMA SCREENING Q2Y  04/01/2018    Influenza Age 5 to Adult  08/01/2018   pt states he will get flu vaccine with his pharmacy  Pt does not remember when his last eye exam was completed      Learning Assessment 8/7/2018   PRIMARY LEARNER Patient   HIGHEST LEVEL OF EDUCATION - PRIMARY LEARNER  -   BARRIERS PRIMARY LEARNER -   PRIMARY LANGUAGE ENGLISH   LEARNER PREFERENCE PRIMARY READING   ANSWERED BY self   RELATIONSHIP SELF

## 2018-09-18 ENCOUNTER — OFFICE VISIT (OUTPATIENT)
Dept: HEMATOLOGY | Age: 73
End: 2018-09-18

## 2018-09-18 VITALS
BODY MASS INDEX: 26.83 KG/M2 | TEMPERATURE: 98.3 F | WEIGHT: 187 LBS | SYSTOLIC BLOOD PRESSURE: 110 MMHG | HEART RATE: 75 BPM | DIASTOLIC BLOOD PRESSURE: 79 MMHG

## 2018-09-18 DIAGNOSIS — R74.8 ABNORMAL LIVER ENZYMES: Primary | ICD-10-CM

## 2018-09-18 DIAGNOSIS — F10.21 ALCOHOL USE DISORDER, MODERATE, IN EARLY REMISSION, IN CONTROLLED ENVIRONMENT (HCC): ICD-10-CM

## 2018-09-18 LAB
ALBUMIN SERPL-MCNC: 4.3 G/DL (ref 3.5–4.8)
ALBUMIN/GLOB SERPL: 1.8 {RATIO} (ref 1.2–2.2)
ALP SERPL-CCNC: 132 IU/L (ref 39–117)
ALT SERPL-CCNC: 15 IU/L (ref 0–44)
AMMONIA PLAS-MCNC: NORMAL UG/DL (ref 27–102)
AST SERPL-CCNC: 24 IU/L (ref 0–40)
BASOPHILS # BLD AUTO: 0 X10E3/UL (ref 0–0.2)
BASOPHILS NFR BLD AUTO: 0 %
BILIRUB SERPL-MCNC: 0.3 MG/DL (ref 0–1.2)
BUN SERPL-MCNC: 9 MG/DL (ref 8–27)
BUN/CREAT SERPL: 12 (ref 10–24)
CALCIUM SERPL-MCNC: 9.3 MG/DL (ref 8.6–10.2)
CHLORIDE SERPL-SCNC: 104 MMOL/L (ref 96–106)
CO2 SERPL-SCNC: 22 MMOL/L (ref 20–29)
CREAT SERPL-MCNC: 0.74 MG/DL (ref 0.76–1.27)
CRP SERPL-MCNC: 3 MG/L (ref 0–4.9)
EOSINOPHIL # BLD AUTO: 0.4 X10E3/UL (ref 0–0.4)
EOSINOPHIL NFR BLD AUTO: 6 %
ERYTHROCYTE [DISTWIDTH] IN BLOOD BY AUTOMATED COUNT: 12.9 % (ref 12.3–15.4)
ERYTHROCYTE [SEDIMENTATION RATE] IN BLOOD BY WESTERGREN METHOD: 38 MM/HR (ref 0–30)
GLOBULIN SER CALC-MCNC: 2.4 G/DL (ref 1.5–4.5)
GLUCOSE SERPL-MCNC: 81 MG/DL (ref 65–99)
HCT VFR BLD AUTO: 40.4 % (ref 37.5–51)
HGB BLD-MCNC: 13.2 G/DL (ref 13–17.7)
IMM GRANULOCYTES # BLD: 0 X10E3/UL (ref 0–0.1)
IMM GRANULOCYTES NFR BLD: 0 %
INR PPP: 1 (ref 0.8–1.2)
LYMPHOCYTES # BLD AUTO: 1.5 X10E3/UL (ref 0.7–3.1)
LYMPHOCYTES NFR BLD AUTO: 23 %
MCH RBC QN AUTO: 32.4 PG (ref 26.6–33)
MCHC RBC AUTO-ENTMCNC: 32.7 G/DL (ref 31.5–35.7)
MCV RBC AUTO: 99 FL (ref 79–97)
MONOCYTES # BLD AUTO: 0.5 X10E3/UL (ref 0.1–0.9)
MONOCYTES NFR BLD AUTO: 8 %
NEUTROPHILS # BLD AUTO: 3.9 X10E3/UL (ref 1.4–7)
NEUTROPHILS NFR BLD AUTO: 63 %
PLATELET # BLD AUTO: 250 X10E3/UL (ref 150–379)
POTASSIUM SERPL-SCNC: 4 MMOL/L (ref 3.5–5.2)
PROT SERPL-MCNC: 6.7 G/DL (ref 6–8.5)
PROTHROMBIN TIME: 10.3 SEC (ref 9.1–12)
RBC # BLD AUTO: 4.08 X10E6/UL (ref 4.14–5.8)
SODIUM SERPL-SCNC: 141 MMOL/L (ref 134–144)
WBC # BLD AUTO: 6.3 X10E3/UL (ref 3.4–10.8)

## 2018-09-18 NOTE — PROGRESS NOTES
1. Have you been to the ER, urgent care clinic since your last visit? Hospitalized since your last visit? No    2. Have you seen or consulted any other health care providers outside of the [address] [place] [address] since your last visit? Include any pap smears or colon screening. No   Chief Complaint   Patient presents with    Follow-up     fibroscan     Visit Vitals    /79 (BP 1 Location: Right arm, BP Patient Position: Sitting)    Pulse 75    Temp 98.3 °F (36.8 °C) (Tympanic)    Wt 187 lb (84.8 kg)    BMI 26.83 kg/m2     PHQ over the last two weeks 8/8/2017   Little interest or pleasure in doing things Not at all   Feeling down, depressed, irritable, or hopeless Several days   Total Score PHQ 2 1     Learning Assessment 9/18/2018   PRIMARY LEARNER Patient   HIGHEST LEVEL OF EDUCATION - PRIMARY LEARNER  -   BARRIERS PRIMARY LEARNER NONE   CO-LEARNER CAREGIVER No   PRIMARY LANGUAGE ENGLISH   LEARNER PREFERENCE PRIMARY LISTENING   ANSWERED BY patient   RELATIONSHIP SELF     Abuse Screening Questionnaire 9/18/2018   Do you ever feel afraid of your partner? N   Are you in a relationship with someone who physically or mentally threatens you? N   Is it safe for you to go home? Y     Fall Risk Assessment, last 12 mths 9/18/2018   Able to walk? Yes   Fall in past 12 months?  No   Fall with injury? -   Number of falls in past 12 months -   Fall Risk Score -

## 2018-09-18 NOTE — MR AVS SNAPSHOT
2700 [facility] Emmanuel .[phone] [address] [place] [address] 
[phone] Patient: Hailey Luu MRN: [id number] :1945 Visit Information Date & Time Provider Department Dept. Phone Encounter #  
 2018 12:45 PM April MINERVA [facility] [id number] Follow-up Instructions Return in about 4 months (around 2019). Your Appointments 2018  9:00 AM  
New Patient with Carrie Keys MD  
[address]) Appt Note: NP - Referred by Jeff Burgess- Test for Autoimmune disease - KD 18; . ... [facility] [address] [id number]  
[address] [place] [id number]  
  
    
 2018  1:45 PM  
ROUTINE CARE with Christian Bowen MD  
[facility] [address] [place] [address]) Appt Note: 3 month fu  
 401 [facility] [address] [facility] [address]  
220 [facility] [address]  
  
    
 2019 12:40 PM  
Follow Up with LAY Martinez 75 ([facility]) Appt Note: Follow up 200 [facility] Emmanuel .[phone] [facility] [address]  
[phone]  
  
   
 200 [facility] [address]  
  
    
 3/12/2019 11:40 AM  
Follow Up with Orquidea Melendrez DO [facility] at [address]) Appt Note: 6 month f/u memory loss 302 [facility] [address]  
[phone]  
  
   
 400 [facility] Emmanuel 298 [facility]  [address] Upcoming Health Maintenance Date Due  
 GLAUCOMA SCREENING Q2Y 2018 Influenza Age 5 to Adult 2018 MEDICARE YEARLY EXAM 2019 DTaP/Tdap/Td series (2 - Td) 2023 Allergies as of 2018  Review Complete On: 2018 By: Jd Estrella No Known Allergies Current Immunizations  Reviewed on 9/17/2018 Name Date Influenza High Dose Vaccine PF 8/10/2017, 9/12/2016 Influenza Vaccine 9/9/2014, 11/20/2013, 9/11/2013 Influenza Vaccine (Quad) PF 8/12/2015 Pneumococcal Conjugate (PCV-13) 5/4/2015 Pneumococcal Vaccine (Unspecified Type) 10/29/2013 Tdap 11/19/2013 Zoster Vaccine, Live 9/20/2013 Not reviewed this visit Vitals BP Pulse Temp Weight(growth percentile) BMI Smoking Status 110/79 (BP 1 Location: Right arm, BP Patient Position: Sitting) 75 98.3 °F (36.8 °C) (Tympanic) 187 lb (84.8 kg) 26.83 kg/m2 Former Smoker BMI and BSA Data Body Mass Index Body Surface Area  
 26.83 kg/m 2 2.05 m 2 Preferred Pharmacy Pharmacy Name Phone [facility], [address] [phone] Your Updated Medication List  
  
   
This list is accurate as of 9/18/18  1:32 PM.  Always use your most recent med list.  
  
  
  
  
 CLARITIN 10 mg tablet Generic drug:  loratadine Take 10 mg by mouth.  
  
 escitalopram oxalate 20 mg tablet Commonly known as:  LEXAPRO  
take 1 tablet by mouth once daily  
  
 lactulose 10 gram/15 mL solution Commonly known as:  Mannie Phil Take 30 mL by mouth three (3) times daily. lidocaine 5 % Commonly known as:  LIDODERM  
apply 1 patch every 24 hours as needed  
  
 lisinopril 10 mg tablet Commonly known as:  PRINIVIL, ZESTRIL  
take 1 tablet by mouth once daily  
  
 memantine 10 mg tablet Commonly known as:  Bray Anchors Take 1 Tab by mouth daily. ondansetron 4 mg disintegrating tablet Commonly known as:  ZOFRAN ODT Take 1 Tab by mouth every eight (8) hours as needed for Nausea. OTHER Vitamin D 25mcg 1 cap a day. pramipexole 0.75 mg tablet Commonly known as:  MIRAPEX Take 1 Tab by mouth nightly. tamsulosin 0.4 mg capsule Commonly known as:  FLOMAX Take 0.4 mg by mouth daily. thiamine  mg tablet Commonly known as:  B-1 Take 1 Tab by mouth daily. traMADol 50 mg tablet Commonly known as:  ULTRAM  
take 2 tablets by mouth every 8 hours if needed for pain  
  
 traZODone 50 mg tablet Commonly known as:  DESYREL  
take 2 tablets by mouth NIGHTLY  
  
 VITAMIN B-12 PO Take  by mouth. Follow-up Instructions Return in about 4 months (around 1/18/2019). Introducing [facility] SERVICES! Oli Siddiqui introduces [employer] patient portal. Now you can access parts of your medical record, email your doctor's office, and request medication refills online. 1. In your internet browser, go to [email or web address] 2. Click on the First Time User? Click Here link in the Sign In box. You will see the New Member Sign Up page. 3. Enter your [employer] Access Code exactly as it appears below. You will not need to use this code after youve completed the sign-up process. If you do not sign up before the expiration date, you must request a new code. · [employer] Access Code: [email or web address] Expires: 11/4/2018  7:39 PM 
 
4. Enter the last four digits of your Social Security Number (xxxx) and Date of Birth (mm/dd/yyyy) as indicated and click Submit. You will be taken to the next sign-up page. 5. Create a [employer] ID. This will be your [employer] login ID and cannot be changed, so think of one that is secure and easy to remember. 6. Create a [employer] password. You can change your password at any time. 7. Enter your Password Reset Question and Answer. This can be used at a later time if you forget your password. 8. Enter your e-mail address. You will receive e-mail notification when new information is available in [address] 9. Click Sign Up. You can now view and download portions of your medical record. 10. Click the Download Summary menu link to download a portable copy of your medical information. If you have questions, please visit the Frequently Asked Questions section of the [employer] website. Remember, [employer] is NOT to be used for urgent needs. For medical emergencies, dial 911. Now available from your iPhone and Android! Please provide this summary of care documentation to your next provider. Your primary care clinician is listed as [address] If you have any questions after today's visit, please call [phone].

## 2018-09-18 NOTE — PROGRESS NOTES
70 Danie Roy MD, [address], Cite [facility], [place]       Rodney Angeles, LAY German, PRIMITIVO Diaz, [facility]   LAY Herrera NP [address] [facility] 136    at [address]    [address] [facility] [address], [address] [facility], [address]    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 The active problem list, all pertinent past medical history, medications, radiologic findings and laboratory findings related to the liver disorder were reviewed with the patient. The patient is a 67 y.o.  male who was first noted to have abnormalities in liver transaminases in 2/2017. Serologic evaluation for markers of chronic liver disease were negative for HCV and HBV. SRIDHAR was positive. CT scan of the liver was performed in 7/2017. The results of the imaging demonstrated a normal appearing liver. An assessment of liver fibrosis with biopsy or elastography has not been performed.  Patient presents to the clinic today for a Fibroscan to assess liver fibrosis or scarring. The patient notes some fatigue and problems concentrating. This has not worsened since last office visit. Today, patient denies abdominal pain, change in bowel habits, dark urine, myalgias, arthralgias, pruritus and problems concentrating. The patient completes all daily activities without any functional limitations. 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 Results were discussed in detail with patient today. Will perform this annually to monitor progression or regression. Persistent elevation in liver transaminases which are now normal with abstinence from all alcohol use. Encouraged abstinence. Elevation in alkaline phosphatase which is near normal.  Serologic testing for causes of chronic liver disease were negative. The most likely causes for the liver chemistry abnormalities were discussed with the patient and include alcohol. The liver enzymes are improving because he is now abstinent from alcohol. Treatment of other medical problems in patients with chronic liver disease  There are no contraindications for the patient to take any medications that are necessary for treatment of other medical issues. Counseling for alcohol in patients with chronic liver disease  The patient was counseled regarding alcohol consumption and the effect of alcohol on chronic liver disease. The patient has not consumed alcohol since 3/2018. Encouraged this behavior. Vaccinations   The need for vaccination against viral hepatitis A and B will be assessed with serologic and instituted as appropriate. Routine vaccinations against other bacterial and viral agents can be performed as indicated. Annual flu vaccination should be administered if indicated. Screening for Hepatocellular Carcinoma  HCC screening has recently been performed and does not suggest [address] The next liver imaging study will be performed in 1/2019.     ALLERGIES  No Known Allergies    MEDICATIONS  Current Outpatient Prescriptions   Medication Sig    memantine (NAMENDA) 10 mg tablet Take 1 Tab by mouth daily.  thiamine HCL (B-1) 100 mg tablet Take 1 Tab by mouth daily.  ondansetron (ZOFRAN ODT) 4 mg disintegrating tablet Take 1 Tab by mouth every eight (8) hours as needed for Nausea.  lidocaine (LIDODERM) 5 % apply 1 patch every 24 hours as needed    lactulose (CHRONULAC) 10 gram/15 mL solution Take 30 mL by mouth three (3) times daily.  tamsulosin (FLOMAX) 0.4 mg capsule Take 0.4 mg by mouth daily.  loratadine (CLARITIN) 10 mg tablet Take 10 mg by mouth.  OTHER Vitamin D 25mcg 1 cap a day.  cyanocobalamin, vitamin B-12, (VITAMIN B-12 PO) Take  by mouth.  traMADol (ULTRAM) 50 mg tablet take 2 tablets by mouth every 8 hours if needed for pain    pramipexole (MIRAPEX) 0.75 mg tablet Take 1 Tab by mouth nightly.  traZODone (DESYREL) 50 mg tablet take 2 tablets by mouth NIGHTLY    escitalopram oxalate (LEXAPRO) 20 mg tablet take 1 tablet by mouth once daily    lisinopril (PRINIVIL, ZESTRIL) 10 mg tablet take 1 tablet by mouth once daily     No current facility-administered medications for this visit. SYSTEM REVIEW NOT RELATED TO LIVER DISEASE OR REVIEWED ABOVE:  Constitution systems: Negative for fever, chills, weight gain, weight loss. Eyes: Negative for visual changes. ENT: Negative for sore throat, painful swallowing. Respiratory: Negative for cough, hemoptysis, SOB. Cardiology: Negative for chest pain, palpitations. GI:  Negative for constipation or diarrhea. : Negative for urinary frequency, dysuria, hematuria, nocturia. Skin: Negative for rash. Hematology: Negative for easy bruising, blood clots. Musculo-skelatal: Negative for back pain, muscle pain, weakness. Neurologic: Negative for headaches, dizziness, vertigo, memory problems not related to HE. Psychology: Negative for anxiety, depression.      FAMILY HISTORY:  The father  of cancer. The mother  of CHF. There is no family history of liver disease. SOCIAL HISTORY:  The patient is . The patient has 3 children and 3 grandchildren. The patient stopped using tobacco products in 1970s   The patient has previously consumed alcohol in excess. The patient has been abstinent from alcohol since 2018. 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 PHYSICAL EXAMINATION:  Visit Vitals    /79 (BP 1 Location: Right arm, BP Patient Position: Sitting)    Pulse 75    Temp 98.3 °F (36.8 °C) (Tympanic)    Wt 187 lb (84.8 kg)    BMI 26.83 kg/m2     General: No acute distress. Eyes: Sclera anicteric. ENT: No oral lesions. Thyroid normal.  Nodes: No adenopathy. Skin: No spider angiomata. No jaundice. No palmar erythema. Respiratory: Lungs clear to auscultation. Cardiovascular: Regular heart rate. No murmurs. No JVD. Abdomen: Soft non-tender. Liver size normal to percussion/palpation. Spleen not palpable. No obvious ascites. Extremities: No edema. No muscle wasting. No gross arthritic changes. Neurologic: Alert and oriented. Cranial nerves grossly intact. No asterixis.     LABORATORY STUDIES:  Liver [place] of [address] Units 2018   WBC 3.4 - 10.8 x10E3/uL 6.3 7.3   ANC 1.4 - 7.0 x10E3/uL 3.9 5.3   HGB 13.0 - 17.7 g/dL 13.2 13.2    - 379 x10E3/uL 250 288   INR 0.8 - 1.2 1.0 1.0   AST 0 - 40 IU/L 24 19   ALT 0 - 44 IU/L 15 12   Alk Phos 39 - 117 IU/L 132 (H) 124 (H)   Bili, Total 0.0 - 1.2 mg/dL 0.3 0.4   Bili, Direct 0.00 - 0.40 mg/dL  0.16   Albumin 3.5 - 4.8 g/dL 4.3 4.4   BUN 8 - 27 mg/dL 9 9   Creat 0.76 - 1.27 mg/dL 0.74 (L) 0.80   Na 134 - 144 mmol/L 141 142   K 3.5 - 5.2 mmol/L 4.0 4.2   Cl 96 - 106 mmol/L 104 104   CO2 20 - 29 mmol/L 22 21   Glucose 65 - 99 mg/dL 81 81   Magnesium 1.6 - 2.6 mg/dL     Ammonia ug/dL CANCELED 61     SEROLOGIES:  Serologies Latest Ref Rng & Units 8/21/2018 7/20/2018   Ferritin 30 - 400 ng/mL 384    Iron % Saturation 15 - 55 % 26    SRIDHAR Ab, Direct Negative  Positive (A)   ASMCA 0 - 19 Units 6    Alpha-1 antitrypsin level 90 - 200 mg/dL 188      Serologies Latest Ref Rng & Units 6/27/2017 12/5/2013   Hep B Surface Ag Negative Negative Negative   Hep C Ab 0.0 - 0.9 s/co ratio  <0.1     LIVER HISTOLOGY:  9/2018. [facility]. EkPa was 10.1. Suggested fibrosis level is F3. CAP score of 257, suggestive of mild steatosis. ENDOSCOPIC PROCEDURES:  Not available or performed    RADIOLOGY:  7/2018. CT scan abdomen without IV contrast. Normal appearing liver. No liver mass lesions. Normal spleen. No ascites. OTHER TESTING:  Not available or performed    All of the issues listed in the Assessment and Plan were discussed with the patient. All questions were answered. The patient expressed a clear understanding of the above. [facility] 87 in 4 months.     Rubina Faye NP  Liver [place] of [address], [address]  Ph: [phone]  Fax: [phone]

## 2018-09-20 PROBLEM — F10.21 ALCOHOL USE DISORDER, MODERATE, IN EARLY REMISSION, IN CONTROLLED ENVIRONMENT (HCC): Status: ACTIVE | Noted: 2018-09-20

## 2018-09-20 PROBLEM — R74.8 ABNORMAL LIVER ENZYMES: Status: ACTIVE | Noted: 2018-09-20

## 2018-09-23 DIAGNOSIS — E72.20 HYPERAMMONEMIA (HCC): Primary | ICD-10-CM

## 2018-09-25 LAB — AMMONIA PLAS-MCNC: 78 UG/DL (ref 27–102)

## 2018-10-20 DIAGNOSIS — K76.82 HEPATIC ENCEPHALOPATHY: ICD-10-CM

## 2018-10-21 RX ORDER — LACTULOSE 10 G/15ML
SOLUTION ORAL; RECTAL
Qty: 946 ML | Refills: 5 | Status: SHIPPED | OUTPATIENT
Start: 2018-10-21 | End: 2019-01-17 | Stop reason: ALTCHOICE

## 2018-11-01 DIAGNOSIS — G25.81 RESTLESS LEG SYNDROME: ICD-10-CM

## 2018-11-01 DIAGNOSIS — M19.90 ARTHRITIS: ICD-10-CM

## 2018-11-01 DIAGNOSIS — M54.9 CHRONIC BACK PAIN, UNSPECIFIED BACK LOCATION, UNSPECIFIED BACK PAIN LATERALITY: ICD-10-CM

## 2018-11-01 DIAGNOSIS — G89.29 CHRONIC BACK PAIN, UNSPECIFIED BACK LOCATION, UNSPECIFIED BACK PAIN LATERALITY: ICD-10-CM

## 2018-11-01 RX ORDER — TRAMADOL HYDROCHLORIDE 50 MG/1
TABLET ORAL
Qty: 120 TAB | Refills: 3 | Status: SHIPPED | OUTPATIENT
Start: 2018-11-01 | End: 2019-03-05 | Stop reason: SDUPTHER

## 2018-11-01 RX ORDER — PRAMIPEXOLE DIHYDROCHLORIDE 0.75 MG/1
0.75 TABLET ORAL
Qty: 90 TAB | Refills: 1 | Status: SHIPPED | OUTPATIENT
Start: 2018-11-01 | End: 2019-06-28 | Stop reason: SDUPTHER

## 2018-11-01 NOTE — TELEPHONE ENCOUNTER
Medication refill request:    Last Office Visit:September 24, 2018   Next Office Visit:    Future Appointments   Date Time Provider Marielos Serrano   11/29/2018  9:00 AM Freddy Johnson  [facility]   12/17/2018  1:45 PM Nuris Del Toro MD [address] [facility]   1/17/2019 12:40 PM Madison Varma NP [facility]   3/12/2019 11:40 AM DO Nitin Crowley verified.  Jeannette Huerta

## 2018-11-14 DIAGNOSIS — F32.A DEPRESSION, UNSPECIFIED DEPRESSION TYPE: ICD-10-CM

## 2018-11-14 RX ORDER — ESCITALOPRAM OXALATE 20 MG/1
TABLET ORAL
Qty: 90 TAB | Refills: 3 | Status: SHIPPED | OUTPATIENT
Start: 2018-11-14 | End: 2019-11-04 | Stop reason: SDUPTHER

## 2018-11-19 DIAGNOSIS — I10 ESSENTIAL HYPERTENSION WITH GOAL BLOOD PRESSURE LESS THAN 140/90: ICD-10-CM

## 2018-11-19 RX ORDER — LISINOPRIL 10 MG/1
TABLET ORAL
Qty: 90 TAB | Refills: 3 | Status: SHIPPED | OUTPATIENT
Start: 2018-11-19 | End: 2019-11-06

## 2018-11-19 NOTE — TELEPHONE ENCOUNTER
/ refill   Received:  Today   Message Contents   Lacy, [address] Office Pool             Patient is attempting to reach office to advise that [facility] pharmacy ([phone]) still needs approval for \"Lisinopril\" Best contact [phone]

## 2018-11-19 NOTE — TELEPHONE ENCOUNTER
Medication refill request:    Last Office Visit:   September 24, 2018   Next Office Visit:    Future Appointments   Date Time Provider Marielos Serrano   11/29/2018  9:00 AM Sabina Gonzalez MD [facility]   12/17/2018  1:45 PM Tawana Navarro MD 3[facility]   1/17/2019 12:40 PM Madison Varma NP [facility]   3/12/2019 11:40 AM DO Nitin Bowers verified.   yes  Patient requesting for a 90 day supply

## 2018-11-29 ENCOUNTER — OFFICE VISIT (OUTPATIENT)
Dept: RHEUMATOLOGY | Age: 73
End: 2018-11-29

## 2018-11-29 VITALS
DIASTOLIC BLOOD PRESSURE: 70 MMHG | TEMPERATURE: 98 F | RESPIRATION RATE: 18 BRPM | HEIGHT: 70 IN | SYSTOLIC BLOOD PRESSURE: 108 MMHG | BODY MASS INDEX: 30.21 KG/M2 | HEART RATE: 69 BPM | WEIGHT: 211 LBS

## 2018-11-29 DIAGNOSIS — R63.4 UNINTENDED WEIGHT LOSS: ICD-10-CM

## 2018-11-29 DIAGNOSIS — R70.0 ESR RAISED: ICD-10-CM

## 2018-11-29 DIAGNOSIS — R79.82 ELEVATED C-REACTIVE PROTEIN (CRP): ICD-10-CM

## 2018-11-29 DIAGNOSIS — R76.8 FALSE POSITIVE ANA: Primary | ICD-10-CM

## 2018-11-29 DIAGNOSIS — G89.29 CHRONIC BACK PAIN GREATER THAN 3 MONTHS DURATION: ICD-10-CM

## 2018-11-29 DIAGNOSIS — M54.9 CHRONIC BACK PAIN GREATER THAN 3 MONTHS DURATION: ICD-10-CM

## 2018-11-29 RX ORDER — VITAMIN E 268 MG
400 CAPSULE ORAL DAILY
Status: ON HOLD | COMMUNITY
End: 2020-04-02 | Stop reason: CLARIF

## 2018-11-29 RX ORDER — DICLOFENAC SODIUM 50 MG/1
75 TABLET, DELAYED RELEASE ORAL 2 TIMES DAILY
COMMUNITY
End: 2019-05-21 | Stop reason: ALTCHOICE

## 2018-11-29 NOTE — PROGRESS NOTES
REASON FOR VISIT This is the initial evaluation for Mr. Skye Bruce a 75 y.o.  male for question of a failure to thrive and elevated inflammatory markers. The patient is referred to the [facility] at the request of Dr. Tiffanie Shahid. HISTORY OF PRESENT ILLNESS I have reviewed and summarized old records from [facility] ([facility]) He has dementia and bladder cancer. In 6/02/2017, MRI Lumbar Spine without contrast showed There is accentuated lumbar lordosis of the lumbar spine. Vertebral body heights are maintained. Marrow signal is normal.   The conus medullaris terminates at L1. Signal and caliber of the distal spinal cord are within normal limits. The paraspinal soft tissues are within normal limits. Lower thoracic spine: No herniation or stenosis. T11-12: There is mild disc bulge. T12-L1: There is a mild disc bulge. L1-L2:  There is a broad disc bulge. Mild right-sided facet distraction is present. Bilateral facet arthropathy is noted. There is minimal bilateral neural foraminal stenosis.  L2-L3:  There is a disc osteophyte complex. Bilateral facet distraction is present. The central canal measures about 10.2 mm anterior to posterior. There is mild right-sided neural foraminal stenosis secondary to the eccentric disc bulge. L3-L4:  There is a broad disc bulge, eccentric to the right with a superimposed central protrusion that measures 15.6 mm right to left, 6.3 mm anterior to posterior, and 9.4 mm superior to inferior. This results in significant central canal stenosis. Bilateral facet arthropathy and ligamentum flavum hypertrophy is noted. There is mild to moderate right and mild left neural foraminal stenosis secondary to the eccentric disc bulge. L4-L5:  There is a disc osteophyte complex.  This, in combination with the facet arthropathy and ligamentum flavum hypertrophy, results in a central canal stenosis with an anterior to posterior dimension of 8.6 mm. There is minimal bilateral neural foraminal stenosis. L5-S1:  No herniation or stenosis. Bilateral facet arthropathy is seen. Incidental note is made of a horseshoe kidney. In addition, there is ectasia of the infrarenal aorta to an axial dimension of 3.1 centimeters for a 4.7 cm long segment. In 7/20/2018, he saw his PCP and noted to have unexplained weight loss (25 lbs). Llabs showed WBC 8.0, lymphocytes 1.7, Hct 43.3%, platelets 030,456, creatinine 1.03 mg/dL, albumin 4.1 g/dL,  U/L, ALT 61 U/L, AST 55 U/L, ESR 5 mm/hr, CRP 47.9 mg/L, positive SRIDHAR direct, anti-RNP 1.1, negative anti-dsDNA, anti-Sm, anti-Ro, anti-La, Quantiferon TB. In 7/24/2018, CT Chest, Abdomen and Pelvis with contrast showed CT chest: The visualized thyroid gland is unremarkable. The aorta and main pulmonary artery are normal in caliber. There is aortic arch and coronary artery atherosclerosis. The heart size is normal.  There is no pericardial or pleural effusion. There are no enlarged axillary, mediastinal, or hilar lymph nodes.    There is no lung mass or airspace opacity. There is no pneumothorax. The central airways are clear. CT abdomen and pelvis: The liver, spleen, pancreas, and adrenal glands are normal. There is a small gallstone without intra- or extra-hepatic biliary dilatation. There is normal enhancement of a horseshoe kidney. There is no hydronephrosis.  There are no dilated bowelloops. The appendix is normal.  There is sigmoid diverticulosis without focal adjacent inflammation. There are no enlarged lymph nodes. There is no free fluid or free air. There is dilatation of the infrarenal aorta measuring 3.2 x 3.5 cm. There is aortoiliac atherosclerosis.   There is mild diffuse urinary bladder wall thickening without focal mass. The prostate is not enlarged. There is no pelvic mass. There are diffuse degenerative changes in the spine without aggressive bony lesion. In 8/06/2018, MRI Brain with and without contrast showed Generalized ventricular and sulcal prominence consistent with cerebral volume loss somewhat greater than expected for age. Minimal white matter T2 hyperintensity within the range of normal for age. No abnormal areas of intracranial enhancement. No abnormal diffusion. No evidence of intracranial hemorrhage, infarct, mass or abnormal extra-axial fluid collections. Flow voids are present in the vertebral, basilar and carotid artery systems. The craniocervical junction is unremarkable. The structures of the cranial base including paranasal sinuses are unremarkable In 9/17/2018, labs showed CRP 3.0 mg/L and ESR 38 mm/hr. In 9/18/2018, he saw Dr. Kota Leroy, hepatology, who noted alcohol hepatitis. In 10/19/2018, he followed up with Dr. Miriam Price, orthopedics. Today, he complains of severe back pain associated with generalized weakness, which has bene going on for around 5 years. He had lumbar laminectomy L4-L5 by Dr. Miriam Price. His weight has normalized. He has been doing physical therapy. His wife reports that he may intermittent sharp pain in his right groin when he does his home PT. He will be seeing his urologist today. He is smoker - E-cigarettes. REVIEW OF SYSTEMS A 15 point review of systems was performed and summarized below. The questionnaire was reviewed with the patient and scanned into the patient's medical record. General: endorses fatigue, weakness, denies recent weight gain, recent weight loss, fever, night sweats Musculoskeletal: endorses back pain, muscle pain, denies joint pain, joint swelling, morning stiffness Ears: denies ringing in ears, loss of hearing, deafness Eyes: denies pain, redness, loss of vision, double vision, blurred vision, dryness, foreign body sensation Mouth: denies sore tongue, oral ulcers, bleeding gums, loss of taste, dryness, increased dental caries Nose: denies nosebleeds, loss of smell, nasal ulcers Throat: denies frequent sore throats, hoarseness, difficulty in swallowing, pain in jaw while chewing Neck: denies swollen glands, tender glands Cardiopulmonary: denies pain in chest, irregular heart beat, sudden changes in heart beat, shortness of breath, difficulty breathing at night, dry cough, productive cough, coughing of blood, wheezing Gastrointestinal: denies nausea, heartburn, stomach pain relieved by food, vomiting of blood/\"coffee grounds\", jaundice, increasing constipation, persistent diarrhea, blood in stools, black stools Genitourinary: denies nocturia, difficult urination, pain or burning on urination, blood in urine, cloudy urine, pus in urine, genital discharge, frequent urination, rash/ulcers, sexual difficulties, prostate trouble Hematologic: denies anemia, bleeding tendency, blood clots Skin: endorses easy bruising, denies sun sensitive, rash, redness, hives, skin tightness, nodules/bumps, hair loss, color changes of hands or feet in the cold (Raynaud's) Neurologic: endorses muscle weakness, memory loss, denies headaches, dizziness, numbness or tingling in hands/feet Psychiatric: endorses depression, excessive worries, denies PTSD, Bipolar Sleep: endorses snoring, daytime somnolence, difficulty falling asleep, denies poor sleep (7-8 hours), apnea, difficulty staying asleep PAST MEDICAL HISTORY He has a past medical history of Alcohol abuse, in remission, Arthritis, Back pain, Bladder cancer ([address] C. difficile colitis, Cancer involving bladder by direct extension from endometrium ([address] Dementia, Depression, Horseshoe kidney, HTN (hypertension), Hyperlipidemia, Liver disease, Recurrent bladder transitional cell carcinoma ([address] Restless leg syndrome, Screening for colorectal cancer, Situational anxiety, Spinal stenosis, lumbar, Thrombocytopenia ([address] and Transitional cell carcinoma of left ureter ([address] FAMILY HISTORY His family history includes Arthritis in his mother; Cancer in his father; Other in his brother. SOCIAL HISTORY He reports that he has been smoking. he has never used smokeless tobacco. He reports that he does not drink alcohol or use drugs. HEALTH MAINTENANCE Immunizations Immunization History Administered Date(s) Administered  Influenza High Dose Vaccine PF 09/12/2016, 08/10/2017  Influenza Vaccine 09/11/2013, 11/20/2013, 09/09/2014  Influenza Vaccine (Quad) PF 08/12/2015  Pneumococcal Conjugate (PCV-13) 05/04/2015  Pneumococcal Vaccine (Unspecified Type) 10/29/2013  Tdap 11/19/2013  Zoster Vaccine, Live 09/20/2013 MEDICATIONS Current Outpatient Medications Medication Sig Dispense Refill  vitamin E (AQUA GEMS) 400 unit capsule Take  by mouth daily.  diclofenac EC (VOLTAREN) 50 mg EC tablet Take  by mouth two (2) times a day.  MILK THISTLE PO Take  by mouth daily.  lisinopril (PRINIVIL, ZESTRIL) 10 mg tablet take 1 tablet by mouth once daily 90 Tab 3  
 escitalopram oxalate (LEXAPRO) 20 mg tablet take 1 tablet by mouth once daily 90 Tab 3  
 traMADol (ULTRAM) 50 mg tablet take 2 tablets by mouth every 8 hours if needed for pain 120 Tab 3  pramipexole (MIRAPEX) 0.75 mg tablet Take 1 Tab by mouth nightly. 90 Tab 1  
 lactulose (CHRONULAC) 10 gram/15 mL solution take 30 milliliters by mouth three times a day 946 mL 5  
 memantine (NAMENDA) 10 mg tablet Take 1 Tab by mouth daily. 90 Tab 1  
 thiamine HCL (B-1) 100 mg tablet Take 1 Tab by mouth daily. 30 Tab 5  
 ondansetron (ZOFRAN ODT) 4 mg disintegrating tablet Take 1 Tab by mouth every eight (8) hours as needed for Nausea. 60 Tab 5  lidocaine (LIDODERM) 5 % apply 1 patch every 24 hours as needed 30 Patch 5  
 tamsulosin (FLOMAX) 0.4 mg capsule Take 0.4 mg by mouth daily.  loratadine (CLARITIN) 10 mg tablet Take 10 mg by mouth.  OTHER Vitamin D 25mcg 1 cap a day.  cyanocobalamin, vitamin B-12, (VITAMIN B-12 PO) Take  by mouth.  traZODone (DESYREL) 50 mg tablet take 2 tablets by mouth NIGHTLY 180 Tab 3 ALLERGIES No Known Allergies PHYSICAL EXAMINATION Visit Vitals /70 Pulse 69 Temp 98 °F (36.7 °C) Resp 18 Ht 5' 10\" (1.778 m) Wt 211 lb (95.7 kg) BMI 30.28 kg/m² Body mass index is 30.28 kg/m². General: Patient is alert, oriented x 3, not in acute distress, wife at bedside HEENT:  
Conjunctiva are not injected and appear moist, there is no alopecia, neck is supple Cardiovascular: 
Heart is regular rate and rhythm, no murmurs. Chest: 
Lungs are clear to auscultation bilaterally. Extremities: 
Free of clubbing, cyanosis, edema, extremities well perfused. Neurological: Muscle strength is full in upper and lower extremities. Skin: 
 
Psoriasis:     no 
Nail Pitting:     no 
Onycholysis:     no 
Palmoplantar pustulosis:   no 
Acne fulminans:    no 
Acne conglobata:    no 
 
Musculoskeletal: A comprehensive musculoskeletal exam was performed for all joints of each upper and lower extremity and assessed for swelling, tenderness and range of motion. Costochondritis:  no  
Synovitis:   no 
Dactylitis:   no 
Enthesitis:   no 
 
DATA REVIEW Prior medical records were reviewed and are summarized as below: 
 
Laboratory data: summarized in the HPI Imaging: summarized in the HPI. ASSESSMENT AND PLAN 
 
1) False Positive SRIDHAR and anti-RNP. He does not have a clinical history or examination consistent with an autoimmune disease, such as systemic lupus erythematosus, at this time. Low positive SRIDHAR 1:40 and 1:160 may be positive in up to 32% and 5%, respectively, in the normal population [facility] M et al. Arthritis Res Ther. 2008;10(6):R131. Epub 2008 Nov 11). A history of thyroid disease in the family or thyroid disease in the patient can also cause a positive SRIDHAR.  Viral infections, malignancy and medications can also cause a positive SRIDHAR. The fluorescent SRIDHAR test is more sensitive/specific than the direct SRIDHAR. Anti-RNP has been found to false positives using [facility], therefore clinical correlation is required. Diagnosing systemic lupus is complicated in the sense that you need to look at the entire picture of a patient's presentation and their laboratory workup. In this video, Dr. Reggie Larios with the [facility], discusses the process of diagnosing Lupus. ([email or web address] [email or web address]/diagnosing-lupus-lupus-education-series) 2) Elevated ESR and CRP. There is no history of Polymyalgia Rheumatica, Rheumatoid Arthritis. These are not likely related to an inflammatory process. 3) Chronic Back Pain. This is not ankylosing spondylitis. He follows with Dr. Huey Parmar and is doing PT. 4) Unintended Weight Loss. There was a 25 lb weight loss which he regained. The patient voiced understanding of the aforementioned assessment and plan. Summary of plan was provided in the After Visit Summary patient instructions. I also provided education about MyChart setup and utility. TODAY'S ORDERS None Future Appointments Date Time Provider Marielos Serrano 12/17/2018  1:30 PM Paulino Jara MD [facility] [place] [address]  
1/17/2019 12:40 PM Maidson Varma,  [address]  
3/12/2019 11:40 AM DO ARNEL Major/ Maureen Soares MD, [facility] Adult Rheumatology Rheumatology Ultrasound Certified [place] [address] Phone [phone] Fax [phone]

## 2018-11-29 NOTE — PATIENT INSTRUCTIONS
Positive SRIDHAR does NOT equal Lupus. You do not have a clinical history or examination consistent with an autoimmune disease, such as systemic lupus erythematosus, at this time. Low positive SRIDHAR 1:40 and 1:160 may be positive in up to 32% and 5%, respectively, in the normal population [facility] et al. Arthritis Res Ther. 2008;10(6):R131. Epub 2008 Nov 11). A history of thyroid disease in the family or thyroid disease in the patient can also cause a positive SRIDHAR. Viral infections, malignancy and medications can also cause a positive SRIDHAR. Anti-RNP has been found to false positives using [facility], therefore clinical correlation is required.

## 2018-12-05 ENCOUNTER — DOCUMENTATION ONLY (OUTPATIENT)
Dept: INTERNAL MEDICINE CLINIC | Age: 73
End: 2018-12-05

## 2018-12-17 ENCOUNTER — OFFICE VISIT (OUTPATIENT)
Dept: INTERNAL MEDICINE CLINIC | Age: 73
End: 2018-12-17

## 2018-12-17 VITALS
SYSTOLIC BLOOD PRESSURE: 104 MMHG | OXYGEN SATURATION: 95 % | WEIGHT: 206.4 LBS | TEMPERATURE: 98.3 F | HEIGHT: 70 IN | BODY MASS INDEX: 29.55 KG/M2 | RESPIRATION RATE: 16 BRPM | DIASTOLIC BLOOD PRESSURE: 70 MMHG | HEART RATE: 73 BPM

## 2018-12-17 DIAGNOSIS — E78.5 HYPERLIPIDEMIA, UNSPECIFIED HYPERLIPIDEMIA TYPE: ICD-10-CM

## 2018-12-17 DIAGNOSIS — G89.29 CHRONIC BILATERAL LOW BACK PAIN WITHOUT SCIATICA: ICD-10-CM

## 2018-12-17 DIAGNOSIS — F10.21 ALCOHOL USE DISORDER, MODERATE, IN EARLY REMISSION, IN CONTROLLED ENVIRONMENT (HCC): ICD-10-CM

## 2018-12-17 DIAGNOSIS — C67.9 RECURRENT BLADDER TRANSITIONAL CELL CARCINOMA (HCC): ICD-10-CM

## 2018-12-17 DIAGNOSIS — I10 ESSENTIAL HYPERTENSION: ICD-10-CM

## 2018-12-17 DIAGNOSIS — M19.90 ARTHRITIS: ICD-10-CM

## 2018-12-17 DIAGNOSIS — Z98.890 S/P LUMBAR LAMINECTOMY: ICD-10-CM

## 2018-12-17 DIAGNOSIS — M54.50 CHRONIC BILATERAL LOW BACK PAIN WITHOUT SCIATICA: ICD-10-CM

## 2018-12-17 DIAGNOSIS — F33.9 RECURRENT DEPRESSION (HCC): ICD-10-CM

## 2018-12-17 DIAGNOSIS — G95.19 NEUROGENIC CLAUDICATION (HCC): Primary | ICD-10-CM

## 2018-12-17 RX ORDER — BUPROPION HYDROCHLORIDE 150 MG/1
150 TABLET ORAL
Qty: 30 TAB | Refills: 5 | Status: SHIPPED | OUTPATIENT
Start: 2018-12-17 | End: 2019-06-20 | Stop reason: SDUPTHER

## 2018-12-17 RX ORDER — SULFAMETHOXAZOLE AND TRIMETHOPRIM 800; 160 MG/1; MG/1
TABLET ORAL
Refills: 0 | COMMUNITY
Start: 2018-11-29 | End: 2018-12-17 | Stop reason: ALTCHOICE

## 2018-12-17 RX ORDER — FLUCONAZOLE 100 MG/1
TABLET ORAL
Refills: 0 | COMMUNITY
Start: 2018-11-29 | End: 2018-12-17 | Stop reason: ALTCHOICE

## 2018-12-17 NOTE — PROGRESS NOTES
HPI:  Presents for f/u weight, dementia, etc    Accompanied by wife who assists with history. Saw Rheum - felt to be false positive SRIDHAR - no rheum dz    Saw  - Dr. Breen General   Pt not emptying bladder  Dx fungal and bacterial UTIs - s/p abx tx  To f/u in February 2019  No active symptoms. No nocturia currently reported. Saw Hepatology - F3 bridging fibrosis  Still using lactulose - 2-3 soft BMs daily. Unclear benefit. Pt still confused. Some increased depression. Increased back pain as well. +neurogenic claudication    Pt saw Dr. Nitza Richter    Still getting PT - but visits will have run out soon. Therapist rec'd [facility]    Past medical, Social, and Family history reviewed    Prior to Admission medications    Medication Sig Start Date End Date Taking? Authorizing Provider   vitamin E (AQUA GEMS) 400 unit capsule Take  by mouth daily. Yes Provider, Historical   diclofenac EC (VOLTAREN) 50 mg EC tablet Take  by mouth two (2) times a day. Yes Provider, Historical   MILK THISTLE PO Take  by mouth daily. Yes Provider, Historical   lisinopril (PRINIVIL, ZESTRIL) 10 mg tablet take 1 tablet by mouth once daily 11/19/18  Yes Nafisa Moise MD   escitalopram oxalate (LEXAPRO) 20 mg tablet take 1 tablet by mouth once daily 11/14/18  Yes Nafisa Moise MD   traMADol Alcar[facility] Jayce) 50 mg tablet take 2 tablets by mouth every 8 hours if needed for pain 11/1/18  Yes Nafisa Moise MD   pramipexole (MIRAPEX) 0.75 mg tablet Take 1 Tab by mouth nightly. 11/1/18  Yes Nafisa Moise MD   lactulose (CHRONULAC) 10 gram/15 mL solution take 30 milliliters by mouth three times a day 10/21/18  Yes Nafisa Moise MD   memantine [facility]) 10 mg tablet Take 1 Tab by mouth daily. 9/12/18  Yes Damaris Galicia,    thiamine HCL (B-1) 100 mg tablet Take 1 Tab by mouth daily.  8/17/18  Yes Nafisa Moise MD   ondansetron (ZOFRAN ODT) 4 mg disintegrating tablet Take 1 Tab by mouth every eight (8) hours as needed for Nausea. 8/17/18  Yes Stefan Hagen MD   lidocaine (LIDODERM) 5 % apply 1 patch every 24 hours as needed 8/17/18  Yes Stefan Hagen MD   tamsulosin [facility]) 0.4 mg capsule Take 0.4 mg by mouth daily. Yes Provider, Historical   loratadine (CLARITIN) 10 mg tablet Take 10 mg by mouth. Yes Provider, Historical   OTHER Vitamin D 25mcg 1 cap a day. Yes Provider, Historical   cyanocobalamin, vitamin B-12, (VITAMIN B-12 PO) Take  by mouth. Yes Provider, Historical   traZODone (DESYREL) 50 mg tablet take 2 tablets by mouth NIGHTLY 2/4/18  Yes Stefan Hagen MD          ROS  Complete ROS reviewed and negative or stable except as noted in HPI. Physical Exam   Constitutional: He is oriented to person, place, and time. No distress. HENT:   Head: Normocephalic and atraumatic. Mouth/Throat: Oropharynx is clear and moist. No oropharyngeal exudate. Eyes: EOM are normal. Pupils are equal, round, and reactive to light. No scleral icterus. Neck: Normal range of motion. Neck supple. No JVD present. No thyromegaly present. Cardiovascular: Normal rate, regular rhythm and normal heart sounds. Exam reveals no gallop and no friction rub. No murmur heard. Pulmonary/Chest: Effort normal and breath sounds normal. No respiratory distress. He has no wheezes. He has no rales. Abdominal: Soft. Bowel sounds are normal. He exhibits no distension and no mass. There is no tenderness. There is no rebound and no guarding. Musculoskeletal: Normal range of motion. He exhibits no edema. Slow deliberate gait, use of cane required for ambulation. Lymphadenopathy:     He has no cervical adenopathy. Neurological: He is alert and oriented to person, place, and time. He exhibits normal muscle tone. Coordination normal.   +interactive. Skin: Skin is warm. No rash noted. Psychiatric: He has a normal mood and affect. Nursing note and vitals reviewed. Prior labs reviewed.   Reviewed Hepatology notes, Ortho notes, rheum notes  Reviewed prior imaging reports      Assessment/Plan:    ICD-10-CM ICD-9-CM    1. Neurogenic claudication M48.062 724.03 REFERRAL TO PHYSICIAL MEDICINE REHAB   2. Alcohol use disorder, moderate, in early remission, in controlled environment ([facility] [address] F10.21 305.03    3. Recurrent depression (HCC) F33.9 296.30 buPROPion XL (WELLBUTRIN XL) 150 mg tablet   4. Recurrent bladder transitional cell carcinoma (HCC) C67.9 188.9    5. Hyperlipidemia, unspecified hyperlipidemia type E78.5 272.4    6. Essential hypertension I10 401.9    7. Arthritis M19.90 716.90 REFERRAL TO PHYSICIAL MEDICINE REHAB   8. S/P lumbar laminectomy Z98.890 V45.89 REFERRAL TO PHYSICIAL MEDICINE REHAB   9. Chronic bilateral low back pain without sciatica M54.5 724.2 REFERRAL TO PHYSICIAL MEDICINE REHAB    G89.29 338.29      Follow-up Disposition:  Return in about 2 months (around 2/17/2019), or if symptoms worsen or fail to improve, for back pain, cholesterol, dementia.    results and schedule of future studies reviewed with patient  reviewed diet, exercise and weight    cardiovascular risk and specific lipid/LDL goals reviewed  reviewed medications and side effects in detail   Continue lactulose for now  Add wellbutrin back  Ref to PM&R

## 2018-12-17 NOTE — PROGRESS NOTES
Rm 13    Chief Complaint   Patient presents with    Weight Management     f/u    Dementia     f/u     1. Have you been to the ER, urgent care clinic since your last visit? Hospitalized since your last visit? No    2. Have you seen or consulted any other health care providers outside of the [address] [place] [address] since your last visit? Include any pap smears or colon screening. No    Health Maintenance Due   Topic Date Due    GLAUCOMA SCREENING Q2Y  04/01/2018   Eye exam needs to be scheduled per pt      Fall Risk Assessment, last 12 mths 11/29/2018   Able to walk? Yes   Fall in past 12 months? Yes   Fall with injury?  Yes   Number of falls in past 12 months 1   Fall Risk Score 2   no new falls      Learning Assessment 11/29/2018   PRIMARY LEARNER Patient   HIGHEST LEVEL OF EDUCATION - PRIMARY LEARNER  -   BARRIERS PRIMARY LEARNER -   CO-LEARNER CAREGIVER No   PRIMARY LANGUAGE ENGLISH   LEARNER PREFERENCE PRIMARY DEMONSTRATION   ANSWERED BY patient   RELATIONSHIP SELF

## 2019-01-17 ENCOUNTER — OFFICE VISIT (OUTPATIENT)
Dept: HEMATOLOGY | Age: 74
End: 2019-01-17

## 2019-01-17 VITALS
WEIGHT: 213 LBS | OXYGEN SATURATION: 94 % | HEART RATE: 66 BPM | SYSTOLIC BLOOD PRESSURE: 135 MMHG | BODY MASS INDEX: 30.56 KG/M2 | DIASTOLIC BLOOD PRESSURE: 71 MMHG | TEMPERATURE: 98.6 F

## 2019-01-17 DIAGNOSIS — F10.21 ALCOHOL USE DISORDER, MODERATE, IN EARLY REMISSION, IN CONTROLLED ENVIRONMENT (HCC): Primary | ICD-10-CM

## 2019-01-17 DIAGNOSIS — R74.8 ABNORMAL LIVER ENZYMES: ICD-10-CM

## 2019-01-17 NOTE — PROGRESS NOTES
[address] Psychiatric,[address] Yvonne Short MD, [facility] L[facility] Rahman PA-C Stan Harm, [facility]   LAY Jo NP [facility] [address] 
  at [facility] 
  [address] [facility], [address] Jose Nye  22. 
  [phone] FAX: [address] [place] [address] Avenue 
  [facility] 
  [address], [address] [facility], [address] 
  [phone] FAX: [phone] Patient Care Team: 
Rey Cintron MD as PCP - General (Internal Medicine) Keena Malhotra MD (Urology) Patient Active Problem List  
Diagnosis Code  Arthritis M19.90  Situational anxiety F41.8  Restless leg syndrome G25.81  
 Hyperlipidemia E78.5  
 HTN (hypertension) I10  
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  
 Alcohol use disorder, moderate, in early remission, in controlled environment ([address] F10.21  
 Abnormal liver enzymes R74.8 Tony Baker returns to the [address] regarding elevated liver enzymes secondary to excessive alcohol use. The active problem list, all pertinent past medical history, medications, radiologic findings and laboratory findings related to the liver disorder were reviewed with the patient. The patient is a 75 y.o.  male who was first noted to have abnormalities in liver transaminases in 2/2017. Serologic evaluation for markers of chronic liver disease were negative for HCV and HBV. SRIDHAR was positive. CT scan of the liver was performed in 7/2017. The results of the imaging demonstrated a normal appearing liver. An assessment of liver fibrosis with elastography was performed and suggested bridging fibrosis with moderate steatosis. Patient has remained abstinent from all alcohol use since June 2018. The patient notes some fatigue. This has not worsened since last office visit. Today, patient denies abdominal pain, change in bowel habits, dark urine, myalgias, arthralgias, pruritus and problems concentrating. The patient completes all daily activities without any functional limitations. ASSESSMENT AND PLAN: 
Alcohol Hepatitis FibroScan demonstrated bridging fibrosis. Will perform this annually to monitor progression or regression. Persistent elevation in liver transaminases are now normal with abstinence from all alcohol use. Encouraged abstinence. Elevation in alkaline phosphatase is now normal. 
Serologic testing for causes of chronic liver disease were negative. The most likely causes for the liver chemistry abnormalities were discussed with the patient and include alcohol. The liver enzymes are improving because he is now abstinent from alcohol. Treatment of other medical problems in patients with chronic liver disease There are no contraindications for the patient to take any medications that are necessary for treatment of other medical issues. Counseling for alcohol in patients with chronic liver disease The patient was counseled regarding alcohol consumption and the effect of alcohol on chronic liver disease. The patient has not consumed alcohol since 6/2018. Encouraged this behavior. Vaccinations The need for vaccination against viral hepatitis A and B will be assessed with serologic and instituted as appropriate. Routine vaccinations against other bacterial and viral agents can be performed as indicated. Annual flu vaccination should be administered if indicated. ALLERGIES No Known Allergies MEDICATIONS Current Outpatient Medications Medication Sig  
  cholecalciferol, vitamin D3, (VITAMIN D3 PO) Take 2,000 Units by mouth daily.  buPROPion XL (WELLBUTRIN XL) 150 mg tablet Take 1 Tab by mouth every morning.  vitamin E (AQUA GEMS) 400 unit capsule Take  by mouth daily.  diclofenac EC (VOLTAREN) 50 mg EC tablet Take  by mouth two (2) times a day.  MILK THISTLE PO Take  by mouth daily.  lisinopril (PRINIVIL, ZESTRIL) 10 mg tablet take 1 tablet by mouth once daily  escitalopram oxalate (LEXAPRO) 20 mg tablet take 1 tablet by mouth once daily  traMADol (ULTRAM) 50 mg tablet take 2 tablets by mouth every 8 hours if needed for pain  pramipexole (MIRAPEX) 0.75 mg tablet Take 1 Tab by mouth nightly.  memantine (NAMENDA) 10 mg tablet Take 1 Tab by mouth daily.  thiamine HCL (B-1) 100 mg tablet Take 1 Tab by mouth daily.  ondansetron (ZOFRAN ODT) 4 mg disintegrating tablet Take 1 Tab by mouth every eight (8) hours as needed for Nausea.  lidocaine (LIDODERM) 5 % apply 1 patch every 24 hours as needed  tamsulosin (FLOMAX) 0.4 mg capsule Take 0.4 mg by mouth daily.  loratadine (CLARITIN) 10 mg tablet Take 10 mg by mouth.  cyanocobalamin, vitamin B-12, (VITAMIN B-12 PO) Take  by mouth.  traZODone (DESYREL) 50 mg tablet take 2 tablets by mouth NIGHTLY No current facility-administered medications for this visit. SYSTEM REVIEW NOT RELATED TO LIVER DISEASE OR REVIEWED ABOVE: 
Constitution systems: Negative for fever, chills, weight gain, weight loss. Eyes: Negative for visual changes. ENT: Negative for sore throat, painful swallowing. Respiratory: Negative for cough, hemoptysis, SOB. Cardiology: Negative for chest pain, palpitations. GI:  Negative for constipation or diarrhea. : Negative for urinary frequency, dysuria, hematuria, nocturia. Skin: Negative for rash. Hematology: Negative for easy bruising, blood clots. Musculo-skelatal: Negative for back pain, muscle pain, weakness. Neurologic: Negative for headaches, dizziness, vertigo, memory problems not related to HE. Psychology: Negative for anxiety, depression. FAMILY HISTORY: 
The father  of cancer. The mother  of CHF. There is no family history of liver disease. SOCIAL HISTORY: 
The patient is . The patient has 3 children and 3 grandchildren. The patient stopped using tobacco products in 1970s The patient has previously consumed alcohol in excess. The patient has been abstinent from alcohol since 2018. The patient used to work as a professor in special education. PHYSICAL EXAMINATION: 
Visit Vitals /71 (BP 1 Location: Left arm, BP Patient Position: Sitting) Pulse 66 Temp 98.6 °F (37 °C) (Tympanic) Wt 213 lb (96.6 kg) SpO2 94% BMI 30.56 kg/m² General: No acute distress. Eyes: Sclera anicteric. ENT: No oral lesions. Thyroid normal. 
Nodes: No adenopathy. Skin: No spider angiomata. No jaundice. No palmar erythema. Respiratory: Lungs clear to auscultation. Cardiovascular: Regular heart rate. No murmurs. No JVD. Abdomen: Soft non-tender. Liver size normal to percussion/palpation. Spleen not palpable. No obvious ascites. Extremities: No edema. No muscle wasting. No gross arthritic changes. Neurologic: Alert and oriented. Cranial nerves grossly intact. No asterixis. LABORATORY STUDIES: 
Liver [place] of [address] [place] [address] & Units 2019 WBC 3.4 - 10.8 x10E3/uL 8.3 ANC 1.4 - 7.0 x10E3/uL HGB 13.0 - 17.7 g/dL 14.5  - 379 x10E3/uL 171 INR 0.8 - 1.2 AST 0 - 40 IU/L 24 ALT 0 - 44 IU/L 29 Alk Phos 39 - 117 IU/L 99 Bili, Total 0.0 - 1.2 mg/dL 0.4 Bili, Direct 0.00 - 0.40 mg/dL Albumin 3.5 - 4.8 g/dL 4.4 BUN 8 - 27 mg/dL 16  
Creat 0.76 - 1.27 mg/dL 1.12 Na 134 - 144 mmol/L 144  
K 3.5 - 5.2 mmol/L 4.6 Cl 96 - 106 mmol/L 107 (H)  
CO2 20 - 29 mmol/L 22 Glucose 65 - 99 mg/dL 85  
 Magnesium 1.6 - 2.6 mg/dL Ammonia 27 - 102 ug/dL Cancer Screening Latest Ref Rng & Units 1/17/2019 AFP, Serum 0.0 - 8.0 ng/mL 3.6 AFP-L3% 0.0 - 9.9 % Comment SEROLOGIES: 
Serologies Latest Ref Rng & Units 8/21/2018 7/20/2018 Ferritin 30 - 400 ng/mL 384 Iron % Saturation 15 - 55 % 26 SRIDHAR Ab, Direct Negative  Positive (A) ASMCA 0 - 19 Units 6 Alpha-1 antitrypsin level 90 - 200 mg/dL 188 Serologies Latest Ref Rng & Units 6/27/2017 12/5/2013 Hep B Surface Ag Negative Negative Negative Hep C Ab 0.0 - 0.9 s/co ratio  <0.1 LIVER HISTOLOGY: 
9/2018. FibroScan performed at [address] EkPa was 10.1. Suggested fibrosis level is F3. CAP score of 257, suggestive of mild-moderate steatosis. ENDOSCOPIC PROCEDURES: 
Not available or performed RADIOLOGY: 
7/2018. CT scan abdomen without IV contrast. Normal appearing liver. No liver mass lesions. Normal spleen. No ascites. OTHER TESTING: 
Not available or performed All of the issues listed in the Assessment and Plan were discussed with the patient. All questions were answered. The patient expressed a clear understanding of the above. [facility] 87 in 6 months with FibroScan. Liz Zavaleta NP Liver [place] of [address], [place] [address], [address] [address] [place], [address] [place] [address] Maggy Ph: [phone] Fax: [phone]

## 2019-01-17 NOTE — PROGRESS NOTES
1. Have you been to the ER, urgent care clinic since your last visit? Hospitalized since your last visit? No 
 
2. Have you seen or consulted any other health care providers outside of the [address] [place] [address] since your last visit? Include any pap smears or colon screening. No  
Chief Complaint Patient presents with  Follow-up Visit Vitals /71 (BP 1 Location: Left arm, BP Patient Position: Sitting) Pulse 66 Temp 98.6 °F (37 °C) (Tympanic) Wt 213 lb (96.6 kg) SpO2 94% BMI 30.56 kg/m² PHQ over the last two weeks 1/17/2019 Little interest or pleasure in doing things Several days Feeling down, depressed, irritable, or hopeless Several days Total Score PHQ 2 2 Learning Assessment 1/17/2019 PRIMARY LEARNER Patient HIGHEST LEVEL OF EDUCATION - PRIMARY LEARNER  -  
BARRIERS PRIMARY LEARNER NONE  
CO-LEARNER CAREGIVER No  
PRIMARY LANGUAGE ENGLISH  
LEARNER PREFERENCE PRIMARY LISTENING  
ANSWERED BY patient RELATIONSHIP SELF Abuse Screening Questionnaire 1/17/2019 Do you ever feel afraid of your partner? Gaston Dominique Are you in a relationship with someone who physically or mentally threatens you? Gaston Dominique Is it safe for you to go home? [place] Hunt Fall Risk Assessment, last 12 mths 1/17/2019 Able to walk? Yes Fall in past 12 months? Yes Fall with injury? Yes  
Number of falls in past 12 months 1 Fall Risk Score 2

## 2019-01-18 LAB
AFP L3 MFR SERPL: NORMAL % (ref 0–9.9)
AFP SERPL-MCNC: 3.6 NG/ML (ref 0–8)
ALBUMIN SERPL-MCNC: 4.4 G/DL (ref 3.5–4.8)
ALBUMIN/GLOB SERPL: 2.2 {RATIO} (ref 1.2–2.2)
ALP SERPL-CCNC: 99 IU/L (ref 39–117)
ALT SERPL-CCNC: 29 IU/L (ref 0–44)
AST SERPL-CCNC: 24 IU/L (ref 0–40)
BILIRUB SERPL-MCNC: 0.4 MG/DL (ref 0–1.2)
BUN SERPL-MCNC: 16 MG/DL (ref 8–27)
BUN/CREAT SERPL: 14 (ref 10–24)
CALCIUM SERPL-MCNC: 9.3 MG/DL (ref 8.6–10.2)
CHLORIDE SERPL-SCNC: 107 MMOL/L (ref 96–106)
CO2 SERPL-SCNC: 22 MMOL/L (ref 20–29)
CREAT SERPL-MCNC: 1.12 MG/DL (ref 0.76–1.27)
ERYTHROCYTE [DISTWIDTH] IN BLOOD BY AUTOMATED COUNT: 14.9 % (ref 12.3–15.4)
GLOBULIN SER CALC-MCNC: 2 G/DL (ref 1.5–4.5)
GLUCOSE SERPL-MCNC: 85 MG/DL (ref 65–99)
HCT VFR BLD AUTO: 44.7 % (ref 37.5–51)
HGB BLD-MCNC: 14.5 G/DL (ref 13–17.7)
MCH RBC QN AUTO: 30 PG (ref 26.6–33)
MCHC RBC AUTO-ENTMCNC: 32.4 G/DL (ref 31.5–35.7)
MCV RBC AUTO: 92 FL (ref 79–97)
PLATELET # BLD AUTO: 171 X10E3/UL (ref 150–379)
POTASSIUM SERPL-SCNC: 4.6 MMOL/L (ref 3.5–5.2)
PROT SERPL-MCNC: 6.4 G/DL (ref 6–8.5)
RBC # BLD AUTO: 4.84 X10E6/UL (ref 4.14–5.8)
SODIUM SERPL-SCNC: 144 MMOL/L (ref 134–144)
WBC # BLD AUTO: 8.3 X10E3/UL (ref 3.4–10.8)

## 2019-01-21 RX ORDER — TRAZODONE HYDROCHLORIDE 50 MG/1
TABLET ORAL
Qty: 180 TAB | Refills: 3 | Status: SHIPPED | OUTPATIENT
Start: 2019-01-21 | End: 2019-11-06 | Stop reason: ALTCHOICE

## 2019-01-21 NOTE — TELEPHONE ENCOUNTER
Medication refill request:    Last Office Visit: February 19, 2019  Next Office Visit:    Future Appointments   Date Time Provider Marielos Serrano   2/19/2019 11:00 AM MD JOSE Thompson   3/12/2019 11:40 AM DO SONU Hernandez   7/29/2019  9:40 AM Renee Baxter April G, NP [address] [facility]       Pharmacy verified. Yes    Patient requesting 90 day supply.

## 2019-02-18 DIAGNOSIS — K76.82 HEPATIC ENCEPHALOPATHY: ICD-10-CM

## 2019-02-18 NOTE — TELEPHONE ENCOUNTER
Medication refill request:    Last Office Visit: December 17, 2018  Next Office Visit:    Future Appointments   Date Time Provider Marielos Serrano   2/19/2019 11:00 AM MD JOSE Helm   3/12/2019 11:40 AM DO ARNEL Garay/ Maureen Prieto   7/29/2019  9:40 AM Jaylyn Ramsey, April G, NP [address] [facility]       Pharmacy verified. Yes    Patient requesting 30 day supply.

## 2019-02-19 ENCOUNTER — OFFICE VISIT (OUTPATIENT)
Dept: INTERNAL MEDICINE CLINIC | Age: 74
End: 2019-02-19

## 2019-02-19 VITALS
RESPIRATION RATE: 15 BRPM | SYSTOLIC BLOOD PRESSURE: 113 MMHG | HEIGHT: 70 IN | OXYGEN SATURATION: 95 % | TEMPERATURE: 97.7 F | HEART RATE: 64 BPM | DIASTOLIC BLOOD PRESSURE: 74 MMHG | BODY MASS INDEX: 31.5 KG/M2 | WEIGHT: 220 LBS

## 2019-02-19 DIAGNOSIS — E53.8 B12 DEFICIENCY: ICD-10-CM

## 2019-02-19 DIAGNOSIS — Q63.1 HORSESHOE KIDNEY: ICD-10-CM

## 2019-02-19 DIAGNOSIS — G89.29 CHRONIC BILATERAL LOW BACK PAIN WITHOUT SCIATICA: ICD-10-CM

## 2019-02-19 DIAGNOSIS — M19.90 ARTHRITIS: ICD-10-CM

## 2019-02-19 DIAGNOSIS — G25.81 RESTLESS LEG SYNDROME: ICD-10-CM

## 2019-02-19 DIAGNOSIS — R51.9 NONINTRACTABLE EPISODIC HEADACHE, UNSPECIFIED HEADACHE TYPE: ICD-10-CM

## 2019-02-19 DIAGNOSIS — M54.50 CHRONIC BILATERAL LOW BACK PAIN WITHOUT SCIATICA: ICD-10-CM

## 2019-02-19 DIAGNOSIS — E78.5 HYPERLIPIDEMIA, UNSPECIFIED HYPERLIPIDEMIA TYPE: ICD-10-CM

## 2019-02-19 DIAGNOSIS — R73.9 HYPERGLYCEMIA: ICD-10-CM

## 2019-02-19 DIAGNOSIS — E55.9 VITAMIN D DEFICIENCY: ICD-10-CM

## 2019-02-19 DIAGNOSIS — Z98.890 S/P LUMBAR LAMINECTOMY: Primary | ICD-10-CM

## 2019-02-19 DIAGNOSIS — K76.82 HEPATIC ENCEPHALOPATHY: ICD-10-CM

## 2019-02-19 DIAGNOSIS — I10 ESSENTIAL HYPERTENSION: ICD-10-CM

## 2019-02-19 DIAGNOSIS — F33.9 RECURRENT DEPRESSION (HCC): ICD-10-CM

## 2019-02-19 DIAGNOSIS — C67.9 RECURRENT BLADDER TRANSITIONAL CELL CARCINOMA (HCC): ICD-10-CM

## 2019-02-19 RX ORDER — LANOLIN ALCOHOL/MO/W.PET/CERES
100 CREAM (GRAM) TOPICAL DAILY
Qty: 30 TAB | Refills: 5 | Status: SHIPPED | OUTPATIENT
Start: 2019-02-19 | End: 2019-08-23 | Stop reason: SDUPTHER

## 2019-02-19 RX ORDER — LANOLIN ALCOHOL/MO/W.PET/CERES
100 CREAM (GRAM) TOPICAL DAILY
Qty: 30 TAB | Refills: 5 | Status: SHIPPED | OUTPATIENT
Start: 2019-02-19 | End: 2019-02-19 | Stop reason: SDUPTHER

## 2019-02-19 NOTE — PROGRESS NOTES
HPI: 
Presents for f/u back pain, mood Pt has seen PM&R - Dr. Espinal Sales Little to no benefit from radiofrequency ablation to L spine Getting PT - little benefit from this either. Frequent HAs Awakening with HAs Generalized achiness Takes aleve for HAs - helps some No visual change No neuro sx. 
 
+hx snoring, but not recently Wife reports poor sleep in the early night time, then difficulty getting up in the AM. Tramadol 2 po bid + lidoderm patches Past medical, Social, and Family history reviewed Prior to Admission medications Medication Sig Start Date End Date Taking? Authorizing Provider  
traZODone (DESYREL) 50 mg tablet take 2 tablets by mouth NIGHTLY 1/21/19  Yes Christelle Gomez MD  
cholecalciferol, vitamin D3, (VITAMIN D3 PO) Take 2,000 Units by mouth daily. Yes Provider, Historical  
buPROPion XL (WELLBUTRIN XL) 150 mg tablet Take 1 Tab by mouth every morning. 12/17/18  Yes Christelle Gomez MD  
vitamin E (AQUA GEMS) 400 unit capsule Take  by mouth daily. Yes Provider, Historical  
diclofenac EC (VOLTAREN) 50 mg EC tablet Take 75 mg by mouth two (2) times a day. Yes Provider, Historical  
MILK THISTLE PO Take  by mouth daily. Yes Provider, Historical  
lisinopril (PRINIVIL, ZESTRIL) 10 mg tablet take 1 tablet by mouth once daily 11/19/18  Yes Christelle Gomez MD  
escitalopram oxalate (LEXAPRO) 20 mg tablet take 1 tablet by mouth once daily 11/14/18  Yes Christelle Gomez MD  
traMADol Sharlot Copper) 50 mg tablet take 2 tablets by mouth every 8 hours if needed for pain 11/1/18  Yes Christelle Gomez MD  
pramipexole (MIRAPEX) 0.75 mg tablet Take 1 Tab by mouth nightly. 11/1/18  Yes Christelle Gomez MD  
memantine [facility]) 10 mg tablet Take 1 Tab by mouth daily. 9/12/18  Yes Romaine Daugherty,   
thiamine HCL (B-1) 100 mg tablet Take 1 Tab by mouth daily.  8/17/18  Yes Christelle Gomez MD  
 ondansetron (ZOFRAN ODT) 4 mg disintegrating tablet Take 1 Tab by mouth every eight (8) hours as needed for Nausea. 8/17/18  Yes Chano Lam MD  
lidocaine (LIDODERM) 5 % apply 1 patch every 24 hours as needed 8/17/18  Yes Chano Lam MD  
tamsulosin [facility]) 0.4 mg capsule Take 0.4 mg by mouth daily. Yes Provider, Historical  
loratadine (CLARITIN) 10 mg tablet Take 10 mg by mouth. Yes Provider, Historical  
cyanocobalamin, vitamin B-12, (VITAMIN B-12 PO) Take  by mouth. Yes Provider, Historical  
  
 
 
ROS Complete ROS reviewed and negative or stable except as noted in HPI. Physical Exam  
Constitutional: He is oriented to person, place, and time. No distress. HENT:  
Head: Normocephalic and atraumatic. Mouth/Throat: Oropharynx is clear and moist. No oropharyngeal exudate. Eyes: EOM are normal. Pupils are equal, round, and reactive to light. No scleral icterus. Neck: Normal range of motion. Neck supple. No JVD present. No thyromegaly present. Cardiovascular: Normal rate, regular rhythm and normal heart sounds. Exam reveals no gallop and no friction rub. No murmur heard. Pulmonary/Chest: Effort normal and breath sounds normal. No respiratory distress. He has no wheezes. He has no rales. Abdominal: Soft. Bowel sounds are normal. He exhibits no distension and no mass. There is no tenderness. There is no rebound and no guarding. Musculoskeletal: Normal range of motion. He exhibits no edema. Slow deliberate gait, use of cane required for ambulation. Lymphadenopathy:  
  He has no cervical adenopathy. Neurological: He is alert and oriented to person, place, and time. He exhibits normal muscle tone. Coordination normal.  
+interactive. Skin: Skin is warm. No rash noted. Psychiatric: He has a normal mood and affect. Nursing note and vitals reviewed. Prior labs reviewed. Assessment/Plan: 
? LUCHO vs other sleep disorder ICD-10-CM ICD-9-CM 1. S/P lumbar laminectomy Z98.890 V45.89 2. Recurrent depression (HCC) F33.9 296.30 3. 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 Hepatic encephalopathy (HCC) K72.90 572.2 thiamine HCL (B-1) 100 mg tablet 5. Hyperlipidemia, unspecified hyperlipidemia type E78.5 272.4 CK  
   LIPID PANEL  
   METABOLIC PANEL, COMPREHENSIVE 6. Restless leg syndrome G25.81 333.94   
7. Essential hypertension I10 401.9 CBC WITH AUTOMATED DIFF MAGNESIUM T4, FREE  
   TSH 3RD GENERATION 8. Arthritis M19.90 716.90   
9. 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 Chronic bilateral low back pain without sciatica M54.5 724.2 9-DRUG SCREEN + OXY, UR  
 G89.29 338.29   
11. Hyperglycemia R73.9 790.29 HEMOGLOBIN A1C WITH EAG 12. Nonintractable episodic headache, unspecified headache type R51 784.0 SED RATE (ESR) C REACTIVE PROTEIN, QT  
13. B12 deficiency E53.8 266.2 VITAMIN B12  
14. Vitamin D deficiency E55.9 268.9 VITAMIN D, 25 HYDROXY Follow-up Disposition: 
Return in about 3 months (around 5/19/2019), or if symptoms worsen or fail to improve, for headache, back pain. results and schedule of future studies reviewed with patient 
reviewed diet and weight   
cardiovascular risk and specific lipid/LDL goals reviewed 
reviewed medications and side effects in detail Trial of melatonin Consider sleep referral - pt defers today See PM&R as scheduled. Complete PT Check inflammatory markers, other labs >40 minutes time spent with >50% in counseling and coordination of care

## 2019-02-19 NOTE — PROGRESS NOTES
Estefania Wall is a 75 y.o. male Room 17 Chief Complaint Patient presents with  Back Pain  
  follow up  Headache  
  increasing frequency - stress concerns expressed by pt 1. Have you been to the ER, urgent care clinic since your last visit? Hospitalized since your last visit? no 
 
2. Have you seen or consulted any other health care providers outside of the [address] [place] [address] since your last visit? Include any pap smears or colon screening.  -Back surgery follow up. Dr. Luis Daniel Fowler at [address] with injections given. Last one over 1 week ago. Twice weekly PT ends next week

## 2019-02-20 ENCOUNTER — DOCUMENTATION ONLY (OUTPATIENT)
Dept: FAMILY MEDICINE CLINIC | Age: 74
End: 2019-02-20

## 2019-02-20 LAB
25(OH)D3+25(OH)D2 SERPL-MCNC: 37 NG/ML (ref 30–100)
ALBUMIN SERPL-MCNC: 4.7 G/DL (ref 3.5–4.8)
ALBUMIN/GLOB SERPL: 1.9 {RATIO} (ref 1.2–2.2)
ALP SERPL-CCNC: 108 IU/L (ref 39–117)
ALT SERPL-CCNC: 31 IU/L (ref 0–44)
AMPHETAMINES UR QL SCN: NEGATIVE NG/ML
AST SERPL-CCNC: 25 IU/L (ref 0–40)
BARBITURATES UR QL SCN: NEGATIVE NG/ML
BASOPHILS # BLD AUTO: 0 X10E3/UL (ref 0–0.2)
BASOPHILS NFR BLD AUTO: 1 %
BENZODIAZ UR QL SCN: NEGATIVE NG/ML
BILIRUB SERPL-MCNC: 0.3 MG/DL (ref 0–1.2)
BUN SERPL-MCNC: 15 MG/DL (ref 8–27)
BUN/CREAT SERPL: 13 (ref 10–24)
BZE UR QL SCN: NEGATIVE NG/ML
CALCIUM SERPL-MCNC: 10.3 MG/DL (ref 8.6–10.2)
CANNABINOIDS UR QL SCN: NEGATIVE NG/ML
CHLORIDE SERPL-SCNC: 105 MMOL/L (ref 96–106)
CHOLEST SERPL-MCNC: 178 MG/DL (ref 100–199)
CK SERPL-CCNC: 132 U/L (ref 24–204)
CO2 SERPL-SCNC: 24 MMOL/L (ref 20–29)
CREAT SERPL-MCNC: 1.14 MG/DL (ref 0.76–1.27)
CREAT UR-MCNC: 129.8 MG/DL (ref 20–300)
CRP SERPL-MCNC: 0.7 MG/L (ref 0–4.9)
EOSINOPHIL # BLD AUTO: 0.4 X10E3/UL (ref 0–0.4)
EOSINOPHIL NFR BLD AUTO: 6 %
ERYTHROCYTE [DISTWIDTH] IN BLOOD BY AUTOMATED COUNT: 15.3 % (ref 12.3–15.4)
ERYTHROCYTE [SEDIMENTATION RATE] IN BLOOD BY WESTERGREN METHOD: 2 MM/HR (ref 0–30)
EST. AVERAGE GLUCOSE BLD GHB EST-MCNC: 103 MG/DL
GLOBULIN SER CALC-MCNC: 2.5 G/DL (ref 1.5–4.5)
GLUCOSE SERPL-MCNC: 87 MG/DL (ref 65–99)
HBA1C MFR BLD: 5.2 % (ref 4.8–5.6)
HCT VFR BLD AUTO: 47.7 % (ref 37.5–51)
HDLC SERPL-MCNC: 52 MG/DL
HGB BLD-MCNC: 15.6 G/DL (ref 13–17.7)
IMM GRANULOCYTES # BLD AUTO: 0 X10E3/UL (ref 0–0.1)
IMM GRANULOCYTES NFR BLD AUTO: 0 %
LDLC SERPL CALC-MCNC: 111 MG/DL (ref 0–99)
LYMPHOCYTES # BLD AUTO: 1.5 X10E3/UL (ref 0.7–3.1)
LYMPHOCYTES NFR BLD AUTO: 20 %
MAGNESIUM SERPL-MCNC: 2.4 MG/DL (ref 1.6–2.3)
MCH RBC QN AUTO: 30.6 PG (ref 26.6–33)
MCHC RBC AUTO-ENTMCNC: 32.7 G/DL (ref 31.5–35.7)
MCV RBC AUTO: 94 FL (ref 79–97)
METHADONE UR QL SCN: NEGATIVE NG/ML
MONOCYTES # BLD AUTO: 0.5 X10E3/UL (ref 0.1–0.9)
MONOCYTES NFR BLD AUTO: 7 %
NEUTROPHILS # BLD AUTO: 5.1 X10E3/UL (ref 1.4–7)
NEUTROPHILS NFR BLD AUTO: 66 %
OPIATES UR QL SCN: NEGATIVE NG/ML
OXYCODONE+OXYMORPHONE UR QL SCN: NEGATIVE NG/ML
PCP UR QL: NEGATIVE NG/ML
PH UR: 5.6 [PH] (ref 4.5–8.9)
PLATELET # BLD AUTO: 221 X10E3/UL (ref 150–379)
PLEASE NOTE:, 733163: NORMAL
POTASSIUM SERPL-SCNC: 5.3 MMOL/L (ref 3.5–5.2)
PROPOXYPH UR QL SCN: NEGATIVE NG/ML
PROT SERPL-MCNC: 7.2 G/DL (ref 6–8.5)
RBC # BLD AUTO: 5.09 X10E6/UL (ref 4.14–5.8)
SODIUM SERPL-SCNC: 145 MMOL/L (ref 134–144)
T4 FREE SERPL-MCNC: 1.3 NG/DL (ref 0.82–1.77)
TRIGL SERPL-MCNC: 77 MG/DL (ref 0–149)
TSH SERPL DL<=0.005 MIU/L-ACNC: 2.45 UIU/ML (ref 0.45–4.5)
VIT B12 SERPL-MCNC: 1142 PG/ML (ref 232–1245)
VLDLC SERPL CALC-MCNC: 15 MG/DL (ref 5–40)
WBC # BLD AUTO: 7.6 X10E3/UL (ref 3.4–10.8)

## 2019-02-21 NOTE — PROGRESS NOTES
Sodium, potassium and calcium are all mildly elevated suggesting dehydration. Increase hydration. Return in 2 weeks to repeat this to confirm it is corrected. LDL cholesterol has increased a little. Reduce saturated fat in the diet to reduce the LDL cholesterol.  
Other labs are normal. 
Continue current medications

## 2019-03-05 DIAGNOSIS — M19.90 ARTHRITIS: ICD-10-CM

## 2019-03-05 DIAGNOSIS — G89.29 CHRONIC BACK PAIN, UNSPECIFIED BACK LOCATION, UNSPECIFIED BACK PAIN LATERALITY: ICD-10-CM

## 2019-03-05 DIAGNOSIS — M54.9 CHRONIC BACK PAIN, UNSPECIFIED BACK LOCATION, UNSPECIFIED BACK PAIN LATERALITY: ICD-10-CM

## 2019-03-05 RX ORDER — TRAMADOL HYDROCHLORIDE 50 MG/1
100 TABLET ORAL
Qty: 120 TAB | Refills: 3 | Status: SHIPPED | OUTPATIENT
Start: 2019-03-05 | End: 2019-05-30 | Stop reason: SDUPTHER

## 2019-03-05 NOTE — TELEPHONE ENCOUNTER
Medication refill request:    Last Office Visit: 2/19/19  Next Office Visit:    Future Appointments   Date Time Provider Marielos Maggie   3/11/2019  2:30 PM MD JOSE Helm SCHED   3/12/2019 11:40 AM DO ARNEL Garay/ Maureen Prieto   5/21/2019  9:00 AM MD JOSE Helm SCHED   7/24/2019  1:45 PM Stacie Hansen,  [address] verified. Yes    [facility] is requesting on behalf of the pt a refill on his Tramadol HCL 50 mg.

## 2019-03-11 ENCOUNTER — OFFICE VISIT (OUTPATIENT)
Dept: INTERNAL MEDICINE CLINIC | Age: 74
End: 2019-03-11

## 2019-03-11 VITALS
OXYGEN SATURATION: 95 % | DIASTOLIC BLOOD PRESSURE: 65 MMHG | HEIGHT: 70 IN | BODY MASS INDEX: 31.75 KG/M2 | WEIGHT: 221.8 LBS | SYSTOLIC BLOOD PRESSURE: 98 MMHG | TEMPERATURE: 98.5 F | HEART RATE: 75 BPM | RESPIRATION RATE: 17 BRPM

## 2019-03-11 DIAGNOSIS — E87.0 HYPERNATREMIA: ICD-10-CM

## 2019-03-11 DIAGNOSIS — F41.8 SITUATIONAL ANXIETY: ICD-10-CM

## 2019-03-11 DIAGNOSIS — N40.0 BENIGN PROSTATIC HYPERPLASIA WITHOUT LOWER URINARY TRACT SYMPTOMS: ICD-10-CM

## 2019-03-11 DIAGNOSIS — R51.9 NONINTRACTABLE HEADACHE, UNSPECIFIED CHRONICITY PATTERN, UNSPECIFIED HEADACHE TYPE: Primary | ICD-10-CM

## 2019-03-11 DIAGNOSIS — I10 ESSENTIAL HYPERTENSION: ICD-10-CM

## 2019-03-11 DIAGNOSIS — E78.5 HYPERLIPIDEMIA, UNSPECIFIED HYPERLIPIDEMIA TYPE: ICD-10-CM

## 2019-03-11 DIAGNOSIS — Q63.1 HORSESHOE KIDNEY: ICD-10-CM

## 2019-03-11 DIAGNOSIS — G89.29 CHRONIC BILATERAL LOW BACK PAIN WITHOUT SCIATICA: ICD-10-CM

## 2019-03-11 DIAGNOSIS — C67.9 RECURRENT BLADDER TRANSITIONAL CELL CARCINOMA (HCC): ICD-10-CM

## 2019-03-11 DIAGNOSIS — F33.9 RECURRENT DEPRESSION (HCC): ICD-10-CM

## 2019-03-11 DIAGNOSIS — M54.50 CHRONIC BILATERAL LOW BACK PAIN WITHOUT SCIATICA: ICD-10-CM

## 2019-03-11 DIAGNOSIS — Z98.890 S/P LUMBAR LAMINECTOMY: ICD-10-CM

## 2019-03-11 NOTE — PROGRESS NOTES
Exam Room [address] [place] [address] is a 75 y.o. male    Chief Complaint   Patient presents with    Headache     Headaches are not going away per spouse    LOW BACK PAIN     Constant back pain     1. Have you been to the ER, urgent care clinic since your last visit? Hospitalized since your last visit? No    2. Have you seen or consulted any other health care providers outside of the [facility] since your last visit? Include any pap smears or colon screening.  No     Health Maintenance Due   Topic Date Due    GLAUCOMA SCREENING Q2Y  04/01/2018

## 2019-03-11 NOTE — PROGRESS NOTES
HPI:  Presents for f/u HAs, back pain, etc    Pt has urology procedure next week - for BPH  Laser TURP  Also, plan for bladder biopsy, cystoscopy    Had at home insurance based screening  Found that PVD screening was abnl  No claudication symptoms    HAs may not be as often  But, can be prolonged at times. Sees neuro tomorrow  Sees Dr Sudha Olson later this week. Past medical, Social, and Family history reviewed    Prior to Admission medications    Medication Sig Start Date End Date Taking? Authorizing Provider   traMADol (ULTRAM) 50 mg tablet Take 2 Tabs by mouth every eight (8) hours as needed for Pain for up to 30 days. Max Daily Amount: 300 mg. 3/5/19 4/4/19 Yes Taylor Becker MD   thiamine HCL (B-1) 100 mg tablet Take 1 Tab by mouth daily. 2/19/19  Yes Taylor Becker MD   traZODone (DESYREL) 50 mg tablet take 2 tablets by mouth NIGHTLY 1/21/19  Yes Taylor Becker MD   cholecalciferol, vitamin D3, (VITAMIN D3 PO) Take 2,000 Units by mouth daily. Yes Provider, Historical   buPROPion XL (WELLBUTRIN XL) 150 mg tablet Take 1 Tab by mouth every morning. 12/17/18  Yes Taylor Becker MD   vitamin E (AQUA GEMS) 400 unit capsule Take  by mouth daily. Yes Provider, Historical   diclofenac EC (VOLTAREN) 50 mg EC tablet Take 75 mg by mouth two (2) times a day. Yes Provider, Historical   MILK THISTLE PO Take  by mouth daily. Yes Provider, Historical   lisinopril (PRINIVIL, ZESTRIL) 10 mg tablet take 1 tablet by mouth once daily 11/19/18  Yes Taylor Becker MD   escitalopram oxalate (LEXAPRO) 20 mg tablet take 1 tablet by mouth once daily 11/14/18  Yes Taylor Becker MD   pramipexole (MIRAPEX) 0.75 mg tablet Take 1 Tab by mouth nightly. 11/1/18  Yes Taylor Becker MD   ondansetron (ZOFRAN ODT) 4 mg disintegrating tablet Take 1 Tab by mouth every eight (8) hours as needed for Nausea.  8/17/18  Yes Taylor Becker MD   lidocaine (LIDODERM) 5 % apply 1 patch every 24 hours as needed 8/17/18  Yes Nathan Vences MD   tamsulosin [facility]) 0.4 mg capsule Take 0.4 mg by mouth daily. Yes Provider, Historical   loratadine (CLARITIN) 10 mg tablet Take 10 mg by mouth. Yes Provider, Historical   cyanocobalamin, vitamin B-12, (VITAMIN B-12 PO) Take  by mouth. Yes Provider, Historical   memantine (NAMENDA) 10 mg tablet Take 1 Tab by mouth two (2) times a day. 3/12/19   Whitney Fountain DO          ROS  Complete ROS reviewed and negative or stable except as noted in HPI. Physical Exam   Constitutional: He is oriented to person, place, and time. No distress. HENT:   Head: Normocephalic and atraumatic. Mouth/Throat: Oropharynx is clear and moist. No oropharyngeal exudate. Eyes: EOM are normal. Pupils are equal, round, and reactive to light. No scleral icterus. Neck: Normal range of motion. Neck supple. No JVD present. No thyromegaly present. Cardiovascular: Normal rate, regular rhythm and normal heart sounds. Exam reveals no gallop and no friction rub. No murmur heard. Pulmonary/Chest: Effort normal and breath sounds normal. No respiratory distress. He has no wheezes. He has no rales. Abdominal: Soft. Bowel sounds are normal. He exhibits no distension and no mass. There is no tenderness. There is no rebound and no guarding. Musculoskeletal: Normal range of motion. He exhibits no edema. Slow deliberate gait, use of cane required for ambulation. Lymphadenopathy:     He has no cervical adenopathy. Neurological: He is alert and oriented to person, place, and time. He exhibits normal muscle tone. Coordination normal.   +interactive. Skin: Skin is warm. No rash noted. Psychiatric: He has a normal mood and affect. Nursing note and vitals reviewed. Prior labs reviewed. Assessment/Plan:    ICD-10-CM ICD-9-CM    1. Nonintractable headache, unspecified chronicity pattern, unspecified headache type R51 784.0    2.  Hypernatremia Y23.3 762.7 METABOLIC PANEL, COMPREHENSIVE   3. Situational anxiety F41.8 300.09    4. S/P lumbar laminectomy Z98.890 V45.89    5. Recurrent depression (HCC) F33.9 296.30    6. Hyperlipidemia, unspecified hyperlipidemia type E78.5 272.4    7. Essential hypertension I10 401.9    8. 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 Benign prostatic hyperplasia without lower urinary tract symptoms N40.0 600.00    11. Chronic bilateral low back pain without sciatica M54.5 724.2     G89.29 338.29      Follow-up Disposition:  Return in about 2 months (around 5/21/2019) for as scheduled. results and schedule of future studies reviewed with patient  reviewed diet and weight    cardiovascular risk and specific lipid/LDL goals reviewed  reviewed medications and side effects in detail  Offered VASILE - pt deferred for now.  procedure as scheduled. Tylenol for HA - not to exceed 2 g per day  Consider fioricet  CMP today  Repeat lipids with improved diet at f/u  Discussed LUCHO and sleep study again - again deferred by pt.

## 2019-03-12 ENCOUNTER — OFFICE VISIT (OUTPATIENT)
Dept: NEUROLOGY | Age: 74
End: 2019-03-12

## 2019-03-12 VITALS
HEART RATE: 75 BPM | RESPIRATION RATE: 18 BRPM | WEIGHT: 220 LBS | SYSTOLIC BLOOD PRESSURE: 118 MMHG | DIASTOLIC BLOOD PRESSURE: 82 MMHG | BODY MASS INDEX: 31.57 KG/M2 | OXYGEN SATURATION: 98 %

## 2019-03-12 DIAGNOSIS — F32.A DEPRESSION, UNSPECIFIED DEPRESSION TYPE: ICD-10-CM

## 2019-03-12 DIAGNOSIS — F03.90 DEMENTIA WITHOUT BEHAVIORAL DISTURBANCE, UNSPECIFIED DEMENTIA TYPE: Primary | ICD-10-CM

## 2019-03-12 DIAGNOSIS — E72.20 HYPERAMMONEMIA (HCC): ICD-10-CM

## 2019-03-12 LAB
ALBUMIN SERPL-MCNC: 4.1 G/DL (ref 3.5–4.8)
ALBUMIN/GLOB SERPL: 1.6 {RATIO} (ref 1.2–2.2)
ALP SERPL-CCNC: 85 IU/L (ref 39–117)
ALT SERPL-CCNC: 26 IU/L (ref 0–44)
AST SERPL-CCNC: 23 IU/L (ref 0–40)
BILIRUB SERPL-MCNC: 0.4 MG/DL (ref 0–1.2)
BUN SERPL-MCNC: 19 MG/DL (ref 8–27)
BUN/CREAT SERPL: 20 (ref 10–24)
CALCIUM SERPL-MCNC: 9.3 MG/DL (ref 8.6–10.2)
CHLORIDE SERPL-SCNC: 105 MMOL/L (ref 96–106)
CO2 SERPL-SCNC: 22 MMOL/L (ref 20–29)
CREAT SERPL-MCNC: 0.96 MG/DL (ref 0.76–1.27)
GLOBULIN SER CALC-MCNC: 2.5 G/DL (ref 1.5–4.5)
GLUCOSE SERPL-MCNC: 90 MG/DL (ref 65–99)
POTASSIUM SERPL-SCNC: 4.5 MMOL/L (ref 3.5–5.2)
PROT SERPL-MCNC: 6.6 G/DL (ref 6–8.5)
SODIUM SERPL-SCNC: 143 MMOL/L (ref 134–144)

## 2019-03-12 RX ORDER — MEMANTINE HYDROCHLORIDE 10 MG/1
10 TABLET ORAL 2 TIMES DAILY
Qty: 60 TAB | Refills: 5 | Status: SHIPPED | OUTPATIENT
Start: 2019-03-12 | End: 2019-09-17 | Stop reason: SDUPTHER

## 2019-03-12 NOTE — PROGRESS NOTES
Neurology Clinic Follow up Note    Patient ID:  Bel Herrera  [id number]  54 y.o.  1945      Mr. Jania Berg is here for follow up today of  Chief Complaint   Patient presents with    Memory Loss          Last Appointment With Me:  9/12/2018       Interval History:   Family reports some decline in memory since last visit, variable from day to day. This appears more notable in the late afternoon/evenings. Often confused to where he lives. No behavioral concerns. On Namenda and doing well with this. No reported side effects. Ammonia previously elevated. Ability to function:  Driving: Not driving, somewhat depressed by this. Finances: Handled by his wife for years  Cooking: No  Manages own medication: Managed by his wife  Residing: Living with his wife at home    PMHx/ PSHx/ FHx/ SHx:  Reviewed and unchanged previous visit. Past Medical History:   Diagnosis Date    Alcohol abuse, in remission     Arthritis     Back pain     Bladder cancer (HCC)      - in [place], [place]    C. difficile colitis 2010    Cancer involving bladder by direct extension from endometrium ([facility] [address] 10/2016    Dementia     Depression     Horseshoe kidney     HTN (hypertension) 12/5/2013    Hyperlipidemia     Liver disease     Recurrent bladder transitional cell carcinoma (HCC)     Restless leg syndrome     Screening for colorectal cancer 03/14/2018    cologuard - negative    Situational anxiety     ie air travel    Spinal stenosis, lumbar     Thrombocytopenia (HCC)     Transitional cell carcinoma of left ureter ([facility] [address]     Dr. Huey Duane         ROS:  Comprehensive review of systems negative except for as noted above. Objective:       Meds:  Current Outpatient Medications   Medication Sig Dispense Refill    traMADol (ULTRAM) 50 mg tablet Take 2 Tabs by mouth every eight (8) hours as needed for Pain for up to 30 days.  Max Daily Amount: 300 mg. 120 Tab 3    thiamine HCL (B-1) 100 mg tablet Take 1 Tab by mouth daily. 30 Tab 5    traZODone (DESYREL) 50 mg tablet take 2 tablets by mouth NIGHTLY 180 Tab 3    cholecalciferol, vitamin D3, (VITAMIN D3 PO) Take 2,000 Units by mouth daily.  buPROPion XL (WELLBUTRIN XL) 150 mg tablet Take 1 Tab by mouth every morning. 30 Tab 5    vitamin E (AQUA GEMS) 400 unit capsule Take  by mouth daily.  diclofenac EC (VOLTAREN) 50 mg EC tablet Take 75 mg by mouth two (2) times a day.  MILK THISTLE PO Take  by mouth daily.  lisinopril (PRINIVIL, ZESTRIL) 10 mg tablet take 1 tablet by mouth once daily 90 Tab 3    escitalopram oxalate (LEXAPRO) 20 mg tablet take 1 tablet by mouth once daily 90 Tab 3    pramipexole (MIRAPEX) 0.75 mg tablet Take 1 Tab by mouth nightly. 90 Tab 1    memantine (NAMENDA) 10 mg tablet Take 1 Tab by mouth daily. 90 Tab 1    ondansetron (ZOFRAN ODT) 4 mg disintegrating tablet Take 1 Tab by mouth every eight (8) hours as needed for Nausea. 60 Tab 5    lidocaine (LIDODERM) 5 % apply 1 patch every 24 hours as needed 30 Patch 5    tamsulosin (FLOMAX) 0.4 mg capsule Take 0.4 mg by mouth daily.  loratadine (CLARITIN) 10 mg tablet Take 10 mg by mouth.  cyanocobalamin, vitamin B-12, (VITAMIN B-12 PO) Take  by mouth. Exam:  Visit Vitals  /82   Pulse 75   Resp 18   Wt 99.8 kg (220 lb)   SpO2 98%   BMI 31.57 kg/m²     NEUROLOGICAL EXAM:  General: Awake, alert, speech fluent. Disoriented to date. Geiger Emily, [place]", not POTUS. Naming and serial 7's intact. Delayed recall impaired (2/5)  CN: PERRL, EOMI without nystagmus, VFF to confrontation, facial sensation and strength are normal and symmetric, hearing is intact to finger rub bilaterally, palate and tongue movements are intact and symmetric. Motor: Normal tone, bulk and strength bilaterally. Reflexes: 1/4 and symmetric, plantar stimulation is flexor. Coordination: No dysmetria.  Normal rapid alternating movements; finger-to-nose and heel-to- shin testing are within normal limits. Tremor b/l UE action/postural.    Gait: hunched posture, slightly unsteady      LABS  Results for orders placed or performed in visit on 43/38/07   METABOLIC PANEL, COMPREHENSIVE   Result Value Ref Range    Glucose 90 65 - 99 mg/dL    BUN 19 8 - 27 mg/dL    Creatinine 0.96 0.76 - 1.27 mg/dL    BUN/Creatinine ratio 20 10 - 24    Sodium 143 134 - 144 mmol/L    Potassium 4.5 3.5 - 5.2 mmol/L    Chloride 105 96 - 106 mmol/L    CO2 22 20 - 29 mmol/L    Calcium 9.3 8.6 - 10.2 mg/dL    Protein, total 6.6 6.0 - 8.5 g/dL    Albumin 4.1 3.5 - 4.8 g/dL    GLOBULIN, TOTAL 2.5 1.5 - 4.5 g/dL    A-G Ratio 1.6 1.2 - 2.2    Bilirubin, total 0.4 0.0 - 1.2 mg/dL    Alk. phosphatase 85 39 - 117 IU/L    AST (SGOT) 23 0 - 40 IU/L    ALT (SGPT) 26 0 - 44 IU/L       IMAGING:  MRI Results (most recent):  Results from Hospital Encounter encounter on 08/06/18   MRI BRAIN W WO CONT    Narrative EXAM:  MRI BRAIN W WO CONT  INDICATION:  Encephalopathy, confusion, R 41.0, recurrent bladder transitional  cell carcinoma, C 67.9, G 93.40,  TECHNIQUE: Sagittal T1, axial FLAIR, T2, T1 and gradient echo T2-weighted images  of the head were obtained followed by intravenous infusion 17 mL Dotarem repeat  axial and coronal T1-weighted images and axial diffusion weighted images. COMPARISON: None available. FINDINGS:  Generalized ventricular and sulcal prominence consistent with cerebral volume  loss somewhat greater than expected for age. Minimal white matter T2 hyperintensity within the range of normal for age. No abnormal areas of intracranial enhancement. No abnormal diffusion. No evidence of intracranial hemorrhage, infarct, mass or abnormal extra-axial  fluid collections. Flow voids are present in the vertebral, basilar and carotid artery systems. The craniocervical junction is unremarkable. The structures of the cranial base including paranasal sinuses are   unremarkable. Impression IMPRESSION:   1.  Generalized cerebral volume loss somewhat greater than expected for age. 2. No acute intracranial abnormality demonstrated. .             Assessment:     Encounter Diagnoses     ICD-10-CM ICD-9-CM   1. Dementia without behavioral disturbance, unspecified dementia type F03.90 294.20   2. Hyperammonemia (HCC) E72.20 270.6   3. Depression, unspecified depression type F32.9 32      75year old male with a h/o HTN, HPL, bladder CA, depression, EtOH abuse, transaminitis, lumbar spinal stenosis here for f/u of cognitive decline. MOCA 18/30 and c/w moderate dementia with significantly impaired delayed recall, orientation, executive functioning and to a lesser extent naming. MRI Brain completed 8/6/18 and independently reviewed with pt/family today without evidence of vascular dementia, mild to moderate cortical atrophy, no acute process identified. Neuropsychologic assessment completed without evidence of pseudodementia. Presentation is c/w evolving moderate dementia, likely AD. Hyperammonemia noted on recent labs- corrected with lactulose. Cognitive deficits appear to have progressed slightly since last visit- increased disorientation, preserved attention/naming. Finances and medication administration should continue to be monitored to ensure accuracy and prevent errors. Patient currently has appropriate social/caregiver support in place. Will continue titration of Namenda to assist with recall. Plan:   Increase Namenda to 10mg BID  Heart healthy diet and exercise  Regular scheduled cognitive and social engagement. Follow-up Disposition:  Return in about 6 months (around 9/12/2019).     Signed:  Nolan Haddad DO  3/12/2019

## 2019-03-12 NOTE — PATIENT INSTRUCTIONS
[facility]   Statement to Patients  April 1, 2014      In an effort to ensure the large volume of patient prescription refills is processed in the most efficient and expeditious manner, we are asking our patients to assist us by calling your Pharmacy for all prescription refills, this will include also your  Mail Order Pharmacy. The pharmacy will contact our office electronically to continue the refill process. Please do not wait until the last minute to call your pharmacy. We need at least 48 hours (2days) to fill prescriptions. We also encourage you to call your pharmacy before going to  your prescription to make sure it is ready. With regard to controlled substance prescription refill requests (narcotic refills) that need to be picked up at our office, we ask your cooperation by providing us with at least 72 hours (3days) notice that you will need a refill. We will not refill narcotic prescription refill requests after 4:00pm on any weekday, Monday through Thursday, or after 2:00pm on Fridays, or on the weekends. We encourage everyone to explore another way of getting your prescription refill request processed using [employer], our patient web portal through our electronic medical record system. [employer] is an efficient and effective way to communicate your medication request directly to the office and  downloadable as an donte on your smart phone . [employer] also features a review functionality that allows you to view your medication list as well as leave messages for your physician. Are you ready to get connected? If so please review the attatched instructions or speak to any of our staff to get you set up right away! Thank you so much for your cooperation. Should you have any questions please contact our Practice Administrator.     The Physicians and Staff,  [facility]

## 2019-04-17 DIAGNOSIS — M19.90 ARTHRITIS: ICD-10-CM

## 2019-04-17 NOTE — TELEPHONE ENCOUNTER
Medication refill request:    Last Office Visit: 3/11/19  Next Office Visit:    Future Appointments   Date Time Provider Marielos Serrano   5/21/2019  9:00 AM Angelina Denney MD [place] [id number] [facility]   7/24/2019  1:45 PM Marcel Turner.,  [address] verified. Yes    [facility] is requesting on behalf of the pt a prescription for his Lidocaine 5% Patches.

## 2019-04-18 RX ORDER — LIDOCAINE 50 MG/G
PATCH TOPICAL
Qty: 30 PATCH | Refills: 5 | Status: SHIPPED | OUTPATIENT
Start: 2019-04-18 | End: 2019-04-18 | Stop reason: SDUPTHER

## 2019-04-18 RX ORDER — LIDOCAINE 50 MG/G
PATCH TOPICAL
Qty: 30 PATCH | Refills: 5 | Status: SHIPPED | OUTPATIENT
Start: 2019-04-18 | End: 2019-10-28 | Stop reason: SDUPTHER

## 2019-05-21 ENCOUNTER — OFFICE VISIT (OUTPATIENT)
Dept: INTERNAL MEDICINE CLINIC | Age: 74
End: 2019-05-21

## 2019-05-21 VITALS
RESPIRATION RATE: 16 BRPM | WEIGHT: 221 LBS | DIASTOLIC BLOOD PRESSURE: 65 MMHG | SYSTOLIC BLOOD PRESSURE: 99 MMHG | OXYGEN SATURATION: 95 % | BODY MASS INDEX: 31.64 KG/M2 | HEIGHT: 70 IN | TEMPERATURE: 98.1 F | HEART RATE: 66 BPM

## 2019-05-21 DIAGNOSIS — R21 RASH: ICD-10-CM

## 2019-05-21 DIAGNOSIS — G89.29 CHRONIC BACK PAIN, UNSPECIFIED BACK LOCATION, UNSPECIFIED BACK PAIN LATERALITY: Primary | ICD-10-CM

## 2019-05-21 DIAGNOSIS — Z98.890 S/P LUMBAR LAMINECTOMY: ICD-10-CM

## 2019-05-21 DIAGNOSIS — I10 ESSENTIAL HYPERTENSION: ICD-10-CM

## 2019-05-21 DIAGNOSIS — M54.9 CHRONIC BACK PAIN, UNSPECIFIED BACK LOCATION, UNSPECIFIED BACK PAIN LATERALITY: Primary | ICD-10-CM

## 2019-05-21 DIAGNOSIS — N40.0 BENIGN PROSTATIC HYPERPLASIA WITHOUT LOWER URINARY TRACT SYMPTOMS: ICD-10-CM

## 2019-05-21 DIAGNOSIS — E55.9 VITAMIN D DEFICIENCY: ICD-10-CM

## 2019-05-21 DIAGNOSIS — E78.5 HYPERLIPIDEMIA, UNSPECIFIED HYPERLIPIDEMIA TYPE: ICD-10-CM

## 2019-05-21 DIAGNOSIS — G95.19 NEUROGENIC CLAUDICATION (HCC): ICD-10-CM

## 2019-05-21 DIAGNOSIS — R73.9 HYPERGLYCEMIA: ICD-10-CM

## 2019-05-21 RX ORDER — MELOXICAM 15 MG/1
15 TABLET ORAL DAILY
Qty: 90 TAB | Refills: 1 | Status: SHIPPED | OUTPATIENT
Start: 2019-05-21 | End: 2019-11-02 | Stop reason: SDUPTHER

## 2019-05-21 RX ORDER — TRIAMCINOLONE ACETONIDE 1 MG/G
CREAM TOPICAL 2 TIMES DAILY
Qty: 45 G | Refills: 2 | Status: SHIPPED | OUTPATIENT
Start: 2019-05-21 | End: 2021-01-01 | Stop reason: ALTCHOICE

## 2019-05-21 NOTE — PROGRESS NOTES
Rm#13 Was concerned about diclofenac due to liver disease. Stopped taking. Back pain has increased Questions about stem cell therapy [facility]:[phone] Is taking tramadol Chief Complaint Patient presents with  
 Headache  
  f/u  Back Pain f/u  Medication Evaluation  
  questions about diclofenac.  stopped  Rash  
  red raised, itching rash on back of neck. x6 weeks 1. Have you been to the ER, urgent care clinic since your last visit? Hospitalized since your last visit? No 
 
2. Have you seen or consulted any other health care providers outside of the [facility] since your last visit? Include any pap smears or colon screening. No 
Health Maintenance Due Topic Date Due  GLAUCOMA SCREENING Q2Y  04/01/2018 Fall Risk Assessment, last 12 mths 5/21/2019 Able to walk? Yes Fall in past 12 months? No  
Fall with injury? -  
Number of falls in past 12 months - Fall Risk Score -

## 2019-05-21 NOTE — PROGRESS NOTES
HPI: 
Presents for f/u back pain, etc 
 
Accompanied by wife to provide/assist with hx Inquire re: [facility] services - PT/OT, respite Wife has been in touch with an agency Needs an order. Pt stopped diclofenac due to package insert noticed caution re: liver dz Frequent HAs - may last all day Limiting tylenol due to fear of liver damage But, tylenol helps. Rash on the back of neck x 6-8 weeks No known exposures Mild itch No pain, no burning Past medical, Social, and Family history reviewed Prior to Admission medications Medication Sig Start Date End Date Taking? Authorizing Provider  
lidocaine (LIDODERM) 5 % apply 1 patch every 24 hours as needed 4/18/19  Yes Mohini Jalloh MD  
memantine [facility]) 10 mg tablet Take 1 Tab by mouth two (2) times a day. 3/12/19  Yes Vipul Woods,   
thiamine HCL (B-1) 100 mg tablet Take 1 Tab by mouth daily. 2/19/19  Yes Mohini Jalloh MD  
traZODone (DESYREL) 50 mg tablet take 2 tablets by mouth NIGHTLY 1/21/19  Yes Mohini Jalloh MD  
cholecalciferol, vitamin D3, (VITAMIN D3 PO) Take 2,000 Units by mouth daily. Yes Provider, Historical  
buPROPion XL (WELLBUTRIN XL) 150 mg tablet Take 1 Tab by mouth every morning. 12/17/18  Yes Mohini Jalloh MD  
vitamin E (AQUA GEMS) 400 unit capsule Take  by mouth daily. Yes Provider, Historical  
MILK THISTLE PO Take  by mouth daily. Yes Provider, Historical  
lisinopril (PRINIVIL, ZESTRIL) 10 mg tablet take 1 tablet by mouth once daily 11/19/18  Yes Mohini Jalloh MD  
escitalopram oxalate (LEXAPRO) 20 mg tablet take 1 tablet by mouth once daily 11/14/18  Yes Mohini Jalloh MD  
pramipexole (MIRAPEX) 0.75 mg tablet Take 1 Tab by mouth nightly. 11/1/18  Yes Mohini Jalloh MD  
ondansetron (ZOFRAN ODT) 4 mg disintegrating tablet Take 1 Tab by mouth every eight (8) hours as needed for Nausea.  8/17/18  Yes Mohini Jalloh MD  
 tamsulosin (FLOMAX) 0.4 mg capsule Take 0.4 mg by mouth daily. Yes Provider, Historical  
loratadine (CLARITIN) 10 mg tablet Take 10 mg by mouth. Yes Provider, Historical  
cyanocobalamin, vitamin B-12, (VITAMIN B-12 PO) Take  by mouth. Yes Provider, Historical  
traMADol (ULTRAM) 50 mg tablet Take 2 Tabs by mouth every eight (8) hours as needed for Pain for up to 30 days. Max Daily Amount: 300 mg. 3/5/19 4/4/19  Мария Block MD  
diclofenac EC (VOLTAREN) 50 mg EC tablet Take 75 mg by mouth two (2) times a day. Provider, Historical  
  
 
 
ROS Complete ROS reviewed and negative or stable except as noted in HPI. Physical Exam  
Constitutional: He is oriented to person, place, and time. No distress. HENT:  
Head: Normocephalic and atraumatic. Mouth/Throat: Oropharynx is clear and moist. No oropharyngeal exudate. Eyes: Pupils are equal, round, and reactive to light. EOM are normal. No scleral icterus. Neck: Normal range of motion. Neck supple. No JVD present. No thyromegaly present. Cardiovascular: Normal rate, regular rhythm and normal heart sounds. Exam reveals no gallop and no friction rub. No murmur heard. Pulmonary/Chest: Effort normal and breath sounds normal. No respiratory distress. He has no wheezes. He has no rales. Abdominal: Soft. Bowel sounds are normal. He exhibits no distension and no mass. There is no tenderness. There is no rebound and no guarding. Musculoskeletal: Normal range of motion. He exhibits no edema. Slow deliberate gait, use of cane required for ambulation. Lymphadenopathy:  
  He has no cervical adenopathy. Neurological: He is alert and oriented to person, place, and time. He exhibits normal muscle tone. Coordination normal.  
+interactive. Skin: Skin is warm. Rash: left posterior neck - erythematous raised patch, confluent +/- papular. Psychiatric: He has a normal mood and affect. Nursing note and vitals reviewed. Prior labs reviewed. Reviewed prior imaging report Reviewed stem cell literature, reviews, and printed brochure from pain specialist offering stem cell therapy. Assessment/Plan: Unclear etiology of rash - most c/w chronic contact dermatitis, but no known contact exposure. Symptoms too limited and too well demarcated to be c/w shingles. ICD-10-CM ICD-9-CM 1. Chronic back pain, unspecified back location, unspecified back pain laterality M54.9 724.5 REFERRAL TO HOME HEALTH  
 G89.29 338.29 meloxicam (MOBIC) 15 mg tablet 2. S/P lumbar laminectomy Z98.890 V45.89   
3. Essential hypertension I10 401.9 CBC WITH AUTOMATED DIFF  
   METABOLIC PANEL, COMPREHENSIVE 4. Hyperlipidemia, unspecified hyperlipidemia type E78.5 272.4 CK  
   LIPID PANEL 5. Benign prostatic hyperplasia without lower urinary tract symptoms N40.0 600.00   
6. Neurogenic claudication M48.062 724.03 REFERRAL TO HOME HEALTH 7. Hyperglycemia R73.9 790.29 HEMOGLOBIN A1C WITH EAG  
8. Vitamin D deficiency E55.9 268.9 VITAMIN D, 25 HYDROXY 9. Rash R21 782.1 triamcinolone acetonide (KENALOG) 0.1 % topical cream  
 
Follow-up and Dispositions · Return in about 3 months (around 8/21/2019), or if symptoms worsen or fail to improve, for headache, back pain, rash, Medicare Wellness Visit. results and schedule of future studies reviewed with patient 
reviewed diet, exercise and weight 
cardiovascular risk and specific lipid/LDL goals reviewed 
reviewed medications and side effects in detail Agree to [facility] - PT/OT, SN, respite. Reviewed stem cell tx - cost is the limiting factor 
mobic instead of diclofenac to reduce hepatic risk Topical steroid May need derm referral 
Safe to use intermittent tylenol at < 2 g per day dosing - though cautioned re: any regularly scheduled tylenol

## 2019-05-22 LAB
25(OH)D3+25(OH)D2 SERPL-MCNC: 42.9 NG/ML (ref 30–100)
ALBUMIN SERPL-MCNC: 4.4 G/DL (ref 3.5–4.8)
ALBUMIN/GLOB SERPL: 1.8 {RATIO} (ref 1.2–2.2)
ALP SERPL-CCNC: 93 IU/L (ref 39–117)
ALT SERPL-CCNC: 27 IU/L (ref 0–44)
AST SERPL-CCNC: 25 IU/L (ref 0–40)
BASOPHILS # BLD AUTO: 0 X10E3/UL (ref 0–0.2)
BASOPHILS NFR BLD AUTO: 1 %
BILIRUB SERPL-MCNC: 0.8 MG/DL (ref 0–1.2)
BUN SERPL-MCNC: 14 MG/DL (ref 8–27)
BUN/CREAT SERPL: 11 (ref 10–24)
CALCIUM SERPL-MCNC: 9.4 MG/DL (ref 8.6–10.2)
CHLORIDE SERPL-SCNC: 104 MMOL/L (ref 96–106)
CHOLEST SERPL-MCNC: 147 MG/DL (ref 100–199)
CK SERPL-CCNC: 125 U/L (ref 24–204)
CO2 SERPL-SCNC: 23 MMOL/L (ref 20–29)
CREAT SERPL-MCNC: 1.24 MG/DL (ref 0.76–1.27)
EOSINOPHIL # BLD AUTO: 0.6 X10E3/UL (ref 0–0.4)
EOSINOPHIL NFR BLD AUTO: 9 %
ERYTHROCYTE [DISTWIDTH] IN BLOOD BY AUTOMATED COUNT: 13.8 % (ref 12.3–15.4)
EST. AVERAGE GLUCOSE BLD GHB EST-MCNC: 111 MG/DL
GLOBULIN SER CALC-MCNC: 2.4 G/DL (ref 1.5–4.5)
GLUCOSE SERPL-MCNC: 93 MG/DL (ref 65–99)
HBA1C MFR BLD: 5.5 % (ref 4.8–5.6)
HCT VFR BLD AUTO: 46 % (ref 37.5–51)
HDLC SERPL-MCNC: 44 MG/DL
HGB BLD-MCNC: 15.5 G/DL (ref 13–17.7)
IMM GRANULOCYTES # BLD AUTO: 0 X10E3/UL (ref 0–0.1)
IMM GRANULOCYTES NFR BLD AUTO: 0 %
LDLC SERPL CALC-MCNC: 85 MG/DL (ref 0–99)
LYMPHOCYTES # BLD AUTO: 1.5 X10E3/UL (ref 0.7–3.1)
LYMPHOCYTES NFR BLD AUTO: 21 %
MCH RBC QN AUTO: 31.3 PG (ref 26.6–33)
MCHC RBC AUTO-ENTMCNC: 33.7 G/DL (ref 31.5–35.7)
MCV RBC AUTO: 93 FL (ref 79–97)
MONOCYTES # BLD AUTO: 0.6 X10E3/UL (ref 0.1–0.9)
MONOCYTES NFR BLD AUTO: 9 %
NEUTROPHILS # BLD AUTO: 4.2 X10E3/UL (ref 1.4–7)
NEUTROPHILS NFR BLD AUTO: 60 %
PLATELET # BLD AUTO: 180 X10E3/UL (ref 150–450)
POTASSIUM SERPL-SCNC: 4.7 MMOL/L (ref 3.5–5.2)
PROT SERPL-MCNC: 6.8 G/DL (ref 6–8.5)
RBC # BLD AUTO: 4.95 X10E6/UL (ref 4.14–5.8)
SODIUM SERPL-SCNC: 142 MMOL/L (ref 134–144)
TRIGL SERPL-MCNC: 88 MG/DL (ref 0–149)
VLDLC SERPL CALC-MCNC: 18 MG/DL (ref 5–40)
WBC # BLD AUTO: 6.9 X10E3/UL (ref 3.4–10.8)

## 2019-05-27 ENCOUNTER — HOME HEALTH ADMISSION (OUTPATIENT)
Dept: HOME HEALTH SERVICES | Facility: HOME HEALTH | Age: 74
End: 2019-05-27
Payer: MEDICARE

## 2019-05-28 ENCOUNTER — HOME CARE VISIT (OUTPATIENT)
Dept: SCHEDULING | Facility: HOME HEALTH | Age: 74
End: 2019-05-28
Payer: MEDICARE

## 2019-05-28 VITALS
RESPIRATION RATE: 18 BRPM | DIASTOLIC BLOOD PRESSURE: 72 MMHG | TEMPERATURE: 98.1 F | OXYGEN SATURATION: 97 % | HEART RATE: 64 BPM | SYSTOLIC BLOOD PRESSURE: 112 MMHG

## 2019-05-28 PROCEDURE — 400013 HH SOC

## 2019-05-28 PROCEDURE — G0299 HHS/HOSPICE OF RN EA 15 MIN: HCPCS

## 2019-05-29 ENCOUNTER — HOME CARE VISIT (OUTPATIENT)
Dept: SCHEDULING | Facility: HOME HEALTH | Age: 74
End: 2019-05-29
Payer: MEDICARE

## 2019-05-29 PROCEDURE — G0151 HHCP-SERV OF PT,EA 15 MIN: HCPCS

## 2019-05-30 DIAGNOSIS — G89.29 CHRONIC BACK PAIN, UNSPECIFIED BACK LOCATION, UNSPECIFIED BACK PAIN LATERALITY: ICD-10-CM

## 2019-05-30 DIAGNOSIS — M54.9 CHRONIC BACK PAIN, UNSPECIFIED BACK LOCATION, UNSPECIFIED BACK PAIN LATERALITY: ICD-10-CM

## 2019-05-30 DIAGNOSIS — M19.90 ARTHRITIS: ICD-10-CM

## 2019-05-30 RX ORDER — TRAMADOL HYDROCHLORIDE 50 MG/1
100 TABLET ORAL
Qty: 120 TAB | Refills: 3 | Status: SHIPPED | OUTPATIENT
Start: 2019-05-30 | End: 2019-09-12 | Stop reason: SDUPTHER

## 2019-05-30 RX ORDER — TRAMADOL HYDROCHLORIDE 50 MG/1
100 TABLET ORAL
Status: CANCELLED | OUTPATIENT
Start: 2019-05-30

## 2019-05-30 NOTE — TELEPHONE ENCOUNTER
Spoke with Leonardo(Pharmacist) at [facility] to give him verbal Rx order for Tramadol. He expressed understanding.

## 2019-05-30 NOTE — TELEPHONE ENCOUNTER
Medication refill request:    Last Office Visit: 5/21/19  Next Office Visit:    Future Appointments   Date Time Provider Marielos Serrano   7/24/2019  1:45 PM Rossy Blankenship, NP [facility] Comment SCHED   8/22/2019  9:00 AM Tamika Geiger MD [facility] verified. Yes    Pharmacy is requesting on behalf of the pt a refill on his Tramadol 50 mg 50 mg take (2) time's a day.

## 2019-05-31 ENCOUNTER — HOME CARE VISIT (OUTPATIENT)
Dept: SCHEDULING | Facility: HOME HEALTH | Age: 74
End: 2019-05-31
Payer: MEDICARE

## 2019-05-31 VITALS
RESPIRATION RATE: 16 BRPM | OXYGEN SATURATION: 97 % | DIASTOLIC BLOOD PRESSURE: 70 MMHG | TEMPERATURE: 98 F | SYSTOLIC BLOOD PRESSURE: 118 MMHG | HEART RATE: 74 BPM

## 2019-05-31 VITALS
SYSTOLIC BLOOD PRESSURE: 131 MMHG | HEART RATE: 69 BPM | TEMPERATURE: 98.5 F | OXYGEN SATURATION: 98 % | RESPIRATION RATE: 16 BRPM | DIASTOLIC BLOOD PRESSURE: 80 MMHG

## 2019-05-31 PROCEDURE — G0152 HHCP-SERV OF OT,EA 15 MIN: HCPCS

## 2019-06-03 ENCOUNTER — HOME CARE VISIT (OUTPATIENT)
Dept: HOME HEALTH SERVICES | Facility: HOME HEALTH | Age: 74
End: 2019-06-03
Payer: MEDICARE

## 2019-06-04 ENCOUNTER — HOME CARE VISIT (OUTPATIENT)
Dept: HOME HEALTH SERVICES | Facility: HOME HEALTH | Age: 74
End: 2019-06-04
Payer: MEDICARE

## 2019-06-20 DIAGNOSIS — F33.9 RECURRENT DEPRESSION (HCC): ICD-10-CM

## 2019-06-20 RX ORDER — BUPROPION HYDROCHLORIDE 150 MG/1
150 TABLET ORAL
Qty: 30 TAB | Refills: 5 | Status: SHIPPED | OUTPATIENT
Start: 2019-06-20 | End: 2019-07-18 | Stop reason: SDUPTHER

## 2019-06-26 ENCOUNTER — TELEPHONE (OUTPATIENT)
Dept: INTERNAL MEDICINE CLINIC | Age: 74
End: 2019-06-26

## 2019-06-26 DIAGNOSIS — M54.9 CHRONIC BACK PAIN, UNSPECIFIED BACK LOCATION, UNSPECIFIED BACK PAIN LATERALITY: Primary | ICD-10-CM

## 2019-06-26 DIAGNOSIS — G89.29 CHRONIC BACK PAIN, UNSPECIFIED BACK LOCATION, UNSPECIFIED BACK PAIN LATERALITY: Primary | ICD-10-CM

## 2019-06-26 DIAGNOSIS — G44.229 CHRONIC TENSION-TYPE HEADACHE, NOT INTRACTABLE: ICD-10-CM

## 2019-06-26 NOTE — TELEPHONE ENCOUNTER
Received faxed request from [facility] requesting order for aquatic PT to address C-spine pain and headaches.      Please fax to [phone] attn: Reilly Nguyễn    (letter placed in nurse outbox)    Toro Estrada MD

## 2019-06-28 DIAGNOSIS — G25.81 RESTLESS LEG SYNDROME: ICD-10-CM

## 2019-06-28 RX ORDER — PRAMIPEXOLE DIHYDROCHLORIDE 0.75 MG/1
0.75 TABLET ORAL
Qty: 90 TAB | Refills: 1 | Status: SHIPPED | OUTPATIENT
Start: 2019-06-28 | End: 2019-10-07 | Stop reason: SDUPTHER

## 2019-07-18 DIAGNOSIS — F33.9 RECURRENT DEPRESSION (HCC): ICD-10-CM

## 2019-07-18 RX ORDER — BUPROPION HYDROCHLORIDE 150 MG/1
TABLET ORAL
Qty: 90 TAB | Refills: 1 | Status: SHIPPED | OUTPATIENT
Start: 2019-07-18 | End: 2020-01-10

## 2019-07-24 ENCOUNTER — OFFICE VISIT (OUTPATIENT)
Dept: HEMATOLOGY | Age: 74
End: 2019-07-24

## 2019-07-24 VITALS
HEART RATE: 81 BPM | BODY MASS INDEX: 32.28 KG/M2 | DIASTOLIC BLOOD PRESSURE: 78 MMHG | TEMPERATURE: 99 F | WEIGHT: 225 LBS | OXYGEN SATURATION: 93 % | SYSTOLIC BLOOD PRESSURE: 134 MMHG

## 2019-07-24 DIAGNOSIS — R74.8 ABNORMAL LIVER ENZYMES: Primary | ICD-10-CM

## 2019-07-24 NOTE — PROGRESS NOTES
[facility], Jeny ESCUDERO, MD Enoc Laird, PRIMITIVO Klein, ACNP-BC     Madison HARRIS Sacha, PCNP-BC   Ismael Mendoza FNP-ARNEL Aguilar, [facility]       [facility]    at [address], [address] [place] [address], [facility] 22.    [phone]    FAX: [address] [place] [address], [address]    [phone]    FAX: [phone]     Patient Care Team:  Melissa Whitmore MD as PCP - General (Internal Medicine)  Rima Aaron MD (Urology)    Patient Active Problem List   Diagnosis Code    Arthritis M19.90    Situational anxiety F41.8    Restless leg syndrome G25.81    Hyperlipidemia E78.5    HTN (hypertension) I10    Recurrent bladder transitional cell carcinoma ([facility] C67.9    Horseshoe kidney Q63.1    Dislocation of right acromioclavicular joint S43.101A    Recurrent depression ([address] F33.9    S/P lumbar laminectomy Z98.890    Alcohol use disorder, moderate, in early remission, in controlled environment ([address] F10.21    Abnormal liver enzymes R74.8    Dementia F03.90     [place] [facility] returns to the [place] [address] of [facility] regarding elevated liver enzymes secondary to excessive alcohol use. The active problem list, all pertinent past medical history, medications, radiologic findings and laboratory findings related to the liver disorder were reviewed with the patient. The patient is a 75 y.o.  male who was first noted to have abnormalities in liver transaminases in 2/2017. Serologic evaluation for markers of chronic liver disease were negative for HCV and HBV. SRIDHAR was positive. CT scan of the liver was performed in 7/2017.  The results of the imaging demonstrated a normal appearing liver. An assessment of liver fibrosis with elastography was performed and suggested bridging fibrosis with moderate steatosis. This will be repeated today. Patient has remained abstinent from all alcohol use since June 2018. The patient notes some fatigue. This has not worsened since last office visit. Today, patient denies abdominal pain, change in bowel habits, dark urine, myalgias, arthralgias, pruritus and problems concentrating. The patient completes all daily activities without any functional limitations. ASSESSMENT AND PLAN:  Alcoholic hepatitis  FibroScan demonstrated no fibrosis today. Can perform this annually to monitor progression or regression, but if he stays away from alcohol, he likely needs no further follow up. Persistent elevation in liver transaminases are now normal with abstinence from all alcohol use. Encouraged abstinence. Elevation in alkaline phosphatase is now normal.  Serologic testing for causes of chronic liver disease was negative. Treatment of other medical problems in patients with chronic liver disease  There are no contraindications for the patient to take any medications necessary for treatment of other medical issues. Counseling for alcohol in patients with chronic liver disease  The patient was counseled regarding alcohol consumption and the effect of alcohol on chronic liver disease. The patient has not consumed alcohol since 6/2018. Encouraged this behavior. Vaccinations   The need for vaccination against viral hepatitis A and B will be assessed with serologic and instituted as appropriate. This was ordered today. Routine vaccinations against other bacterial and viral agents can be performed as indicated. Annual flu vaccination should be administered if indicated.     ALLERGIES  No Known Allergies    MEDICATIONS  Current Outpatient Medications   Medication Sig    buPROPion XL (WELLBUTRIN XL) 150 mg tablet TAKE 1 TABLET BY MOUTH EVERY MORNING    pramipexole (MIRAPEX) 0.75 mg tablet Take 1 Tab by mouth nightly.  traMADol (ULTRAM) 50 mg tablet Take 100 mg by mouth every eight (8) hours as needed for Pain.  melatonin tab tablet Take 5 mg by mouth nightly.  multivitamin (ONE DAILY ENERGY) tablet Take 1 Tab by mouth daily.  meloxicam (MOBIC) 15 mg tablet Take 1 Tab by mouth daily.  triamcinolone acetonide (KENALOG) 0.1 % topical cream Apply  to affected area two (2) times a day. use thin layer x 3-5 days at time    lidocaine (LIDODERM) 5 % apply 1 patch every 24 hours as needed    memantine (NAMENDA) 10 mg tablet Take 1 Tab by mouth two (2) times a day.  thiamine HCL (B-1) 100 mg tablet Take 1 Tab by mouth daily.  traZODone (DESYREL) 50 mg tablet take 2 tablets by mouth NIGHTLY    cholecalciferol, vitamin D3, (VITAMIN D3 PO) Take 2,000 Units by mouth daily.  vitamin E (AQUA GEMS) 400 unit capsule Take 400 Units by mouth daily.  MILK THISTLE PO Take 1 Tab by mouth daily.  lisinopril (PRINIVIL, ZESTRIL) 10 mg tablet take 1 tablet by mouth once daily    escitalopram oxalate (LEXAPRO) 20 mg tablet take 1 tablet by mouth once daily    ondansetron (ZOFRAN ODT) 4 mg disintegrating tablet Take 1 Tab by mouth every eight (8) hours as needed for Nausea.  tamsulosin (FLOMAX) 0.4 mg capsule Take 0.4 mg by mouth daily.  loratadine (CLARITIN) 10 mg tablet Take 10 mg by mouth daily.  cyanocobalamin, vitamin B-12, (VITAMIN B-12 PO) Take 1 Tab by mouth daily.  traMADol (ULTRAM) 50 mg tablet Take 2 Tabs by mouth every eight (8) hours as needed for Pain for up to 30 days. Max Daily Amount: 300 mg. No current facility-administered medications for this visit. SYSTEM REVIEW NOT RELATED TO LIVER DISEASE OR REVIEWED ABOVE:  Constitution systems: Negative for fever, chills, weight gain, weight loss. Eyes: Negative for visual changes. ENT: Negative for sore throat, painful swallowing. Respiratory: Negative for cough, hemoptysis, SOB. Cardiology: Negative for chest pain, palpitations. GI:  Negative for constipation or diarrhea. : Negative for urinary frequency, dysuria, hematuria, nocturia. Skin: Negative for rash. Hematology: Negative for easy bruising, blood clots. Musculo-skeletal: Negative for back pain, muscle pain, weakness. Neurologic: Negative for headaches, dizziness, vertigo, memory problems not related to HE. Psychology: Negative for anxiety, depression. FAMILY HISTORY:  The father  of cancer. The mother  of CHF. There is no family history of liver disease. SOCIAL HISTORY:  The patient is . The patient has 3 children and 3 grandchildren. The patient stopped using tobacco products in 1970s   The patient has previously consumed alcohol in excess. The patient has been abstinent from alcohol since 2018. The patient used to work as a professor in special education. PHYSICAL EXAMINATION:  Visit Vitals  /78 (BP 1 Location: Left arm, BP Patient Position: Sitting)   Pulse 81   Temp 99 °F (37.2 °C) (Tympanic)   Wt 225 lb (102.1 kg)   SpO2 93%   BMI 32.28 kg/m²     General: No acute distress. Obese. Eyes: Sclera anicteric. ENT: No oral lesions. Nodes: No adenopathy. Skin: No spider angiomata. No jaundice. No palmar erythema. Respiratory: Lungs clear to auscultation. Cardiovascular: Regular heart rate. No murmurs. No JVD. Abdomen: Soft non-tender. Liver size normal to percussion/palpation. Spleen not palpable. No obvious ascites. Extremities: No edema. No muscle wasting. No gross arthritic changes. Neurologic: Alert and oriented. Cranial nerves grossly intact. No asterixis.     LABORATORY STUDIES:  Liver [place] of [address] Units 2019 3/11/2019   WBC 3.4 - 10.8 x10E3/uL 6.9    ANC 1.4 - 7.0 x10E3/uL 4.2    HGB 13.0 - 17.7 g/dL 15.5     - 450 x10E3/uL 180    INR 0.8 - 1.2     AST 0 - 40 IU/L 25 23   ALT 0 - 44 IU/L 27 26   Alk Phos 39 - 117 IU/L 93 85   Bili, Total 0.0 - 1.2 mg/dL 0.8 0.4   Bili, Direct 0.00 - 0.40 mg/dL     Albumin 3.5 - 4.8 g/dL 4.4 4.1   BUN 8 - 27 mg/dL 14 19   Creat 0.76 - 1.27 mg/dL 1.24 0.96   Na 134 - 144 mmol/L 142 143   K 3.5 - 5.2 mmol/L 4.7 4.5   Cl 96 - 106 mmol/L 104 105   CO2 20 - 29 mmol/L 23 22   Glucose 65 - 99 mg/dL 93 90   Magnesium 1.6 - 2.3 mg/dL     Ammonia 27 - 102 ug/dL       Liver [place] [facility] Latest Ref Rng & Units 2/19/2019   WBC 3.4 - 10.8 x10E3/uL 7.6   ANC 1.4 - 7.0 x10E3/uL 5.1   HGB 13.0 - 17.7 g/dL 15.6    - 450 x10E3/uL 221   INR 0.8 - 1.2    AST 0 - 40 IU/L 25   ALT 0 - 44 IU/L 31   Alk Phos 39 - 117 IU/L 108   Bili, Total 0.0 - 1.2 mg/dL 0.3   Bili, Direct 0.00 - 0.40 mg/dL    Albumin 3.5 - 4.8 g/dL 4.7   BUN 8 - 27 mg/dL 15   Creat 0.76 - 1.27 mg/dL 1.14   Na 134 - 144 mmol/L 145 (H)   K 3.5 - 5.2 mmol/L 5.3 (H)   Cl 96 - 106 mmol/L 105   CO2 20 - 29 mmol/L 24   Glucose 65 - 99 mg/dL 87   Magnesium 1.6 - 2.3 mg/dL 2.4 (H)   Ammonia 27 - 102 ug/dL      Cancer Screening Latest Ref Rng & Units 1/17/2019 8/21/2018   AFP, Serum 0.0 - 8.0 ng/mL 3.6 5.5   AFP-L3% 0.0 - 9.9 % Comment Comment     SEROLOGIES:  Serologies Latest Ref Rng & Units 8/21/2018 7/20/2018   Ferritin 30 - 400 ng/mL 384    Iron % Saturation 15 - 55 % 26    SRIDHAR Ab, Direct Negative  Positive (A)   ASMCA 0 - 19 Units 6    Alpha-1 antitrypsin level 90 - 200 mg/dL 188      Serologies Latest Ref Rng & Units 6/27/2017 12/5/2013   Hep B Surface Ag Negative Negative Negative   Hep C Ab 0.0 - 0.9 s/co ratio  <0.1     LIVER HISTOLOGY:  7/2019. FibroScan performed at [address]. EkPa was 4.4. IQR/med 23%. . The results suggested a fibrosis level of F0. The CAP score suggests fatty liver. 9/2018. FibroScan performed at [address]. EkPa was 10.1. Suggested fibrosis level is F3.  CAP score of 257, suggestive of mild-moderate steatosis. ENDOSCOPIC PROCEDURES:  Not available or performed    RADIOLOGY:  7/2018. CT scan abdomen without IV contrast. Normal appearing liver. No liver mass lesions. Normal spleen. No ascites. OTHER TESTING:  Not available or performed    All of the issues listed in the assessment and plan were discussed with the patient. All questions were answered. The patient expressed a clear understanding of the above. Follow-up [facility] [address] PRN. He has over a year of sobriety with normal enzymes and no fibrosis. If he maintains abstinence, he likely will not need any more follow up. Please return to the [facility] [address] at any time should any values change.      LASHONDA Fitzgerald-BC  Liver [place] of [facility] [address] [place], [address] [place] [address]  22.  [phone]

## 2019-07-24 NOTE — PROGRESS NOTES
1. Have you been to the ER, urgent care clinic since your last visit? Hospitalized since your last visit? No    2. Have you seen or consulted any other health care providers outside of the [address] [place] [address] since your last visit? Include any pap smears or colon screening. No   Chief Complaint   Patient presents with    Follow-up     fibroscan      Visit Vitals  /78 (BP 1 Location: Left arm, BP Patient Position: Sitting)   Pulse 81   Temp 99 °F (37.2 °C) (Tympanic)   Wt 225 lb (102.1 kg)   SpO2 93%   BMI 32.28 kg/m²     3 most recent PHQ Screens 7/24/2019   Little interest or pleasure in doing things Not at all   Feeling down, depressed, irritable, or hopeless Not at all   Total Score PHQ 2 0     Learning Assessment 7/24/2019   PRIMARY LEARNER Patient   HIGHEST LEVEL OF EDUCATION - PRIMARY LEARNER  -   BARRIERS PRIMARY LEARNER NONE   CO-LEARNER CAREGIVER No   PRIMARY LANGUAGE ENGLISH   LEARNER PREFERENCE PRIMARY LISTENING   ANSWERED BY patient   RELATIONSHIP SELF     Abuse Screening Questionnaire 7/24/2019   Do you ever feel afraid of your partner? N   Are you in a relationship with someone who physically or mentally threatens you? N   Is it safe for you to go home? Y     Fall Risk Assessment, last 12 mths 7/24/2019   Able to walk? Yes   Fall in past 12 months?  No   Fall with injury? -   Number of falls in past 12 months -   Fall Risk Score -

## 2019-07-25 LAB
AFP L3 MFR SERPL: NORMAL % (ref 0–9.9)
AFP SERPL-MCNC: 3.2 NG/ML (ref 0–8)
ALBUMIN SERPL-MCNC: 4.3 G/DL (ref 3.5–4.8)
ALP SERPL-CCNC: 90 IU/L (ref 39–117)
ALT SERPL-CCNC: 22 IU/L (ref 0–44)
AST SERPL-CCNC: 21 IU/L (ref 0–40)
BILIRUB DIRECT SERPL-MCNC: 0.1 MG/DL (ref 0–0.4)
BILIRUB SERPL-MCNC: 0.4 MG/DL (ref 0–1.2)
BUN SERPL-MCNC: 19 MG/DL (ref 8–27)
BUN/CREAT SERPL: 18 (ref 10–24)
CALCIUM SERPL-MCNC: 9.5 MG/DL (ref 8.6–10.2)
CHLORIDE SERPL-SCNC: 103 MMOL/L (ref 96–106)
CO2 SERPL-SCNC: 24 MMOL/L (ref 20–29)
CREAT SERPL-MCNC: 1.06 MG/DL (ref 0.76–1.27)
ERYTHROCYTE [DISTWIDTH] IN BLOOD BY AUTOMATED COUNT: 14.3 % (ref 12.3–15.4)
GLUCOSE SERPL-MCNC: 72 MG/DL (ref 65–99)
HAV AB SER QL IA: NEGATIVE
HBV SURFACE AB SER QL: NON REACTIVE
HCT VFR BLD AUTO: 46.4 % (ref 37.5–51)
HGB BLD-MCNC: 15.4 G/DL (ref 13–17.7)
INR PPP: 1 (ref 0.8–1.2)
MCH RBC QN AUTO: 31.2 PG (ref 26.6–33)
MCHC RBC AUTO-ENTMCNC: 33.2 G/DL (ref 31.5–35.7)
MCV RBC AUTO: 94 FL (ref 79–97)
PLATELET # BLD AUTO: 205 X10E3/UL (ref 150–450)
POTASSIUM SERPL-SCNC: 4.8 MMOL/L (ref 3.5–5.2)
PROT SERPL-MCNC: 6.5 G/DL (ref 6–8.5)
PROTHROMBIN TIME: 10.5 SEC (ref 9.1–12)
RBC # BLD AUTO: 4.94 X10E6/UL (ref 4.14–5.8)
SODIUM SERPL-SCNC: 141 MMOL/L (ref 134–144)
WBC # BLD AUTO: 8.9 X10E3/UL (ref 3.4–10.8)

## 2019-07-26 LAB — HCV RNA SERPL QL NAA+PROBE: NEGATIVE

## 2019-07-29 ENCOUNTER — HOSPITAL ENCOUNTER (OUTPATIENT)
Dept: ULTRASOUND IMAGING | Age: 74
Discharge: HOME | End: 2019-07-29
Attending: NURSE PRACTITIONER
Payer: MEDICARE

## 2019-07-29 DIAGNOSIS — R74.8 ABNORMAL LIVER ENZYMES: ICD-10-CM

## 2019-07-29 PROCEDURE — 76700 US EXAM ABDOM COMPLETE: CPT

## 2019-08-05 ENCOUNTER — OFFICE VISIT (OUTPATIENT)
Dept: INTERNAL MEDICINE CLINIC | Age: 74
End: 2019-08-05

## 2019-08-05 VITALS
TEMPERATURE: 97.8 F | DIASTOLIC BLOOD PRESSURE: 61 MMHG | OXYGEN SATURATION: 95 % | WEIGHT: 225.38 LBS | SYSTOLIC BLOOD PRESSURE: 96 MMHG | HEART RATE: 62 BPM | HEIGHT: 70 IN | RESPIRATION RATE: 14 BRPM | BODY MASS INDEX: 32.27 KG/M2

## 2019-08-05 DIAGNOSIS — I71.40 ABDOMINAL AORTIC ANEURYSM (AAA) 3.0 CM TO 5.0 CM IN DIAMETER IN FEMALE: ICD-10-CM

## 2019-08-05 DIAGNOSIS — I10 ESSENTIAL HYPERTENSION: Primary | ICD-10-CM

## 2019-08-05 DIAGNOSIS — M54.50 LUMBAR BACK PAIN: ICD-10-CM

## 2019-08-05 DIAGNOSIS — R93.429 KIDNEY FILLING DEFECT: ICD-10-CM

## 2019-08-05 DIAGNOSIS — I95.9 HYPOTENSION, UNSPECIFIED HYPOTENSION TYPE: ICD-10-CM

## 2019-08-05 DIAGNOSIS — R21 RASH AND NONSPECIFIC SKIN ERUPTION: ICD-10-CM

## 2019-08-05 DIAGNOSIS — Z98.890 S/P LUMBAR LAMINECTOMY: ICD-10-CM

## 2019-08-05 DIAGNOSIS — R82.90 ABNORMAL URINE: ICD-10-CM

## 2019-08-05 DIAGNOSIS — G44.229 CHRONIC TENSION-TYPE HEADACHE, NOT INTRACTABLE: ICD-10-CM

## 2019-08-05 LAB
BILIRUB UR QL STRIP: NEGATIVE
GLUCOSE UR-MCNC: NEGATIVE MG/DL
KETONES P FAST UR STRIP-MCNC: NEGATIVE MG/DL
PH UR STRIP: 5 [PH] (ref 4.6–8)
PROT UR QL STRIP: NORMAL
SP GR UR STRIP: 1.02 (ref 1–1.03)
UA UROBILINOGEN AMB POC: NORMAL (ref 0.2–1)
URINALYSIS CLARITY POC: CLEAR
URINALYSIS COLOR POC: YELLOW
URINE BLOOD POC: NORMAL
URINE LEUKOCYTES POC: NORMAL
URINE NITRITES POC: NEGATIVE

## 2019-08-05 RX ORDER — TIZANIDINE 2 MG/1
2 TABLET ORAL
Qty: 30 TAB | Refills: 3 | Status: SHIPPED | OUTPATIENT
Start: 2019-08-05 | End: 2019-08-05 | Stop reason: SDUPTHER

## 2019-08-05 RX ORDER — ACETAMINOPHEN 500 MG
TABLET ORAL
COMMUNITY
End: 2021-01-01 | Stop reason: ALTCHOICE

## 2019-08-05 RX ORDER — IBUPROFEN 200 MG
CAPSULE ORAL
COMMUNITY

## 2019-08-05 NOTE — PROGRESS NOTES
HISTORY OF PRESENT ILLNESS  Norm Luly is a 75 y.o. male presents for acute care   HPI  Constant back pain, tried everything, tut cortisone injection, CBD oil, lidocaine patch, Mobic, two extra strength Tylenol. Back specialist  Dr. Fabiola Maxwell, October 2018 had lumbar laminectomy,  Receives PT for back    Abdominal ultrasound last week showed AAA and horseshoe kidney. Spouse would like further evaluation of this  US  ordered by Dr. Mirella Crain for evaluation of elevated liver enzymes    Frontal headaches daily, switched  form Claritin to Allegra 1.5 weeks ago. Wakes up with headaches, sometimes severe      Dizzy when he has PT    Gets adequate sleep and hydration:  4 non alcoholic beer daily, 1 coke and water daily    Itchy rash scalp and posterior neck. some improvement with topical medication. Uses less often than prescribed       Past Medical History:   Diagnosis Date    AAA (abdominal aortic aneurysm) ([facility] 07/29/2019    3.8 cm on US    Alcohol abuse, in remission     Arthritis     Back pain     Bladder cancer (HCC)      - in [place], [place]    C. difficile colitis 2010    Cancer involving bladder by direct extension from endometrium ([address] 10/2016    Dementia     Depression     Horseshoe kidney     HTN (hypertension) 12/5/2013    Hyperlipidemia     Liver disease     Recurrent bladder transitional cell carcinoma (HCC)     Restless leg syndrome     Screening for colorectal cancer 03/14/2018    cologuard - negative    Situational anxiety     ie air travel    Spinal stenosis, lumbar     Thrombocytopenia (HCC)     Transitional cell carcinoma of left ureter ([facility]     Dr. Trey Kennedy       Current Outpatient Medications   Medication Sig    acetaminophen (TYLENOL EXTRA STRENGTH) 500 mg tablet Take  by mouth BID Mon Wed & Fri.    ibuprofen 200 mg cap Take  by mouth.     OTHER CBD oil(for back pain)    buPROPion XL (WELLBUTRIN XL) 150 mg tablet TAKE 1 TABLET BY MOUTH EVERY MORNING    pramipexole (MIRAPEX) 0.75 mg tablet Take 1 Tab by mouth nightly.  traMADol (ULTRAM) 50 mg tablet Take 100 mg by mouth every eight (8) hours as needed for Pain.  melatonin tab tablet Take 5 mg by mouth nightly.  multivitamin (ONE DAILY ENERGY) tablet Take 1 Tab by mouth daily.  meloxicam (MOBIC) 15 mg tablet Take 1 Tab by mouth daily.  triamcinolone acetonide (KENALOG) 0.1 % topical cream Apply  to affected area two (2) times a day. use thin layer x 3-5 days at time    lidocaine (LIDODERM) 5 % apply 1 patch every 24 hours as needed    memantine (NAMENDA) 10 mg tablet Take 1 Tab by mouth two (2) times a day.  thiamine HCL (B-1) 100 mg tablet Take 1 Tab by mouth daily.  traZODone (DESYREL) 50 mg tablet take 2 tablets by mouth NIGHTLY    cholecalciferol, vitamin D3, (VITAMIN D3 PO) Take 2,000 Units by mouth daily.  vitamin E (AQUA GEMS) 400 unit capsule Take 400 Units by mouth daily.  MILK THISTLE PO Take 1 Tab by mouth two (2) times a day.  lisinopril (PRINIVIL, ZESTRIL) 10 mg tablet take 1 tablet by mouth once daily    escitalopram oxalate (LEXAPRO) 20 mg tablet take 1 tablet by mouth once daily    ondansetron (ZOFRAN ODT) 4 mg disintegrating tablet Take 1 Tab by mouth every eight (8) hours as needed for Nausea.  tamsulosin (FLOMAX) 0.4 mg capsule Take 0.4 mg by mouth daily.  loratadine (CLARITIN) 10 mg tablet Take 10 mg by mouth daily.  cyanocobalamin, vitamin B-12, (VITAMIN B-12 PO) Take 1 Tab by mouth daily.  tiZANidine (ZANAFLEX) 2 mg tablet TAKE 1 TABLET BY MOUTH THREE TIMES DAILY AS NEEDED FOR PAIN    traMADol (ULTRAM) 50 mg tablet Take 2 Tabs by mouth every eight (8) hours as needed for Pain for up to 30 days. Max Daily Amount: 300 mg. No current facility-administered medications for this visit. No Known Allergies   Review of Systems   Unable to perform ROS: Dementia    poor historian.  Spouse provides HPI       Physical Exam   Constitutional: He appears well-developed and well-nourished. No distress. Cardiovascular: Normal rate and regular rhythm. Pulmonary/Chest: Effort normal and breath sounds normal.   Neurological: He is alert. Skin: Skin is warm and dry. Rash (rasised, erythematous , scattered lesion central scalp and occipital area ) noted. He is not diaphoretic. Psychiatric: He is slowed. Cognition and memory are impaired. Results   US ABD COMP (Accession [id number]) (Order [id number])   Allergies      No Known Allergies   Exam Information       Study Result     EXAM: US ABD COMP      INDICATION: Chronic hepatitis C, abnormal liver enzymes.     COMPARISON: 7/6/2017.     TECHNIQUE:   Real-time sonography of the abdomen was performed with multiple static images of  the liver, gallbladder, pancreas, spleen, kidneys and retroperitoneum obtained.     FINDINGS:  LIVER:   The liver is normal in echotexture with no mass or other focal abnormality.      LIVER VASCULATURE:   The portal vein flow is hepatopedal.     GALLBLADDER:  The gallbladder is normal and no stones are identified. There is no wall  thickening or fluid around the gallbladder.      COMMON BILE DUCT:  There is no biliary duct dilatation and the common duct measures 2.9 mm in  diameter.      PANCREAS:  The pancreas is not well-visualized due to bowel gas.     SPLEEN:  The spleen is normal in echotexture and size and measures 11.1 cm in length.     RIGHT KIDNEY:  The right kidney demonstrates normal echogenicity with no mass, stone or  hydronephrosis. The right kidney measures 14.8 cm in length. There is a known  horseshoe kidney.     LEFT KIDNEY:  The left kidney demonstrates normal echogenicity with mild fullness of the left  collecting system. The left kidney measures 14.5 cm in length.      RETROPERITONEUM:  There is a 3.8 cm distal abdominal aortic aneurysm. The IVC is normal.  No retroperitoneal mass is identified.     IMPRESSION  IMPRESSION: Horseshoe kidney. Mild fullness of the left collecting system. 3.8  cm distal abdominal aortic aneurysm. No hepatic mass is identified. ASSESSMENT and PLAN    ICD-10-CM ICD-9-CM    1. Essential hypertension I10 401.9 AMB POC URINALYSIS DIP STICK AUTO W/O MICRO   2. Hypotension, unspecified hypotension type I95.9 458.9    3. Lumbar back pain M54.5 724.2 DISCONTINUED: tiZANidine (ZANAFLEX) 2 mg tablet   4. S/P lumbar laminectomy Z98.890 V45.89    5. Abdominal aortic aneurysm (AAA) 3.0 cm to 5.0 cm in diameter in female (HCC) I71.4 441.4 REFERRAL TO VASCULAR SURGERY   6. Kidney filling defect R93.429 793.5 REFERRAL TO UROLOGY   7. Rash and nonspecific skin eruption R21 782.1    8. Abnormal urine R82.90 791.9 CULTURE, URINE     Follow-up and Dispositions    · Return in about 1 month (around 9/2/2019) for htn, dizziness, headaches, . Hypotension, likely cause of postural dizziness. Spouse advise to hold Lisinopril. Encouraged HBP monitoring       Spouse advise to give Tylenol before  bed and prn to treat morning headaches. Reviewed MRI brain done August 2018. Defer any additional imaging, neuro referral to PCP    Discussed need  for surveillance of AAA, a  new finding     Urology referral for evaluation of possible left hydronephrosis     Advised to continue topical steroid BID for rash, reevaluate on follow up     lab results and schedule of future lab studies reviewed with patient  reviewed diet, exercise and weight control  reviewed medications and side effects in detail  radiology results and schedule of future radiology studies reviewed with patient      Patient voices understanding and acceptance of this advice and will call back if any further questions or concerns. An After Visit Summary was printed and given to the patient.

## 2019-08-05 NOTE — PROGRESS NOTES
RM 7    Patient's wife brought in a copy of xray to patient's back after back surgery Oct 2018. Chief Complaint   Patient presents with    Back Pain     occuring for the past 2 years     Headache     Patient c/o daily headaches     Rash     Patient reports itchy rash to scalp and neck. Reports Kenalog cream effective briefly, rash continues to reappear. 1. Have you been to the ER, urgent care clinic since your last visit? Hospitalized since your last visit? No    2. Have you seen or consulted any other health care providers outside of the [address] [place] [address] since your last visit? Include any pap smears or colon screening. Yes Reason for visit: 7/24/19, [address], abnormal liver enzymes    Health Maintenance Due   Topic Date Due    GLAUCOMA SCREENING Q2Y  04/01/2018    Influenza Age 5 to Adult  08/01/2019    MEDICARE YEARLY EXAM  08/18/2019     Abuse Screening Questionnaire 8/5/2019   Do you ever feel afraid of your partner? N   Are you in a relationship with someone who physically or mentally threatens you? N   Is it safe for you to go home?  Y     3 most recent PHQ Screens 8/5/2019   Little interest or pleasure in doing things Not at all   Feeling down, depressed, irritable, or hopeless Several days   Total Score PHQ 2 1

## 2019-08-05 NOTE — PATIENT INSTRUCTIONS
STOP LISINOPRIL  10 MG DAILY DUE TO LOW BLOOD PRESSURE      Abdominal Aortic Aneurysm: Care Instructions  Your Care Instructions  An abdominal aortic aneurysm is a stretched and bulging area of the aorta. The aorta is the large blood vessel that takes oxygen-rich blood from the heart to the rest of the body. This type of aneurysm is in the belly, where the aorta takes blood to the lower body. If an aneurysm gets too big, it can cause serious problems. A bulging aorta is weak and can burst, or rupture. This causes life-threatening bleeding. If your doctor has determined that your aneurysm is small and not growing fast, it is safe to watch the aneurysm carefully and wait on surgery. If the aneurysm is larger, surgery may be the safest choice. In some cases, your doctor may be able to put in a type of graft, called a stent, to fix the aneurysm without doing major surgery. Follow-up care is a key part of your treatment and safety. Be sure to make and go to all appointments, and call your doctor if you are having problems. It's also a good idea to know your test results and keep a list of the medicines you take. How can you care for yourself at home? · Take your medicines exactly as prescribed. Call your doctor if you think you are having a problem with your medicine. You may take medicine to help lower blood pressure and cholesterol. · Follow a heart-healthy diet. ? Eat lots of fruits and vegetables, whole grains, fish, and low-fat or nonfat dairy foods. ? Eat lean meats. Limit saturated fat and avoid trans fat. ? Limit processed food, sodium, alcohol, and sweets. · If your doctor recommends it, get more exercise. Walking is a good choice. Bit by bit, increase the amount you walk every day. Try for at least 30 minutes on most days of the week. · Talk to your doctor about when you can do more active workouts. · Manage your weight.  Being at a healthy weight will not likely change your aortic aneurysm, but it may lower the chance of complications if you ever need surgery. · Do not smoke or allow others to smoke around you. Smoking can make your condition worse. If you need help quitting, talk to your doctor about stop-smoking programs and medicines. These can increase your chances of quitting for good. When should you call for help? Call 911 anytime you think you may need emergency care. For example, call if:    · You have severe pain in your belly, back, or chest.     · You passed out (lost consciousness).     · You have severe trouble breathing.    Call your doctor now or seek immediate medical care if:    · You are dizzy or lightheaded, or you feel like you may faint.     · One or both feet change color, are painful, feel cool, or burn or tingle.    Watch closely for changes in your health, and be sure to contact your doctor if you have any problems. Where can you learn more? Go to http://julissa-jannette.info/. Enter P658 in the search box to learn more about \"Abdominal Aortic Aneurysm: Care Instructions. \"  Current as of: September 26, 2018  Content Version: 12.1  © 1621-4907 [employer]. Care instructions adapted under license by [employer] (which disclaims liability or warranty for this information). If you have questions about a medical condition or this instruction, always ask your healthcare professional. [address] any warranty or liability for your use of this information. Rash: Care Instructions  Your Care Instructions  A rash is any irritation or inflammation of the skin. Rashes have many possible causes, including allergy, infection, illness, heat, and emotional stress. Follow-up care is a key part of your treatment and safety. Be sure to make and go to all appointments, and call your doctor if you are having problems. It's also a good idea to know your test results and keep a list of the medicines you take.   How can you care for yourself at home? · Wash the area with water only. Soap can make dryness and itching worse. Pat dry. · Put cold, wet cloths on the rash to reduce itching. · Keep cool, and stay out of the sun. · Leave the rash open to the air as much of the time as possible. · Sometimes petroleum jelly (Vaseline) can help relieve the discomfort caused by a rash. A moisturizing lotion, such as Cetaphil, also may help. Calamine lotion may help for rashes caused by contact with something (such as a plant or soap) that irritated the skin. Use it 3 or 4 times a day. · If your doctor prescribed a cream, use it as directed. If your doctor prescribed medicine, take it exactly as directed. · If your rash itches so badly that it interferes with your normal activities, take an over-the-counter antihistamine, such as diphenhydramine (Benadryl) or loratadine (Claritin). Read and follow all instructions on the label. When should you call for help? Call your doctor now or seek immediate medical care if:    · You have signs of infection, such as:  ? Increased pain, swelling, warmth, or redness. ? Red streaks leading from the area. ? Pus draining from the area. ? A fever.     · You have joint pain along with the rash.    Watch closely for changes in your health, and be sure to contact your doctor if:    · Your rash is changing or getting worse. For example, call if you have pain along with the rash, the rash is spreading, or you have new blisters.     · You do not get better after 1 week. Where can you learn more? Go to http://julissa-jannette.info/. Enter B299 in the search box to learn more about \"Rash: Care Instructions. \"  Current as of: April 1, 2019  Content Version: 12.1  © 9105-3528 [employer]. Care instructions adapted under license by [employer] (which disclaims liability or warranty for this information).  If you have questions about a medical condition or this instruction, always ask your healthcare professional. [place] [address] any warranty or liability for your use of this information.

## 2019-08-07 LAB — BACTERIA UR CULT: ABNORMAL

## 2019-08-23 DIAGNOSIS — K76.82 HEPATIC ENCEPHALOPATHY: ICD-10-CM

## 2019-08-23 RX ORDER — LANOLIN ALCOHOL/MO/W.PET/CERES
100 CREAM (GRAM) TOPICAL DAILY
Qty: 30 TAB | Refills: 5 | Status: SHIPPED | OUTPATIENT
Start: 2019-08-23 | End: 2020-02-27

## 2019-09-10 DIAGNOSIS — G89.29 CHRONIC BACK PAIN, UNSPECIFIED BACK LOCATION, UNSPECIFIED BACK PAIN LATERALITY: ICD-10-CM

## 2019-09-10 DIAGNOSIS — M19.90 ARTHRITIS: ICD-10-CM

## 2019-09-10 DIAGNOSIS — M54.9 CHRONIC BACK PAIN, UNSPECIFIED BACK LOCATION, UNSPECIFIED BACK PAIN LATERALITY: ICD-10-CM

## 2019-09-10 DIAGNOSIS — R11.2 NAUSEA AND VOMITING, INTRACTABILITY OF VOMITING NOT SPECIFIED, UNSPECIFIED VOMITING TYPE: ICD-10-CM

## 2019-09-10 RX ORDER — ONDANSETRON 4 MG/1
4 TABLET, ORALLY DISINTEGRATING ORAL
Qty: 60 TAB | Refills: 5 | OUTPATIENT
Start: 2019-09-10

## 2019-09-13 RX ORDER — ONDANSETRON 4 MG/1
4 TABLET, ORALLY DISINTEGRATING ORAL
Qty: 60 TAB | Refills: 5 | Status: SHIPPED | OUTPATIENT
Start: 2019-09-13

## 2019-09-13 RX ORDER — TRAMADOL HYDROCHLORIDE 50 MG/1
50 TABLET ORAL
Qty: 120 TAB | Refills: 0 | Status: SHIPPED | OUTPATIENT
Start: 2019-09-13 | End: 2019-10-20 | Stop reason: SDUPTHER

## 2019-09-17 RX ORDER — MEMANTINE HYDROCHLORIDE 10 MG/1
10 TABLET ORAL 2 TIMES DAILY
Qty: 60 TAB | Refills: 2 | Status: SHIPPED | OUTPATIENT
Start: 2019-09-17 | End: 2019-11-27 | Stop reason: SDUPTHER

## 2019-09-24 NOTE — PROGRESS NOTES
Rm#[id number]  FASTING  Presents w/ spouse     Chief Complaint   Patient presents with    Hypertension     1 month f/u    Headache     f/u    Dizziness     f/u     1. Have you been to the ER, urgent care clinic since your last visit? Hospitalized since your last visit? No    2. Have you seen or consulted any other health care providers outside of the [address] [place] [address] since your last visit? Include any pap smears or colon screening. No     Health Maintenance Due   Topic Date Due    GLAUCOMA SCREENING Q2Y  04/01/2018    Influenza Age 5 to Adult  08/01/2019    MEDICARE YEARLY EXAM  08/18/2019     Fall Risk Assessment, last 12 mths 8/5/2019   Able to walk? Yes   Fall in past 12 months?  No   Fall with injury? -   Number of falls in past 12 months -   Fall Risk Score -

## 2019-09-25 ENCOUNTER — OFFICE VISIT (OUTPATIENT)
Dept: INTERNAL MEDICINE CLINIC | Age: 74
End: 2019-09-25

## 2019-09-25 VITALS
DIASTOLIC BLOOD PRESSURE: 74 MMHG | WEIGHT: 230 LBS | BODY MASS INDEX: 32.93 KG/M2 | RESPIRATION RATE: 16 BRPM | TEMPERATURE: 98.3 F | OXYGEN SATURATION: 96 % | HEIGHT: 70 IN | HEART RATE: 63 BPM | SYSTOLIC BLOOD PRESSURE: 115 MMHG

## 2019-09-25 DIAGNOSIS — G89.29 CHRONIC BACK PAIN, UNSPECIFIED BACK LOCATION, UNSPECIFIED BACK PAIN LATERALITY: ICD-10-CM

## 2019-09-25 DIAGNOSIS — R82.998 URINE LEUKOCYTES: ICD-10-CM

## 2019-09-25 DIAGNOSIS — R31.9 HEMATURIA, UNSPECIFIED TYPE: ICD-10-CM

## 2019-09-25 DIAGNOSIS — G44.229 CHRONIC TENSION-TYPE HEADACHE, NOT INTRACTABLE: ICD-10-CM

## 2019-09-25 DIAGNOSIS — I10 ESSENTIAL HYPERTENSION: Primary | ICD-10-CM

## 2019-09-25 DIAGNOSIS — Z23 ENCOUNTER FOR IMMUNIZATION: ICD-10-CM

## 2019-09-25 DIAGNOSIS — M54.9 CHRONIC BACK PAIN, UNSPECIFIED BACK LOCATION, UNSPECIFIED BACK PAIN LATERALITY: ICD-10-CM

## 2019-09-25 NOTE — PROGRESS NOTES
HISTORY OF PRESENT ILLNESS  Madelyn Torrez is a 75 y.o. male presents with spouse for short interval follow up  HPI     Dizziness resolved since Lisinopril discontinued LOV  Back pain not better. Sees Dr. Mikayla Livingston, pain d/t arthritis. Pain better with sitting, When  walking a long way pain gets progressively worse   Several courses of PT, none for 1 month, felt better when he had PT. Chronic headaches , now daily, generalized  MRI 2018 unrevealing     Now takes Tramadol 1 TID was 2 TID for back pain    Scalp rash. Medicated shampoo just started,  continues topical medication prescribed       Past Medical History:   Diagnosis Date    AAA (abdominal aortic aneurysm) ([facility] [address] 07/29/2019    3.8 cm on US    Alcohol abuse, in remission     Arthritis     Back pain     Bladder cancer (HCC)      - in [place], [place]    C. 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 Noel Salamanca           Current Outpatient Medications   Medication Sig    memantine (NAMENDA) 10 mg tablet Take 1 Tab by mouth two (2) times a day.  traMADol (ULTRAM) 50 mg tablet Take 1 Tab by mouth every eight (8) hours as needed for Pain for up to 30 days. Max Daily Amount: 150 mg.    ondansetron (ZOFRAN ODT) 4 mg disintegrating tablet Take 1 Tab by mouth every eight (8) hours as needed for Nausea.  thiamine HCL (B-1) 100 mg tablet Take 1 Tab by mouth daily.     acetaminophen (TYLENOL EXTRA STRENGTH) 500 mg tablet Take  by mouth BID Mon Wed & Fri.    ibuprofen 200 mg cap Take  by mouth.  tiZANidine (ZANAFLEX) 2 mg tablet TAKE 1 TABLET BY MOUTH THREE TIMES DAILY AS NEEDED FOR PAIN    buPROPion XL (WELLBUTRIN XL) 150 mg tablet TAKE 1 TABLET BY MOUTH EVERY MORNING    pramipexole (MIRAPEX) 0.75 mg tablet Take 1 Tab by mouth nightly.  traMADol (ULTRAM) 50 mg tablet Take 50 mg by mouth every eight (8) hours as needed for Pain.  melatonin tab tablet Take 5 mg by mouth nightly.  multivitamin (ONE DAILY ENERGY) tablet Take 1 Tab by mouth daily.  triamcinolone acetonide (KENALOG) 0.1 % topical cream Apply  to affected area two (2) times a day. use thin layer x 3-5 days at time    lidocaine (LIDODERM) 5 % apply 1 patch every 24 hours as needed    traZODone (DESYREL) 50 mg tablet take 2 tablets by mouth NIGHTLY    cholecalciferol, vitamin D3, (VITAMIN D3 PO) Take 2,000 Units by mouth daily.  vitamin E (AQUA GEMS) 400 unit capsule Take 400 Units by mouth daily.  MILK THISTLE PO Take 1 Tab by mouth two (2) times a day.  escitalopram oxalate (LEXAPRO) 20 mg tablet take 1 tablet by mouth once daily    tamsulosin (FLOMAX) 0.4 mg capsule Take 0.4 mg by mouth daily.  loratadine (CLARITIN) 10 mg tablet Take 10 mg by mouth daily.  cyanocobalamin, vitamin B-12, (VITAMIN B-12 PO) Take 1 Tab by mouth daily.  OTHER CBD oil(for back pain)    meloxicam (MOBIC) 15 mg tablet Take 1 Tab by mouth daily.  lisinopril (PRINIVIL, ZESTRIL) 10 mg tablet take 1 tablet by mouth once daily     No current facility-administered medications for this visit. No Known Allergies  Review of Systems   Constitutional: Negative. HENT: Negative. Respiratory: Negative. Cardiovascular: Negative. Musculoskeletal: Positive for back pain. Skin: Positive for itching and rash. Neurological: Positive for headaches. Negative for dizziness.      Visit Vitals  /74 (BP 1 Location: Left arm, BP Patient Position: Sitting)   Pulse 63   Temp 98.3 °F (36.8 °C) (Oral)   Resp 16   Ht 5' 10\" (1.778 m)   Wt 230 lb (104.3 kg)   SpO2 96%   BMI 33.00 kg/m²     Physical Exam   Constitutional: He appears well-developed and well-nourished. No distress. HENT:   Head: Normocephalic and atraumatic. Cardiovascular: Normal rate and regular rhythm. Pulmonary/Chest: Effort normal and breath sounds normal.   Musculoskeletal:   Antalgic gait, uses cane   Neurological: He is alert. No cranial nerve deficit. Coordination normal.   Skin: Skin is warm and dry. He is not diaphoretic. Erythematous, macula papular scalp lesions, crown and hair line occipital area   Psychiatric: He has a normal mood and affect. His speech is normal and behavior is normal. Cognition and memory are impaired. ASSESSMENT and PLAN    ICD-10-CM ICD-9-CM    1. Essential hypertension I10 401.9 AMB POC URINALYSIS DIP STICK AUTO W/O MICRO      CULTURE, URINE   2. Chronic back pain, unspecified back location, unspecified back pain laterality M54.9 724.5 REFERRAL TO PHYSICIAL MEDICINE REHAB    G89.29 338.29    3. Chronic tension-type headache, not intractable G44.229 339.12 REFERRAL TO NEUROLOGY   4. Encounter for immunization Z23 V03.89 INFLUENZA VACCINE INACTIVATED (IIV), SUBUNIT, ADJUVANTED, IM      NH IMMUNIZ ADMIN,1 SINGLE/COMB VAC/TOXOID   5. Hematuria, unspecified type R31.9 599.70 CULTURE, URINE   6. Urine leukocytes R82.998 791.7 CULTURE, URINE         Dizziness resolved. Hypertension controlled with medication adjustment. Encouraged PMR consult for chronic back pain       Refractory headache, encouraged neurology evaluation     Urology appointment pending     lab results and schedule of future lab studies reviewed with patient  reviewed diet, exercise and weight control  reviewed medications and side effects in detail  Radiology results reviewed with patient     Patient voices understanding and acceptance of this advice and will call back if any further questions or concerns.       An After Visit Summary was printed and given to the patient.

## 2019-09-25 NOTE — PATIENT INSTRUCTIONS
Learning About How to Have a Healthy Back  What causes back pain? Back pain is often caused by overuse, strain, or injury. For example, people often hurt their backs playing sports or working in the yard, being jolted in a car accident, or lifting something too heavy. Aging plays a part too. Your bones and muscles tend to lose strength as you age, which makes injury more likely. The spongy discs between the bones of the spine (vertebrae) may suffer from wear and tear and no longer provide enough cushion between the bones. A disc that bulges or breaks open (herniated disc) can press on nerves, causing back pain. In some people, back pain is the result of arthritis, broken vertebrae caused by bone loss (osteoporosis), illness, or a spine problem. Although most people have back pain at one time or another, there are steps you can take to make it less likely. How can you have a healthy back? Reduce stress on your back through good posture  Slumping or slouching alone may not cause low back pain. But after the back has been strained or injured, bad posture can make pain worse. · Sleep in a position that maintains your back's normal curves and on a mattress that feels comfortable. Sleep on your side with a pillow between your knees, or sleep on your back with a pillow under your knees. These positions can reduce strain on your back. · Stand and sit up straight. \"Good posture\" generally means your ears, shoulders, and hips are in a straight line. · If you must stand for a long time, put one foot on a stool, ledge, or box. Switch feet every now and then. · Sit in a chair that is low enough to let you place both feet flat on the floor with both knees nearly level with your hips. If your chair or desk is too high, use a footrest to raise your knees. Place a small pillow, a rolled-up towel, or a lumbar roll in the curve of your back if you need extra support.   · Try a kneeling chair, which helps tilt your hips forward. This takes pressure off your lower back. · Try sitting on an exercise ball. It can rock from side to side, which helps keep your back loose. · When driving, keep your knees nearly level with your hips. Sit straight, and drive with both hands on the steering wheel. Your arms should be in a slightly bent position. Reduce stress on your back through careful lifting  · Squat down, bending at the hips and knees only. If you need to, put one knee to the floor and extend your other knee in front of you, bent at a right angle (half kneeling). · Press your chest straight forward. This helps keep your upper back straight while keeping a slight arch in your low back. · Hold the load as close to your body as possible, at the level of your belly button (navel). · Use your feet to change direction, taking small steps. · Lead with your hips as you change direction. Keep your shoulders in line with your hips as you move. · Set down your load carefully, squatting with your knees and hips only. Exercise and stretch your back  · Do some exercise on most days of the week, if your doctor says it is okay. You can walk, run, swim, or cycle. · Stretch your back muscles. Here are a few exercises to try:  ? Lie on your back, and gently pull one bent knee to your chest. Put that foot back on the floor, and then pull the other knee to your chest.  ? Do pelvic tilts. Lie on your back with your knees bent. Tighten your stomach muscles. Pull your belly button (navel) in and up toward your ribs. You should feel like your back is pressing to the floor and your hips and pelvis are slightly lifting off the floor. Hold for 6 seconds while breathing smoothly. ? Sit with your back flat against a wall. · Keep your core muscles strong. The muscles of your back, belly (abdomen), and buttocks support your spine. ? Pull in your belly and imagine pulling your navel toward your spine. Hold this for 6 seconds, then relax.  Remember to keep breathing normally as you tense your muscles. ? Do curl-ups. Always do them with your knees bent. Keep your low back on the floor, and curl your shoulders toward your knees using a smooth, slow motion. Keep your arms folded across your chest. If this bothers your neck, try putting your hands behind your neck (not your head), with your elbows spread apart. ? Lie on your back with your knees bent and your feet flat on the floor. Tighten your belly muscles, and then push with your feet and raise your buttocks up a few inches. Hold this position 6 seconds as you continue to breathe normally, then lower yourself slowly to the floor. Repeat 8 to 12 times. ? If you like group exercise, try Pilates or yoga. These classes have poses that strengthen the core muscles. Lead a healthy lifestyle  · Stay at a healthy weight to avoid strain on your back. · Do not smoke. Smoking increases the risk of osteoporosis, which weakens the spine. If you need help quitting, talk to your doctor about stop-smoking programs and medicines. These can increase your chances of quitting for good. Where can you learn more? Go to http://julissaVarsity Opticsjannette.info/. Enter L315 in the search box to learn more about \"Learning About How to Have a Healthy Back. \"  Current as of: June 26, 2019  Content Version: 12.2  © 7399-6734 [employer], [employer]. Care instructions adapted under license by [employer] (which disclaims liability or warranty for this information). If you have questions about a medical condition or this instruction, always ask your healthcare professional. [place] [address] any warranty or liability for your use of this information. Learning About High Blood Pressure  What is high blood pressure? Blood pressure is a measure of how hard the blood pushes against the walls of your arteries.  It's normal for blood pressure to go up and down throughout the day, but if it stays up, you have high blood pressure. Another name for high blood pressure is hypertension. Two numbers tell you your blood pressure. The first number is the systolic pressure. It shows how hard the blood pushes when your heart is pumping. The second number is the diastolic pressure. It shows how hard the blood pushes between heartbeats, when your heart is relaxed and filling with blood. Your doctor will give you a goal for your blood pressure. Your goal will be based on your health and your age. High blood pressure (hypertension) means that the top number stays high, or the bottom number stays high, or both. High blood pressure increases the risk of stroke, heart attack, and other problems. You and your doctor will talk about your risks of these problems based on your blood pressure. What happens when you have high blood pressure? · Blood flows through your arteries with too much force. Over time, this damages the walls of your arteries. But you can't feel it. High blood pressure usually doesn't cause symptoms. · Fat and calcium start to build up in your arteries. This buildup is called plaque. Plaque makes your arteries narrower and stiffer. Blood can't flow through them as easily. · This lack of good blood flow starts to damage some of the organs in your body. This can lead to problems such as coronary artery disease and heart attack, heart failure, stroke, kidney failure, and eye damage. How can you prevent high blood pressure? · Stay at a healthy weight. · Try to limit how much sodium you eat to less than 2,300 milligrams (mg) a day. If you limit your sodium to 1,500 mg a day, you can lower your blood pressure even more. ? Buy foods that are labeled \"unsalted,\" \"sodium-free,\" or \"low-sodium. \" Foods labeled \"reduced-sodium\" and \"light sodium\" may still have too much sodium. ? Flavor your food with garlic, lemon juice, onion, vinegar, herbs, and spices instead of salt.  Do not use soy sauce, steak sauce, onion salt, garlic salt, mustard, or ketchup on your food. ? Use less salt (or none) when recipes call for it. You can often use half the salt a recipe calls for without losing flavor. · Be physically active. Get at least 30 minutes of exercise on most days of the week. Walking is a good choice. You also may want to do other activities, such as running, swimming, cycling, or playing tennis or team sports. · Limit alcohol to 2 drinks a day for men and 1 drink a day for women. · Eat plenty of fruits, vegetables, and low-fat dairy products. Eat less saturated and total fats. How is high blood pressure treated? · Your doctor will suggest making lifestyle changes to help your heart. For example, your doctor may ask you to eat healthy foods, quit smoking, lose extra weight, and be more active. · If lifestyle changes don't help enough, your doctor may recommend that you take medicine. · When blood pressure is very high, medicines are needed to lower it. Follow-up care is a key part of your treatment and safety. Be sure to make and go to all appointments, and call your doctor if you are having problems. It's also a good idea to know your test results and keep a list of the medicines you take. Where can you learn more? Go to http://julissa-jannette.info/. Enter P501 in the search box to learn more about \"Learning About High Blood Pressure. \"  Current as of: April 9, 2019  Content Version: 12.2  © 6912-6285 [employer]. Care instructions adapted under license by [employer] (which disclaims liability or warranty for this information). If you have questions about a medical condition or this instruction, always ask your healthcare professional. [place] [address] any warranty or liability for your use of this information. Headache: Care Instructions  Your Care Instructions    Headaches have many possible causes.  Most headaches aren't a sign of a more serious problem, and they will get better on their own. Home treatment may help you feel better faster. The doctor has checked you carefully, but problems can develop later. If you notice any problems or new symptoms, get medical treatment right away. Follow-up care is a key part of your treatment and safety. Be sure to make and go to all appointments, and call your doctor if you are having problems. It's also a good idea to know your test results and keep a list of the medicines you take. How can you care for yourself at home? · Do not drive if you have taken a prescription pain medicine. · Rest in a quiet, dark room until your headache is gone. Close your eyes and try to relax or go to sleep. Don't watch TV or read. · Put a cold, moist cloth or cold pack on the painful area for 10 to 20 minutes at a time. Put a thin cloth between the cold pack and your skin. · Use a warm, moist towel or a heating pad set on low to relax tight shoulder and neck muscles. · Have someone gently massage your neck and shoulders. · Take pain medicines exactly as directed. ? If the doctor gave you a prescription medicine for pain, take it as prescribed. ? If you are not taking a prescription pain medicine, ask your doctor if you can take an over-the-counter medicine. · Be careful not to take pain medicine more often than the instructions allow, because you may get worse or more frequent headaches when the medicine wears off. · Do not ignore new symptoms that occur with a headache, such as a fever, weakness or numbness, vision changes, or confusion. These may be signs of a more serious problem. To prevent headaches  · Keep a headache diary so you can figure out what triggers your headaches. Avoiding triggers may help you prevent headaches. Record when each headache began, how long it lasted, and what the pain was like (throbbing, aching, stabbing, or dull).  Write down any other symptoms you had with the headache, such as nausea, flashing lights or dark spots, or sensitivity to bright light or loud noise. Note if the headache occurred near your period. List anything that might have triggered the headache, such as certain foods (chocolate, cheese, wine) or odors, smoke, bright light, stress, or lack of sleep. · Find healthy ways to deal with stress. Headaches are most common during or right after stressful times. Take time to relax before and after you do something that has caused a headache in the past.  · Try to keep your muscles relaxed by keeping good posture. Check your jaw, face, neck, and shoulder muscles for tension, and try relaxing them. When sitting at a desk, change positions often, and stretch for 30 seconds each hour. · Get plenty of sleep and exercise. · Eat regularly and well. Long periods without food can trigger a headache. · Treat yourself to a massage. Some people find that regular massages are very helpful in relieving tension. · Limit caffeine by not drinking too much coffee, tea, or soda. But don't quit caffeine suddenly, because that can also give you headaches. · Reduce eyestrain from computers by blinking frequently and looking away from the computer screen every so often. Make sure you have proper eyewear and that your monitor is set up properly, about an arm's length away. · Seek help if you have depression or anxiety. Your headaches may be linked to these conditions. Treatment can both prevent headaches and help with symptoms of anxiety or depression. When should you call for help? Call 911 anytime you think you may need emergency care. For example, call if:    · You have signs of a stroke. These may include:  ? Sudden numbness, paralysis, or weakness in your face, arm, or leg, especially on only one side of your body. ? Sudden vision changes. ? Sudden trouble speaking. ? Sudden confusion or trouble understanding simple statements. ? Sudden problems with walking or balance.   ? A sudden, severe headache that is different from past headaches.    Call your doctor now or seek immediate medical care if:    · You have a new or worse headache.     · Your headache gets much worse. Where can you learn more? Go to http://julissa-jannette.info/. Enter M271 in the search box to learn more about \"Headache: Care Instructions. \"  Current as of: March 28, 2019  Content Version: 12.2  © 8144-8807 [employer]. Care instructions adapted under license by [employer] (which disclaims liability or warranty for this information). If you have questions about a medical condition or this instruction, always ask your healthcare professional. [address] any warranty or liability for your use of this information. Headache: Care Instructions  Your Care Instructions    Headaches have many possible causes. Most headaches aren't a sign of a more serious problem, and they will get better on their own. Home treatment may help you feel better faster. The doctor has checked you carefully, but problems can develop later. If you notice any problems or new symptoms, get medical treatment right away. Follow-up care is a key part of your treatment and safety. Be sure to make and go to all appointments, and call your doctor if you are having problems. It's also a good idea to know your test results and keep a list of the medicines you take. How can you care for yourself at home? · Do not drive if you have taken a prescription pain medicine. · Rest in a quiet, dark room until your headache is gone. Close your eyes and try to relax or go to sleep. Don't watch TV or read. · Put a cold, moist cloth or cold pack on the painful area for 10 to 20 minutes at a time. Put a thin cloth between the cold pack and your skin. · Use a warm, moist towel or a heating pad set on low to relax tight shoulder and neck muscles. · Have someone gently massage your neck and shoulders.   · Take pain medicines exactly as directed. ? If the doctor gave you a prescription medicine for pain, take it as prescribed. ? If you are not taking a prescription pain medicine, ask your doctor if you can take an over-the-counter medicine. · Be careful not to take pain medicine more often than the instructions allow, because you may get worse or more frequent headaches when the medicine wears off. · Do not ignore new symptoms that occur with a headache, such as a fever, weakness or numbness, vision changes, or confusion. These may be signs of a more serious problem. To prevent headaches  · Keep a headache diary so you can figure out what triggers your headaches. Avoiding triggers may help you prevent headaches. Record when each headache began, how long it lasted, and what the pain was like (throbbing, aching, stabbing, or dull). Write down any other symptoms you had with the headache, such as nausea, flashing lights or dark spots, or sensitivity to bright light or loud noise. Note if the headache occurred near your period. List anything that might have triggered the headache, such as certain foods (chocolate, cheese, wine) or odors, smoke, bright light, stress, or lack of sleep. · Find healthy ways to deal with stress. Headaches are most common during or right after stressful times. Take time to relax before and after you do something that has caused a headache in the past.  · Try to keep your muscles relaxed by keeping good posture. Check your jaw, face, neck, and shoulder muscles for tension, and try relaxing them. When sitting at a desk, change positions often, and stretch for 30 seconds each hour. · Get plenty of sleep and exercise. · Eat regularly and well. Long periods without food can trigger a headache. · Treat yourself to a massage. Some people find that regular massages are very helpful in relieving tension. · Limit caffeine by not drinking too much coffee, tea, or soda.  But don't quit caffeine suddenly, because that can also give you headaches. · Reduce eyestrain from computers by blinking frequently and looking away from the computer screen every so often. Make sure you have proper eyewear and that your monitor is set up properly, about an arm's length away. · Seek help if you have depression or anxiety. Your headaches may be linked to these conditions. Treatment can both prevent headaches and help with symptoms of anxiety or depression. When should you call for help? Call 911 anytime you think you may need emergency care. For example, call if:    · You have signs of a stroke. These may include:  ? Sudden numbness, paralysis, or weakness in your face, arm, or leg, especially on only one side of your body. ? Sudden vision changes. ? Sudden trouble speaking. ? Sudden confusion or trouble understanding simple statements. ? Sudden problems with walking or balance. ? A sudden, severe headache that is different from past headaches.    Call your doctor now or seek immediate medical care if:    · You have a new or worse headache.     · Your headache gets much worse. Where can you learn more? Go to http://julissa-jannette.info/. Enter M271 in the search box to learn more about \"Headache: Care Instructions. \"  Current as of: March 28, 2019  Content Version: 12.2  © 2488-6502 [employer]. Care instructions adapted under license by [employer] (which disclaims liability or warranty for this information). If you have questions about a medical condition or this instruction, always ask your healthcare professional. [place] [address] any warranty or liability for your use of this information.

## 2019-09-27 LAB — BACTERIA UR CULT: NORMAL

## 2019-10-04 ENCOUNTER — TELEPHONE (OUTPATIENT)
Dept: INTERNAL MEDICINE CLINIC | Age: 74
End: 2019-10-04

## 2019-10-04 NOTE — TELEPHONE ENCOUNTER
Pt wife's advised he has to cancel appointment 10/31/2019. I tried to reschedule but the are looking for something the first week of November and I dint see anything available.    Best contact [phone]     10/4/19 - [facility] for pt to call office to r/s appt

## 2019-10-07 DIAGNOSIS — G25.81 RESTLESS LEG SYNDROME: ICD-10-CM

## 2019-10-07 RX ORDER — PRAMIPEXOLE DIHYDROCHLORIDE 0.75 MG/1
TABLET ORAL
Qty: 90 TAB | Refills: 1 | Status: SHIPPED | OUTPATIENT
Start: 2019-10-07 | End: 2021-01-08 | Stop reason: SDUPTHER

## 2019-10-10 NOTE — TELEPHONE ENCOUNTER
Patient's wife call because she said there was a problem with the pharmacy filling the patient's prescription for Pramipexoles. Please follow up with the patient.

## 2019-10-11 NOTE — TELEPHONE ENCOUNTER
Called spouse. Verified name and . She states that pharmacy had contacted us several times to refill pts pramipexole medication with no response. Provider sent to pharm. 10-7-19. Informed called and told her I would call it in to the pharmacy since they didn't receive it.     Called in to [facility]

## 2019-10-20 DIAGNOSIS — M19.90 ARTHRITIS: ICD-10-CM

## 2019-10-20 DIAGNOSIS — G89.29 CHRONIC BACK PAIN, UNSPECIFIED BACK LOCATION, UNSPECIFIED BACK PAIN LATERALITY: ICD-10-CM

## 2019-10-20 DIAGNOSIS — M54.9 CHRONIC BACK PAIN, UNSPECIFIED BACK LOCATION, UNSPECIFIED BACK PAIN LATERALITY: ICD-10-CM

## 2019-10-24 RX ORDER — TRAMADOL HYDROCHLORIDE 50 MG/1
TABLET ORAL
Qty: 120 TAB | Refills: 3 | Status: ON HOLD | OUTPATIENT
Start: 2019-10-24 | End: 2020-04-02 | Stop reason: CLARIF

## 2019-10-24 NOTE — TELEPHONE ENCOUNTER
Medication refill authorized. Please call in since cannot be sent electronically    Due for urine drug testing per regulations. Order placed. May arrange a time to come in to give sample. Rx may be called in prior to sample being left.

## 2019-10-24 NOTE — TELEPHONE ENCOUNTER
Pt's wife called [facility] to check status of pt's Tramadol prescription. Pharmacy informed the pt that they have not received the request pt is now completely out and would like if [facility] could please approve the prescription today.

## 2019-10-24 NOTE — TELEPHONE ENCOUNTER
[facility] called stating that the pt's wife was there to pick-up the pt's Tramadol. The writer informed the pharmacist that the request would have to be forwarded to another provider due to [facility] Staff being out of the office the next couple of day's. The pharmacist asked the pt's wife how soon was it requested she stated she had called yesterday,writer informed the pharmacist that the request was put in today and that there is a 24-48 hr turn around time for refill's.

## 2019-10-25 NOTE — TELEPHONE ENCOUNTER
Called pts wife. Verified name and . On HIPPA. Scheduled for lab appt only next week for urine drug screen. Scheduled for 19 for headaches.   And  for regular f/u w/ Dr. Tong Jeffers

## 2019-10-28 DIAGNOSIS — M19.90 ARTHRITIS: ICD-10-CM

## 2019-10-28 RX ORDER — LIDOCAINE 50 MG/G
PATCH TOPICAL
Qty: 30 PATCH | Refills: 5 | Status: SHIPPED | OUTPATIENT
Start: 2019-10-28 | End: 2020-07-12

## 2019-11-01 LAB
AMPHETAMINES UR QL SCN: NEGATIVE NG/ML
BARBITURATES UR QL SCN: NEGATIVE NG/ML
BENZODIAZ UR QL SCN: NEGATIVE NG/ML
BZE UR QL SCN: NEGATIVE NG/ML
CANNABINOIDS UR QL SCN: NEGATIVE NG/ML
CREAT UR-MCNC: 70.5 MG/DL (ref 20–300)
FENTANYL+NORFENTANYL UR QL SCN: NEGATIVE PG/ML
MEPERIDINE UR QL: NEGATIVE NG/ML
METHADONE UR QL SCN: NEGATIVE NG/ML
OPIATES UR QL SCN: NEGATIVE NG/ML
OXYCODONE+OXYMORPHONE UR QL SCN: NEGATIVE NG/ML
PCP UR QL: NEGATIVE NG/ML
PH UR: 5.7 [PH] (ref 4.5–8.9)
PLEASE NOTE:, 733157: ABNORMAL
PROPOXYPH UR QL SCN: NEGATIVE NG/ML
SP GR UR: 1.01
TRAMADOL UR-MCNC: POSITIVE UG/ML

## 2019-11-02 DIAGNOSIS — G89.29 CHRONIC BACK PAIN, UNSPECIFIED BACK LOCATION, UNSPECIFIED BACK PAIN LATERALITY: ICD-10-CM

## 2019-11-02 DIAGNOSIS — M54.9 CHRONIC BACK PAIN, UNSPECIFIED BACK LOCATION, UNSPECIFIED BACK PAIN LATERALITY: ICD-10-CM

## 2019-11-02 RX ORDER — MELOXICAM 15 MG/1
TABLET ORAL
Qty: 90 TAB | Refills: 1 | Status: SHIPPED | OUTPATIENT
Start: 2019-11-02 | End: 2020-01-09 | Stop reason: ALTCHOICE

## 2019-11-04 DIAGNOSIS — F32.A DEPRESSION, UNSPECIFIED DEPRESSION TYPE: ICD-10-CM

## 2019-11-04 RX ORDER — ESCITALOPRAM OXALATE 20 MG/1
TABLET ORAL
Qty: 90 TAB | Refills: 3 | Status: SHIPPED | OUTPATIENT
Start: 2019-11-04 | End: 2019-12-04

## 2019-11-06 ENCOUNTER — OFFICE VISIT (OUTPATIENT)
Dept: INTERNAL MEDICINE CLINIC | Age: 74
End: 2019-11-06

## 2019-11-06 VITALS
RESPIRATION RATE: 16 BRPM | DIASTOLIC BLOOD PRESSURE: 78 MMHG | HEIGHT: 70 IN | TEMPERATURE: 97.9 F | BODY MASS INDEX: 33.21 KG/M2 | WEIGHT: 232 LBS | SYSTOLIC BLOOD PRESSURE: 120 MMHG | OXYGEN SATURATION: 94 % | HEART RATE: 72 BPM

## 2019-11-06 DIAGNOSIS — R21 RASH AND NONSPECIFIC SKIN ERUPTION: ICD-10-CM

## 2019-11-06 DIAGNOSIS — L21.0 SEBORRHEA CAPITIS: ICD-10-CM

## 2019-11-06 DIAGNOSIS — F32.A DEPRESSION, UNSPECIFIED DEPRESSION TYPE: ICD-10-CM

## 2019-11-06 DIAGNOSIS — R51.9 HEADACHE DISORDER: Primary | ICD-10-CM

## 2019-11-06 DIAGNOSIS — J01.90 ACUTE NON-RECURRENT SINUSITIS, UNSPECIFIED LOCATION: ICD-10-CM

## 2019-11-06 DIAGNOSIS — M54.9 CHRONIC BACK PAIN, UNSPECIFIED BACK LOCATION, UNSPECIFIED BACK PAIN LATERALITY: ICD-10-CM

## 2019-11-06 DIAGNOSIS — G89.29 CHRONIC BACK PAIN, UNSPECIFIED BACK LOCATION, UNSPECIFIED BACK PAIN LATERALITY: ICD-10-CM

## 2019-11-06 RX ORDER — KETOCONAZOLE 20 MG/ML
SHAMPOO TOPICAL
Qty: 120 ML | Refills: 1 | Status: SHIPPED | OUTPATIENT
Start: 2019-11-06 | End: 2020-02-15

## 2019-11-06 RX ORDER — FLUTICASONE PROPIONATE 50 MCG
2 SPRAY, SUSPENSION (ML) NASAL DAILY
Qty: 1 BOTTLE | Refills: 3 | Status: SHIPPED | OUTPATIENT
Start: 2019-11-06 | End: 2019-11-06 | Stop reason: SDUPTHER

## 2019-11-06 NOTE — PATIENT INSTRUCTIONS
Reduce Lexapro to 1/2 tablet daily for 1 week, then eveyother day for 1 week, then stop             Recovering From Depression: Care Instructions  Your Care Instructions    Taking good care of yourself is important as you recover from depression. In time, your symptoms will fade as your treatment takes hold. Do not give up. Instead, focus your energy on getting better. Your mood will improve. It just takes some time. Focus on things that can help you feel better, such as being with friends and family, eating well, and getting enough rest. But take things slowly. Do not do too much too soon. You will begin to feel better gradually. Follow-up care is a key part of your treatment and safety. Be sure to make and go to all appointments, and call your doctor if you are having problems. It's also a good idea to know your test results and keep a list of the medicines you take. How can you care for yourself at home? Be realistic  · If you have a large task to do, break it up into smaller steps you can handle, and just do what you can. · You may want to put off important decisions until your depression has lifted. If you have plans that will have a major impact on your life, such as marriage, divorce, or a job change, try to wait a bit. Talk it over with friends and loved ones who can help you look at the overall picture first.  · Reaching out to people for help is important. Do not isolate yourself. Let your family and friends help you. Find someone you can trust and confide in, and talk to that person. · Be patient, and be kind to yourself. Remember that depression is not your fault and is not something you can overcome with willpower alone. Treatment is necessary for depression, just like for any other illness. Feeling better takes time, and your mood will improve little by little. Stay active  · Stay busy and get outside. Take a walk, or try some other light exercise.   · Talk with your doctor about an exercise program. Exercise can help with mild depression. · Go to a movie or concert. Take part in a Worship activity or other social gathering. Go to a ball game. · Ask a friend to have dinner with you. Take care of yourself  · Eat a balanced diet with plenty of fresh fruits and vegetables, whole grains, and lean protein. If you have lost your appetite, eat small snacks rather than large meals. · Avoid drinking alcohol or using illegal drugs. Do not take medicines that have not been prescribed for you. They may interfere with medicines you may be taking for depression, or they may make your depression worse. · Take your medicines exactly as they are prescribed. You may start to feel better within 1 to 3 weeks of taking antidepressant medicine. But it can take as many as 6 to 8 weeks to see more improvement. If you have questions or concerns about your medicines, or if you do not notice any improvement by 3 weeks, talk to your doctor. · If you have any side effects from your medicine, tell your doctor. Antidepressants can make you feel tired, dizzy, or nervous. Some people have dry mouth, constipation, headaches, sexual problems, or diarrhea. Many of these side effects are mild and will go away on their own after you have been taking the medicine for a few weeks. Some may last longer. Talk to your doctor if side effects are bothering you too much. You might be able to try a different medicine. · Get enough sleep. If you have problems sleeping:  ? Go to bed at the same time every night, and get up at the same time every morning. ? Keep your bedroom dark and quiet. ? Do not exercise after 5:00 p.m.  ? Avoid drinks with caffeine after 5:00 p.m. · Avoid sleeping pills unless they are prescribed by the doctor treating your depression. Sleeping pills may make you groggy during the day, and they may interact with other medicine you are taking.   · If you have any other illnesses, such as diabetes, heart disease, or high blood pressure, make sure to continue with your treatment. Tell your doctor about all of the medicines you take, including those with or without a prescription. · Keep the numbers for these national suicide hotlines: 5-681-604-TALK (7-593.861.2415) and 8-909-AIPMPTH (1-272.925.5856). If you or someone you know talks about suicide or feeling hopeless, get help right away. When should you call for help? Call 911 anytime you think you may need emergency care. For example, call if:    · You feel like hurting yourself or someone else.     · Someone you know has depression and is about to attempt or is attempting suicide.   [facility] your doctor now or seek immediate medical care if:    · You hear voices.     · Someone you know has depression and:  ? Starts to give away his or her possessions. ? Uses illegal drugs or drinks alcohol heavily. ? Talks or writes about death, including writing suicide notes or talking about guns, knives, or pills. ? Starts to spend a lot of time alone. ? Acts very aggressively or suddenly appears calm.    Watch closely for changes in your health, and be sure to contact your doctor if:    · You do not get better as expected. Where can you learn more? Go to http://julissa-jannette.info/. Enter O885 in the search box to learn more about \"Recovering From Depression: Care Instructions. \"  Current as of: May 28, 2019  Content Version: 12.2  © 3345-6594 [employer]. Care instructions adapted under license by [employer] (which disclaims liability or warranty for this information). If you have questions about a medical condition or this instruction, always ask your healthcare professional. [place] [address] any warranty or liability for your use of this information. Rash: Care Instructions  Your Care Instructions  A rash is any irritation or inflammation of the skin.  Rashes have many possible causes, including allergy, infection, illness, heat, and emotional stress. Follow-up care is a key part of your treatment and safety. Be sure to make and go to all appointments, and call your doctor if you are having problems. It's also a good idea to know your test results and keep a list of the medicines you take. How can you care for yourself at home? · Wash the area with water only. Soap can make dryness and itching worse. Pat dry. · Put cold, wet cloths on the rash to reduce itching. · Keep cool, and stay out of the sun. · Leave the rash open to the air as much of the time as possible. · Sometimes petroleum jelly (Vaseline) can help relieve the discomfort caused by a rash. A moisturizing lotion, such as Cetaphil, also may help. Calamine lotion may help for rashes caused by contact with something (such as a plant or soap) that irritated the skin. Use it 3 or 4 times a day. · If your doctor prescribed a cream, use it as directed. If your doctor prescribed medicine, take it exactly as directed. · If your rash itches so badly that it interferes with your normal activities, take an over-the-counter antihistamine, such as diphenhydramine (Benadryl) or loratadine (Claritin). Read and follow all instructions on the label. When should you call for help? Call your doctor now or seek immediate medical care if:    · You have signs of infection, such as:  ? Increased pain, swelling, warmth, or redness. ? Red streaks leading from the area. ? Pus draining from the area. ? A fever.     · You have joint pain along with the rash.    Watch closely for changes in your health, and be sure to contact your doctor if:    · Your rash is changing or getting worse. For example, call if you have pain along with the rash, the rash is spreading, or you have new blisters.     · You do not get better after 1 week. Where can you learn more? Go to http://julissa-jannette.info/.   Enter A987 in the search box to learn more about \"Rash: Care Instructions. \"  Current as of: April 1, 2019  Content Version: 12.2  © 2840-8390 [employer]. Care instructions adapted under license by [employer] (which disclaims liability or warranty for this information). If you have questions about a medical condition or this instruction, always ask your healthcare professional. [address] any warranty or liability for your use of this information. Seborrheic Dermatitis: Care Instructions  Your Care Instructions  Seborrheic dermatitis (say \"cpl-msi-JKX-ick nyr-pob-ZK-tus\") is a skin problem that causes a reddish rash with greasy, flaky, yellow skin patches. The rash may appear on many parts of the body. It may be on the scalp, face (especially the eyebrow area and between the nose and mouth), ears, breasts, underarms, and genital area. The flaky skin on the scalp is called dandruff. This rash is often a long-term (chronic) condition. It may last for years. But the symptoms may come and go. Symptoms can be treated with special creams, shampoos, or other skin care. The cause of seborrheic dermatitis is not fully understood. It may occur when skin glands make too much oil. It may get worse in cold weather or with stress. A type of skin fungus, or yeast, may also be linked with this condition. Follow-up care is a key part of your treatment and safety. Be sure to make and go to all appointments, and call your doctor if you are having problems. It's also a good idea to know your test results and keep a list of the medicines you take. How can you care for yourself at home? · If your doctor prescribes a steroid cream, dandruff shampoo, or antifungal cream or medicine, use it as directed. If your doctor prescribes other medicine, take it as directed. · Use a dandruff shampoo if seborrheic dermatitis affects your scalp. This includes Head & Shoulders, Sebulex, and Selsun Blue.  You may need to try a few kinds of shampoo to find the one that works best for you. · To help with itching:  ? Use hydrocortisone cream. Follow the directions on the label. ? Use cold, wet cloths. ? Take an over-the-counter antihistamine, such as diphenhydramine (Benadryl) or loratadine (Claritin). Read and follow all instructions on the label. When should you call for help? Call your doctor now or seek immediate medical care if:    · You have signs of infection, such as:  ? Increased pain, swelling, warmth, or redness. ? Red streaks leading from the rash. ? Pus draining from the rash. ? A fever.    Watch closely for changes in your health, and be sure to contact your doctor if:    · The rash gets worse or spreads to other parts of your body.     · You do not get better as expected. Where can you learn more? Go to http://julissa-jannette.info/. Enter Y011 in the search box to learn more about \"Seborrheic Dermatitis: Care Instructions. \"  Current as of: April 1, 2019  Content Version: 12.2  © 6812-4228 [employer]. Care instructions adapted under license by [employer] (which disclaims liability or warranty for this information). If you have questions about a medical condition or this instruction, always ask your healthcare professional. [address] any warranty or liability for your use of this information. Headache: Care Instructions  Your Care Instructions    Headaches have many possible causes. Most headaches aren't a sign of a more serious problem, and they will get better on their own. Home treatment may help you feel better faster. The doctor has checked you carefully, but problems can develop later. If you notice any problems or new symptoms, get medical treatment right away. Follow-up care is a key part of your treatment and safety. Be sure to make and go to all appointments, and call your doctor if you are having problems.  It's also a good idea to know your test results and keep a list of the medicines you take. How can you care for yourself at home? · Do not drive if you have taken a prescription pain medicine. · Rest in a quiet, dark room until your headache is gone. Close your eyes and try to relax or go to sleep. Don't watch TV or read. · Put a cold, moist cloth or cold pack on the painful area for 10 to 20 minutes at a time. Put a thin cloth between the cold pack and your skin. · Use a warm, moist towel or a heating pad set on low to relax tight shoulder and neck muscles. · Have someone gently massage your neck and shoulders. · Take pain medicines exactly as directed. ? If the doctor gave you a prescription medicine for pain, take it as prescribed. ? If you are not taking a prescription pain medicine, ask your doctor if you can take an over-the-counter medicine. · Be careful not to take pain medicine more often than the instructions allow, because you may get worse or more frequent headaches when the medicine wears off. · Do not ignore new symptoms that occur with a headache, such as a fever, weakness or numbness, vision changes, or confusion. These may be signs of a more serious problem. To prevent headaches  · Keep a headache diary so you can figure out what triggers your headaches. Avoiding triggers may help you prevent headaches. Record when each headache began, how long it lasted, and what the pain was like (throbbing, aching, stabbing, or dull). Write down any other symptoms you had with the headache, such as nausea, flashing lights or dark spots, or sensitivity to bright light or loud noise. Note if the headache occurred near your period. List anything that might have triggered the headache, such as certain foods (chocolate, cheese, wine) or odors, smoke, bright light, stress, or lack of sleep. · Find healthy ways to deal with stress. Headaches are most common during or right after stressful times.  Take time to relax before and after you do something that has caused a headache in the past.  · Try to keep your muscles relaxed by keeping good posture. Check your jaw, face, neck, and shoulder muscles for tension, and try relaxing them. When sitting at a desk, change positions often, and stretch for 30 seconds each hour. · Get plenty of sleep and exercise. · Eat regularly and well. Long periods without food can trigger a headache. · Treat yourself to a massage. Some people find that regular massages are very helpful in relieving tension. · Limit caffeine by not drinking too much coffee, tea, or soda. But don't quit caffeine suddenly, because that can also give you headaches. · Reduce eyestrain from computers by blinking frequently and looking away from the computer screen every so often. Make sure you have proper eyewear and that your monitor is set up properly, about an arm's length away. · Seek help if you have depression or anxiety. Your headaches may be linked to these conditions. Treatment can both prevent headaches and help with symptoms of anxiety or depression. When should you call for help? Call 911 anytime you think you may need emergency care. For example, call if:    · You have signs of a stroke. These may include:  ? Sudden numbness, paralysis, or weakness in your face, arm, or leg, especially on only one side of your body. ? Sudden vision changes. ? Sudden trouble speaking. ? Sudden confusion or trouble understanding simple statements. ? Sudden problems with walking or balance. ? A sudden, severe headache that is different from past headaches.    Call your doctor now or seek immediate medical care if:    · You have a new or worse headache.     · Your headache gets much worse. Where can you learn more? Go to http://julissa-jannette.info/. Enter M271 in the search box to learn more about \"Headache: Care Instructions. \"  Current as of: March 28, 2019  Content Version: 12.2  © 6122-2372 [employer].  Care instructions adapted under license by [employer] (which disclaims liability or warranty for this information). If you have questions about a medical condition or this instruction, always ask your healthcare professional. [address] any warranty or liability for your use of this information.

## 2019-11-06 NOTE — PROGRESS NOTES
Rm 9    Chief Complaint   Patient presents with    Headache     Happening daily,    10/ 29/19 bladder Tumor removal Dr Sulma Cochran point    1. Have you been to the ER, urgent care clinic since your last visit? Hospitalized since your last visit? No    2. Have you seen or consulted any other health care providers outside of the [address] [place] [address] since your last visit? Include any pap smears or colon screening. No    Health Maintenance Due   Topic Date Due    GLAUCOMA SCREENING Q2Y  04/01/2018    MEDICARE YEARLY EXAM  08/18/2019     3 most recent PHQ Screens 11/6/2019   Little interest or pleasure in doing things Not at all   Feeling down, depressed, irritable, or hopeless Not at all   Total Score PHQ 2 0     Fall Risk Assessment, last 12 mths 11/6/2019   Able to walk? Yes   Fall in past 12 months?  No   Fall with injury? -   Number of falls in past 12 months -   Fall Risk Score -       Learning Assessment 7/24/2019   PRIMARY LEARNER Patient   HIGHEST LEVEL OF EDUCATION - PRIMARY LEARNER  -   BARRIERS PRIMARY LEARNER NONE   CO-LEARNER CAREGIVER No   PRIMARY LANGUAGE ENGLISH   LEARNER PREFERENCE PRIMARY LISTENING   ANSWERED BY patient   RELATIONSHIP SELF

## 2019-11-06 NOTE — PROGRESS NOTES
HISTORY OF PRESENT ILLNESS  Marielena Puckett is a 75 y.o. male presents with spouse  for headache follow up   HPI  Usually this time of the day he has headache but not today  Now takes one Tramadol TID, was two TID  Takes 3 ES Tylenol daily    Follow up appointment with neurologist November 29 for evaluation of daily headache    October 30 had surgery for  bladder tumor, pathology report pending     Always has back pain, some days worse    Trazodone effective for sleep.        Stopped Lisinopril  6 months ago 2/2 symptomatic hypotension; dizziness resolved     Spouse reports he is still depressed on current medications     Nasal congestion, using Sudafed     Scalp lesions    Facial erythema     Past Medical History:   Diagnosis Date    AAA (abdominal aortic aneurysm) ([facility] [address] 07/29/2019    3.8 cm on US    Alcohol abuse, in remission     Arthritis     Back pain     Bladder cancer (HCC)      - in [place], [place]    C. 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 Allie Krishnamurthy       Current Outpatient Medications   Medication Sig    ketoconazole (NIZORAL) 2 % shampoo Apply twice weekly for 2-4 weeks    escitalopram oxalate (LEXAPRO) 20 mg tablet take 1 tablet by mouth once daily    meloxicam (MOBIC) 15 mg tablet TAKE 1 TABLET BY MOUTH DAILY    lidocaine (LIDODERM) 5 % apply 1 patch every 24 hours as needed    traMADol (ULTRAM) 50 mg tablet TAKE 1 TABLET BY MOUTH EVERY 8 HOURS FOR UP TO 30 DAYS- MAX DAILY AMOUNT: 150MG    pramipexole (MIRAPEX) 0.75 mg tablet TAKE 1 TABLET BY MOUTH EVERY NIGHT    memantine (NAMENDA) 10 mg tablet Take 1 Tab by mouth two (2) times a day.  ondansetron (ZOFRAN ODT) 4 mg disintegrating tablet Take 1 Tab by mouth every eight (8) hours as needed for Nausea.  thiamine HCL (B-1) 100 mg tablet Take 1 Tab by mouth daily.  acetaminophen (TYLENOL EXTRA STRENGTH) 500 mg tablet Take  by mouth BID Mon Wed & Fri.    ibuprofen 200 mg cap Take  by mouth.  tiZANidine (ZANAFLEX) 2 mg tablet TAKE 1 TABLET BY MOUTH THREE TIMES DAILY AS NEEDED FOR PAIN    OTHER CBD oil(for back pain)    buPROPion XL (WELLBUTRIN XL) 150 mg tablet TAKE 1 TABLET BY MOUTH EVERY MORNING    traMADol (ULTRAM) 50 mg tablet Take 50 mg by mouth every eight (8) hours as needed for Pain.  melatonin tab tablet Take 5 mg by mouth nightly.  multivitamin (ONE DAILY ENERGY) tablet Take 1 Tab by mouth daily.  triamcinolone acetonide (KENALOG) 0.1 % topical cream Apply  to affected area two (2) times a day. use thin layer x 3-5 days at time    cholecalciferol, vitamin D3, (VITAMIN D3 PO) Take 2,000 Units by mouth daily.  vitamin E (AQUA GEMS) 400 unit capsule Take 400 Units by mouth daily.  MILK THISTLE PO Take 1 Tab by mouth two (2) times a day.  tamsulosin (FLOMAX) 0.4 mg capsule Take 0.4 mg by mouth daily.  loratadine (CLARITIN) 10 mg tablet Take 10 mg by mouth daily.  cyanocobalamin, vitamin B-12, (VITAMIN B-12 PO) Take 1 Tab by mouth daily.  fluticasone propionate (FLONASE) 50 mcg/actuation nasal spray SHAKE LIQUID AND USE 2 SPRAYS INTO EACH NOSTRIL DAILY     No current facility-administered medications for this visit. No Known Allergies     Past Surgical History:   Procedure Laterality Date    BLADDER CANCER FISH      endoscopic    HX LUMBAR LAMINECTOMY      HX UROLOGICAL           Review of Systems   Constitutional: Negative. HENT: Positive for congestion. Negative for sore throat.     Eyes: Negative for blurred vision. Respiratory: Negative. Cardiovascular: Negative for chest pain, palpitations and leg swelling. Gastrointestinal: Negative. Genitourinary: Negative for dysuria and hematuria. Musculoskeletal: Positive for back pain and myalgias. Neurological: Positive for headaches. Negative for dizziness. Psychiatric/Behavioral: Positive for depression. Negative for substance abuse and suicidal ideas. The patient is nervous/anxious. Visit Vitals  /78 (BP 1 Location: Left arm, BP Patient Position: Sitting)   Pulse 72   Temp 97.9 °F (36.6 °C) (Oral)   Resp 16   Ht 5' 10\" (1.778 m)   Wt 232 lb (105.2 kg)   SpO2 94%   BMI 33.29 kg/m²     Physical Exam   Constitutional: He appears well-developed and well-nourished. No distress. HENT:   Head: Normocephalic and atraumatic. Right Ear: External ear normal.   Nose: Nose normal.   Mouth/Throat: Oropharynx is clear and moist.   Eyes: Conjunctivae are normal. Right eye exhibits no discharge. Left eye exhibits no discharge. Cardiovascular: Normal rate, regular rhythm and normal heart sounds. Pulmonary/Chest: Effort normal and breath sounds normal.   Musculoskeletal: He exhibits no edema or tenderness. Lymphadenopathy:     He has no cervical adenopathy. Neurological: He is alert. Skin: Skin is warm and dry. He is not diaphoretic. Small, tan plaques crown of head  Chin erythematous, without lesion, tenderness or increased temperature    Psychiatric: His speech is normal and behavior is normal. His mood appears anxious. Cognition and memory are impaired (mild ). He exhibits abnormal recent memory. ASSESSMENT and PLAN    ICD-10-CM ICD-9-CM    1. Headache disorder R51 784.0    2. Seborrhea capitis L21.0 690.11 ketoconazole (NIZORAL) 2 % shampoo   3. Rash and nonspecific skin eruption R21 782.1 REFERRAL TO DERMATOLOGY   4. Depression, unspecified depression type F32.9 311    5.  Acute non-recurrent sinusitis, unspecified location J01.90 461.9 DISCONTINUED: fluticasone propionate (FLONASE) 50 mcg/actuation nasal spray   6. Chronic back pain, unspecified back location, unspecified back pain laterality M54.9 724.5     G89.29 338.29      Follow-up and Dispositions    · Return in about 4 weeks (around 12/4/2019), or if symptoms worsen or fail to improve, for depression, headache, htn.     strongly encouraged to see neurologist     Dermatology referral for evaluation of chronic scalp dermatitis,  likely rosacea    Allergic sinusitis. Continue sudafed, add Flonase     Depression uncontrolled. Francois Cesar He currently takes SNRI and SSRI. Discussed alternate therapy. Spouse would like less medication   Trial off Lexapro. Instructions given how to wean medication. Instructed to restart medication if depression increase    lab results and schedule of future lab studies reviewed with patient  reviewed diet, exercise and weight control  reviewed medications and side effects in detail      Patient voices understanding and acceptance of this advice and will call back if any further questions or concerns. An After Visit Summary was printed and given to the patient.

## 2019-11-08 RX ORDER — FLUTICASONE PROPIONATE 50 MCG
SPRAY, SUSPENSION (ML) NASAL
Qty: 1 BOTTLE | Refills: 3 | Status: SHIPPED | OUTPATIENT
Start: 2019-11-08 | End: 2019-11-11 | Stop reason: SDUPTHER

## 2019-11-11 DIAGNOSIS — J01.90 ACUTE NON-RECURRENT SINUSITIS, UNSPECIFIED LOCATION: ICD-10-CM

## 2019-11-12 RX ORDER — FLUTICASONE PROPIONATE 50 MCG
SPRAY, SUSPENSION (ML) NASAL
Qty: 1 BOTTLE | Refills: 3 | Status: SHIPPED | OUTPATIENT
Start: 2019-11-12 | End: 2020-11-16

## 2019-11-27 ENCOUNTER — OFFICE VISIT (OUTPATIENT)
Dept: NEUROLOGY | Age: 74
End: 2019-11-27

## 2019-11-27 VITALS
RESPIRATION RATE: 18 BRPM | SYSTOLIC BLOOD PRESSURE: 122 MMHG | HEART RATE: 75 BPM | WEIGHT: 234 LBS | BODY MASS INDEX: 33.58 KG/M2 | OXYGEN SATURATION: 97 % | DIASTOLIC BLOOD PRESSURE: 76 MMHG

## 2019-11-27 DIAGNOSIS — F32.A DEPRESSION, UNSPECIFIED DEPRESSION TYPE: ICD-10-CM

## 2019-11-27 DIAGNOSIS — F03.90 DEMENTIA WITHOUT BEHAVIORAL DISTURBANCE, UNSPECIFIED DEMENTIA TYPE: Primary | ICD-10-CM

## 2019-11-27 RX ORDER — MEMANTINE HYDROCHLORIDE 10 MG/1
10 TABLET ORAL 2 TIMES DAILY
Qty: 180 TAB | Refills: 1 | Status: SHIPPED | OUTPATIENT
Start: 2019-11-27 | End: 2020-05-06 | Stop reason: SDUPTHER

## 2019-11-27 NOTE — PROGRESS NOTES
Neurology Clinic Follow up Note Patient ID: Jessica Mccallum 
[id number] 
75 y.o. 
1945 Mr. Zoë Hassan is here for follow up today of Chief Complaint Patient presents with  Memory Loss Last Appointment With Me: 
3/12/19 Interval History:  
Family reports some continued slow decline in memory since last visit, variable from day to day. He is repeating himself often. Having difficulty with recalling where he lives for example. He is requiring assistance with medications, finances, meal prep. Still independent for other ADLs. On Namenda and doing well with this. No reported side effects. Family denies aggression/agitation, other behavioral concerns. Ability to function: 
Driving: Not driving, somewhat depressed by this. Finances: Handled by his wife for years Cooking: No 
Manages own medication: Managed by his wife Residing: Living with his wife at home PMHx/ PSHx/ FHx/ SHx:  Reviewed and unchanged previous visit. Past Medical History:  
Diagnosis Date  AAA (abdominal aortic aneurysm) ([address] 07/29/2019  
 3.8 cm on US  
 Alcohol abuse, in remission  Arthritis  Back pain  Bladder cancer ([address]  - in [place], [place] C. difficile colitis 2010  Cancer involving bladder by direct extension from endometrium ([address] 10/2016  Dementia ([address]  Depression  Horseshoe kidney  HTN (hypertension) 12/5/2013  Hyperlipidemia  Liver disease  Recurrent bladder transitional cell carcinoma ([address]  Restless leg syndrome  Screening for colorectal cancer 03/14/2018  
 cologuard - negative  Situational anxiety   
 ie air travel  Spinal stenosis, lumbar  Thrombocytopenia ([address]  Transitional cell carcinoma of left ureter ([address] Dr. Jacob To ROS: 
Comprehensive review of systems negative except for as noted above. Objective:  
 
 
Meds: 
Current Outpatient Medications Medication Sig Dispense Refill  fluticasone propionate (FLONASE) 50 mcg/actuation nasal spray SHAKE LIQUID AND USE 2 SPRAYS INTO EACH NOSTRIL DAILY 1 Bottle 3  
 ketoconazole (NIZORAL) 2 % shampoo Apply twice weekly for 2-4 weeks 120 mL 1  
 escitalopram oxalate (LEXAPRO) 20 mg tablet take 1 tablet by mouth once daily 90 Tab 3  
 meloxicam (MOBIC) 15 mg tablet TAKE 1 TABLET BY MOUTH DAILY 90 Tab 1  
 lidocaine (LIDODERM) 5 % apply 1 patch every 24 hours as needed 30 Patch 5  
 pramipexole (MIRAPEX) 0.75 mg tablet TAKE 1 TABLET BY MOUTH EVERY NIGHT 90 Tab 1  
 memantine (NAMENDA) 10 mg tablet Take 1 Tab by mouth two (2) times a day. 60 Tab 2  
 ondansetron (ZOFRAN ODT) 4 mg disintegrating tablet Take 1 Tab by mouth every eight (8) hours as needed for Nausea. 60 Tab 5  thiamine HCL (B-1) 100 mg tablet Take 1 Tab by mouth daily. 30 Tab 5  
 acetaminophen (TYLENOL EXTRA STRENGTH) 500 mg tablet Take  by mouth BID Mon Wed & Fri.    
 ibuprofen 200 mg cap Take  by mouth.  tiZANidine (ZANAFLEX) 2 mg tablet TAKE 1 TABLET BY MOUTH THREE TIMES DAILY AS NEEDED FOR PAIN 90 Tab 3  
 OTHER CBD oil(for back pain)  buPROPion XL (WELLBUTRIN XL) 150 mg tablet TAKE 1 TABLET BY MOUTH EVERY MORNING 90 Tab 1  
 traMADol (ULTRAM) 50 mg tablet Take 50 mg by mouth every eight (8) hours as needed for Pain.  melatonin tab tablet Take 5 mg by mouth nightly.  multivitamin (ONE DAILY ENERGY) tablet Take 1 Tab by mouth daily.  triamcinolone acetonide (KENALOG) 0.1 % topical cream Apply  to affected area two (2) times a day. use thin layer x 3-5 days at time 45 g 2  cholecalciferol, vitamin D3, (VITAMIN D3 PO) Take 2,000 Units by mouth daily.  vitamin E (AQUA GEMS) 400 unit capsule Take 400 Units by mouth daily.  MILK THISTLE PO Take 1 Tab by mouth two (2) times a day.  tamsulosin (FLOMAX) 0.4 mg capsule Take 0.4 mg by mouth daily.  loratadine (CLARITIN) 10 mg tablet Take 10 mg by mouth daily.  cyanocobalamin, vitamin B-12, (VITAMIN B-12 PO) Take 1 Tab by mouth daily.  traMADol (ULTRAM) 50 mg tablet TAKE 1 TABLET BY MOUTH EVERY 8 HOURS FOR UP TO 30 DAYS- MAX DAILY AMOUNT: 150MG 120 Tab 3 Exam: 
Visit Vitals /76 Pulse 75 Resp 18 Wt 106.1 kg (234 lb) SpO2 97% BMI 33.58 kg/m² NEUROLOGICAL EXAM: 
General: Awake, alert, speech fluent. Disoriented to date. Geiger Emily, \"[facility] [place]\", not POTUS. Naming and serial 7's intact. Delayed recall impaired (2/5) CN: PERRL, EOMI without nystagmus, VFF to confrontation, facial sensation and strength are normal and symmetric, hearing is intact to finger rub bilaterally, palate and tongue movements are intact and symmetric. Motor: Normal tone, bulk and strength bilaterally. Reflexes: 1/4 and symmetric, plantar stimulation is flexor. Coordination: No dysmetria. Normal rapid alternating movements; finger-to-nose and heel-to- shin testing are within normal limits. Tremor b/l UE action/postural.   
Gait: hunched posture, slightly unsteady LABS Results for orders placed or performed in visit on 10/20/19 PAIN MGMT PANEL W/REFL, UR Result Value Ref Range Amphetamine Screen, urine Negative Mhxwwz=4924 ng/mL Barbiturates Screen, urine Negative Fuihyz=998 ng/mL Benzodiazepines Screen, urine Negative Slwugf=680 ng/mL Cannabinoid Screen, urine Negative Cutoff=20 ng/mL Cocaine Metab. Screen, urine Negative Pweobd=959 ng/mL Opiate Screen, urine Negative Dcvgdh=633 ng/mL Oxycodone/Oxymorphone, urine Negative Ezhqov=023 ng/mL Phencyclidine Screen, urine Negative Cutoff=25 ng/mL Methadone Screen, urine Negative Kucnem=996 ng/mL Propoxyphene Screen, urine Negative Gydwrt=020 ng/mL Meperidine screen Negative Iqylgj=091 ng/mL FENTANYL, URINE Negative Uzxtjb=0764 pg/mL Tramadol Screen, urine Positive (A) Xnvuoz=899 Creatinine, urine 70.5 20.0 - 300.0 mg/dL  Specific Gravity 1.013   
 pH, urine 5.7 4.5 - 8.9 Please Note Comment IMAGING: 
MRI Results (most recent): 
Results from Hospital Encounter encounter on 08/06/18 MRI BRAIN W WO CONT Narrative EXAM:  MRI BRAIN W WO CONT INDICATION:  Encephalopathy, confusion, R 41.0, recurrent bladder transitional 
cell carcinoma, C 67.9, G 93.40, TECHNIQUE: Sagittal T1, axial FLAIR, T2, T1 and gradient echo T2-weighted images 
of the head were obtained followed by intravenous infusion 17 mL Dotarem repeat 
axial and coronal T1-weighted images and axial diffusion weighted images. COMPARISON: None available. FINDINGS: 
Generalized ventricular and sulcal prominence consistent with cerebral volume 
loss somewhat greater than expected for age. Minimal white matter T2 hyperintensity within the range of normal for age. No abnormal areas of intracranial enhancement. No abnormal diffusion. No evidence of intracranial hemorrhage, infarct, mass or abnormal extra-axial 
fluid collections. Flow voids are present in the vertebral, basilar and carotid artery systems. The craniocervical junction is unremarkable. The structures of the cranial base including paranasal sinuses are  
unremarkable. Impression IMPRESSION:  
1. Generalized cerebral volume loss somewhat greater than expected for age. 2. No acute intracranial abnormality demonstrated. [place] Assessment:  
 
Encounter Diagnoses ICD-10-CM ICD-9-CM 1. Dementia without behavioral disturbance, unspecified dementia type ([facility] F03.90 294.20 2. Depression, unspecified depression type F32.9 [address] [facility]  
  
75year old male with a h/o HTN, HPL, bladder CA, depression, EtOH abuse, transaminitis, lumbar spinal stenosis here for f/u of cognitive decline. MOCA 18/30 and c/w moderate dementia with significantly impaired delayed recall, orientation, executive functioning and to a lesser extent naming.    
MRI Brain completed 8/6/18 and independently reviewed with pt/family today without evidence of vascular dementia, mild to moderate cortical atrophy, no acute process identified. Neuropsychologic assessment completed without evidence of pseudodementia. Presentation is c/w evolving moderate dementia, likely AD. Finances and medication administration should continue to be monitored to ensure accuracy and prevent errors. Patient currently has appropriate social/caregiver support in place. Will continue Namenda to assist with recall. Advised that he continue to abstain from driving. Plan:  
Continue Namenda 10mg BID Heart healthy diet and exercise Regular scheduled cognitive and social engagement. Follow-up and Dispositions · Return in about 6 months (around 5/27/2020). Signed: 
Rachna Ahuja,  
11/27/2019

## 2019-11-27 NOTE — PATIENT INSTRUCTIONS
PRESCRIPTION REFILL POLICY [facility] Statement to Patients April 1, 2014 In an effort to ensure the large volume of patient prescription refills is processed in the most efficient and expeditious manner, we are asking our patients to assist us by calling your Pharmacy for all prescription refills, this will include also your  Mail Order Pharmacy. The pharmacy will contact our office electronically to continue the refill process. Please do not wait until the last minute to call your pharmacy. We need at least 48 hours (2days) to fill prescriptions. We also encourage you to call your pharmacy before going to  your prescription to make sure it is ready. With regard to controlled substance prescription refill requests (narcotic refills) that need to be picked up at our office, we ask your cooperation by providing us with at least 72 hours (3days) notice that you will need a refill. We will not refill narcotic prescription refill requests after 4:00pm on any weekday, Monday through Thursday, or after 2:00pm on Fridays, or on the weekends. We encourage everyone to explore another way of getting your prescription refill request processed using [employer], our patient web portal through our electronic medical record system. [employer] is an efficient and effective way to communicate your medication request directly to the office and  downloadable as an donte on your smart phone . [employer] also features a review functionality that allows you to view your medication list as well as leave messages for your physician. Are you ready to get connected? If so please review the attatched instructions or speak to any of our staff to get you set up right away! Thank you so much for your cooperation. Should you have any questions please contact our Practice Administrator. The Physicians and Staff,  [facility]

## 2019-12-03 ENCOUNTER — TELEPHONE (OUTPATIENT)
Dept: INTERNAL MEDICINE CLINIC | Age: 74
End: 2019-12-03

## 2019-12-03 NOTE — TELEPHONE ENCOUNTER
Pt has appt 12/4/19 w/ Dr Matt Kay. Wife brought a letter re: pt's back pain and headaches, pain specialist info, and a current med list. These were placed in provider's '24 hour' box for review.

## 2019-12-04 ENCOUNTER — OFFICE VISIT (OUTPATIENT)
Dept: INTERNAL MEDICINE CLINIC | Age: 74
End: 2019-12-04

## 2019-12-04 VITALS
HEART RATE: 80 BPM | OXYGEN SATURATION: 95 % | DIASTOLIC BLOOD PRESSURE: 73 MMHG | BODY MASS INDEX: 33.93 KG/M2 | WEIGHT: 237 LBS | RESPIRATION RATE: 12 BRPM | TEMPERATURE: 98.4 F | HEIGHT: 70 IN | SYSTOLIC BLOOD PRESSURE: 129 MMHG

## 2019-12-04 DIAGNOSIS — E78.5 HYPERLIPIDEMIA, UNSPECIFIED HYPERLIPIDEMIA TYPE: ICD-10-CM

## 2019-12-04 DIAGNOSIS — G89.29 CHRONIC BACK PAIN, UNSPECIFIED BACK LOCATION, UNSPECIFIED BACK PAIN LATERALITY: ICD-10-CM

## 2019-12-04 DIAGNOSIS — C67.9 RECURRENT BLADDER TRANSITIONAL CELL CARCINOMA (HCC): ICD-10-CM

## 2019-12-04 DIAGNOSIS — F03.91 DEMENTIA WITH BEHAVIORAL DISTURBANCE, UNSPECIFIED DEMENTIA TYPE: Primary | ICD-10-CM

## 2019-12-04 DIAGNOSIS — M54.9 CHRONIC BACK PAIN, UNSPECIFIED BACK LOCATION, UNSPECIFIED BACK PAIN LATERALITY: ICD-10-CM

## 2019-12-04 DIAGNOSIS — F33.9 RECURRENT DEPRESSION (HCC): ICD-10-CM

## 2019-12-04 DIAGNOSIS — R51.9 HEADACHE DISORDER: ICD-10-CM

## 2019-12-04 DIAGNOSIS — I10 ESSENTIAL HYPERTENSION: ICD-10-CM

## 2019-12-04 DIAGNOSIS — M48.062 SPINAL STENOSIS OF LUMBAR REGION WITH NEUROGENIC CLAUDICATION: ICD-10-CM

## 2019-12-04 DIAGNOSIS — Z98.890 S/P LUMBAR LAMINECTOMY: ICD-10-CM

## 2019-12-04 DIAGNOSIS — G25.81 RESTLESS LEG SYNDROME: ICD-10-CM

## 2019-12-04 DIAGNOSIS — F32.A DEPRESSION, UNSPECIFIED DEPRESSION TYPE: ICD-10-CM

## 2019-12-04 DIAGNOSIS — E55.9 VITAMIN D DEFICIENCY: ICD-10-CM

## 2019-12-04 RX ORDER — CHLORZOXAZONE 500 MG/1
500 TABLET ORAL 2 TIMES DAILY
Refills: 0 | COMMUNITY
Start: 2019-11-27 | End: 2020-01-06

## 2019-12-04 RX ORDER — DONEPEZIL HYDROCHLORIDE 10 MG/1
10 TABLET, FILM COATED ORAL
Qty: 90 TAB | Refills: 1 | Status: SHIPPED | OUTPATIENT
Start: 2019-12-04 | End: 2020-05-25

## 2019-12-04 RX ORDER — SERTRALINE HYDROCHLORIDE 50 MG/1
50 TABLET, FILM COATED ORAL DAILY
Qty: 90 TAB | Refills: 1 | Status: SHIPPED | OUTPATIENT
Start: 2019-12-04 | End: 2020-01-09

## 2019-12-04 RX ORDER — TRAZODONE HYDROCHLORIDE 100 MG/1
100 TABLET ORAL
Qty: 90 TAB | Refills: 1 | Status: SHIPPED | OUTPATIENT
Start: 2019-12-04 | End: 2019-12-04

## 2019-12-04 NOTE — PROGRESS NOTES
Rm#15 Presents w/ wife Taking tramadol Chief Complaint Patient presents with  Depression f/u  Headache  
  f/u  Hypertension f/u  Nasal Congestion  
  nasal and head congestion 1. Have you been to the ER, urgent care clinic since your last visit? Hospitalized since your last visit? No 
 
2. Have you seen or consulted any other health care providers outside of the [address] [place] [address] since your last visit? Include any pap smears or colon screening. No  
 
Health Maintenance Due Topic Date Due  GLAUCOMA SCREENING Q2Y  04/01/2018  MEDICARE YEARLY EXAM  08/18/2019

## 2019-12-04 NOTE — PROGRESS NOTES
HPI: 
Presents for f/u HTN, dementia, mood, etc 
 
Accompanied by wife Depression - not ideal. 
 
Memory - wife planning on  Day program enrollment HAs - still daily HAs Frontal HAs HTN - stable Nasal congestion - no fevers +/- sinus pain or pressure Chronic pain - using tramadol, meloxicam, lidocaine patches At least stable Has reduced tramadol dose. To see derm Appears to be seb derm on scalp, face Using ketoconazole 2 x per week Past medical, Social, and Family history reviewed Prior to Admission medications Medication Sig Start Date End Date Taking? Authorizing Provider  
chlorzoxazone (PARAFON FORTE) 500 mg tablet Take 500 mg by mouth two (2) times a day. 11/27/19  Yes Provider, Historical  
donepezil (ARICEPT) 10 mg tablet Take 1 Tab by mouth nightly. 12/4/19  Yes Andreas Stuart MD  
sertraline (ZOLOFT) 50 mg tablet Take 1 Tab by mouth daily. 12/4/19  Yes Andreas Stuart MD  
memantine [facility]) 10 mg tablet Take 1 Tab by mouth two (2) times a day. 11/27/19  Yes Arsenio Ramsey,   
fluticasone propionate (FLONASE) 50 mcg/actuation nasal spray SHAKE LIQUID AND USE 2 SPRAYS INTO EACH NOSTRIL DAILY 11/12/19  Yes Dariela Rm NP  
ketoconazole (NIZORAL) 2 % shampoo Apply twice weekly for 2-4 weeks 11/6/19  Yes Rony SINGH NP  
meloxicam (MOBIC) 15 mg tablet TAKE 1 TABLET BY MOUTH DAILY 11/2/19  Yes Andreas Stuart MD  
lidocaine (LIDODERM) 5 % apply 1 patch every 24 hours as needed 10/28/19  Yes Andreas Stuart MD  
pramipexole (MIRAPEX) 0.75 mg tablet TAKE 1 TABLET BY MOUTH EVERY NIGHT 10/7/19  Yes Andreas Stuart MD  
ondansetron (ZOFRAN ODT) 4 mg disintegrating tablet Take 1 Tab by mouth every eight (8) hours as needed for Nausea. 9/13/19  Yes Dariela Rm NP  
thiamine HCL (B-1) 100 mg tablet Take 1 Tab by mouth daily.  8/23/19  Yes Sofya Lynn MD  
acetaminophen (TYLENOL EXTRA STRENGTH) 500 mg tablet Take  by mouth BID Mon Wed & Fri. Yes Provider, Historical  
tiZANidine (ZANAFLEX) 2 mg tablet TAKE 1 TABLET BY MOUTH THREE TIMES DAILY AS NEEDED FOR PAIN 8/5/19  Yes Alok Daniel NP  
buPROPion XL (WELLBUTRIN XL) 150 mg tablet TAKE 1 TABLET BY MOUTH EVERY MORNING 7/18/19  Yes Italo Zee MD  
traMADol [facility]) 50 mg tablet Take 50 mg by mouth every eight (8) hours as needed for Pain. Yes Provider, Historical  
melatonin tab tablet Take 5 mg by mouth nightly. Yes Provider, Historical  
multivitamin (ONE DAILY ENERGY) tablet Take 1 Tab by mouth daily. Yes Provider, Historical  
triamcinolone acetonide (KENALOG) 0.1 % topical cream Apply  to affected area two (2) times a day. use thin layer x 3-5 days at time 5/21/19  Yes Miguel Smalls MD  
cholecalciferol, vitamin D3, (VITAMIN D3 PO) Take 2,000 Units by mouth daily. Yes Provider, Historical  
vitamin E (AQUA GEMS) 400 unit capsule Take 400 Units by mouth daily. Yes Provider, Historical  
MILK THISTLE PO Take 1 Tab by mouth two (2) times a day. Yes Provider, Historical  
tamsulosin (FLOMAX) 0.4 mg capsule Take 0.4 mg by mouth daily. Yes Provider, Historical  
loratadine (CLARITIN) 10 mg tablet Take 10 mg by mouth daily. Yes Provider, Historical  
cyanocobalamin, vitamin B-12, (VITAMIN B-12 PO) Take 1 Tab by mouth daily. Yes Provider, Historical  
traMADol (ULTRAM) 50 mg tablet TAKE 1 TABLET BY MOUTH EVERY 8 HOURS FOR UP TO 30 DAYS- MAX DAILY AMOUNT: 150MG 10/24/19 11/23/19  Miguel Smalls MD  
ibuprofen 200 mg cap Take  by mouth. Provider, Historical  
OTHER CBD oil(for back pain)    Provider, Historical  
  
 
 
ROS Complete ROS reviewed and negative or stable except as noted in HPI. Physical Exam 
Vitals signs and nursing note reviewed. Constitutional:   
   General: He is not in acute distress. HENT:  
   Head: Normocephalic and atraumatic. Mouth/Throat:  
   Pharynx: No oropharyngeal exudate. Eyes: General: No scleral icterus. Pupils: Pupils are equal, round, and reactive to light. Neck: Musculoskeletal: Normal range of motion and neck supple. Thyroid: No thyromegaly. Vascular: No JVD. Cardiovascular:  
   Rate and Rhythm: Normal rate and regular rhythm. Heart sounds: Normal heart sounds. No murmur. No friction rub. No gallop. Pulmonary:  
   Effort: Pulmonary effort is normal. No respiratory distress. Breath sounds: Normal breath sounds. No wheezing or rales. Abdominal:  
   General: Bowel sounds are normal. There is no distension. Palpations: Abdomen is soft. There is no mass. Tenderness: There is no tenderness. There is no guarding or rebound. Musculoskeletal: Normal range of motion. Comments: Slow deliberate gait, use of cane required for ambulation. Lymphadenopathy:  
   Cervical: No cervical adenopathy. Skin: 
   General: Skin is warm. Neurological:  
   Mental Status: He is alert and oriented to person, place, and time. Motor: No abnormal muscle tone. Coordination: Coordination normal.  
   Comments: +interactive. Prior labs reviewed. Reviewed letter from wife pre-visit - see scanned. Assessment/Plan: 
Possible analgesic rebound HAs Consider allergy/sinus HAs 
  ICD-10-CM ICD-9-CM 1. Dementia with behavioral disturbance, unspecified dementia type ([facility] F03.91 294.21   
2. Recurrent bladder transitional cell carcinoma (HCC) C67.9 188.9 3. Recurrent depression (HCC) F33.9 296.30   
4. Hyperlipidemia, unspecified hyperlipidemia type E78.5 272.4 5. Essential hypertension I10 401.9 6. Restless leg syndrome G25.81 333.94   
7. Chronic back pain, unspecified back location, unspecified back pain laterality M54.9 724.5 G89.29 338.29   
8. S/P lumbar laminectomy Z98.890 V45.89   
9. Spinal stenosis of lumbar region with neurogenic claudication M48.062 724.03   
10. Vitamin D deficiency E55.9 268.9 11. Headache disorder R51 784.0 12. Depression, unspecified depression type F32.9 311 Follow-up and Dispositions · Return in about 5 weeks (around 1/9/2020), or if symptoms worsen or fail to improve, for as scheduled - mood, HAs. 
  
 
 results and schedule of future studies reviewed with patient, wife 
reviewed diet, exercise and weight   
cardiovascular risk and specific lipid/LDL goals reviewed 
reviewed medications and side effects in detail Taper off lexapro Start zoloft Start aricept at 10 mg daily after tapered off of lexapro Agree to benadryl for sleep Await trial of benadryl prior to adding resp med - consider singulair or ENT eval 
Continue melatonin for now - may stop if not needed with benadryl Try to taper off mobic re: possible analgesic rebound HAs I encouraged the day program enrollment Continue other current medications >40 minutes time spent with >50% in counseling and coordination of care

## 2019-12-04 NOTE — PATIENT INSTRUCTIONS
Reduce Lexapro to 1/2 tablet daily for 1 week, then eveyother day for 1 week, then stop 
  
Taper off lexapro Start zoloft Start aricept at 10 mg daily after tapered off of lexapro Agree to benadryl for sleep - 25 mg Await trial of benadryl prior to adding respiratory medication - consider singulair or ENT eval if persists Continue melatonin for now - may stop if not needed with benadryl Try to taper off mobic re: possible rebound HAs

## 2019-12-27 DIAGNOSIS — I10 ESSENTIAL HYPERTENSION WITH GOAL BLOOD PRESSURE LESS THAN 140/90: ICD-10-CM

## 2019-12-27 RX ORDER — LISINOPRIL 10 MG/1
TABLET ORAL
Qty: 90 TAB | Refills: 3 | OUTPATIENT
Start: 2019-12-27

## 2020-01-06 DIAGNOSIS — M54.50 LUMBAR BACK PAIN: ICD-10-CM

## 2020-01-06 RX ORDER — TIZANIDINE 2 MG/1
TABLET ORAL
Qty: 90 TAB | Refills: 3 | Status: SHIPPED | OUTPATIENT
Start: 2020-01-06 | End: 2020-06-12

## 2020-01-08 NOTE — PROGRESS NOTES
Rm#13  Presents w/ wife      Chief Complaint   Patient presents with    Headache     f/u    Back Pain     f/u    Other     mood f/u     1. Have you been to the ER, urgent care clinic since your last visit? Hospitalized since your last visit? No    2. Have you seen or consulted any other health care providers outside of the [address] [place] [address] since your last visit? Include any pap smears or colon screening.  Yes pain spec., Dr. Effie Beck, 1-2-20, epidural      Health Maintenance Due   Topic Date Due    GLAUCOMA SCREENING Q2Y  04/01/2018    MEDICARE YEARLY EXAM  08/18/2019

## 2020-01-09 ENCOUNTER — OFFICE VISIT (OUTPATIENT)
Dept: INTERNAL MEDICINE CLINIC | Age: 75
End: 2020-01-09

## 2020-01-09 VITALS
BODY MASS INDEX: 32.93 KG/M2 | WEIGHT: 230 LBS | HEIGHT: 70 IN | DIASTOLIC BLOOD PRESSURE: 74 MMHG | RESPIRATION RATE: 12 BRPM | HEART RATE: 83 BPM | TEMPERATURE: 98.4 F | SYSTOLIC BLOOD PRESSURE: 134 MMHG | OXYGEN SATURATION: 96 %

## 2020-01-09 DIAGNOSIS — C67.9 RECURRENT BLADDER TRANSITIONAL CELL CARCINOMA (HCC): ICD-10-CM

## 2020-01-09 DIAGNOSIS — F03.91 DEMENTIA WITH BEHAVIORAL DISTURBANCE, UNSPECIFIED DEMENTIA TYPE: Primary | ICD-10-CM

## 2020-01-09 DIAGNOSIS — F10.21 ALCOHOL USE DISORDER, MODERATE, IN EARLY REMISSION, IN CONTROLLED ENVIRONMENT (HCC): ICD-10-CM

## 2020-01-09 DIAGNOSIS — M54.9 CHRONIC BACK PAIN, UNSPECIFIED BACK LOCATION, UNSPECIFIED BACK PAIN LATERALITY: ICD-10-CM

## 2020-01-09 DIAGNOSIS — I10 ESSENTIAL HYPERTENSION: ICD-10-CM

## 2020-01-09 DIAGNOSIS — F33.9 RECURRENT DEPRESSION (HCC): ICD-10-CM

## 2020-01-09 DIAGNOSIS — R51.9 NONINTRACTABLE HEADACHE, UNSPECIFIED CHRONICITY PATTERN, UNSPECIFIED HEADACHE TYPE: ICD-10-CM

## 2020-01-09 DIAGNOSIS — G89.29 CHRONIC BACK PAIN, UNSPECIFIED BACK LOCATION, UNSPECIFIED BACK PAIN LATERALITY: ICD-10-CM

## 2020-01-09 DIAGNOSIS — I71.40 ABDOMINAL AORTIC ANEURYSM (AAA) 3.0 CM TO 5.0 CM IN DIAMETER IN FEMALE: ICD-10-CM

## 2020-01-09 DIAGNOSIS — E55.9 VITAMIN D DEFICIENCY: ICD-10-CM

## 2020-01-09 DIAGNOSIS — E78.5 HYPERLIPIDEMIA, UNSPECIFIED HYPERLIPIDEMIA TYPE: ICD-10-CM

## 2020-01-09 RX ORDER — SERTRALINE HYDROCHLORIDE 100 MG/1
100 TABLET, FILM COATED ORAL DAILY
Qty: 90 TAB | Refills: 1 | Status: SHIPPED | OUTPATIENT
Start: 2020-01-09 | End: 2020-09-16 | Stop reason: DRUGHIGH

## 2020-01-09 RX ORDER — CHLORZOXAZONE 500 MG/1
500 TABLET ORAL 2 TIMES DAILY
COMMUNITY

## 2020-01-09 RX ORDER — DIPHENHYDRAMINE HCL 25 MG
25 CAPSULE ORAL
COMMUNITY

## 2020-01-09 NOTE — PROGRESS NOTES
HPI:  Presents for f/u mood, HA, dementia, etc    Accompanied by his wife    Filled out forms for day program    Saw Dr. Prabhu Otero Friday - epidural injections with limited or no benefit thus far    Mood not ideal.     HAs less frequent  Stopped Mobic   Limiting tylenol    Past medical, Social, and Family history reviewed    Prior to Admission medications    Medication Sig Start Date End Date Taking? Authorizing Provider   chlorzoxazone (PARAFON FORTE) 500 mg tablet Take 500 mg by mouth two (2) times a day. Yes Provider, Historical   diphenhydrAMINE (BENADRYL) 25 mg capsule Take 25 mg by mouth nightly as needed. Yes Provider, Historical   tiZANidine (ZANAFLEX) 2 mg tablet TAKE 1 TABLET BY MOUTH THREE TIMES DAILY AS NEEDED FOR PAIN 1/6/20  Yes Justin Dominguez NP   donepezil (ARICEPT) 10 mg tablet Take 1 Tab by mouth nightly. 12/4/19  Yes [place] Maynard MD   sertraline (ZOLOFT) 50 mg tablet Take 1 Tab by mouth daily. 12/4/19  Yes [place] Maynard MD   memantine [facility]) 10 mg tablet Take 1 Tab by mouth two (2) times a day. 11/27/19  Yes Norva Sabal, DO   fluticasone propionate (FLONASE) 50 mcg/actuation nasal spray SHAKE LIQUID AND USE 2 SPRAYS INTO EACH NOSTRIL DAILY 11/12/19  Yes Justin Dominguez NP   ketoconazole (NIZORAL) 2 % shampoo Apply twice weekly for 2-4 weeks 11/6/19  Yes Sharron SINGH NP   meloxicam (MOBIC) 15 mg tablet TAKE 1 TABLET BY MOUTH DAILY 11/2/19  Yes [place] Maynard MD   lidocaine (LIDODERM) 5 % apply 1 patch every 24 hours as needed 10/28/19  Yes [place] Maynard MD   traMADol (ULTRAM) 50 mg tablet TAKE 1 TABLET BY MOUTH EVERY 8 HOURS FOR UP TO 30 DAYS- MAX DAILY AMOUNT: 150MG 10/24/19 1/9/20 Yes [place] Maynard MD   pramipexole (MIRAPEX) 0.75 mg tablet TAKE 1 TABLET BY MOUTH EVERY NIGHT 10/7/19  Yes [place] Maynard MD   ondansetron (ZOFRAN ODT) 4 mg disintegrating tablet Take 1 Tab by mouth every eight (8) hours as needed for Nausea.  9/13/19  Yes Justin Dominguez, NP thiamine HCL (B-1) 100 mg tablet Take 1 Tab by mouth daily. 8/23/19  Yes Darlin Vasquez MD   acetaminophen (TYLENOL EXTRA STRENGTH) 500 mg tablet Take  by mouth BID Mon Wed & Fri. Yes Provider, Historical   buPROPion XL (WELLBUTRIN XL) 150 mg tablet TAKE 1 TABLET BY MOUTH EVERY MORNING 7/18/19  Yes Darlin Vasquez MD   traMADol [facility]) 50 mg tablet Take 50 mg by mouth every eight (8) hours as needed for Pain. Yes Provider, Historical   melatonin tab tablet Take 5 mg by mouth nightly. Yes Provider, Historical   multivitamin (ONE DAILY ENERGY) tablet Take 1 Tab by mouth daily. Yes Provider, Historical   triamcinolone acetonide (KENALOG) 0.1 % topical cream Apply  to affected area two (2) times a day. use thin layer x 3-5 days at time 5/21/19  Yes Liliya Moncada MD   MILK THISTLE PO Take 1 Tab by mouth two (2) times a day. Yes Provider, Historical   tamsulosin (FLOMAX) 0.4 mg capsule Take 0.4 mg by mouth daily. Yes Provider, Historical   loratadine (CLARITIN) 10 mg tablet Take 10 mg by mouth daily. Yes Provider, Historical   ibuprofen 200 mg cap Take  by mouth. Provider, Historical   OTHER CBD oil(for back pain)    Provider, Historical   cholecalciferol, vitamin D3, (VITAMIN D3 PO) Take 2,000 Units by mouth daily. Provider, Historical   vitamin E (AQUA GEMS) 400 unit capsule Take 400 Units by mouth daily. Provider, Historical   cyanocobalamin, vitamin B-12, (VITAMIN B-12 PO) Take 1 Tab by mouth daily. Provider, Historical          ROS  Complete ROS reviewed and negative or stable except as noted in HPI. Physical Exam  Vitals signs and nursing note reviewed. Constitutional:       General: He is not in acute distress. HENT:      Head: Normocephalic and atraumatic. Mouth/Throat:      Pharynx: No oropharyngeal exudate. Eyes:      General: No scleral icterus. Pupils: Pupils are equal, round, and reactive to light.    Neck:      Musculoskeletal: Normal range of motion and neck supple. Thyroid: No thyromegaly. Vascular: No JVD. Cardiovascular:      Rate and Rhythm: Normal rate and regular rhythm. Heart sounds: Normal heart sounds. No murmur. No friction rub. No gallop. Pulmonary:      Effort: Pulmonary effort is normal. No respiratory distress. Breath sounds: Normal breath sounds. No wheezing or rales. Abdominal:      General: Bowel sounds are normal. There is no distension. Palpations: Abdomen is soft. There is no mass. Tenderness: There is no tenderness. There is no guarding or rebound. Musculoskeletal: Normal range of motion. Comments: Slow deliberate gait, use of cane required for ambulation. Lymphadenopathy:      Cervical: No cervical adenopathy. Skin:     General: Skin is warm. Neurological:      Mental Status: He is alert and oriented to person, place, and time. Motor: No abnormal muscle tone. Coordination: Coordination normal.      Comments: +interactive. Prior labs reviewed. Assessment/Plan:    ICD-10-CM ICD-9-CM    1. Dementia with behavioral disturbance, unspecified dementia type ([facility] F03.91 294.21    2. Alcohol use disorder, moderate, in early remission, in controlled environment ([facility] F10.21 305.03    3. Recurrent depression (HCC) F33.9 296.30    4. Recurrent bladder transitional cell carcinoma (HCC) C67.9 188.9    5. Abdominal aortic aneurysm (AAA) 3.0 cm to 5.0 cm in diameter in female [facility]) I71.4 441.4    6. Essential hypertension I10 401.9    7. Vitamin D deficiency E55.9 268.9    8. Chronic back pain, unspecified back location, unspecified back pain laterality M54.9 724.5     G89.29 338.29    9. Hyperlipidemia, unspecified hyperlipidemia type E78.5 272.4    10.  Nonintractable headache, unspecified chronicity pattern, unspecified headache type R51 784.0      Follow-up and Dispositions    · Return in about 4 months (around 5/9/2020), or if symptoms worsen or fail to improve, for HAs, mood, back pain. results and schedule of future studies reviewed with patient  reviewed diet, exercise and weight   cardiovascular risk and specific lipid/LDL goals reviewed  reviewed medications and side effects in detail   Increase to 100 mg daily zoloft  Continue aricept  May use more tylenol - up to 2 g per day  Forms for day program filled out.

## 2020-01-10 DIAGNOSIS — F33.9 RECURRENT DEPRESSION (HCC): ICD-10-CM

## 2020-01-10 RX ORDER — BUPROPION HYDROCHLORIDE 150 MG/1
TABLET ORAL
Qty: 90 TAB | Refills: 1 | Status: SHIPPED | OUTPATIENT
Start: 2020-01-10 | End: 2020-05-05 | Stop reason: ALTCHOICE

## 2020-01-20 ENCOUNTER — DOCUMENTATION ONLY (OUTPATIENT)
Dept: INTERNAL MEDICINE CLINIC | Age: 75
End: 2020-01-20

## 2020-01-27 ENCOUNTER — CLINICAL SUPPORT (OUTPATIENT)
Dept: INTERNAL MEDICINE CLINIC | Age: 75
End: 2020-01-27

## 2020-01-27 DIAGNOSIS — R31.9 HEMATURIA, UNSPECIFIED TYPE: ICD-10-CM

## 2020-01-27 DIAGNOSIS — F03.91 DEMENTIA WITH BEHAVIORAL DISTURBANCE, UNSPECIFIED DEMENTIA TYPE: Primary | ICD-10-CM

## 2020-01-27 NOTE — LETTER
1/28/2020 2:41 PM 
 
Mr. Deondre Sahne 
[address] [facility] [address] Results for orders placed or performed in visit on 01/27/20 AMB POC URINALYSIS DIP STICK AUTO W/O MICRO Result Value Ref Range Color (UA POC) Maxi Dage Clarity (UA POC) Turbid Glucose (UA POC) Negative Negative Bilirubin (UA POC) 2+ Negative Ketones (UA POC) Trace Negative Specific gravity (UA POC) 1.020 1.001 - 1.035 Blood (UA POC) 3+ Negative pH (UA POC) 5.5 4.6 - 8.0 Protein (UA POC) 3+ Negative Urobilinogen (UA POC) 0.2 mg/dL 0.2 - 1 Nitrites (UA POC) Negative Negative Leukocyte esterase (UA POC) 1+ Negative Sincerely, [facility], NP

## 2020-01-28 ENCOUNTER — TELEPHONE (OUTPATIENT)
Dept: INTERNAL MEDICINE CLINIC | Age: 75
End: 2020-01-28

## 2020-01-28 LAB
BILIRUB UR QL STRIP: ABNORMAL
GLUCOSE UR-MCNC: NEGATIVE MG/DL
KETONES P FAST UR STRIP-MCNC: ABNORMAL MG/DL
PH UR STRIP: 5.5 [PH] (ref 4.6–8)
PROT UR QL STRIP: ABNORMAL
SP GR UR STRIP: 1.02 (ref 1–1.03)
UA UROBILINOGEN AMB POC: ABNORMAL (ref 0.2–1)
URINALYSIS CLARITY POC: ABNORMAL
URINALYSIS COLOR POC: ABNORMAL
URINE BLOOD POC: ABNORMAL
URINE LEUKOCYTES POC: ABNORMAL
URINE NITRITES POC: NEGATIVE

## 2020-01-28 NOTE — TELEPHONE ENCOUNTER
----- Message from [facility] sent at 1/28/2020 12:36 PM EST -----  Regarding: NAVI/TELEPHONE  Contact: [phone]  Caller's first and last name and relationship (if not the patient): Ivanna Rice (spouse)  Best contact number(s): [phone]  Whose call is being returned: Juan R Mckeon  Details to clarify the request: Ivanna Rice is returning a missed call to Juan R Mckeon.

## 2020-01-28 NOTE — PROGRESS NOTES
Results for orders placed or performed in visit on 01/27/20   AMB POC URINALYSIS DIP STICK AUTO W/O MICRO   Result Value Ref Range    Color (UA POC) Brown     Clarity (UA POC) Turbid     Glucose (UA POC) Negative Negative    Bilirubin (UA POC) 2+ Negative    Ketones (UA POC) Trace Negative    Specific gravity (UA POC) 1.020 1.001 - 1.035    Blood (UA POC) 3+ Negative    pH (UA POC) 5.5 4.6 - 8.0    Protein (UA POC) 3+ Negative    Urobilinogen (UA POC) 0.2 mg/dL 0.2 - 1    Nitrites (UA POC) Negative Negative    Leukocyte esterase (UA POC) 1+ Negative     Informed pts wife that the urine sample was high abnormal.  She states pt is having no symptoms but does have a hx of reoccurring bladder cancer. [facility] 10:40am w/ Dr. Maria Del Carmen Dixon. [facility] asked for me to send urine results and notes. Asked them if they wanted us to send urine to the lab today. They stated they can do urinalysis and culture in office tomorrow. Informed pts wife of appt. Faxed urine result to #[phone].

## 2020-02-03 ENCOUNTER — TELEPHONE (OUTPATIENT)
Dept: INTERNAL MEDICINE CLINIC | Age: 75
End: 2020-02-03

## 2020-02-03 NOTE — TELEPHONE ENCOUNTER
Blank Attending Phys Statement for Adult [address] scanned into cc and placed in provider's bin. Please call pt's spouse, Allison Merritt (on hipaa), ph#: [phone], when forms are completed.

## 2020-02-07 NOTE — TELEPHONE ENCOUNTER
Form completed and placed in nurse outbasket for copy and notification of patient care provider.      Thanks  Silvia Pollock MD

## 2020-02-11 NOTE — TELEPHONE ENCOUNTER
Copy of documents made for chart, sent to scanning. Origional at  for . Left voie mail for patients wife Calvin  to  documents at front. Call back number provided.

## 2020-02-14 ENCOUNTER — TELEPHONE (OUTPATIENT)
Dept: INTERNAL MEDICINE CLINIC | Age: 75
End: 2020-02-14

## 2020-02-14 DIAGNOSIS — R19.5 POSITIVE FIT (FECAL IMMUNOCHEMICAL TEST): ICD-10-CM

## 2020-02-14 DIAGNOSIS — L21.0 SEBORRHEA CAPITIS: ICD-10-CM

## 2020-02-14 NOTE — TELEPHONE ENCOUNTER
[employer] did home screening program for patient. Found to have a + FIT test. This was collected on 1/28/2020. This indicates he likely has a small amount of GI bleeding, and this may or may not represent colon cancer. Typically we only test patient who are eligible to complete colonoscopy. This should be disucssed with physician within next 2-3 months.      Ella Simon MD

## 2020-02-15 RX ORDER — KETOCONAZOLE 20 MG/ML
SHAMPOO TOPICAL
Qty: 120 ML | Refills: 1 | Status: SHIPPED | OUTPATIENT
Start: 2020-02-15 | End: 2021-01-01 | Stop reason: SDUPTHER

## 2020-02-18 NOTE — TELEPHONE ENCOUNTER
pts wife called. Verified name and . On hippa. Informed wife of providers message. States they already have an appt. With ms. thomas next week. Will discuss then.   She notes that he has never had a colonoscopy before

## 2020-02-24 ENCOUNTER — OFFICE VISIT (OUTPATIENT)
Dept: INTERNAL MEDICINE CLINIC | Age: 75
End: 2020-02-24

## 2020-02-24 VITALS
WEIGHT: 218.6 LBS | BODY MASS INDEX: 31.3 KG/M2 | OXYGEN SATURATION: 95 % | SYSTOLIC BLOOD PRESSURE: 117 MMHG | DIASTOLIC BLOOD PRESSURE: 77 MMHG | TEMPERATURE: 98.1 F | HEART RATE: 74 BPM | RESPIRATION RATE: 16 BRPM | HEIGHT: 70 IN

## 2020-02-24 DIAGNOSIS — Z12.11 ENCOUNTER FOR HEMOCCULT SCREENING: ICD-10-CM

## 2020-02-24 DIAGNOSIS — F33.9 RECURRENT DEPRESSION (HCC): ICD-10-CM

## 2020-02-24 DIAGNOSIS — R19.5 STOOL GUAIAC POSITIVE: Primary | ICD-10-CM

## 2020-02-24 DIAGNOSIS — M54.50 LUMBAR BACK PAIN: ICD-10-CM

## 2020-02-24 NOTE — PROGRESS NOTES
Rm 7    Chief Complaint   Patient presents with    Melena     test done at home a couple weeks ago, came back positive. Wife has some questions about how test was collected   Pt states he has not noticed any blood in stool when having a BM, More loose than normal.    Wife states that when stool sample was collected she believes it was mixed with urine and that may be why blood was found in stool. Wife states that Pt is laying around more than usually. Blood also found in urine, Urology appt 2/27/20      1. Have you been to the ER, urgent care clinic since your last visit? Hospitalized since your last visit? No    2. Have you seen or consulted any other health care providers outside of the [address] [place] [address] since your last visit? Include any pap smears or colon screening. No        Health Maintenance Due   Topic Date Due    GLAUCOMA SCREENING Q2Y  04/01/2018    Medicare Yearly Exam  08/18/2019           3 most recent PHQ Screens 2/24/2020   Little interest or pleasure in doing things Not at all   Feeling down, depressed, irritable, or hopeless Not at all   Total Score PHQ 2 0       Fall Risk Assessment, last 12 mths 2/24/2020   Able to walk? Yes   Fall in past 12 months?  No   Fall with injury? -   Number of falls in past 12 months -   Fall Risk Score -           Learning Assessment 7/24/2019   PRIMARY LEARNER Patient   HIGHEST LEVEL OF EDUCATION - PRIMARY LEARNER  -   BARRIERS PRIMARY LEARNER NONE   CO-LEARNER CAREGIVER No   PRIMARY LANGUAGE ENGLISH   LEARNER PREFERENCE PRIMARY LISTENING   ANSWERED BY patient   RELATIONSHIP SELF

## 2020-02-24 NOTE — PROGRESS NOTES
HISTORY OF PRESENT ILLNESS  Courtney Valverde is a 75 y.o. male presents with spouse for acute care   HPI     FOBT positive, spouse believes specimen was contaminated by blood in urine. She requests repeat testing   Hematuria; had CT scan of bladder last week  Follow up this Thursday with urology     Referred back to PCP by PMR provider to request hydrocodone for chronic low  back pain; Tramadol and injections ineffective   Follow up with Dr Baljinder Flowers pending     Poor appetite. He drinks Ensure 1-2 cans daily     depressed mood, on dual antidepressant     Compliant with medical management     Past Medical History:   Diagnosis Date    AAA (abdominal aortic aneurysm) ([facility] 07/29/2019    3.8 cm on US    Alcohol abuse, in remission     Arthritis     Back pain     Bladder cancer (HCC)      - in [place], [place]    C. difficile colitis 2010    Cancer involving bladder by direct extension from endometrium ([facility] 10/2016    Dementia ([facility]     Depression     Horseshoe kidney     HTN (hypertension) 12/5/2013    Hyperlipidemia     Liver disease     Recurrent bladder transitional cell carcinoma (HCC)     Restless leg syndrome     Screening for colorectal cancer 03/14/2018    cologuard - negative    Situational anxiety     ie air travel    Spinal stenosis, lumbar     Thrombocytopenia (HCC)     Transitional cell carcinoma of left ureter ([facility]     Dr. Musa Lewis       Current Outpatient Medications   Medication Sig    ketoconazole (NIZORAL) 2 % shampoo APPLY EXTERNALLY 2 TIMES A WEEK FOR 2 TO 4 WEEKS    buPROPion XL (WELLBUTRIN XL) 150 mg tablet TAKE 1 TABLET BY MOUTH EVERY MORNING    chlorzoxazone (PARAFON FORTE) 500 mg tablet Take 500 mg by mouth two (2) times a day.  diphenhydrAMINE (BENADRYL) 25 mg capsule Take 25 mg by mouth nightly as needed.  sertraline (ZOLOFT) 100 mg tablet Take 1 Tab by mouth daily.     tiZANidine (ZANAFLEX) 2 mg tablet TAKE 1 TABLET BY MOUTH THREE TIMES DAILY AS NEEDED FOR PAIN    donepezil (ARICEPT) 10 mg tablet Take 1 Tab by mouth nightly.  memantine (NAMENDA) 10 mg tablet Take 1 Tab by mouth two (2) times a day.  fluticasone propionate (FLONASE) 50 mcg/actuation nasal spray SHAKE LIQUID AND USE 2 SPRAYS INTO EACH NOSTRIL DAILY    lidocaine (LIDODERM) 5 % apply 1 patch every 24 hours as needed    pramipexole (MIRAPEX) 0.75 mg tablet TAKE 1 TABLET BY MOUTH EVERY NIGHT    ondansetron (ZOFRAN ODT) 4 mg disintegrating tablet Take 1 Tab by mouth every eight (8) hours as needed for Nausea.  thiamine HCL (B-1) 100 mg tablet Take 1 Tab by mouth daily.  acetaminophen (TYLENOL EXTRA STRENGTH) 500 mg tablet Take  by mouth BID Mon Wed & Fri.    ibuprofen 200 mg cap Take  by mouth.  OTHER CBD oil(for back pain)    traMADol (ULTRAM) 50 mg tablet Take 50 mg by mouth every eight (8) hours as needed for Pain.  melatonin tab tablet Take 5 mg by mouth nightly.  multivitamin (ONE DAILY ENERGY) tablet Take 1 Tab by mouth daily.  triamcinolone acetonide (KENALOG) 0.1 % topical cream Apply  to affected area two (2) times a day. use thin layer x 3-5 days at time    cholecalciferol, vitamin D3, (VITAMIN D3 PO) Take 2,000 Units by mouth daily.  vitamin E (AQUA GEMS) 400 unit capsule Take 400 Units by mouth daily.  MILK THISTLE PO Take 1 Tab by mouth two (2) times a day.  tamsulosin (FLOMAX) 0.4 mg capsule Take 0.4 mg by mouth daily.  loratadine (CLARITIN) 10 mg tablet Take 10 mg by mouth daily.  cyanocobalamin, vitamin B-12, (VITAMIN B-12 PO) Take 1 Tab by mouth daily.  traMADol (ULTRAM) 50 mg tablet TAKE 1 TABLET BY MOUTH EVERY 8 HOURS FOR UP TO 30 DAYS- MAX DAILY AMOUNT: 150MG     No current facility-administered medications for this visit. No Known Allergies  Review of Systems   Constitutional: Positive for malaise/fatigue. Negative for chills and fever. Gastrointestinal: Negative for blood in stool, constipation and melena.    Musculoskeletal: Positive for back pain. Psychiatric/Behavioral: Positive for depression. Negative for suicidal ideas. The patient has insomnia. Visit Vitals  /77 (BP 1 Location: Left arm, BP Patient Position: Sitting)   Pulse 74   Temp 98.1 °F (36.7 °C) (Oral)   Resp 16   Ht 5' 10\" (1.778 m)   Wt 218 lb 9.6 oz (99.2 kg)   SpO2 95%   BMI 31.37 kg/m²     Physical Exam  Constitutional:       Appearance: Normal appearance. He is not ill-appearing. Neck:      Musculoskeletal: Normal range of motion and neck supple. Cardiovascular:      Rate and Rhythm: Normal rate and regular rhythm. Neurological:      Mental Status: He is alert. Psychiatric:         Attention and Perception: Attention normal.         Mood and Affect: Mood is depressed. Speech: Speech normal.         Behavior: Behavior is withdrawn. Behavior is cooperative. ASSESSMENT and PLAN    ICD-10-CM ICD-9-CM    1. Stool guaiac positive R19.5 792.1    2. Encounter for Hemoccult screening Z12.11 V76.51 OCCULT BLOOD IMMUNOASSAY,DIAGNOSTIC   3. Recurrent depression (HCC) F33.9 296.30    4. Lumbar back pain M54.5 724.2      Follow-up and Dispositions    · Return in about 3 months (around 5/24/2020) for weight, appetite , depression. Repeat FOBT    Depression uncontrolled, complicated by dementia. Discussed medication adjustment options. Patient and spouse elect to increase Wellbutrin to 300 mg daily, continue Zoloft. Encouraged to call with update after 2 weeks     Advised to discuss  chronic pain management with  PMR specialist      lab results and schedule of future lab studies reviewed with patient  reviewed medications and side effects in detail    Patient voices understanding and acceptance of this advice and will call back if any further questions or concerns. An After Visit Summary was printed and given to the patient.

## 2020-02-24 NOTE — PATIENT INSTRUCTIONS
Colon Cancer Screening: Care Instructions  Your Care Instructions    Colorectal cancer occurs in the colon or rectum. That's the lower part of your digestive system. It is the second-leading cause of cancer deaths in the [place]. It often starts with small growths called polyps in the colon or rectum. Polyps are usually found with screening tests. Depending on the type of test, any polyps found may be removed during the tests. Colorectal cancer usually does not cause symptoms at first. But regular tests can help find it early, before it spreads and becomes harder to treat. Your risk for colorectal cancer gets higher as you get older. Some experts say that adults should start regular screening at age 48 and stop at age 76. Others say to start before age 48 or continue after age 76. Talk with your doctor about your risk and when to start and stop screening. You may have one of several tests. Follow-up care is a key part of your treatment and safety. Be sure to make and go to all appointments, and call your doctor if you are having problems. It's also a good idea to know your test results and keep a list of the medicines you take. What are the main screening tests for colon cancer? · Stool tests. These include the fecal immunochemical test (FIT) and the fecal occult blood test (FOBT). These tests check stool samples for signs of cancer. If your test is positive, you will need to have a colonoscopy. · Sigmoidoscopy. This test lets your doctor look at the lining of your rectum and the lowest part of your colon. Your doctor uses a lighted tube called a sigmoidoscope. This test can't find cancers or polyps in the upper part of your colon. In some cases, polyps that are found can be removed. But if your doctor finds polyps, you will need to have a colonoscopy to check the upper part of your colon. · Colonoscopy. This test lets your doctor look at the lining of your rectum and your entire colon.  The doctor uses a thin, flexible tool called a colonoscope. It can also be used to remove polyps or get a tissue sample (biopsy). What tests do you need? The following guidelines are for adults who are not at high risk for colorectal cancer. You may have at least one of these tests as directed by your doctor. · Fecal immunochemical test (FIT) or guaiac fecal occult blood test (gFOBT) every year  · Sigmoidoscopy every 5 years  · Colonoscopy every 10 years  If you are over age 76, you can work with your doctor to decide if screening is a good option. Talk with your doctor about when you need to be tested. And discuss which tests are right for you. Your doctor may recommend earlier or more frequent testing if you:  · Have had colorectal cancer before. · Have had colon polyps. · Have symptoms of colorectal cancer. These include blood in your stool and changes in your bowel habits. · Have a parent, brother or sister, or child with colon polyps or colorectal cancer. · Have a bowel disease. This includes ulcerative colitis and Crohn's disease. · Have a rare polyp syndrome that runs in families, such as familial adenomatous polyposis (FAP). · Have had radiation treatments to the belly or pelvis. When should you call for help? Watch closely for changes in your health, and be sure to contact your doctor if:    · You have any changes in your bowel habits.     · You have any problems. Where can you learn more? Go to http://julissa-jannette.info/. Enter M541 in the search box to learn more about \"Colon Cancer Screening: Care Instructions. \"  Current as of: December 19, 2018  Content Version: 12.2  © 3263-4508 [employer]. Care instructions adapted under license by [employer] (which disclaims liability or warranty for this information).  If you have questions about a medical condition or this instruction, always ask your healthcare professional. [place] [address] any warranty or liability for your use of this information.

## 2020-02-27 DIAGNOSIS — K76.82 HEPATIC ENCEPHALOPATHY: ICD-10-CM

## 2020-02-27 RX ORDER — CALCIUM CARBONATE/VITAMIN D3 600 MG-10
TABLET ORAL
Qty: 90 TAB | Refills: 3 | Status: SHIPPED | OUTPATIENT
Start: 2020-02-27 | End: 2021-01-01 | Stop reason: ALTCHOICE

## 2020-03-31 ENCOUNTER — DOCUMENTATION ONLY (OUTPATIENT)
Dept: HEMATOLOGY | Age: 75
End: 2020-03-31

## 2020-04-02 ENCOUNTER — APPOINTMENT (OUTPATIENT)
Dept: ULTRASOUND IMAGING | Age: 75
End: 2020-04-02
Attending: INTERNAL MEDICINE
Payer: MEDICARE

## 2020-04-02 ENCOUNTER — HOSPITAL ENCOUNTER (OUTPATIENT)
Age: 75
Setting detail: OUTPATIENT SURGERY
Discharge: HOME | End: 2020-04-02
Attending: INTERNAL MEDICINE | Admitting: INTERNAL MEDICINE
Payer: MEDICARE

## 2020-04-02 ENCOUNTER — ANESTHESIA EVENT (OUTPATIENT)
Dept: ENDOSCOPY | Age: 75
End: 2020-04-02
Payer: MEDICARE

## 2020-04-02 ENCOUNTER — ANESTHESIA (OUTPATIENT)
Dept: ENDOSCOPY | Age: 75
End: 2020-04-02
Payer: MEDICARE

## 2020-04-02 VITALS
SYSTOLIC BLOOD PRESSURE: 114 MMHG | WEIGHT: 230 LBS | TEMPERATURE: 97.6 F | HEART RATE: 57 BPM | OXYGEN SATURATION: 96 % | DIASTOLIC BLOOD PRESSURE: 73 MMHG | RESPIRATION RATE: 16 BRPM | BODY MASS INDEX: 33 KG/M2

## 2020-04-02 PROCEDURE — 76060000032 HC ANESTHESIA 0.5 TO 1 HR: Performed by: INTERNAL MEDICINE

## 2020-04-02 PROCEDURE — 77030009426 HC FCPS BIOP ENDOSC BSC -B: Performed by: INTERNAL MEDICINE

## 2020-04-02 PROCEDURE — 74011250636 HC RX REV CODE- 250/636: Performed by: INTERNAL MEDICINE

## 2020-04-02 PROCEDURE — 76040000007: Performed by: INTERNAL MEDICINE

## 2020-04-02 PROCEDURE — 88305 TISSUE EXAM BY PATHOLOGIST: CPT

## 2020-04-02 PROCEDURE — 74011250636 HC RX REV CODE- 250/636: Performed by: NURSE ANESTHETIST, CERTIFIED REGISTERED

## 2020-04-02 PROCEDURE — 74011250637 HC RX REV CODE- 250/637: Performed by: INTERNAL MEDICINE

## 2020-04-02 RX ORDER — ATROPINE SULFATE 0.1 MG/ML
0.5 INJECTION INTRAVENOUS
Status: DISCONTINUED | OUTPATIENT
Start: 2020-04-02 | End: 2020-04-02 | Stop reason: HOSPADM

## 2020-04-02 RX ORDER — SODIUM CHLORIDE 0.9 % (FLUSH) 0.9 %
5-40 SYRINGE (ML) INJECTION EVERY 8 HOURS
Status: DISCONTINUED | OUTPATIENT
Start: 2020-04-02 | End: 2020-04-02 | Stop reason: HOSPADM

## 2020-04-02 RX ORDER — SODIUM CHLORIDE 0.9 % (FLUSH) 0.9 %
5-40 SYRINGE (ML) INJECTION AS NEEDED
Status: DISCONTINUED | OUTPATIENT
Start: 2020-04-02 | End: 2020-04-02 | Stop reason: HOSPADM

## 2020-04-02 RX ORDER — EPINEPHRINE 0.1 MG/ML
1 INJECTION INTRACARDIAC; INTRAVENOUS
Status: DISCONTINUED | OUTPATIENT
Start: 2020-04-02 | End: 2020-04-02 | Stop reason: HOSPADM

## 2020-04-02 RX ORDER — PROPOFOL 10 MG/ML
INJECTION, EMULSION INTRAVENOUS AS NEEDED
Status: DISCONTINUED | OUTPATIENT
Start: 2020-04-02 | End: 2020-04-02 | Stop reason: HOSPADM

## 2020-04-02 RX ORDER — SODIUM CHLORIDE 9 MG/ML
INJECTION, SOLUTION INTRAVENOUS
Status: DISCONTINUED | OUTPATIENT
Start: 2020-04-02 | End: 2020-04-02 | Stop reason: HOSPADM

## 2020-04-02 RX ORDER — FLUMAZENIL 0.1 MG/ML
0.2 INJECTION INTRAVENOUS
Status: DISCONTINUED | OUTPATIENT
Start: 2020-04-02 | End: 2020-04-02 | Stop reason: HOSPADM

## 2020-04-02 RX ORDER — FENTANYL CITRATE 50 UG/ML
25-200 INJECTION, SOLUTION INTRAMUSCULAR; INTRAVENOUS
Status: DISCONTINUED | OUTPATIENT
Start: 2020-04-02 | End: 2020-04-02 | Stop reason: HOSPADM

## 2020-04-02 RX ORDER — SODIUM CHLORIDE 9 MG/ML
50 INJECTION, SOLUTION INTRAVENOUS CONTINUOUS
Status: DISCONTINUED | OUTPATIENT
Start: 2020-04-02 | End: 2020-04-02 | Stop reason: HOSPADM

## 2020-04-02 RX ORDER — DEXTROMETHORPHAN/PSEUDOEPHED 2.5-7.5/.8
1.2 DROPS ORAL
Status: DISCONTINUED | OUTPATIENT
Start: 2020-04-02 | End: 2020-04-02 | Stop reason: HOSPADM

## 2020-04-02 RX ORDER — NALOXONE HYDROCHLORIDE 0.4 MG/ML
0.4 INJECTION, SOLUTION INTRAMUSCULAR; INTRAVENOUS; SUBCUTANEOUS
Status: DISCONTINUED | OUTPATIENT
Start: 2020-04-02 | End: 2020-04-02 | Stop reason: HOSPADM

## 2020-04-02 RX ORDER — MIDAZOLAM HYDROCHLORIDE 1 MG/ML
.25-5 INJECTION, SOLUTION INTRAMUSCULAR; INTRAVENOUS
Status: DISCONTINUED | OUTPATIENT
Start: 2020-04-02 | End: 2020-04-02 | Stop reason: HOSPADM

## 2020-04-02 RX ADMIN — PROPOFOL 50 MG: 10 INJECTION, EMULSION INTRAVENOUS at 13:02

## 2020-04-02 RX ADMIN — PROPOFOL 50 MG: 10 INJECTION, EMULSION INTRAVENOUS at 13:14

## 2020-04-02 RX ADMIN — PROPOFOL 50 MG: 10 INJECTION, EMULSION INTRAVENOUS at 13:11

## 2020-04-02 RX ADMIN — PROPOFOL 50 MG: 10 INJECTION, EMULSION INTRAVENOUS at 13:21

## 2020-04-02 RX ADMIN — PROPOFOL 50 MG: 10 INJECTION, EMULSION INTRAVENOUS at 13:07

## 2020-04-02 RX ADMIN — PROPOFOL 50 MG: 10 INJECTION, EMULSION INTRAVENOUS at 13:28

## 2020-04-02 RX ADMIN — PROPOFOL 50 MG: 10 INJECTION, EMULSION INTRAVENOUS at 13:26

## 2020-04-02 RX ADMIN — PROPOFOL 100 MG: 10 INJECTION, EMULSION INTRAVENOUS at 12:58

## 2020-04-02 RX ADMIN — SODIUM CHLORIDE: 900 INJECTION, SOLUTION INTRAVENOUS at 12:54

## 2020-04-02 RX ADMIN — PROPOFOL 50 MG: 10 INJECTION, EMULSION INTRAVENOUS at 13:03

## 2020-04-02 RX ADMIN — PROPOFOL 50 MG: 10 INJECTION, EMULSION INTRAVENOUS at 13:01

## 2020-04-02 RX ADMIN — PROPOFOL 50 MG: 10 INJECTION, EMULSION INTRAVENOUS at 13:17

## 2020-04-02 RX ADMIN — PROPOFOL 50 MG: 10 INJECTION, EMULSION INTRAVENOUS at 13:24

## 2020-04-02 RX ADMIN — PROPOFOL 50 MG: 10 INJECTION, EMULSION INTRAVENOUS at 13:09

## 2020-04-02 RX ADMIN — PROPOFOL 50 MG: 10 INJECTION, EMULSION INTRAVENOUS at 13:05

## 2020-04-02 RX ADMIN — PROPOFOL 50 MG: 10 INJECTION, EMULSION INTRAVENOUS at 12:59

## 2020-04-02 NOTE — PROCEDURES
[address]  [address] [facility] [address] [place]     Flexible Sigmoidoscopy with Rectal Endoscopic Ultrasound     NAME:  Amor Lion   :   1945   MRN:   [id number]       Procedure Name: Flexible sigmoidoscopy with rectal endoscopic ultrasound     Indications: Abnormal CT scan showing mass in rectal area, h/o bladder cancer    : Kati Greer MD    Referring Provider: --Lawrence Ellington MD    Procedure Details:      Anethesia/Sedation:  MAC anesthesia Propofol      Procedure Details   After infom consent was obtained for the procedure, with all risks and benefits of procedure explained the patient was  placed in the left lateral decubitus position. Initially passed the rectal radial echoendoscope to sigmoid colon . The iliac vessels were seen and were normal, and there was no adenopathy appreciated. No perirectal mass, lymph nodes were identified. The endoscope was withdrawn,   The patient tolerated the procedure well. Findings:   Normal rectal EUS and normal EUS of distal sigmoid colon  No mass seen  No adenopathies  Calcifications seen in prostate       Specimen Removed:  None    Complications: None. EBL:  None. IRecommendations:    Follow up with Dr. Blevins Degree    Signed By: Kati Greer MD     2020  1:46 PM

## 2020-04-02 NOTE — ANESTHESIA PREPROCEDURE EVALUATION
Relevant Problems No relevant active problems Anesthetic History Review of Systems / Medical History Patient summary reviewed, nursing notes reviewed and pertinent labs reviewed Pulmonary Smoker Neuro/Psych Psychiatric history and dementia Comments: Restless leg syndrome (G25.81) Spinal stenosis, lumbar (M48.061) Alcohol use: Yes; 1.0 standard drinks per week Cardiovascular Hypertension Hyperlipidemia Exercise tolerance: >4 METS Comments: AAA (abdominal aortic aneurysm) (HCC) (I71.4) 07/29/2019 3.8 cm on US   
GI/Hepatic/Renal 
  
 
 
Renal disease Endo/Other Arthritis and cancer Other Findings Comments: Thrombocytopenia ([address] (D69.6) Physical Exam 
 
Airway Mallampati: II 
TM Distance: > 6 cm Neck ROM: normal range of motion Mouth opening: Normal 
 
 Cardiovascular Regular rate and rhythm,  S1 and S2 normal,  no murmur, click, rub, or gallop Rhythm: regular Rate: normal 
 
 
 
 Dental 
No notable dental hx Pulmonary Breath sounds clear to auscultation Abdominal 
GI exam deferred Other Findings Anesthetic Plan ASA: 3 Anesthesia type: MAC Induction: Intravenous Anesthetic plan and risks discussed with: Patient

## 2020-04-02 NOTE — PROCEDURES
[address]  174 [facility], [address] [place] [address]      Colonoscopy Operative Report    Diana Thomas  [id number]  1945      Procedure Type:   Colonoscopy with polypectomy (hot biopsy)     Indications:    positive FIT test   First colonoscopy     Pre-operative Diagnosis: see indication above    Post-operative Diagnosis:  See findings below    :  Amanda Merritt MD    Surgical Assistant: None    Implants:  None    Referring Provider: Fredo Lin MD      Sedation:  MAC anesthesia Propofol      Procedure Details:  After informed consent was obtained with all risks and benefits of procedure explained and preoperative exam completed, the patient was taken to the endoscopy suite and placed in the left lateral decubitus position. Upon sequential sedation as per above, a digital rectal exam was performed demonstrating internal hemorrhoids. The Olympus videocolonoscope  was inserted in the rectum and carefully advanced to the cecum, which was identified by the ileocecal valve and appendiceal orifice. The cecum was identified by the ileocecal valve and appendiceal orifice. The quality of preparation was good. The colonoscope was slowly withdrawn with careful evaluation between folds. Retroflexion in the rectum was completed . Findings:   Rectum: 2 polyps around 1 cm each removed by hot biopsies  Sigmoid: 3 polyps around 1 cm each in size removed by hot biopsies  Mild diverticulosis  Descending Colon: normal  Transverse Colon: normal  Ascending Colon: 9 mm polyp removed by hot biopsy  Cecum: normal        Specimen Removed:  as above    Complications: None. EBL:  None. Impression:    see findings    Recommendations: --Await pathology.       Recommendation for next colonscopy in 3 years  Rectal EUS today    Signed By: Amanda Merritt MD     4/2/2020  1:40 PM

## 2020-04-02 NOTE — DISCHARGE INSTRUCTIONS
[facility]    COLON DISCHARGE INSTRUCTIONS    Amor Lion  [id number]  1945    Discomfort:  Redness at IV site- apply warm compress to area; if redness or soreness persist- contact your physician  There may be a slight amount of blood passed from the rectum  Gaseous discomfort- walking, belching will help relieve any discomfort  You may not operate a vehicle for 12 hours  You may not engage in an occupation involving machinery or appliances for rest of today  You may not drink alcoholic beverages for at least 12 hours  Avoid making any critical decisions for at least 24 hour  DIET:  You may resume your regular diet - however -  remember your colon is empty and a heavy meal will produce gas. Avoid these foods:  vegetables, fried / greasy foods, carbonated drinks     ACTIVITY:  You may  resume your normal daily activities it is recommended that you spend the remainder of the day resting -  avoid any strenuous activity. CALL M.D.   ANY SIGN OF:   Increasing pain, nausea, vomiting  Abdominal distension (swelling)  New increased bleeding (oral or rectal)  Fever (chills)  Pain in chest area  Bloody discharge from nose or mouth  Shortness of breath      Follow-up Instructions:   Call Dr. Kati Greer for any questions or problems at [phone]  High fiber diet  F/u with Dr. Sebastian Common:   Your colonoscopy showed 6 polyps removed, no mass seen, mild diverticulosis, rest of exam was normal.  Telephone # [phone]      Signed By: Kati Greer MD     4/2/2020  1:51 PM       DISCHARGE SUMMARY from Nurse    The following personal items collected during your admission are returned to you:   Dental Appliance: Dental Appliances: None  Vision: Visual Aid: None  Hearing Aid:    Jewelry:    Clothing:    Other Valuables:    Valuables sent to safe:      Patient Education   Learning About Coronavirus (COVID-19)  Coronavirus (COVID-19): Overview  What is coronavirus (IPHTN-01)? The coronavirus disease (COVID-19) is caused by a virus. It is an illness that was first found in [place], [place], in December 2019. It has since spread worldwide. The virus can cause fever, cough, and trouble breathing. In severe cases, it can cause pneumonia and make it hard to breathe without help. It can cause death. Coronaviruses are a large group of viruses. They cause the common cold. They also cause more serious illnesses like Middle East respiratory syndrome (MERS) and severe acute respiratory syndrome (SARS). COVID-19 is caused by a novel coronavirus. That means it's a new type that has not been seen in people before. This virus spreads person-to-person through droplets from coughing and sneezing. It can also spread when you are close to someone who is infected. And it can spread when you touch something that has the virus on it, such as a doorknob or a tabletop. What can you do to protect yourself from coronavirus (COVID-19)? The best way to protect yourself from getting sick is to:  · Avoid areas where there is an outbreak. · Avoid contact with people who may be infected. · Wash your hands often with soap or alcohol-based hand sanitizers. · Avoid crowds and try to stay at least 6 feet away from other people. · Wash your hands often, especially after you cough or sneeze. Use soap and water, and scrub for at least 20 seconds. If soap and water aren't available, use an alcohol-based hand . · Avoid touching your mouth, nose, and eyes with unwashed hands. What can you do to avoid spreading the virus to others? To help avoid spreading the virus to others:  · Cover your mouth with a tissue when you cough or sneeze. Then throw the tissue in the trash. · Use a disinfectant to clean things that you touch often. · Stay home if you are sick or have been exposed to the virus. Don't go to school, work, or public areas.  And don't use public transportation. · If you are sick:  ? Leave your home only if you need to get medical care. But call the doctor's office first so they know you're coming, and wear a face mask when you go. ? Wear a face mask around other people. It can help stop the spread of the virus when you cough or sneeze. ? Clean and disinfect your home every day. Use household  and disinfectant wipes or sprays. Take special care to clean things that you grab with your hands. These include doorknobs, remote controls, phones, and handles on your refrigerator and microwave. And don't forget countertops, tabletops, bathrooms, and computer keyboards. When to call for help  Call 911 anytime you think you may need emergency care. For example, call if:  · You have severe trouble breathing. · You have severe dehydration. Symptoms of dehydration include:  ? Dry eyes and a dry mouth. ? Passing only a little urine. ? Feeling thirstier than usual.  · You are extremely confused or not thinking clearly. · You pass out (lose consciousness). Call your doctor now if you develop symptoms such as:  · Shortness of breath. · Fever. · Cough. If you need to get care, call ahead to the doctor's office for instructions before you go. Make sure you wear a face mask when you go there to prevent exposing other people to the virus. Where can you get the latest information? The following health organizations are tracking and studying this virus. Their websites contain the most up-to-date information. Jeimy Baird also learn what to do if you think you may have been exposed to the virus. · U.S. Centers for Disease Control and Prevention (CDC): The CDC provides updated news about the disease and travel advice. The website also tells you how to prevent the spread of infection. www.cdc.gov  · World Health Organization [facility]): WHO offers information about the virus outbreaks.  WHO also has travel advice. www.who.int  Current as of: March 20, 2020                EGXCQXR Version: 12.4  © 5012-2528 [employer]. Care instructions adapted under license by your healthcare professional. If you have questions about a medical condition or this instruction, always ask your healthcare professional. [address] any warranty or liability for your use of this information. Patient Education        Colon Polyps: Care Instructions  Your Care Instructions    Colon polyps are growths in the colon or the rectum. The cause of most colon polyps is not known, and most people who get them do not have any problems. But a certain kind can turn into cancer. For this reason, regular testing for colon polyps is important for people as they get older. It is also important for anyone who has an increased risk for colon cancer. Polyps are usually found through routine colon cancer screening tests. Although most colon polyps are not cancerous, they are usually removed and then tested for cancer. Screening for colon cancer saves lives because the cancer can usually be cured if it is caught early. If you have a polyp that is the type that can turn into cancer, you may need more tests to examine your entire colon. The doctor will remove any other polyps that he or she finds, and you will be tested more often. Follow-up care is a key part of your treatment and safety. Be sure to make and go to all appointments, and call your doctor if you are having problems. It's also a good idea to know your test results and keep a list of the medicines you take. How can you care for yourself at home? Regular exams to look for colon polyps are the best way to prevent polyps from turning into colon cancer. These can include stool tests, sigmoidoscopy, colonoscopy, and CT colonography. Talk with your doctor about a testing schedule that is right for you. To prevent polyps  There is no home treatment that can prevent colon polyps.  But these steps may help lower your risk for cancer. · Stay active. Being active can help you get to and stay at a healthy weight. Try to exercise on most days of the week. Walking is a good choice. · Eat well. Choose a variety of vegetables, fruits, legumes (such as peas and beans), fish, poultry, and whole grains. · Do not smoke. If you need help quitting, talk to your doctor about stop-smoking programs and medicines. These can increase your chances of quitting for good. · If you drink alcohol, limit how much you drink. Limit alcohol to 2 drinks a day for men and 1 drink a day for women. When should you call for help? Call your doctor now or seek immediate medical care if:    · You have severe belly pain.     · Your stools are maroon or very bloody.    Watch closely for changes in your health, and be sure to contact your doctor if:    · You have a fever.     · You have nausea or vomiting.     · You have a change in bowel habits (new constipation or diarrhea).     · Your symptoms get worse or are not improving as expected. Where can you learn more? Go to http://julissa-jannette.info/  Enter C571 in the search box to learn more about \"Colon Polyps: Care Instructions. \"  Current as of: August 21, 2019Content Version: 12.4  © 0961-8690 [employer]. Care instructions adapted under license by [employer] (which disclaims liability or warranty for this information). If you have questions about a medical condition or this instruction, always ask your healthcare professional. [place] [address] any warranty or liability for your use of this information. Patient Education        Diverticulosis: Care Instructions  Your Care Instructions  In diverticulosis, pouches called diverticula form in the wall of the large intestine (colon). The pouches do not cause any pain or other symptoms. Most people who have diverticulosis do not know they have it.  But the pouches sometimes bleed, and if they become infected, they can cause pain and other symptoms. When this happens, it is called diverticulitis. Diverticula form when pressure pushes the wall of the colon outward at certain weak points. A diet that is too low in fiber can cause diverticula. Follow-up care is a key part of your treatment and safety. Be sure to make and go to all appointments, and call your doctor if you are having problems. It's also a good idea to know your test results and keep a list of the medicines you take. How can you care for yourself at home? · Include fruits, leafy green vegetables, beans, and whole grains in your diet each day. These foods are high in fiber. · Take a fiber supplement, such as Citrucel or Metamucil, every day if needed. Read and follow all instructions on the label. · Drink plenty of fluids, enough so that your urine is light yellow or clear like water. If you have kidney, heart, or liver disease and have to limit fluids, talk with your doctor before you increase the amount of fluids you drink. · Get at least 30 minutes of exercise on most days of the week. Walking is a good choice. You also may want to do other activities, such as running, swimming, cycling, or playing tennis or team sports. · Cut out foods that cause gas, pain, or other symptoms. When should you call for help?   Call your doctor now or seek immediate medical care if:    · You have belly pain.     · You pass maroon or very bloody stools.     · You have a fever.     · You have nausea and vomiting.     · You have unusual changes in your bowel movements or abdominal swelling.     · You have burning pain when you urinate.     · You have abnormal vaginal discharge.     · You have shoulder pain.     · You have cramping pain that does not get better when you have a bowel movement or pass gas.     · You pass gas or stool from your urethra while urinating.    Watch closely for changes in your health, and be sure to contact your doctor if you have any problems. Where can you learn more? Go to http://julissa-jannette.info/  Enter [id number] in the search box to learn more about \"Diverticulosis: Care Instructions. \"  Current as of: August 11, 2019Content Version: 12.4  © 5193-9959 Healthwise[employer] Inc[employer]. Care instructions adapted under license by [employer] (which disclaims liability or warranty for this information). If you have questions about a medical condition or this instruction, always ask your healthcare professional. [address] any warranty or liability for your use of this information.

## 2020-04-02 NOTE — H&P
The patient is a 75year old male who presents with a complaint of Abnormal x-ray finding. The patient presents consultation at the request of Dr. Justin Gomes). Note for \"Abnormal x-ray finding\": he has h/o bladder cancer followed by Dr. Justin Gomes, never had colonoscopy done before, had positive FIT test last month by his PCP, recent CT scan by [place] [facility] showed 2.6x2.1 cm mass at 8 cm from anus. he came to see me with his wife, no GI complaints, he is scheduled for procedure by Dr. Hellen Reyna on 4/15      Problem List/Past Medical Liliya Thomas; 4/1/2020 11:21 AM)  Bladder tumor (239.4  D49.4)    Hypertension    Lung tumor (239.1  D49.1)      Past Surgical History Liliya Thomas; 4/1/2020 11:21 AM)  Back Surgery    History of bladder surgery (V45.89  Z98.890)      Allergies Liliyajacklyn Thomas; 4/1/2020 11:21 AM)  No Known Drug Allergies  [03/13/2020]:  No Known Allergies  [03/13/2020]:    Medication History Liliya Thomas; 4/1/2020 11:26 AM)  Sertraline HCl  (50MG Tablet, 1 Oral bid) Active. buPROPion HCl ER (XL)  (150MG Tablet ER 24HR, 2 Oral daily) Active. Tamsulosin HCl  (4mg Oral daily) Specific strength unknown - Active. Pramipexole Dihydrochloride  (0.75MG Tablet, 1 Oral daily) Active. Triamcinolone Acetonide  (0.1% Cream, External bid, prn) Active. Lidocaine  (5% Patch, 1 External every 12 hours) Active. tiZANidine HCl  (2MG Capsule, 1 Oral tid) Active. Chlorzoxazone  (500MG Tablet, 1 Oral bid) Active. Ondansetron HCl  (Oral prn) Specific strength unknown - Active. Fluocinonide  (0.05% Solution, External prn) Active. Donepezil HCl  (10MG Tablet, 1 Oral daily) Active. Memantine HCl  (10MG Tablet, 1 Oral bid) Active. Vitamin B1  (100MG Tablet, 1 Oral daily) Active. Multivitamin Adults  (Oral daily) Active. Claritin  (10MG Capsule, 1 Oral daily) Active. Benadryl Allergy  (25MG Capsule, 1 Oral daily) Active. Melatonin  (1 Oral qhs) Specific strength unknown - Active.   Ketoconazole  (2% Shampoo, External prn) Active. Hydrocodone/Acetaminophen  (Oral QID) Specific strength unknown - Active. Medications Reconciled     Social History Nora Martinez; 4/1/2020 11:21 AM)  Marital status   . Employment status   Retired. Alcohol Use   Moderate alcohol use. Tobacco Use   Former smoker. Blood Transfusion   No.    Health Maintenance History Nora Martinez; 4/1/2020 11:21 AM)  Flu Vaccine  [8764]:        Review of Systems Nora Martinez; 4/1/2020 11:21 AM)  General Present- Chronic Fatigue. Not Present- Poor Appetite, Weight Gain and Weight Loss. Skin Present- Itching. Not Present- Rash and Skin Color Changes. HEENT Not Present- Hearing Loss and Vertigo. Respiratory Not Present- Difficulty Breathing and TB exposure. Cardiovascular Not Present- Chest Pain, Use of Antibiotics before Dental Procedures and Use of Blood Thinners. Gastrointestinal Present- See HPI. Musculoskeletal Not Present- Arthritis, Hip Replacement Surgery and Knee Replacement Surgery. Neurological Not Present- Weakness. Psychiatric Not Present- Depression. Endocrine Not Present- Diabetes and Thyroid Problems. Hematology Not Present- Anemia. Vitals Nora Martinez; 4/1/2020 11:32 AM)  4/1/2020 11:26 AM  Weight: 203.25 lb   Height: 71 in   Body Surface Area: 2.12 m²   Body Mass Index: 28.35 kg/m²    Temp.: 97° F (Tympanic)    Pulse: 60 (Regular)    Resp. : 16 (Unlabored)     BP: 110/70 (Sitting, Left Arm, Standard)              Physical Exam Anson Bonilla MD; 4/1/2020 3:47 PM)  General  Mental Status - Alert. General Appearance - Cooperative, Pleasant, Not in acute distress. Orientation - Oriented X3. Build & Nutrition - Well nourished and Well developed. Integumentary  General Characteristics  Overall examination of the patient's skin reveals - no rashes, no bruises and no spider angiomas. Color - normal coloration of skin.     Head and Neck  Neck  Global Assessment - full range of motion and supple, no bruit auscultated on the right, no bruit auscultated on the left, non-tender, no lymphadenopathy. Thyroid  Gland Characteristics - normal size and consistency. Eye  Eyeball - Left - No Exophthalmos. Eyeball - Right - No Exophthalmos. Sclera/Conjunctiva - Left - No Jaundice. Sclera/Conjunctiva - Right - No Jaundice. Chest and Lung Exam  Chest and lung exam reveals  - quiet, even and easy respiratory effort with no use of accessory muscles. Auscultation  Breath sounds - Normal. Adventitious sounds - No Adventitious sounds. Cardiovascular  Auscultation  Rhythm - Regular, No Tachycardia, No Bradycardia . Heart Sounds - Normal heart sounds , S1 WNL and S2 WNL, No S3, No Summation Gallop. Murmurs & Other Heart Sounds - Auscultation of the heart reveals - No Murmurs. Abdomen  Palpation/Percussion  Tenderness - Non-Tender. Rebound tenderness - No rebound. Rigidity (guarding) - No Rigidity. Dullness to percussion - No abnormal dullness to percussion. Liver - No hepatosplenomegaly. Abdominal Mass Palpable - No masses. Other Characteristics - No Ascites. Auscultation  Auscultation of the abdomen reveals - Bowel sounds normal, No Abdominal bruits and No Succussion splash. Rectal - Did not examine. Peripheral Vascular  Upper Extremity  Inspection - Left - Normal - No Clubbing, No Cyanosis, No Edema, Pulses Intact. Right - Normal - No Clubbing, No Cyanosis, No Edema, Pulses Intact. Palpation - Edema - Left - No edema. Right - No edema. Lower Extremity  Inspection - Left - Inspection Normal. Right - Inspection Normal. Palpation - Edema - Left - No edema. Right - No edema. Neurologic  Neurologic evaluation reveals  - Cranial nerves grossly intact, no focal neurologic deficits. Motor  Involuntary Movements - Asterixis - not present. Musculoskeletal  Global Assessment  Gait and Station - normal gait and station.         Assessment & Plan Luci Varner MD; 4/1/2020 3:48 PM)  Abnormal finding on GI tract imaging (793.4 R93.3)  Impression: he has h/o bladder cancer followed by Dr. Mary Cortes, never had colonoscopy done before, had positive FIT test last month by his PCP, recent CT scan by [place] [facility] showed 2.6x2.1 cm mass at 8 cm from anus. he came to see me with his wife, no GI complaints, he is scheduled for procedure by Dr. Obinna Martinez on 4/15  colonoscopy and rectal EUS to examine the abnormality seen on CT scana nd examne rest of his colon for any neoplasm  given sample of suprep  Current Plans  Colonoscopy (19739) (Discussed risks and benefits with the patient to include:; perforation, post polypectomy, or post biopsy bleeding, missed lesions, and sedation reactions.)  FLEX SIGMOIDOSCOPY (17902) ((Please refer to procedure note attached))  Pt Education - How to access health information online: discussed with patient and provided information. Patient is to call me for any questions or concerns. Positive FIT (fecal immunochemical test) (792.1  R19.5)  Date of Surgery Update:  Jyoti Arredondo was seen and examined. History and physical has been reviewed. The patient has been examined.  There have been no significant clinical changes since the completion of the originally dated History and Physical.    Signed By: Any Ku MD     April 2, 2020 11:25 AM

## 2020-04-02 NOTE — ANESTHESIA POSTPROCEDURE EVALUATION
Procedure(s): ENDOSCOPIC ULTRASOUND (EUS) COLONOSCOPY 
ENDOSCOPIC POLYPECTOMY. MAC Anesthesia Post Evaluation Patient location during evaluation: PACU Patient participation: complete - patient participated Level of consciousness: awake and alert Pain management: adequate Airway patency: patent Anesthetic complications: no 
Cardiovascular status: acceptable Respiratory status: acceptable Hydration status: acceptable Comments: Seen, no complaints, pending transfer Post anesthesia nausea and vomiting:  none Vitals Value Taken Time /70 4/2/2020  2:04 PM  
Temp 36.4 °C (97.6 °F) 4/2/2020  1:39 PM  
Pulse 60 4/2/2020  2:08 PM  
Resp 16 4/2/2020  2:08 PM  
SpO2 94 % 4/2/2020  2:08 PM  
Vitals shown include unvalidated device data.

## 2020-05-05 ENCOUNTER — VIRTUAL VISIT (OUTPATIENT)
Dept: INTERNAL MEDICINE CLINIC | Age: 75
End: 2020-05-05

## 2020-05-05 DIAGNOSIS — J06.9 UPPER RESPIRATORY TRACT INFECTION, UNSPECIFIED TYPE: ICD-10-CM

## 2020-05-05 DIAGNOSIS — F32.A DEPRESSION, UNSPECIFIED DEPRESSION TYPE: Primary | ICD-10-CM

## 2020-05-05 RX ORDER — MELATONIN
1000 DAILY
COMMUNITY

## 2020-05-05 RX ORDER — HYDROCODONE BITARTRATE AND ACETAMINOPHEN 7.5; 325 MG/1; MG/1
TABLET ORAL
COMMUNITY
Start: 2020-04-18 | End: 2021-01-01 | Stop reason: ALTCHOICE

## 2020-05-05 RX ORDER — DULOXETIN HYDROCHLORIDE 60 MG/1
60 CAPSULE, DELAYED RELEASE ORAL DAILY
Qty: 30 CAP | Refills: 1 | Status: SHIPPED | OUTPATIENT
Start: 2020-05-05 | End: 2020-06-26

## 2020-05-05 NOTE — PROGRESS NOTES
[phone] Presents w/ wife Chest xray lodules 3-2020 Chief Complaint Patient presents with  Back Pain  Other  
  mood f/u 1. Have you been to the ER, urgent care clinic since your last visit? Hospitalized since your last visit? No 
 
2. Have you seen or consulted any other health care providers outside of the [address] [place] [address] since your last visit? Include any pap smears or colon screening. [facility] in regards to thickening of bladder. Health Maintenance Due Topic Date Due  GLAUCOMA SCREENING Q2Y  04/01/2018  Medicare Yearly Exam  08/18/2019  
 
3 most recent PHQ Screens 2/24/2020 Little interest or pleasure in doing things Not at all Feeling down, depressed, irritable, or hopeless Not at all Total Score PHQ 2 0

## 2020-05-06 ENCOUNTER — VIRTUAL VISIT (OUTPATIENT)
Dept: NEUROLOGY | Age: 75
End: 2020-05-06

## 2020-05-06 VITALS — RESPIRATION RATE: 18 BRPM | HEIGHT: 70 IN | BODY MASS INDEX: 32.93 KG/M2 | WEIGHT: 230 LBS

## 2020-05-06 DIAGNOSIS — Z86.59 H/O: DEPRESSION: ICD-10-CM

## 2020-05-06 DIAGNOSIS — G30.9 ALZHEIMER'S DEMENTIA WITHOUT BEHAVIORAL DISTURBANCE, UNSPECIFIED TIMING OF DEMENTIA ONSET: Primary | ICD-10-CM

## 2020-05-06 DIAGNOSIS — F02.80 ALZHEIMER'S DEMENTIA WITHOUT BEHAVIORAL DISTURBANCE, UNSPECIFIED TIMING OF DEMENTIA ONSET: Primary | ICD-10-CM

## 2020-05-06 RX ORDER — MEMANTINE HYDROCHLORIDE 10 MG/1
10 TABLET ORAL 2 TIMES DAILY
Qty: 180 TAB | Refills: 1 | Status: SHIPPED | OUTPATIENT
Start: 2020-05-06 | End: 2020-10-16

## 2020-05-06 NOTE — PROGRESS NOTES
Neurology Clinic Follow up Note Patient ID: Charlie Duval 
[id number] 
75 y.o. 
1945 Mr. Sonja Cam is here for follow up today of Chief Complaint Patient presents with  Memory Loss Last Appointment With Me: 
11/27/2019 This is a telemedicine visit that was performed with the originating site at [facility] and the distant site at patient's home. Verbal consent to participate in video visit was obtained. The patient was identified by name and date of birth. This visit occurred during the Coronavirus [phone]) [facility] Emergency. I discussed with the patient the nature of our telemedicine visits, that:  I would evaluate the patient and recommend diagnostics and treatments based on my assessment  Our sessions are not being recorded and that personal health information is protected  Our team would provide follow up care in person if/when the patient needs it Interval History:  
Family reports some continued slow decline in memory since last visit. Still having significant difficulty with recall. He is requiring assistance with medications, finances, meal prep. Still independent for other ADLs. On Aricept/Namenda and doing well with this. No reported side effects. Family reports rare aggression/agitation, no significant behavioral concerns. Ability to function: 
Driving: Not driving, somewhat depressed by this. Finances: Handled by his wife for years Cooking: No 
Manages own medication: Managed by his wife Residing: Living with his wife at home PMHx/ PSHx/ FHx/ SHx:  Reviewed and unchanged previous visit. Past Medical History:  
Diagnosis Date  AAA (abdominal aortic aneurysm) ([facility] 07/29/2019  
 3.8 cm on US  
 Alcohol abuse, in remission  Arthritis  Back pain  Bladder cancer ([facility]  - in [place], [place] C. difficile colitis 2010  Cancer involving bladder by direct extension from endometrium ([facility] 10/2016  Dementia ([facility]  Depression  Horseshoe kidney  HTN (hypertension) 12/5/2013  Hyperlipidemia  Liver disease  Recurrent bladder transitional cell carcinoma ([facility]  Restless leg syndrome  Screening for colorectal cancer 03/14/2018  
 cologuard - negative  Situational anxiety   
 ie air travel  Spinal stenosis, lumbar  [facility]  Transitional cell carcinoma of left ureter ([facility] Dr. Patito Clemente  Tubular adenoma of colon ROS: 
Comprehensive review of systems negative except for as noted above. Objective:  
 
 
Meds: 
Current Outpatient Medications Medication Sig Dispense Refill  
 HYDROcodone-acetaminophen (NORCO) 7.5-325 mg per tablet Take  by mouth every four (4) hours as needed.  cholecalciferol (Vitamin D3) (1000 Units /25 mcg) tablet Take 1,000 Units by mouth daily.  DULoxetine (CYMBALTA) 60 mg capsule Take 1 Cap by mouth daily. 30 Cap 1  
 HYDROCODONE-ACETAMINOPHEN PO Take  by mouth.  VITAMIN B-1, MONONITRATE, 100 mg tablet TAKE 1 TABLET BY MOUTH DAILY 90 Tab 3  
 ketoconazole (NIZORAL) 2 % shampoo APPLY EXTERNALLY 2 TIMES A WEEK FOR 2 TO 4 WEEKS 120 mL 1  chlorzoxazone (PARAFON FORTE) 500 mg tablet Take 500 mg by mouth two (2) times a day.  diphenhydrAMINE (BENADRYL) 25 mg capsule Take 25 mg by mouth nightly as needed.  sertraline (ZOLOFT) 100 mg tablet Take 1 Tab by mouth daily. 90 Tab 1  
 tiZANidine (ZANAFLEX) 2 mg tablet TAKE 1 TABLET BY MOUTH THREE TIMES DAILY AS NEEDED FOR PAIN 90 Tab 3  
 donepezil (ARICEPT) 10 mg tablet Take 1 Tab by mouth nightly. 90 Tab 1  
 memantine (NAMENDA) 10 mg tablet Take 1 Tab by mouth two (2) times a day.  180 Tab 1  
 fluticasone propionate (FLONASE) 50 mcg/actuation nasal spray SHAKE LIQUID AND USE 2 SPRAYS INTO EACH NOSTRIL DAILY 1 Bottle 3  
 lidocaine (LIDODERM) 5 % apply 1 patch every 24 hours as needed 30 Patch 5  
  pramipexole (MIRAPEX) 0.75 mg tablet TAKE 1 TABLET BY MOUTH EVERY NIGHT 90 Tab 1  
 ondansetron (ZOFRAN ODT) 4 mg disintegrating tablet Take 1 Tab by mouth every eight (8) hours as needed for Nausea. 60 Tab 5  
 acetaminophen (TYLENOL EXTRA STRENGTH) 500 mg tablet Take  by mouth BID Mon Wed & Fri.    
 ibuprofen 200 mg cap Take  by mouth.  melatonin tab tablet Take 5 mg by mouth nightly.  multivitamin (ONE DAILY ENERGY) tablet Take 1 Tab by mouth daily.  triamcinolone acetonide (KENALOG) 0.1 % topical cream Apply  to affected area two (2) times a day. use thin layer x 3-5 days at time 45 g 2  
 MILK THISTLE PO Take 1 Tab by mouth two (2) times a day.  tamsulosin (FLOMAX) 0.4 mg capsule Take 0.4 mg by mouth daily.  loratadine (CLARITIN) 10 mg tablet Take 10 mg by mouth daily. Exam: 
Visit Vitals Resp 18 Ht 5' 10\" (1.778 m) Wt 104.3 kg (230 lb) BMI 33.00 kg/m² Due to this being a TeleHealth evaluation, many elements of the physical examination are unable to be assessed. NEUROLOGICAL EXAM: 
General: Awake, alert, speech fluent. Disoriented to date, knows year. [facility]. Naming intact. Serial 7's intact. Delayed recall impaired (3/5) CN: EOMI, VF intact, facial strength/sensation normal and symmetric, hearing is intact Motor: AG x 4 Reflexes: deferred Coordination: No ataxia. Tremor b/l UE action/postural.  
Sensation: Deferred Gait: hunched posture, slightly unsteady, uses cane to assist 
 
 
LABS Results for orders placed or performed in visit on 01/27/20 AMB POC URINALYSIS DIP STICK AUTO W/O MICRO Result Value Ref Range Color (UA POC) Lesly Arnoldo Clarity (UA POC) Turbid Glucose (UA POC) Negative Negative Bilirubin (UA POC) 2+ Negative Ketones (UA POC) Trace Negative Specific gravity (UA POC) 1.020 1.001 - 1.035 Blood (UA POC) 3+ Negative pH (UA POC) 5.5 4.6 - 8.0 Protein (UA POC) 3+ Negative Urobilinogen (UA POC) 0.2 mg/dL 0.2 - 1 Nitrites (UA POC) Negative Negative Leukocyte esterase (UA POC) 1+ Negative IMAGING: 
MRI Results (most recent): 
Results from Hospital Encounter encounter on 08/06/18 MRI BRAIN W WO CONT Narrative EXAM:  MRI BRAIN W WO CONT INDICATION:  Encephalopathy, confusion, R 41.0, recurrent bladder transitional 
cell carcinoma, C 67.9, G 93.40, TECHNIQUE: Sagittal T1, axial FLAIR, T2, T1 and gradient echo T2-weighted images 
of the head were obtained followed by intravenous infusion 17 mL Dotarem repeat 
axial and coronal T1-weighted images and axial diffusion weighted images. COMPARISON: None available. FINDINGS: 
Generalized ventricular and sulcal prominence consistent with cerebral volume 
loss somewhat greater than expected for age. Minimal white matter T2 hyperintensity within the range of normal for age. No abnormal areas of intracranial enhancement. No abnormal diffusion. No evidence of intracranial hemorrhage, infarct, mass or abnormal extra-axial 
fluid collections. Flow voids are present in the vertebral, basilar and carotid artery systems. The craniocervical junction is unremarkable. The structures of the cranial base including paranasal sinuses are  
unremarkable. Impression IMPRESSION:  
1. Generalized cerebral volume loss somewhat greater than expected for age. 2. No acute intracranial abnormality demonstrated. Rich Confer Assessment:  
 
Encounter Diagnoses ICD-10-CM ICD-9-CM 1. Alzheimer's dementia without behavioral disturbance, unspecified timing of dementia onset ([facility] G30.9 331.0 F02.80 294.10  
2. H/O: depression Z80.58 V[address]  
75year old male with a h/o HTN, HPL, bladder CA, depression, EtOH abuse, transaminitis, lumbar spinal stenosis here for f/u of cognitive decline.    
MOCA 18/30 and c/w moderate dementia with significantly impaired delayed recall, orientation, executive functioning and to a lesser extent naming. MRI Brain completed 8/6/18 and independently reviewed with pt/family today without evidence of vascular dementia, mild to moderate cortical atrophy, no acute process identified. Neuropsychologic assessment completed without evidence of pseudodementia. Presentation is c/w evolving moderate dementia, likely AD subtype. Finances and medication administration should continue to be monitored to ensure accuracy and prevent errors. Patient currently has appropriate social/caregiver support in place. Will continue Aricept and Namenda to assist with recall. Advised that he continue to abstain from driving for now. He request formal driving assessment to objectively evaluate his driving performance which I have ordered today. Plan:  
Continue Aricept 10mg qhs, Namenda 10mg BID 
OT driving evaluation Heart healthy diet and exercise Regular scheduled cognitive and social engagement. Follow-up and Dispositions · Return in about 6 months (around 11/6/2020). Signed: 
Jessica Bahena, DO 
5/6/2020

## 2020-05-06 NOTE — PROGRESS NOTES
Subjective Amena Care is a 75 y.o. male presents with spouse for depression follow up Amena Care is a 75 y.o. male being evaluated by a Virtual Visit (video visit) encounter to address concerns as mentioned above. A caregiver was present when appropriate. Due to this being a TeleHealth encounter (During [facility]- public health emergency), evaluation of the following organ systems was limited: Vitals/Constitutional/EENT/Resp/CV/GI//MS/Neuro/Skin/Heme-Lymph-Imm. Pursuant to the emergency declaration under the [facility], [address] [place] [address] authority and the [employer] Act, this Virtual Visit was conducted with patient's (and/or legal guardian's) consent, to reduce the risk of exposure to COVID-19 and provide necessary medical care. Services were provided through a video synchronous discussion virtually to substitute for in-person encounter. --Sabiha Howard NP on 5/6/2020 at 4:19 AM 
 
An electronic signature was used to authenticate this note. HPI No improvement in depressed mood with higher dose Wellbutrin, continues to take Zoloft. He is confined to home d/t pandemic. Spouse believes this has caused worsening depression He has frontal headaches ~ 5 times weekly, phlegm and congestion in throat. Rarely uses Flonase S/p urology procedure. Urinary symptoms have improved Colonoscopy April 2; multiple benign polyps Past Medical History:  
Diagnosis Date  AAA (abdominal aortic aneurysm) ([address] 07/29/2019  
 3.8 cm on US  
 Alcohol abuse, in remission  Arthritis  Back pain  Bladder cancer ([address]  - in [place], [place] C. difficile colitis 2010  Cancer involving bladder by direct extension from endometrium ([address] 10/2016  Dementia ([address]  Depression  Horseshoe kidney  HTN (hypertension) 12/5/2013  Hyperlipidemia  Liver disease  Recurrent bladder transitional cell carcinoma ([facility]  Restless leg syndrome  Screening for colorectal cancer 03/14/2018  
 cologuard - negative  Situational anxiety   
 ie air travel  Spinal stenosis, lumbar  Thrombocytopenia ([facility]  Transitional cell carcinoma of left ureter ([facility] Dr. Raines Schools  Tubular adenoma of colon Current Outpatient Medications Medication Sig  
 HYDROcodone-acetaminophen (NORCO) 7.5-325 mg per tablet Take  by mouth every four (4) hours as needed.  cholecalciferol (Vitamin D3) (1000 Units /25 mcg) tablet Take 1,000 Units by mouth daily.  DULoxetine (CYMBALTA) 60 mg capsule Take 1 Cap by mouth daily.  HYDROCODONE-ACETAMINOPHEN PO Take  by mouth.  VITAMIN B-1, MONONITRATE, 100 mg tablet TAKE 1 TABLET BY MOUTH DAILY  ketoconazole (NIZORAL) 2 % shampoo APPLY EXTERNALLY 2 TIMES A WEEK FOR 2 TO 4 WEEKS  chlorzoxazone (PARAFON FORTE) 500 mg tablet Take 500 mg by mouth two (2) times a day.  diphenhydrAMINE (BENADRYL) 25 mg capsule Take 25 mg by mouth nightly as needed.  sertraline (ZOLOFT) 100 mg tablet Take 1 Tab by mouth daily.  tiZANidine (ZANAFLEX) 2 mg tablet TAKE 1 TABLET BY MOUTH THREE TIMES DAILY AS NEEDED FOR PAIN  
 donepezil (ARICEPT) 10 mg tablet Take 1 Tab by mouth nightly.  memantine (NAMENDA) 10 mg tablet Take 1 Tab by mouth two (2) times a day.  fluticasone propionate (FLONASE) 50 mcg/actuation nasal spray SHAKE LIQUID AND USE 2 SPRAYS INTO EACH NOSTRIL DAILY  lidocaine (LIDODERM) 5 % apply 1 patch every 24 hours as needed  pramipexole (MIRAPEX) 0.75 mg tablet TAKE 1 TABLET BY MOUTH EVERY NIGHT  ondansetron (ZOFRAN ODT) 4 mg disintegrating tablet Take 1 Tab by mouth every eight (8) hours as needed for Nausea.  acetaminophen (TYLENOL EXTRA STRENGTH) 500 mg tablet Take  by mouth BID Mon Wed & Fri.  
 melatonin tab tablet Take 5 mg by mouth nightly.  multivitamin (ONE DAILY ENERGY) tablet Take 1 Tab by mouth daily.  triamcinolone acetonide (KENALOG) 0.1 % topical cream Apply  to affected area two (2) times a day. use thin layer x 3-5 days at time  MILK THISTLE PO Take 1 Tab by mouth two (2) times a day.  tamsulosin (FLOMAX) 0.4 mg capsule Take 0.4 mg by mouth daily.  loratadine (CLARITIN) 10 mg tablet Take 10 mg by mouth daily.  ibuprofen 200 mg cap Take  by mouth. No current facility-administered medications for this visit. No Known Allergies Review of Systems Constitutional: Negative for chills and fever. HENT: Positive for congestion. Respiratory: Positive for cough. Cardiovascular: Negative. Gastrointestinal: Negative. Genitourinary: Negative. Neurological: Positive for headaches. Negative for dizziness. Psychiatric/Behavioral: Positive for depression and memory loss. Negative for suicidal ideas. The patient is nervous/anxious and has insomnia. Objective Physical Exam 
  
Assessment & Plan ICD-10-CM ICD-9-CM 1. Depression, unspecified depression type F32.9 311 DULoxetine (CYMBALTA) 60 mg capsule 2. Upper respiratory tract infection, unspecified type J06.9 465.9   
 
 
discussed alternate treatment options. He declines  psych referral. Will D/C Wellbutrin and start above medication. Consider tapering off Zoloft if no improvement. Will continue to encourage psych consult Instructed to use Flonase daily, call back if URI symptoms persist. Encouraged adequate hydration  
 
lab results and schedule of future lab studies reviewed with patient 
reviewed diet, exercise and weight control 
reviewed medications and side effects in detail Patient voices understanding and acceptance of this advice and will call back if any further questions or concerns.  
 
 
Anthony Parrish NP

## 2020-05-06 NOTE — PATIENT INSTRUCTIONS
PRESCRIPTION REFILL POLICY [facility] Statement to Patients April 1, 2014 In an effort to ensure the large volume of patient prescription refills is processed in the most efficient and expeditious manner, we are asking our patients to assist us by calling your Pharmacy for all prescription refills, this will include also your  Mail Order Pharmacy. The pharmacy will contact our office electronically to continue the refill process. Please do not wait until the last minute to call your pharmacy. We need at least 48 hours (2days) to fill prescriptions. We also encourage you to call your pharmacy before going to  your prescription to make sure it is ready. With regard to controlled substance prescription refill requests (narcotic refills) that need to be picked up at our office, we ask your cooperation by providing us with at least 72 hours (3days) notice that you will need a refill. We will not refill narcotic prescription refill requests after 4:00pm on any weekday, Monday through Thursday, or after 2:00pm on Fridays, or on the weekends. We encourage everyone to explore another way of getting your prescription refill request processed using [employer], our patient web portal through our electronic medical record system. [employer] is an efficient and effective way to communicate your medication request directly to the office and  downloadable as an donte on your smart phone . [employer] also features a review functionality that allows you to view your medication list as well as leave messages for your physician. Are you ready to get connected? If so please review the attatched instructions or speak to any of our staff to get you set up right away! Thank you so much for your cooperation. Should you have any questions please contact our Practice Administrator. The Physicians and Staff,  [facility]

## 2020-05-25 RX ORDER — SERTRALINE HYDROCHLORIDE 50 MG/1
TABLET, FILM COATED ORAL
Qty: 90 TAB | Refills: 1 | Status: SHIPPED | OUTPATIENT
Start: 2020-05-25 | End: 2021-01-01 | Stop reason: DRUGHIGH

## 2020-05-25 RX ORDER — DONEPEZIL HYDROCHLORIDE 10 MG/1
TABLET, FILM COATED ORAL
Qty: 90 TAB | Refills: 1 | Status: SHIPPED | OUTPATIENT
Start: 2020-05-25 | End: 2020-11-16 | Stop reason: SDUPTHER

## 2020-06-12 DIAGNOSIS — M54.50 LUMBAR BACK PAIN: ICD-10-CM

## 2020-06-12 RX ORDER — TIZANIDINE 2 MG/1
TABLET ORAL
Qty: 90 TAB | Refills: 3 | Status: SHIPPED | OUTPATIENT
Start: 2020-06-12 | End: 2020-09-16 | Stop reason: ALTCHOICE

## 2020-06-25 DIAGNOSIS — F32.A DEPRESSION, UNSPECIFIED DEPRESSION TYPE: ICD-10-CM

## 2020-06-26 RX ORDER — DULOXETIN HYDROCHLORIDE 60 MG/1
CAPSULE, DELAYED RELEASE ORAL
Qty: 30 CAP | Refills: 1 | Status: SHIPPED | OUTPATIENT
Start: 2020-06-26 | End: 2020-08-20

## 2020-07-11 DIAGNOSIS — M19.90 ARTHRITIS: ICD-10-CM

## 2020-07-12 RX ORDER — LIDOCAINE 50 MG/G
PATCH TOPICAL
Qty: 30 PATCH | Refills: 5 | Status: SHIPPED | OUTPATIENT
Start: 2020-07-12 | End: 2021-01-01

## 2020-08-20 DIAGNOSIS — F32.A DEPRESSION, UNSPECIFIED DEPRESSION TYPE: ICD-10-CM

## 2020-08-20 RX ORDER — DULOXETIN HYDROCHLORIDE 60 MG/1
CAPSULE, DELAYED RELEASE ORAL
Qty: 30 CAP | Refills: 1 | Status: SHIPPED | OUTPATIENT
Start: 2020-08-20 | End: 2020-09-16

## 2020-09-15 NOTE — PROGRESS NOTES
Rm#[id number]    Presents w/ wife   Non fasting     Chief Complaint   Patient presents with    Depression     f/u    Back Pain     f/u    Dementia     f/u     1. Have you been to the ER, urgent care clinic since your last visit? Hospitalized since your last visit? No    2. Have you seen or consulted any other health care providers outside of the [address] [place] [address] since your last visit? Include any pap smears or colon screening. Yes Dr. Roseanna Holloway, pain f/u, 9-8-20.  dr. Tien Kendrick, 8-6-20, vascular surgeon, follow up.  [facility], [id number] procedure.   dr. Leandro Sosa, neuro follow up. 4-1-20, dr. Arnulfo Velazquez, GI       Health Maintenance Due   Topic Date Due    GLAUCOMA SCREENING Q2Y  04/01/2018    Medicare Yearly Exam  08/18/2019    Flu Vaccine (1) 09/01/2020     3 most recent PHQ Screens 2/24/2020   Little interest or pleasure in doing things Not at all   Feeling down, depressed, irritable, or hopeless Not at all   Total Score PHQ 2 0     Recent Travel Screening and Travel History documentation     Travel Screening      No screening recorded since 09/14/20 1721      Travel History   Travel since 08/15/20     No documented travel since 08/15/20

## 2020-09-16 ENCOUNTER — OFFICE VISIT (OUTPATIENT)
Dept: INTERNAL MEDICINE CLINIC | Age: 75
End: 2020-09-16
Payer: MEDICARE

## 2020-09-16 VITALS
DIASTOLIC BLOOD PRESSURE: 79 MMHG | SYSTOLIC BLOOD PRESSURE: 144 MMHG | OXYGEN SATURATION: 95 % | WEIGHT: 230 LBS | RESPIRATION RATE: 12 BRPM | HEART RATE: 78 BPM | HEIGHT: 70 IN | BODY MASS INDEX: 32.93 KG/M2 | TEMPERATURE: 98.9 F

## 2020-09-16 DIAGNOSIS — N52.8 OTHER MALE ERECTILE DYSFUNCTION: ICD-10-CM

## 2020-09-16 DIAGNOSIS — I71.40 ABDOMINAL AORTIC ANEURYSM (AAA) 3.0 CM TO 5.0 CM IN DIAMETER IN FEMALE: ICD-10-CM

## 2020-09-16 DIAGNOSIS — F03.91 DEMENTIA WITH BEHAVIORAL DISTURBANCE, UNSPECIFIED DEMENTIA TYPE: ICD-10-CM

## 2020-09-16 DIAGNOSIS — I10 ESSENTIAL HYPERTENSION: ICD-10-CM

## 2020-09-16 DIAGNOSIS — F33.9 RECURRENT DEPRESSION (HCC): ICD-10-CM

## 2020-09-16 DIAGNOSIS — F32.A DEPRESSION, UNSPECIFIED DEPRESSION TYPE: ICD-10-CM

## 2020-09-16 DIAGNOSIS — E78.5 HYPERLIPIDEMIA, UNSPECIFIED HYPERLIPIDEMIA TYPE: ICD-10-CM

## 2020-09-16 DIAGNOSIS — M54.50 LUMBAR BACK PAIN: ICD-10-CM

## 2020-09-16 DIAGNOSIS — E55.9 VITAMIN D DEFICIENCY: ICD-10-CM

## 2020-09-16 DIAGNOSIS — R40.0 DAYTIME SOMNOLENCE: ICD-10-CM

## 2020-09-16 DIAGNOSIS — R51.9 MORNING HEADACHE: Primary | ICD-10-CM

## 2020-09-16 DIAGNOSIS — Z23 ENCOUNTER FOR IMMUNIZATION: ICD-10-CM

## 2020-09-16 DIAGNOSIS — R73.9 HYPERGLYCEMIA: ICD-10-CM

## 2020-09-16 DIAGNOSIS — N40.0 BENIGN PROSTATIC HYPERPLASIA WITHOUT LOWER URINARY TRACT SYMPTOMS: ICD-10-CM

## 2020-09-16 DIAGNOSIS — C67.9 RECURRENT BLADDER TRANSITIONAL CELL CARCINOMA (HCC): ICD-10-CM

## 2020-09-16 PROCEDURE — G8754 DIAS BP LESS 90: HCPCS

## 2020-09-16 PROCEDURE — 3017F COLORECTAL CA SCREEN DOC REV: CPT

## 2020-09-16 PROCEDURE — G8753 SYS BP > OR = 140: HCPCS

## 2020-09-16 PROCEDURE — G9717 DOC PT DX DEP/BP F/U NT REQ: HCPCS

## 2020-09-16 PROCEDURE — 99215 OFFICE O/P EST HI 40 MIN: CPT

## 2020-09-16 PROCEDURE — G8536 NO DOC ELDER MAL SCRN: HCPCS

## 2020-09-16 PROCEDURE — G8417 CALC BMI ABV UP PARAM F/U: HCPCS

## 2020-09-16 PROCEDURE — G8427 DOCREV CUR MEDS BY ELIG CLIN: HCPCS

## 2020-09-16 PROCEDURE — G0008 ADMIN INFLUENZA VIRUS VAC: HCPCS

## 2020-09-16 PROCEDURE — 1101F PT FALLS ASSESS-DOCD LE1/YR: CPT

## 2020-09-16 PROCEDURE — 90653 IIV ADJUVANT VACCINE IM: CPT

## 2020-09-16 RX ORDER — BUPROPION HYDROCHLORIDE 150 MG/1
TABLET ORAL
Qty: 90 TAB | Refills: 1 | Status: SHIPPED | OUTPATIENT
Start: 2020-09-16 | End: 2020-12-29

## 2020-09-16 RX ORDER — MINERAL OIL
180 ENEMA (ML) RECTAL DAILY
COMMUNITY

## 2020-09-16 RX ORDER — TADALAFIL 5 MG/1
5 TABLET ORAL DAILY
Qty: 90 TAB | Refills: 1 | Status: SHIPPED | OUTPATIENT
Start: 2020-09-16 | End: 2021-01-01

## 2020-09-16 RX ORDER — DULOXETIN HYDROCHLORIDE 30 MG/1
30 CAPSULE, DELAYED RELEASE ORAL DAILY
Qty: 30 CAP | Refills: 0 | Status: SHIPPED | OUTPATIENT
Start: 2020-09-16 | End: 2020-10-16 | Stop reason: ALTCHOICE

## 2020-09-16 RX ORDER — SERTRALINE HYDROCHLORIDE 100 MG/1
200 TABLET, FILM COATED ORAL DAILY
Qty: 180 TAB | Refills: 1 | Status: SHIPPED | OUTPATIENT
Start: 2020-09-16 | End: 2021-01-01

## 2020-09-16 RX ORDER — HYDROCODONE BITARTRATE AND ACETAMINOPHEN 10; 325 MG/1; MG/1
10-325 TABLET ORAL 4 TIMES DAILY
COMMUNITY
Start: 2020-09-08 | End: 2020-12-29 | Stop reason: DRUGHIGH

## 2020-09-16 NOTE — PATIENT INSTRUCTIONS
Stop  flomax (tamsulosin) and start daily dose cialis 5mg daily If this fails to improve or is complicated by worsened prostate symptoms then see urology to consider procedure to open up the outflow tract Taper off cymbalta (duloxetine) - 30 mg daily for 1-2 weeks then every other day x 1 week Resume welllbutrin  mg daily Increase zoloft to 200 mg daily Consider trial of lower namenda if change with mood medication does not help tiredness Consider lower aricept next Consider reduction in other sedating meds - hold for now Ref to sleep to consider home sleep study to be sure this is not contributing to headaches or sleepiness

## 2020-09-16 NOTE — PROGRESS NOTES
HPI:  Presents for f/u dementia, mood, back pain, HA    Accompanied by wife    Seeing back pain specialist, Dr. Sunitha Galindo    Following with Neuro - no new rec's    EUS per Dr. Kaylin Gambino in April 2020 - reported as NL    Urology procedure in May 2020 - cystoscopy and bladder bx - benign    C/o HAs - 1-2 x per week  Usually awakens with HA  Resolves during the day. Taking ibuprofen prn and hydrocodone    Inconsistent snoring. Tired and sleeps most of the day    Still depressed despite 2 medications  Pain not any better with cymbalta and mood no better either      Using flomax for BPH  +ED and ejaculatory problem    Past medical, Social, and Family history reviewed      Prior to Admission medications    Medication Sig Start Date End Date Taking? Authorizing Provider   HYDROcodone-acetaminophen (NORCO)  mg tablet Take  Tabs by mouth four (4) times daily. 9/8/20  Yes Provider, Historical   fexofenadine (ALLEGRA) 180 mg tablet Take 180 mg by mouth daily. Yes Provider, Historical   DULoxetine (CYMBALTA) 60 mg capsule TAKE 1 CAPSULE BY MOUTH DAILY 8/20/20  Yes Aminta Cooper NP   lidocaine (LIDODERM) 5 % APPLY ONE PATCH TOPICALLY EVERY 24 HOURS AS NEEDED 7/12/20  Yes My Glover MD   donepeziL (ARICEPT) 10 mg tablet TAKE 1 TABLET BY MOUTH EVERY NIGHT 5/25/20  Yes My Glover MD   memantine (Namenda) 10 mg tablet Take 1 Tab by mouth two (2) times a day. 5/6/20  Yes Renato Cruz DO   cholecalciferol (Vitamin D3) (1000 Units /25 mcg) tablet Take 1,000 Units by mouth daily. Yes Provider, Historical   VITAMIN B-1, MONONITRATE, 100 mg tablet TAKE 1 TABLET BY MOUTH DAILY 2/27/20  Yes Leotha Aase, MD   chlorzoxazone (PARAFON FORTE) 500 mg tablet Take 500 mg by mouth two (2) times a day. Yes Provider, Historical   diphenhydrAMINE (BENADRYL) 25 mg capsule Take 25 mg by mouth nightly as needed. Yes Provider, Historical   sertraline (ZOLOFT) 100 mg tablet Take 1 Tab by mouth daily.  1/9/20 Yes Nael Gutiérrez MD   fluticasone propionate (FLONASE) 50 mcg/actuation nasal spray SHAKE LIQUID AND USE 2 SPRAYS INTO EACH NOSTRIL DAILY 11/12/19  Yes Lester SINGH NP   pramipexole (MIRAPEX) 0.75 mg tablet TAKE 1 TABLET BY MOUTH EVERY NIGHT 10/7/19  Yes Nael Gutiérrez MD   ondansetron (ZOFRAN ODT) 4 mg disintegrating tablet Take 1 Tab by mouth every eight (8) hours as needed for Nausea. 9/13/19  Yes Jun Celaya NP   ibuprofen 200 mg cap Take  by mouth. Yes Provider, Historical   melatonin tab tablet Take 5 mg by mouth nightly. Yes Provider, Historical   multivitamin (ONE DAILY ENERGY) tablet Take 1 Tab by mouth daily. Yes Provider, Historical   triamcinolone acetonide (KENALOG) 0.1 % topical cream Apply  to affected area two (2) times a day. use thin layer x 3-5 days at time 5/21/19  Yes Nael Gutiérrez MD   MILK THISTLE PO Take 1 Tab by mouth two (2) times a day. Yes Provider, Historical   tamsulosin (FLOMAX) 0.4 mg capsule Take 0.4 mg by mouth daily. Yes Provider, Historical   tiZANidine (ZANAFLEX) 2 mg tablet TAKE 1 TABLET BY MOUTH THREE TIMES DAILY AS NEEDED FOR PAIN 6/12/20   Lester SINGH NP   sertraline (ZOLOFT) 50 mg tablet TAKE 1 TABLET BY MOUTH DAILY 5/25/20   Nael Gutiérrez MD   HYDROcodone-acetaminophen [facility]) 7.5-325 mg per tablet Take  by mouth every four (4) hours as needed. 4/18/20   Provider, Historical   HYDROCODONE-ACETAMINOPHEN PO Take  by mouth. Provider, Historical   ketoconazole (NIZORAL) 2 % shampoo APPLY EXTERNALLY 2 TIMES A WEEK FOR 2 TO 4 WEEKS 2/15/20   Lester SINGH NP   acetaminophen (TYLENOL EXTRA STRENGTH) 500 mg tablet Take  by mouth BID Mon Wed & Fri. Provider, Historical   loratadine (CLARITIN) 10 mg tablet Take 10 mg by mouth daily. Provider, Historical          ROS  Complete ROS reviewed and negative or stable except as noted in HPI. Physical Exam  Vitals signs and nursing note reviewed.    Constitutional:       General: He is not in acute distress. HENT:      Head: Normocephalic and atraumatic. Mouth/Throat:      Pharynx: No oropharyngeal exudate. Eyes:      General: No scleral icterus. Pupils: Pupils are equal, round, and reactive to light. Neck:      Musculoskeletal: Normal range of motion and neck supple. Thyroid: No thyromegaly. Vascular: No JVD. Cardiovascular:      Rate and Rhythm: Normal rate and regular rhythm. Heart sounds: Normal heart sounds. No murmur. No friction rub. No gallop. Pulmonary:      Effort: Pulmonary effort is normal. No respiratory distress. Breath sounds: Normal breath sounds. No wheezing or rales. Abdominal:      General: Bowel sounds are normal. There is no distension. Palpations: Abdomen is soft. There is no mass. Tenderness: There is no abdominal tenderness. There is no guarding or rebound. Musculoskeletal: Normal range of motion. Comments: Slow deliberate gait, use of cane required for ambulation. Lymphadenopathy:      Cervical: No cervical adenopathy. Skin:     General: Skin is warm. Neurological:      Mental Status: He is alert and oriented to person, place, and time. Mental status is at baseline. Motor: No abnormal muscle tone. Coordination: Coordination normal.      Comments: +interactive. Prior labs reviewed. Assessment/Plan:  ?adverse effects from cymbalta with zoloft  R/o LUCHO contributing to morning HAs and sleepiness      ICD-10-CM ICD-9-CM    1. Morning headache  R51 784.0 SLEEP MEDICINE REFERRAL      CBC WITH AUTOMATED DIFF   2. Depression, unspecified depression type  F32.9 311 DULoxetine (CYMBALTA) 30 mg capsule   3. Daytime somnolence  R40.0 780.54 SLEEP MEDICINE REFERRAL      CBC WITH AUTOMATED DIFF      T4, FREE      TSH 3RD GENERATION   4. Other male erectile dysfunction  N52.8 607.84 tadalafiL (Cialis) 5 mg tablet   5.  Dementia with behavioral disturbance, unspecified dementia type ([facility]  F03.91 294.21 6. Recurrent bladder transitional cell carcinoma (HCC)  C67.9 188.9    7. Benign prostatic hyperplasia without lower urinary tract symptoms  N40.0 600.00 tadalafiL (Cialis) 5 mg tablet   8. Recurrent depression (HCC)  F33.9 296.30 buPROPion XL (WELLBUTRIN XL) 150 mg tablet   9. Lumbar back pain  M54.5 724.2    10. Vitamin D deficiency  E55.9 268.9 VITAMIN D, 25 HYDROXY   11. Essential hypertension  I10 401.9 CBC WITH AUTOMATED DIFF      METABOLIC PANEL, COMPREHENSIVE   12. Abdominal aortic aneurysm (AAA) 3.0 cm to 5.0 cm in diameter in female (HCC)  I71.4 441.4    13. Hyperlipidemia, unspecified hyperlipidemia type  E78.5 272.4 CK      LIPID PANEL      METABOLIC PANEL, COMPREHENSIVE   14. Hyperglycemia  R73.9 790.29 HEMOGLOBIN A1C WITH EAG   15. Encounter for immunization  Z23 V03.89 INFLUENZA VACCINE INACTIVATED (IIV), SUBUNIT, ADJUVANTED, IM     Follow-up and Dispositions    · Return in about 1 month (around 10/16/2020), or if symptoms worsen or fail to improve, for mood, sleepiness - either.        results and schedule of future studies reviewed with patient, wife  reviewed diet, exercise and weight  cardiovascular risk and specific lipid/LDL goals reviewed  reviewed medications and side effects in detail    Dc flomax and start daily dose cialis 5mg daily  If fails to improve or complicated by worsened prostate symptoms then see urology to consider proocedure to open up the outflow tract  Taper off cymbalta  Resume welllbutrin XL  Increase zoloft to 200 mg daily  Consider trial of lower namenda if change with mood meds does not help tiredness  Consider lower aricept next   Consider reduction in other sedating meds   Ref to sleep to consider home sleep study         >40 minutes time spent with >50% in counseling and coordination of care

## 2020-09-18 ENCOUNTER — LAB ONLY (OUTPATIENT)
Dept: INTERNAL MEDICINE CLINIC | Age: 75
End: 2020-09-18

## 2020-09-18 DIAGNOSIS — I10 ESSENTIAL HYPERTENSION: ICD-10-CM

## 2020-09-18 DIAGNOSIS — E78.5 HYPERLIPIDEMIA, UNSPECIFIED HYPERLIPIDEMIA TYPE: ICD-10-CM

## 2020-09-18 DIAGNOSIS — R40.0 DAYTIME SOMNOLENCE: ICD-10-CM

## 2020-09-18 DIAGNOSIS — E55.9 VITAMIN D DEFICIENCY: ICD-10-CM

## 2020-09-18 DIAGNOSIS — R51.9 MORNING HEADACHE: ICD-10-CM

## 2020-09-18 DIAGNOSIS — R73.9 HYPERGLYCEMIA: ICD-10-CM

## 2020-09-18 LAB
25(OH)D3 SERPL-MCNC: 31.8 NG/ML (ref 30–100)
ALBUMIN SERPL-MCNC: 4.2 G/DL (ref 3.5–5)
ALBUMIN/GLOB SERPL: 1.4 {RATIO} (ref 1.1–2.2)
ALP SERPL-CCNC: 157 U/L (ref 45–117)
ALT SERPL-CCNC: 35 U/L (ref 12–78)
ANION GAP SERPL CALC-SCNC: 5 MMOL/L (ref 5–15)
AST SERPL-CCNC: 36 U/L (ref 15–37)
BASOPHILS # BLD: 0.1 K/UL (ref 0–0.1)
BASOPHILS NFR BLD: 1 % (ref 0–1)
BILIRUB SERPL-MCNC: 0.5 MG/DL (ref 0.2–1)
BUN SERPL-MCNC: 21 MG/DL (ref 6–20)
BUN/CREAT SERPL: 21 (ref 12–20)
CALCIUM SERPL-MCNC: 9.2 MG/DL (ref 8.5–10.1)
CHLORIDE SERPL-SCNC: 109 MMOL/L (ref 97–108)
CHOLEST SERPL-MCNC: 166 MG/DL
CK SERPL-CCNC: 96 U/L (ref 39–308)
CO2 SERPL-SCNC: 25 MMOL/L (ref 21–32)
CREAT SERPL-MCNC: 1.02 MG/DL (ref 0.7–1.3)
DIFFERENTIAL METHOD BLD: ABNORMAL
EOSINOPHIL # BLD: 0.5 K/UL (ref 0–0.4)
EOSINOPHIL NFR BLD: 7 % (ref 0–7)
ERYTHROCYTE [DISTWIDTH] IN BLOOD BY AUTOMATED COUNT: 13.3 % (ref 11.5–14.5)
EST. AVERAGE GLUCOSE BLD GHB EST-MCNC: 108 MG/DL
GLOBULIN SER CALC-MCNC: 2.9 G/DL (ref 2–4)
GLUCOSE SERPL-MCNC: 97 MG/DL (ref 65–100)
HBA1C MFR BLD: 5.4 % (ref 4–5.6)
HCT VFR BLD AUTO: 49.2 % (ref 36.6–50.3)
HDLC SERPL-MCNC: 46 MG/DL
HDLC SERPL: 3.6 {RATIO} (ref 0–5)
HGB BLD-MCNC: 15.7 G/DL (ref 12.1–17)
IMM GRANULOCYTES # BLD AUTO: 0 K/UL (ref 0–0.04)
IMM GRANULOCYTES NFR BLD AUTO: 0 % (ref 0–0.5)
LDLC SERPL CALC-MCNC: 74.4 MG/DL (ref 0–100)
LIPID PROFILE,FLP: ABNORMAL
LYMPHOCYTES # BLD: 1.8 K/UL (ref 0.8–3.5)
LYMPHOCYTES NFR BLD: 22 % (ref 12–49)
MCH RBC QN AUTO: 31.7 PG (ref 26–34)
MCHC RBC AUTO-ENTMCNC: 31.9 G/DL (ref 30–36.5)
MCV RBC AUTO: 99.2 FL (ref 80–99)
MONOCYTES # BLD: 1.1 K/UL (ref 0–1)
MONOCYTES NFR BLD: 13 % (ref 5–13)
NEUTS SEG # BLD: 4.5 K/UL (ref 1.8–8)
NEUTS SEG NFR BLD: 57 % (ref 32–75)
NRBC # BLD: 0 K/UL (ref 0–0.01)
NRBC BLD-RTO: 0 PER 100 WBC
PLATELET # BLD AUTO: 185 K/UL (ref 150–400)
PMV BLD AUTO: 11.7 FL (ref 8.9–12.9)
POTASSIUM SERPL-SCNC: 4.3 MMOL/L (ref 3.5–5.1)
PROT SERPL-MCNC: 7.1 G/DL (ref 6.4–8.2)
RBC # BLD AUTO: 4.96 M/UL (ref 4.1–5.7)
SODIUM SERPL-SCNC: 139 MMOL/L (ref 136–145)
T4 FREE SERPL-MCNC: 1.2 NG/DL (ref 0.8–1.5)
TRIGL SERPL-MCNC: 228 MG/DL (ref ?–150)
TSH SERPL DL<=0.05 MIU/L-ACNC: 3.16 UIU/ML (ref 0.36–3.74)
VLDLC SERPL CALC-MCNC: 45.6 MG/DL
WBC # BLD AUTO: 8 K/UL (ref 4.1–11.1)

## 2020-09-20 NOTE — PROGRESS NOTES
Labs are either normal or stable and at goal.  
Continue your current medications as discussed at your visit.

## 2020-10-16 ENCOUNTER — OFFICE VISIT (OUTPATIENT)
Dept: INTERNAL MEDICINE CLINIC | Age: 75
End: 2020-10-16
Payer: MEDICARE

## 2020-10-16 VITALS
WEIGHT: 227 LBS | DIASTOLIC BLOOD PRESSURE: 68 MMHG | RESPIRATION RATE: 16 BRPM | TEMPERATURE: 98.3 F | BODY MASS INDEX: 32.5 KG/M2 | SYSTOLIC BLOOD PRESSURE: 134 MMHG | HEART RATE: 80 BPM | OXYGEN SATURATION: 95 % | HEIGHT: 70 IN

## 2020-10-16 DIAGNOSIS — M54.9 CHRONIC BACK PAIN, UNSPECIFIED BACK LOCATION, UNSPECIFIED BACK PAIN LATERALITY: ICD-10-CM

## 2020-10-16 DIAGNOSIS — R40.0 DAYTIME SOMNOLENCE: ICD-10-CM

## 2020-10-16 DIAGNOSIS — E78.5 HYPERLIPIDEMIA, UNSPECIFIED HYPERLIPIDEMIA TYPE: ICD-10-CM

## 2020-10-16 DIAGNOSIS — F03.91 DEMENTIA WITH BEHAVIORAL DISTURBANCE, UNSPECIFIED DEMENTIA TYPE: ICD-10-CM

## 2020-10-16 DIAGNOSIS — G89.29 CHRONIC BACK PAIN, UNSPECIFIED BACK LOCATION, UNSPECIFIED BACK PAIN LATERALITY: ICD-10-CM

## 2020-10-16 DIAGNOSIS — G25.81 RESTLESS LEG SYNDROME: ICD-10-CM

## 2020-10-16 DIAGNOSIS — M54.50 LUMBAR BACK PAIN: ICD-10-CM

## 2020-10-16 DIAGNOSIS — E55.9 VITAMIN D DEFICIENCY: ICD-10-CM

## 2020-10-16 DIAGNOSIS — I10 ESSENTIAL HYPERTENSION: ICD-10-CM

## 2020-10-16 DIAGNOSIS — R25.1 TREMOR: ICD-10-CM

## 2020-10-16 DIAGNOSIS — F32.A DEPRESSION, UNSPECIFIED DEPRESSION TYPE: ICD-10-CM

## 2020-10-16 DIAGNOSIS — Z00.00 MEDICARE ANNUAL WELLNESS VISIT, SUBSEQUENT: Primary | ICD-10-CM

## 2020-10-16 DIAGNOSIS — R51.9 MORNING HEADACHE: ICD-10-CM

## 2020-10-16 PROCEDURE — G0439 PPPS, SUBSEQ VISIT: HCPCS | Performed by: INTERNAL MEDICINE

## 2020-10-16 PROCEDURE — 1101F PT FALLS ASSESS-DOCD LE1/YR: CPT | Performed by: INTERNAL MEDICINE

## 2020-10-16 PROCEDURE — G9717 DOC PT DX DEP/BP F/U NT REQ: HCPCS | Performed by: INTERNAL MEDICINE

## 2020-10-16 PROCEDURE — G8536 NO DOC ELDER MAL SCRN: HCPCS | Performed by: INTERNAL MEDICINE

## 2020-10-16 PROCEDURE — G8752 SYS BP LESS 140: HCPCS | Performed by: INTERNAL MEDICINE

## 2020-10-16 PROCEDURE — 99214 OFFICE O/P EST MOD 30 MIN: CPT | Performed by: INTERNAL MEDICINE

## 2020-10-16 PROCEDURE — G8754 DIAS BP LESS 90: HCPCS | Performed by: INTERNAL MEDICINE

## 2020-10-16 PROCEDURE — G8427 DOCREV CUR MEDS BY ELIG CLIN: HCPCS | Performed by: INTERNAL MEDICINE

## 2020-10-16 PROCEDURE — G8417 CALC BMI ABV UP PARAM F/U: HCPCS | Performed by: INTERNAL MEDICINE

## 2020-10-16 PROCEDURE — 3017F COLORECTAL CA SCREEN DOC REV: CPT | Performed by: INTERNAL MEDICINE

## 2020-10-16 RX ORDER — MEMANTINE HYDROCHLORIDE 5 MG/1
5 TABLET ORAL 2 TIMES DAILY
Qty: 180 TAB | Refills: 1 | Status: SHIPPED | OUTPATIENT
Start: 2020-10-16 | End: 2021-01-01

## 2020-10-16 NOTE — PROGRESS NOTES
This is the Subsequent Medicare Annual Wellness Exam, performed 12 months or more after the Initial AWV or the last Subsequent AWV    I have reviewed the patient's medical history in detail and updated the computerized patient record.      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    Dislocation of right acromioclavicular joint S43.101A    Recurrent depression ([address] F33.9    S/P lumbar laminectomy Z98.890    Alcohol use disorder, moderate, in early remission, in controlled environment ([address] F10.21    Abnormal liver enzymes R74.8    Dementia ([address] F03.90    Positive FIT (fecal immunochemical test) R19.5    Tubular adenoma of colon D12.6     Past Medical History:   Diagnosis Date    AAA (abdominal aortic aneurysm) ([address] 07/29/2019    3.8 cm on US    Alcohol abuse, in remission     Arthritis     Back pain     Bladder cancer ([address]      - in [facility] C. difficile colitis 2010    Cancer involving bladder by direct extension from endometrium ([address] 10/2016    Dementia ([address]     Depression     Horseshoe kidney     HTN (hypertension) 12/5/2013    Hyperlipidemia     Liver disease     Recurrent bladder transitional cell carcinoma (HCC)     Restless leg syndrome     Screening for colorectal cancer 03/14/2018    cologuard - negative    Situational anxiety     ie air travel    Spinal stenosis, lumbar     Thrombocytopenia (HCC)     Transitional cell carcinoma of left ureter ([address]     Dr. Jasmin Smith    Tubular adenoma of colon       Past Surgical History:   Procedure Laterality Date    BLADDER CANCER FISH      endoscopic    COLONOSCOPY N/A 4/2/2020    COLONOSCOPY performed by Maria R Villalobos MD at [place] [address] [facility] UROLOGICAL       Current Outpatient Medications   Medication Sig Dispense Refill    memantine (NAMENDA) 5 mg tablet Take 1 Tab by mouth two (2) times a day. 180 Tab 1    HYDROcodone-acetaminophen (NORCO)  mg tablet Take  Tabs by mouth four (4) times daily.  fexofenadine (ALLEGRA) 180 mg tablet Take 180 mg by mouth daily.  tadalafiL (Cialis) 5 mg tablet Take 1 Tab by mouth daily. 90 Tab 1    sertraline (ZOLOFT) 100 mg tablet Take 2 Tabs by mouth daily. (Patient taking differently: Take 200 mg by mouth two (2) times a day.) 180 Tab 1    buPROPion XL (WELLBUTRIN XL) 150 mg tablet TAKE 1 TABLET BY MOUTH EVERY MORNING 90 Tab 1    lidocaine (LIDODERM) 5 % APPLY ONE PATCH TOPICALLY EVERY 24 HOURS AS NEEDED 30 Patch 5    donepeziL (ARICEPT) 10 mg tablet TAKE 1 TABLET BY MOUTH EVERY NIGHT 90 Tab 1    cholecalciferol (Vitamin D3) (1000 Units /25 mcg) tablet Take 1,000 Units by mouth daily.  chlorzoxazone (PARAFON FORTE) 500 mg tablet Take 500 mg by mouth two (2) times a day.  diphenhydrAMINE (BENADRYL) 25 mg capsule Take 25 mg by mouth nightly as needed.  fluticasone propionate (FLONASE) 50 mcg/actuation nasal spray SHAKE LIQUID AND USE 2 SPRAYS INTO EACH NOSTRIL DAILY 1 Bottle 3    pramipexole (MIRAPEX) 0.75 mg tablet TAKE 1 TABLET BY MOUTH EVERY NIGHT 90 Tab 1    ondansetron (ZOFRAN ODT) 4 mg disintegrating tablet Take 1 Tab by mouth every eight (8) hours as needed for Nausea. 60 Tab 5    ibuprofen 200 mg cap Take  by mouth.  melatonin tab tablet Take 5 mg by mouth nightly.  multivitamin (ONE DAILY ENERGY) tablet Take 1 Tab by mouth daily.  triamcinolone acetonide (KENALOG) 0.1 % topical cream Apply  to affected area two (2) times a day. use thin layer x 3-5 days at time 45 g 2    MILK THISTLE PO Take 1 Tab by mouth two (2) times a day.       sertraline (ZOLOFT) 50 mg tablet TAKE 1 TABLET BY MOUTH DAILY 90 Tab 1    HYDROcodone-acetaminophen (NORCO) 7.5-325 mg per tablet Take  by mouth every four (4) hours as needed.  HYDROCODONE-ACETAMINOPHEN PO Take  by mouth.  VITAMIN B-1, MONONITRATE, 100 mg tablet TAKE 1 TABLET BY MOUTH DAILY 90 Tab 3    ketoconazole (NIZORAL) 2 % shampoo APPLY EXTERNALLY 2 TIMES A WEEK FOR 2 TO 4 WEEKS 120 mL 1    acetaminophen (TYLENOL EXTRA STRENGTH) 500 mg tablet Take  by mouth BID Mon Wed & Fri.      tamsulosin (FLOMAX) 0.4 mg capsule Take 0.4 mg by mouth daily.  loratadine (CLARITIN) 10 mg tablet Take 10 mg by mouth daily. No Known Allergies    Family History   Problem Relation Age of Onset    Arthritis Mother     Cancer Father         lung ca    Other Brother         back problems     Social History     Tobacco Use    Smoking status: Current Every Day Smoker    Smokeless tobacco: Never Used    Tobacco comment: E cigarette   Substance Use Topics    Alcohol use: Yes     Alcohol/week: 1.0 - 2.0 standard drinks     Types: 1 - 2 Glasses of wine per week     Comment: 1 non-alcoholic beer, 1-2 glasses of red wine daily        Depression Risk Factor Screening:     3 most recent PHQ Screens 2/24/2020   Little interest or pleasure in doing things Not at all   Feeling down, depressed, irritable, or hopeless Not at all   Total Score PHQ 2 0       Alcohol Risk Screen   Do you average more than 1 drink per night or more than 7 drinks a week: No    In the past three months have you have had more than 4 drinks containing alcohol on one occasion: No        Functional Ability and Level of Safety:   Hearing: Hearing is good. Activities of Daily Living: The home contains: no safety equipment. Patient needs help with:  transportation, shopping, managing medications, managing money and walking     Ambulation: with difficulty, uses a cane     Fall Risk:  Fall Risk Assessment, last 12 mths 2/24/2020   Able to walk? Yes   Fall in past 12 months?  No   Fall with injury? -   Number of falls in past 12 months -   Fall Risk Score - No falls  Abuse Screen:  Patient is not abused       Cognitive Screening   Has your family/caregiver stated any concerns about your memory: yes      Cognitive Screening: known dementia dx    Patient Care Team   Patient Care Team:  Lovina Hodgkin, MD as PCP - General (Internal Medicine)  Lovina Hodgkin, MD as PCP - [facility] Empaneled Provider  Kp Parr MD (Urology)    Assessment/Plan   Education and counseling provided:  Are appropriate based on today's review and evaluation      ICD-10-CM ICD-9-CM    1. Medicare annual wellness visit, subsequent  Z00.00 V70.0    2. Daytime somnolence  R40.0 780.54 SLEEP MEDICINE REFERRAL   3. Essential hypertension  I10 401.9    4. Vitamin D deficiency  E55.9 268.9    5. Hyperlipidemia, unspecified hyperlipidemia type  E78.5 272.4    6. Morning headache  R51.9 784.0    7. Depression, unspecified depression type  F32.9 311    8. Dementia with behavioral disturbance, unspecified dementia type ([facility]  F03.91 294.21    9. Lumbar back pain  M54.5 724.2    10. Restless leg syndrome  G25.81 333.94    11. Chronic back pain, unspecified back location, unspecified back pain laterality  M54.9 724.5     G89.29 338.29    12. Tremor  R25.1 781.0      Follow-up and Dispositions    · Return in about 2 months (around 12/16/2020), or if symptoms worsen or fail to improve, for mood, sleepiness.         results and schedule of future studies reviewed with patient  reviewed diet, exercise and weight   cardiovascular risk and specific lipid/LDL goals reviewed  reviewed medications and side effects in detail

## 2020-10-16 NOTE — PROGRESS NOTES
HPI:  Presents for f/u daytime somnolence    Accompanied by wife    Very little change  Still sleeping a lot    No improvement in mood    Back pain is constant  Wife reports this as a primary concern and she suspects that is the cause for his depression  Seeing Dr. Kori Urena - injections are of little benefit    appt next week with urology    HA better  Wife had not made sleep appt yet. Wife reports foot tremor  Sees neuro - due for f/u soon    Past medical, Social, and Family history reviewed    Prior to Admission medications    Medication Sig Start Date End Date Taking? Authorizing Provider   HYDROcodone-acetaminophen (NORCO)  mg tablet Take  Tabs by mouth four (4) times daily. 9/8/20  Yes Provider, Historical   fexofenadine (ALLEGRA) 180 mg tablet Take 180 mg by mouth daily. Yes Provider, Historical   tadalafiL (Cialis) 5 mg tablet Take 1 Tab by mouth daily. 9/16/20  Yes Wicho Kennedy MD   sertraline (ZOLOFT) 100 mg tablet Take 2 Tabs by mouth daily. Patient taking differently: Take 200 mg by mouth two (2) times a day. 9/16/20  Yes Wicho Kennedy MD   buPROPion XL (WELLBUTRIN XL) 150 mg tablet TAKE 1 TABLET BY MOUTH EVERY MORNING 9/16/20  Yes Wicho Kennedy MD   lidocaine (LIDODERM) 5 % APPLY ONE PATCH TOPICALLY EVERY 24 HOURS AS NEEDED 7/12/20  Yes Wicho Kennedy MD   donepeziL (ARICEPT) 10 mg tablet TAKE 1 TABLET BY MOUTH EVERY NIGHT 5/25/20  Yes Wicho Kennedy MD   memantine (Namenda) 10 mg tablet Take 1 Tab by mouth two (2) times a day. 5/6/20  Yes Kusum Boyer,    cholecalciferol (Vitamin D3) (1000 Units /25 mcg) tablet Take 1,000 Units by mouth daily. Yes Provider, Historical   chlorzoxazone (PARAFON FORTE) 500 mg tablet Take 500 mg by mouth two (2) times a day. Yes Provider, Historical   diphenhydrAMINE (BENADRYL) 25 mg capsule Take 25 mg by mouth nightly as needed.    Yes Provider, Historical   fluticasone propionate (FLONASE) 50 mcg/actuation nasal spray SHAKE LIQUID AND USE 2 SPRAYS INTO EACH NOSTRIL DAILY 11/12/19  Yes Mac Leyva NP   pramipexole (MIRAPEX) 0.75 mg tablet TAKE 1 TABLET BY MOUTH EVERY NIGHT 10/7/19  Yes Delmi Valera MD   ondansetron (ZOFRAN ODT) 4 mg disintegrating tablet Take 1 Tab by mouth every eight (8) hours as needed for Nausea. 9/13/19  Yes Mac Leyva NP   ibuprofen 200 mg cap Take  by mouth. Yes Provider, Historical   melatonin tab tablet Take 5 mg by mouth nightly. Yes Provider, Historical   multivitamin (ONE DAILY ENERGY) tablet Take 1 Tab by mouth daily. Yes Provider, Historical   triamcinolone acetonide (KENALOG) 0.1 % topical cream Apply  to affected area two (2) times a day. use thin layer x 3-5 days at time 5/21/19  Yes Delmi Valera MD   MILK THISTLE PO Take 1 Tab by mouth two (2) times a day. Yes Provider, Historical   DULoxetine (CYMBALTA) 30 mg capsule Take 1 Cap by mouth daily. X 1-2 weeks then every other day x 1 week - taper off 9/16/20   Delmi Valera MD   sertraline (ZOLOFT) 50 mg tablet TAKE 1 TABLET BY MOUTH DAILY 5/25/20   Delmi Valera MD   HYDROcodone-acetaminophen [facility]) 7.5-325 mg per tablet Take  by mouth every four (4) hours as needed. 4/18/20   Provider, Historical   HYDROCODONE-ACETAMINOPHEN PO Take  by mouth. Provider, Historical   VITAMIN B-1, MONONITRATE, 100 mg tablet TAKE 1 TABLET BY MOUTH DAILY 2/27/20   Charan Braga MD   ketoconazole (NIZORAL) 2 % shampoo APPLY EXTERNALLY 2 TIMES A WEEK FOR 2 TO 4 WEEKS 2/15/20   Carlos SINGH NP   acetaminophen (TYLENOL EXTRA STRENGTH) 500 mg tablet Take  by mouth BID Mon Wed & Fri. Provider, Historical   tamsulosin (FLOMAX) 0.4 mg capsule Take 0.4 mg by mouth daily. Provider, Historical   loratadine (CLARITIN) 10 mg tablet Take 10 mg by mouth daily. Provider, Historical          ROS  Complete ROS reviewed and negative or stable except as noted in HPI. Physical Exam  Vitals signs and nursing note reviewed. Constitutional:       General: He is not in acute distress. HENT:      Head: Normocephalic and atraumatic. Mouth/Throat:      Pharynx: No oropharyngeal exudate. Eyes:      General: No scleral icterus. Pupils: Pupils are equal, round, and reactive to light. Neck:      Musculoskeletal: Normal range of motion and neck supple. Thyroid: No thyromegaly. Vascular: No JVD. Cardiovascular:      Rate and Rhythm: Normal rate and regular rhythm. Heart sounds: Normal heart sounds. No murmur. No friction rub. No gallop. Pulmonary:      Effort: Pulmonary effort is normal. No respiratory distress. Breath sounds: Normal breath sounds. No wheezing or rales. Abdominal:      General: Bowel sounds are normal. There is no distension. Palpations: Abdomen is soft. There is no mass. Tenderness: There is no abdominal tenderness. There is no guarding or rebound. Musculoskeletal: Normal range of motion. Comments: Slow deliberate gait, use of cane required for ambulation. Lymphadenopathy:      Cervical: No cervical adenopathy. Skin:     General: Skin is warm. Neurological:      Mental Status: He is alert and oriented to person, place, and time. Mental status is at baseline. Motor: No abnormal muscle tone. Coordination: Coordination normal.      Comments: +interactive. Prior labs reviewed. Assessment/Plan:  Still somnolent by report      ICD-10-CM ICD-9-CM    1. Daytime somnolence  R40.0 780.54 SLEEP MEDICINE REFERRAL   2. Essential hypertension  I10 401.9    3. Vitamin D deficiency  E55.9 268.9    4. Hyperlipidemia, unspecified hyperlipidemia type  E78.5 272.4    5. Morning headache  R51.9 784.0    6. Depression, unspecified depression type  F32.9 311    7. Dementia with behavioral disturbance, unspecified dementia type ([facility]  F03.91 294.21    8. Lumbar back pain  M54.5 724.2    9. Restless leg syndrome  G25.81 333.94    10.  Chronic back pain, unspecified back location, unspecified back pain laterality  M54.9 724.5     G89.29 338.29    11. Medicare annual wellness visit, subsequent  Z00.00 V70.0    12. Tremor  R25.1 781.0      Follow-up and Dispositions    · Return in about 2 months (around 12/16/2020), or if symptoms worsen or fail to improve, for mood, sleepiness.         results and schedule of future studies reviewed with patient  reviewed diet, exercise and weight   cardiovascular risk and specific lipid/LDL goals reviewed  reviewed medications and side effects in detail     Reduce namenda to 5 mg bid   Consider increase wellbutrin XL if mood remains a problem  Encouraged sleep eval and home sleep study  Encouraged to review foot tremor with neuro

## 2020-10-16 NOTE — PROGRESS NOTES
Rm#[id number]  Presents w/ wife  Chief Complaint   Patient presents with    Other     follow up for sleepiness and mood. still sleeping alot      1. Have you been to the ER, urgent care clinic since your last visit? Hospitalized since your last visit? No    2. Have you seen or consulted any other health care providers outside of the [address] [place] [address] since your last visit? Include any pap smears or colon screening. Dr. Guerra Class office. Back injection, 10-6-20     Health Maintenance Due   Topic Date Due    GLAUCOMA SCREENING Q2Y  04/01/2018    Medicare Yearly Exam  08/18/2019     3 most recent PHQ Screens 2/24/2020   Little interest or pleasure in doing things Not at all   Feeling down, depressed, irritable, or hopeless Not at all   Total Score PHQ 2 0     Recent Travel Screening and Travel History documentation     Travel Screening     Question   Response    In the last month, have you been in contact with someone who was confirmed or suspected to have Coronavirus / COVID-19? No / Unsure    Have you had a COVID-19 viral test in the last 14 days? No    Do you have any of the following new or worsening symptoms? None of these    Have you traveled internationally in the last month?   No      Travel History   Travel since 09/16/20     No documented travel since 09/16/20

## 2020-10-16 NOTE — PATIENT INSTRUCTIONS
Medicare Wellness Visit, Male The best way to live healthy is to have a lifestyle where you eat a well-balanced diet, exercise regularly, limit alcohol use, and quit all forms of tobacco/nicotine, if applicable. Regular preventive services are another way to keep healthy. Preventive services (vaccines, screening tests, monitoring & exams) can help personalize your care plan, which helps you manage your own care. Screening tests can find health problems at the earliest stages, when they are easiest to treat. [facility] follows the current, evidence-based guidelines published by the [facility] ([facility]) when recommending preventive services for our patients. Because we follow these guidelines, sometimes recommendations change over time as research supports it. (For example, a prostate screening blood test is no longer routinely recommended for men with no symptoms). Of course, you and your doctor may decide to screen more often for some diseases, based on your risk and co-morbidities (chronic disease you are already diagnosed with). Preventive services for you include: - Medicare offers their members a free annual wellness visit, which is time for you and your primary care provider to discuss and plan for your preventive service needs. Take advantage of this benefit every year! 
-All adults over age 72 should receive the recommended pneumonia vaccines. Current USPSTF guidelines recommend a series of two vaccines for the best pneumonia protection.  
-All adults should have a flu vaccine yearly and tetanus vaccine every 10 years. 
-All adults age 48 and older should receive the shingles vaccines (series of two vaccines).       
-All adults age 38-68 who are overweight should have a diabetes screening test once every three years.  
-Other screening tests & preventive services for persons with diabetes include: an eye exam to screen for diabetic retinopathy, a kidney function test, a foot exam, and stricter control over your cholesterol.  
-Cardiovascular screening for adults with routine risk involves an electrocardiogram (ECG) at intervals determined by the provider.  
-Colorectal cancer screening should be done for adults age 54-65 with no increased risk factors for colorectal cancer. There are a number of acceptable methods of screening for this type of cancer. Each test has its own benefits and drawbacks. Discuss with your provider what is most appropriate for you during your annual wellness visit. The different tests include: colonoscopy (considered the best screening method), a fecal occult blood test, a fecal DNA test, and sigmoidoscopy. 
-All adults born between [facility] should be screened once for Hepatitis C. 
-An Abdominal Aortic Aneurysm (AAA) Screening is recommended for men age 73-68 who has ever smoked in their lifetime. Here is a list of your current Health Maintenance items (your personalized list of preventive services) with a due date: 
Health Maintenance Due Topic Date Due  Glaucoma Screening   04/01/2018 [address] [place] [address] Annual Well Visit  08/18/2019

## 2020-11-16 DIAGNOSIS — J01.90 ACUTE NON-RECURRENT SINUSITIS, UNSPECIFIED LOCATION: ICD-10-CM

## 2020-11-16 RX ORDER — SERTRALINE HYDROCHLORIDE 100 MG/1
200 TABLET, FILM COATED ORAL DAILY
Qty: 180 TAB | Refills: 1 | Status: SHIPPED | OUTPATIENT
Start: 2020-11-16 | End: 2021-01-01 | Stop reason: SDUPTHER

## 2020-11-16 RX ORDER — DONEPEZIL HYDROCHLORIDE 10 MG/1
10 TABLET, FILM COATED ORAL
Qty: 90 TAB | Refills: 1 | Status: SHIPPED | OUTPATIENT
Start: 2020-11-16 | End: 2021-01-01

## 2020-11-16 RX ORDER — FLUTICASONE PROPIONATE 50 MCG
SPRAY, SUSPENSION (ML) NASAL
Qty: 16 G | Refills: 3 | Status: SHIPPED | OUTPATIENT
Start: 2020-11-16

## 2020-11-16 NOTE — TELEPHONE ENCOUNTER
Per updated medication list Zoloft was increased to 200 mg twice daily on 10/16/2020. Last visit 10/16/2020 MD Zayda Macedo   Next appointment 12/29/2020 MD Zayda Macedo   Previous refill encounter(s)   05/25/2020 Aricept #90 with 1 refill,  09/16/2020 Zoloft #180 with 1 refill. Requested Prescriptions     Pending Prescriptions Disp Refills    donepeziL (ARICEPT) 10 mg tablet 90 Tab 1     Sig: Take 1 Tab by mouth nightly.  sertraline (ZOLOFT) 100 mg tablet 360 Tab 1     Sig: Take 2 Tabs by mouth two (2) times a day.

## 2020-12-29 ENCOUNTER — OFFICE VISIT (OUTPATIENT)
Dept: INTERNAL MEDICINE CLINIC | Age: 75
End: 2020-12-29
Payer: MEDICARE

## 2020-12-29 VITALS
HEART RATE: 69 BPM | HEIGHT: 73 IN | RESPIRATION RATE: 18 BRPM | WEIGHT: 228 LBS | DIASTOLIC BLOOD PRESSURE: 74 MMHG | SYSTOLIC BLOOD PRESSURE: 133 MMHG | TEMPERATURE: 97 F | BODY MASS INDEX: 30.22 KG/M2 | OXYGEN SATURATION: 91 %

## 2020-12-29 DIAGNOSIS — G47.20 DISRUPTED SLEEP-WAKE CYCLE: ICD-10-CM

## 2020-12-29 DIAGNOSIS — M54.50 LUMBAR BACK PAIN: ICD-10-CM

## 2020-12-29 DIAGNOSIS — F33.9 RECURRENT DEPRESSION (HCC): ICD-10-CM

## 2020-12-29 DIAGNOSIS — F51.8 DISRUPTED SLEEP-WAKE CYCLE: ICD-10-CM

## 2020-12-29 DIAGNOSIS — R40.0 DAYTIME SOMNOLENCE: Primary | ICD-10-CM

## 2020-12-29 DIAGNOSIS — R51.9 MORNING HEADACHE: ICD-10-CM

## 2020-12-29 DIAGNOSIS — I10 ESSENTIAL HYPERTENSION: ICD-10-CM

## 2020-12-29 DIAGNOSIS — I87.2 VENOUS INSUFFICIENCY: ICD-10-CM

## 2020-12-29 DIAGNOSIS — B35.3 TINEA PEDIS OF BOTH FEET: ICD-10-CM

## 2020-12-29 PROCEDURE — 1101F PT FALLS ASSESS-DOCD LE1/YR: CPT | Performed by: INTERNAL MEDICINE

## 2020-12-29 PROCEDURE — G9717 DOC PT DX DEP/BP F/U NT REQ: HCPCS | Performed by: INTERNAL MEDICINE

## 2020-12-29 PROCEDURE — G8754 DIAS BP LESS 90: HCPCS | Performed by: INTERNAL MEDICINE

## 2020-12-29 PROCEDURE — G8427 DOCREV CUR MEDS BY ELIG CLIN: HCPCS | Performed by: INTERNAL MEDICINE

## 2020-12-29 PROCEDURE — 99214 OFFICE O/P EST MOD 30 MIN: CPT | Performed by: INTERNAL MEDICINE

## 2020-12-29 PROCEDURE — G8752 SYS BP LESS 140: HCPCS | Performed by: INTERNAL MEDICINE

## 2020-12-29 PROCEDURE — G8417 CALC BMI ABV UP PARAM F/U: HCPCS | Performed by: INTERNAL MEDICINE

## 2020-12-29 PROCEDURE — G8536 NO DOC ELDER MAL SCRN: HCPCS | Performed by: INTERNAL MEDICINE

## 2020-12-29 RX ORDER — HYDROCODONE BITARTRATE AND ACETAMINOPHEN 10; 325 MG/1; MG/1
1 TABLET ORAL 3 TIMES DAILY
Qty: 90 TAB | Refills: 0
Start: 2020-12-29 | End: 2021-01-01

## 2020-12-29 RX ORDER — PRENATAL VIT 91/IRON/FOLIC/DHA 28-975-200
COMBINATION PACKAGE (EA) ORAL 2 TIMES DAILY
Qty: 30 G | Refills: 2 | Status: SHIPPED | OUTPATIENT
Start: 2020-12-29 | End: 2021-01-01 | Stop reason: ALTCHOICE

## 2020-12-29 RX ORDER — BUPROPION HYDROCHLORIDE 300 MG/1
TABLET ORAL
Qty: 90 TAB | Refills: 1 | Status: SHIPPED | OUTPATIENT
Start: 2020-12-29 | End: 2021-01-01 | Stop reason: SDUPTHER

## 2020-12-29 NOTE — PROGRESS NOTES
HPI:  Presents for f/u mood, sleep, etc    Accompanied by wife    Did not go to sleep eval as scheduled     No longer awakening with HAs  But, pt still sleeping a lot during the day  But, at times will stay up all night and into the next day and then sleep for an extended period of time. Seeing Dr. Fidencio Dawson  Hydrocodone reduced from qid to tid  Dr. Fidencio Dawson desires labs    Pt remains depressed. No neuro appt in the interim    Red feet, +/- swelling. No claudication. Scalp rash improved with triamcinolone     Past medical, Social, and Family history reviewed    Prior to Admission medications    Medication Sig Start Date End Date Taking? Authorizing Provider   fluticasone propionate (FLONASE) 50 mcg/actuation nasal spray SHAKE LIQUID AND USE 2 SPRAYS INTO EACH NOSTRIL DAILY 11/16/20  Yes Aixa De Anda NP   donepeziL (ARICEPT) 10 mg tablet Take 1 Tab by mouth nightly. 11/16/20  Yes Luis Saez MD   sertraline (ZOLOFT) 100 mg tablet Take 2 Tabs by mouth daily. 11/16/20  Yes Luis Saez MD   memantine [facility]) 5 mg tablet Take 1 Tab by mouth two (2) times a day. 10/16/20  Yes Luis Saez MD   HYDROcodone-acetaminophen [facility])  mg tablet Take  Tabs by mouth four (4) times daily. 9/8/20  Yes Provider, Historical   fexofenadine (ALLEGRA) 180 mg tablet Take 180 mg by mouth daily. Yes Provider, Historical   tadalafiL (Cialis) 5 mg tablet Take 1 Tab by mouth daily. 9/16/20  Yes Luis Saez MD   sertraline (ZOLOFT) 100 mg tablet Take 2 Tabs by mouth daily. Patient taking differently: Take 200 mg by mouth two (2) times a day. 9/16/20  Yes Luis Saez MD   buPROPion XL (WELLBUTRIN XL) 150 mg tablet TAKE 1 TABLET BY MOUTH EVERY MORNING 9/16/20  Yes Luis Saez MD   lidocaine (LIDODERM) 5 % APPLY ONE PATCH TOPICALLY EVERY 24 HOURS AS NEEDED 7/12/20  Yes Luis Saez MD   cholecalciferol (Vitamin D3) (1000 Units /25 mcg) tablet Take 1,000 Units by mouth daily.    Yes Provider, Historical   chlorzoxazone (PARAFON FORTE) 500 mg tablet Take 500 mg by mouth two (2) times a day. Yes Provider, Historical   diphenhydrAMINE (BENADRYL) 25 mg capsule Take 25 mg by mouth nightly as needed. Yes Provider, Historical   pramipexole (MIRAPEX) 0.75 mg tablet TAKE 1 TABLET BY MOUTH EVERY NIGHT 10/7/19  Yes Eric Melendez MD   ondansetron (ZOFRAN ODT) 4 mg disintegrating tablet Take 1 Tab by mouth every eight (8) hours as needed for Nausea. 9/13/19  Yes Maxine Garcia, NP   ibuprofen 200 mg cap Take  by mouth. Yes Provider, Historical   melatonin tab tablet Take 5 mg by mouth nightly. Yes Provider, Historical   multivitamin (ONE DAILY ENERGY) tablet Take 1 Tab by mouth daily. Yes Provider, Historical   triamcinolone acetonide (KENALOG) 0.1 % topical cream Apply  to affected area two (2) times a day. use thin layer x 3-5 days at time 5/21/19  Yes rEic Melendez MD   MILK THISTLE PO Take 1 Tab by mouth two (2) times a day. Yes Provider, Historical   sertraline (ZOLOFT) 50 mg tablet TAKE 1 TABLET BY MOUTH DAILY 5/25/20   Eric Melendez MD   HYDROcodone-acetaminophen [facility]) 7.5-325 mg per tablet Take  by mouth every four (4) hours as needed. 4/18/20   Provider, Historical   HYDROCODONE-ACETAMINOPHEN PO Take  by mouth. Provider, Historical   VITAMIN B-1, MONONITRATE, 100 mg tablet TAKE 1 TABLET BY MOUTH DAILY 2/27/20   Lindy Jaquez MD   ketoconazole (NIZORAL) 2 % shampoo APPLY EXTERNALLY 2 TIMES A WEEK FOR 2 TO 4 WEEKS 2/15/20   Ricardo SINGH NP   acetaminophen (TYLENOL EXTRA STRENGTH) 500 mg tablet Take  by mouth BID Mon Wed & Fri. Provider, Historical   tamsulosin (FLOMAX) 0.4 mg capsule Take 0.4 mg by mouth daily. Provider, Historical   loratadine (CLARITIN) 10 mg tablet Take 10 mg by mouth daily. Provider, Historical          ROS  Complete ROS reviewed and negative or stable except as noted in HPI.       Physical Exam  Vitals signs and nursing note reviewed. Constitutional:       General: He is not in acute distress. HENT:      Head: Normocephalic and atraumatic. Mouth/Throat:      Pharynx: No oropharyngeal exudate. Eyes:      General: No scleral icterus. Pupils: Pupils are equal, round, and reactive to light. Neck:      Musculoskeletal: Normal range of motion and neck supple. Thyroid: No thyromegaly. Vascular: No JVD. Cardiovascular:      Rate and Rhythm: Normal rate and regular rhythm. Heart sounds: Normal heart sounds. No murmur. No friction rub. No gallop. Pulmonary:      Effort: Pulmonary effort is normal. No respiratory distress. Breath sounds: Normal breath sounds. No wheezing or rales. Abdominal:      General: Bowel sounds are normal. There is no distension. Palpations: Abdomen is soft. There is no mass. Tenderness: There is no abdominal tenderness. There is no guarding or rebound. Musculoskeletal: Normal range of motion. Comments: Purple coloration to toes, blanchable, nontender. Assoc foot varicose veins   Lymphadenopathy:      Cervical: No cervical adenopathy. Skin:     General: Skin is warm. Comments: +tinea pedis   Neurological:      Mental Status: He is alert and oriented to person, place, and time. Mental status is at baseline. Motor: No abnormal muscle tone. Coordination: Coordination normal.      Comments: +interactive. Prior labs reviewed. Reviewed prior imaging report      Assessment/Plan:    Favor venous insuff to account for foot discoloration and reported swelling      ICD-10-CM ICD-9-CM    1. Daytime somnolence  R40.0 780.54    2. Disrupted sleep-wake cycle  G47.20 307.45     F51.8     3. Morning headache  R51.9 784.0    4. Essential hypertension  I10 401.9    5. Lumbar back pain  M54.5 724.2 HYDROcodone-acetaminophen (NORCO)  mg tablet   6. Recurrent depression (HCC)  F33.9 296.30 buPROPion XL (WELLBUTRIN XL) 300 mg XL tablet   7.  Venous insufficiency  I87.2 459.81    8. Tinea pedis of both feet  B35.3 110.4 terbinafine HCL (LAMISIL) 1 % topical cream     Follow-up and Dispositions    · Return in about 3 months (around 3/29/2021), or if symptoms worsen or fail to improve, for mood, sleep, other. results and schedule of future studies reviewed with patient  reviewed diet, exercise and weight  cardiovascular risk and specific lipid/LDL goals reviewed  reviewed medications and side effects in detail     Encouraged sleep eval for both possible LUCHO and sleep wake cycle disturbance  Fax labs to Dr. Abiodun Prado from 9/18/20  Increase wellbutrin to 300 mg daily  Pt again defers VASILE study  Reviewed safe APAP dosing - agree to add 1000 g per day APAP which would not exceed 2 g per day  Reviewed intermittent scalp rash topical steroid. lamisil to feet  Elevation of feet.

## 2020-12-29 NOTE — PROGRESS NOTES
Chief Complaint   Patient presents with    Follow-up     patient did not go to sleep center because he felt bad, headache have resolved,         1. Have you been to the ER, urgent care clinic since your last visit? Hospitalized since your last visit? no    2. Have you seen or consulted any other health care providers outside of the [address] [place] [address] since your last visit? Include any pap smears or colon screening.   no

## 2021-01-01 ENCOUNTER — TELEPHONE (OUTPATIENT)
Dept: SLEEP MEDICINE | Age: 76
End: 2021-01-01

## 2021-01-01 ENCOUNTER — HOSPITAL ENCOUNTER (OUTPATIENT)
Dept: SLEEP MEDICINE | Age: 76
Discharge: HOME | End: 2021-02-02
Payer: MEDICARE

## 2021-01-01 ENCOUNTER — OFFICE VISIT (OUTPATIENT)
Dept: SLEEP MEDICINE | Age: 76
End: 2021-01-01

## 2021-01-01 ENCOUNTER — APPOINTMENT (OUTPATIENT)
Dept: INTERNAL MEDICINE CLINIC | Age: 76
End: 2021-01-01

## 2021-01-01 ENCOUNTER — DOCUMENTATION ONLY (OUTPATIENT)
Dept: INTERNAL MEDICINE CLINIC | Age: 76
End: 2021-01-01

## 2021-01-01 ENCOUNTER — TELEPHONE (OUTPATIENT)
Dept: INTERNAL MEDICINE CLINIC | Age: 76
End: 2021-01-01

## 2021-01-01 ENCOUNTER — IMMUNIZATION (OUTPATIENT)
Dept: INTERNAL MEDICINE CLINIC | Age: 76
End: 2021-01-01
Payer: MEDICARE

## 2021-01-01 ENCOUNTER — VIRTUAL VISIT (OUTPATIENT)
Dept: SLEEP MEDICINE | Age: 76
End: 2021-01-01
Payer: MEDICARE

## 2021-01-01 ENCOUNTER — DOCUMENTATION ONLY (OUTPATIENT)
Dept: SLEEP MEDICINE | Age: 76
End: 2021-01-01

## 2021-01-01 ENCOUNTER — HOSPITAL ENCOUNTER (OUTPATIENT)
Dept: SLEEP MEDICINE | Age: 76
Discharge: HOME | End: 2021-03-01
Payer: MEDICARE

## 2021-01-01 ENCOUNTER — OFFICE VISIT (OUTPATIENT)
Dept: INTERNAL MEDICINE CLINIC | Age: 76
End: 2021-01-01
Payer: MEDICARE

## 2021-01-01 ENCOUNTER — HOSPITAL ENCOUNTER (OUTPATIENT)
Dept: GENERAL RADIOLOGY | Age: 76
Discharge: HOME | End: 2021-11-17
Attending: INTERNAL MEDICINE
Payer: MEDICARE

## 2021-01-01 ENCOUNTER — OFFICE VISIT (OUTPATIENT)
Dept: INTERNAL MEDICINE CLINIC | Age: 76
End: 2021-01-01

## 2021-01-01 ENCOUNTER — HOSPITAL ENCOUNTER (OUTPATIENT)
Dept: GENERAL RADIOLOGY | Age: 76
Discharge: HOME | End: 2021-07-15
Attending: INTERNAL MEDICINE
Payer: MEDICARE

## 2021-01-01 ENCOUNTER — TRANSCRIBE ORDER (OUTPATIENT)
Dept: SCHEDULING | Age: 76
End: 2021-01-01

## 2021-01-01 VITALS
WEIGHT: 224 LBS | SYSTOLIC BLOOD PRESSURE: 115 MMHG | DIASTOLIC BLOOD PRESSURE: 67 MMHG | HEART RATE: 73 BPM | OXYGEN SATURATION: 93 % | RESPIRATION RATE: 16 BRPM | HEIGHT: 73 IN | BODY MASS INDEX: 29.69 KG/M2 | TEMPERATURE: 96.3 F

## 2021-01-01 VITALS
SYSTOLIC BLOOD PRESSURE: 132 MMHG | OXYGEN SATURATION: 92 % | TEMPERATURE: 98.5 F | HEART RATE: 75 BPM | DIASTOLIC BLOOD PRESSURE: 83 MMHG

## 2021-01-01 VITALS
BODY MASS INDEX: 27.55 KG/M2 | DIASTOLIC BLOOD PRESSURE: 78 MMHG | WEIGHT: 207.9 LBS | SYSTOLIC BLOOD PRESSURE: 132 MMHG | HEART RATE: 80 BPM | OXYGEN SATURATION: 94 % | HEIGHT: 73 IN | TEMPERATURE: 98.3 F

## 2021-01-01 VITALS — HEIGHT: 73 IN | BODY MASS INDEX: 30.22 KG/M2 | WEIGHT: 228 LBS

## 2021-01-01 VITALS
SYSTOLIC BLOOD PRESSURE: 122 MMHG | HEIGHT: 73 IN | TEMPERATURE: 98.3 F | HEART RATE: 72 BPM | WEIGHT: 217.25 LBS | DIASTOLIC BLOOD PRESSURE: 72 MMHG | OXYGEN SATURATION: 92 % | BODY MASS INDEX: 28.79 KG/M2 | RESPIRATION RATE: 18 BRPM

## 2021-01-01 VITALS
SYSTOLIC BLOOD PRESSURE: 117 MMHG | WEIGHT: 203 LBS | OXYGEN SATURATION: 91 % | DIASTOLIC BLOOD PRESSURE: 71 MMHG | HEART RATE: 81 BPM | HEIGHT: 73 IN | RESPIRATION RATE: 14 BRPM | TEMPERATURE: 98.2 F | BODY MASS INDEX: 26.9 KG/M2

## 2021-01-01 DIAGNOSIS — R51.9 HEADACHE DISORDER: ICD-10-CM

## 2021-01-01 DIAGNOSIS — G47.33 OSA (OBSTRUCTIVE SLEEP APNEA): Primary | ICD-10-CM

## 2021-01-01 DIAGNOSIS — M19.90 ARTHRITIS: ICD-10-CM

## 2021-01-01 DIAGNOSIS — N52.8 OTHER MALE ERECTILE DYSFUNCTION: ICD-10-CM

## 2021-01-01 DIAGNOSIS — E55.9 VITAMIN D DEFICIENCY: ICD-10-CM

## 2021-01-01 DIAGNOSIS — G47.33 OSA (OBSTRUCTIVE SLEEP APNEA): ICD-10-CM

## 2021-01-01 DIAGNOSIS — R26.89 IMBALANCE: ICD-10-CM

## 2021-01-01 DIAGNOSIS — N40.0 BENIGN PROSTATIC HYPERPLASIA WITHOUT LOWER URINARY TRACT SYMPTOMS: ICD-10-CM

## 2021-01-01 DIAGNOSIS — Z00.00 MEDICARE ANNUAL WELLNESS VISIT, SUBSEQUENT: Primary | ICD-10-CM

## 2021-01-01 DIAGNOSIS — G89.29 CHRONIC BACK PAIN, UNSPECIFIED BACK LOCATION, UNSPECIFIED BACK PAIN LATERALITY: ICD-10-CM

## 2021-01-01 DIAGNOSIS — E78.5 HYPERLIPIDEMIA, UNSPECIFIED HYPERLIPIDEMIA TYPE: ICD-10-CM

## 2021-01-01 DIAGNOSIS — F33.9 RECURRENT DEPRESSION (HCC): ICD-10-CM

## 2021-01-01 DIAGNOSIS — M54.50 LUMBAR BACK PAIN: ICD-10-CM

## 2021-01-01 DIAGNOSIS — M54.9 CHRONIC BACK PAIN, UNSPECIFIED BACK LOCATION, UNSPECIFIED BACK PAIN LATERALITY: ICD-10-CM

## 2021-01-01 DIAGNOSIS — G95.19 NEUROGENIC CLAUDICATION (HCC): ICD-10-CM

## 2021-01-01 DIAGNOSIS — F03.91 DEMENTIA WITH BEHAVIORAL DISTURBANCE, UNSPECIFIED DEMENTIA TYPE: Primary | ICD-10-CM

## 2021-01-01 DIAGNOSIS — G47.33 OBSTRUCTIVE SLEEP APNEA (ADULT) (PEDIATRIC): Primary | ICD-10-CM

## 2021-01-01 DIAGNOSIS — I10 ESSENTIAL HYPERTENSION: ICD-10-CM

## 2021-01-01 DIAGNOSIS — K02.9 TOOTH DECAY: ICD-10-CM

## 2021-01-01 DIAGNOSIS — K59.00 CONSTIPATION, UNSPECIFIED CONSTIPATION TYPE: ICD-10-CM

## 2021-01-01 DIAGNOSIS — M48.00 SPINAL STENOSIS, UNSPECIFIED SPINAL REGION: ICD-10-CM

## 2021-01-01 DIAGNOSIS — M54.9 CHRONIC BACK PAIN, UNSPECIFIED BACK LOCATION, UNSPECIFIED BACK PAIN LATERALITY: Primary | ICD-10-CM

## 2021-01-01 DIAGNOSIS — F03.91 DEMENTIA WITH BEHAVIORAL DISTURBANCE, UNSPECIFIED DEMENTIA TYPE: ICD-10-CM

## 2021-01-01 DIAGNOSIS — C67.9 RECURRENT BLADDER TRANSITIONAL CELL CARCINOMA (HCC): ICD-10-CM

## 2021-01-01 DIAGNOSIS — I71.40 ABDOMINAL AORTIC ANEURYSM (AAA) 3.0 CM TO 5.5 CM IN DIAMETER IN MALE: ICD-10-CM

## 2021-01-01 DIAGNOSIS — Z91.81 RISK FOR FALLS: ICD-10-CM

## 2021-01-01 DIAGNOSIS — G47.20 DISRUPTED SLEEP-WAKE CYCLE: ICD-10-CM

## 2021-01-01 DIAGNOSIS — Z23 ENCOUNTER FOR IMMUNIZATION: Primary | ICD-10-CM

## 2021-01-01 DIAGNOSIS — G47.33 OBSTRUCTIVE SLEEP APNEA (ADULT) (PEDIATRIC): ICD-10-CM

## 2021-01-01 DIAGNOSIS — F33.9 RECURRENT DEPRESSION (HCC): Primary | ICD-10-CM

## 2021-01-01 DIAGNOSIS — Z11.59 SPECIAL SCREENING EXAMINATION FOR UNSPECIFIED VIRAL DISEASE: ICD-10-CM

## 2021-01-01 DIAGNOSIS — G47.23 CIRCADIAN RHYTHM SLEEP DISORDER, IRREGULAR SLEEP WAKE TYPE: ICD-10-CM

## 2021-01-01 DIAGNOSIS — R51.9 MORNING HEADACHE: ICD-10-CM

## 2021-01-01 DIAGNOSIS — G25.81 RESTLESS LEG SYNDROME: ICD-10-CM

## 2021-01-01 DIAGNOSIS — L21.0 SEBORRHEA CAPITIS: ICD-10-CM

## 2021-01-01 DIAGNOSIS — R73.9 HYPERGLYCEMIA: ICD-10-CM

## 2021-01-01 DIAGNOSIS — Z98.890 S/P LUMBAR LAMINECTOMY: ICD-10-CM

## 2021-01-01 DIAGNOSIS — Z71.89 ADVANCED DIRECTIVES, COUNSELING/DISCUSSION: ICD-10-CM

## 2021-01-01 DIAGNOSIS — R19.7 DIARRHEA, UNSPECIFIED TYPE: ICD-10-CM

## 2021-01-01 DIAGNOSIS — I71.40 ABDOMINAL AORTIC ANEURYSM: Primary | ICD-10-CM

## 2021-01-01 DIAGNOSIS — K59.00 CONSTIPATION, UNSPECIFIED CONSTIPATION TYPE: Primary | ICD-10-CM

## 2021-01-01 DIAGNOSIS — G89.29 CHRONIC BACK PAIN, UNSPECIFIED BACK LOCATION, UNSPECIFIED BACK PAIN LATERALITY: Primary | ICD-10-CM

## 2021-01-01 DIAGNOSIS — Z23 NEEDS FLU SHOT: ICD-10-CM

## 2021-01-01 DIAGNOSIS — F51.8 DISRUPTED SLEEP-WAKE CYCLE: ICD-10-CM

## 2021-01-01 LAB
25(OH)D3 SERPL-MCNC: 42.6 NG/ML (ref 30–100)
ALBUMIN SERPL-MCNC: 3.6 G/DL (ref 3.5–5)
ALBUMIN/GLOB SERPL: 1.1 {RATIO} (ref 1.1–2.2)
ALP SERPL-CCNC: 146 U/L (ref 45–117)
ALT SERPL-CCNC: 31 U/L (ref 12–78)
ANION GAP SERPL CALC-SCNC: 3 MMOL/L (ref 5–15)
AST SERPL-CCNC: 26 U/L (ref 15–37)
BASOPHILS # BLD: 0 K/UL (ref 0–0.1)
BASOPHILS NFR BLD: 0 % (ref 0–1)
BILIRUB SERPL-MCNC: 0.4 MG/DL (ref 0.2–1)
BUN SERPL-MCNC: 23 MG/DL (ref 6–20)
BUN/CREAT SERPL: 21 (ref 12–20)
CALCIUM SERPL-MCNC: 8.8 MG/DL (ref 8.5–10.1)
CHLORIDE SERPL-SCNC: 112 MMOL/L (ref 97–108)
CHOLEST SERPL-MCNC: 139 MG/DL
CO2 SERPL-SCNC: 26 MMOL/L (ref 21–32)
CREAT SERPL-MCNC: 1.08 MG/DL (ref 0.7–1.3)
DIFFERENTIAL METHOD BLD: NORMAL
EOSINOPHIL # BLD: 0.4 K/UL (ref 0–0.4)
EOSINOPHIL NFR BLD: 5 % (ref 0–7)
ERYTHROCYTE [DISTWIDTH] IN BLOOD BY AUTOMATED COUNT: 13.4 % (ref 11.5–14.5)
EST. AVERAGE GLUCOSE BLD GHB EST-MCNC: 105 MG/DL
GLOBULIN SER CALC-MCNC: 3.3 G/DL (ref 2–4)
GLUCOSE SERPL-MCNC: 98 MG/DL (ref 65–100)
HBA1C MFR BLD: 5.3 % (ref 4–5.6)
HCT VFR BLD AUTO: 46.7 % (ref 36.6–50.3)
HDLC SERPL-MCNC: 54 MG/DL
HDLC SERPL: 2.6 {RATIO} (ref 0–5)
HGB BLD-MCNC: 15.1 G/DL (ref 12.1–17)
IMM GRANULOCYTES # BLD AUTO: 0 K/UL (ref 0–0.04)
IMM GRANULOCYTES NFR BLD AUTO: 0 % (ref 0–0.5)
LDLC SERPL CALC-MCNC: 70.8 MG/DL (ref 0–100)
LYMPHOCYTES # BLD: 1.2 K/UL (ref 0.8–3.5)
LYMPHOCYTES NFR BLD: 13 % (ref 12–49)
MCH RBC QN AUTO: 31.4 PG (ref 26–34)
MCHC RBC AUTO-ENTMCNC: 32.3 G/DL (ref 30–36.5)
MCV RBC AUTO: 97.1 FL (ref 80–99)
MONOCYTES # BLD: 0.6 K/UL (ref 0–1)
MONOCYTES NFR BLD: 7 % (ref 5–13)
NEUTS SEG # BLD: 6.5 K/UL (ref 1.8–8)
NEUTS SEG NFR BLD: 75 % (ref 32–75)
NRBC # BLD: 0 K/UL (ref 0–0.01)
NRBC BLD-RTO: 0 PER 100 WBC
PLATELET # BLD AUTO: 205 K/UL (ref 150–400)
PMV BLD AUTO: 11.1 FL (ref 8.9–12.9)
POTASSIUM SERPL-SCNC: 4.3 MMOL/L (ref 3.5–5.1)
PROT SERPL-MCNC: 6.9 G/DL (ref 6.4–8.2)
RBC # BLD AUTO: 4.81 M/UL (ref 4.1–5.7)
SARS-COV-2, NAA: NOT DETECTED
SODIUM SERPL-SCNC: 141 MMOL/L (ref 136–145)
TESTOST FREE SERPL-MCNC: 7.2 PG/ML (ref 6.6–18.1)
TESTOST SERPL-MCNC: 700 NG/DL (ref 264–916)
TRIGL SERPL-MCNC: 71 MG/DL (ref ?–150)
VLDLC SERPL CALC-MCNC: 14.2 MG/DL
WBC # BLD AUTO: 8.7 K/UL (ref 4.1–11.1)

## 2021-01-01 PROCEDURE — G8427 DOCREV CUR MEDS BY ELIG CLIN: HCPCS | Performed by: INTERNAL MEDICINE

## 2021-01-01 PROCEDURE — G8754 DIAS BP LESS 90: HCPCS | Performed by: INTERNAL MEDICINE

## 2021-01-01 PROCEDURE — G8536 NO DOC ELDER MAL SCRN: HCPCS | Performed by: INTERNAL MEDICINE

## 2021-01-01 PROCEDURE — 99214 OFFICE O/P EST MOD 30 MIN: CPT | Performed by: INTERNAL MEDICINE

## 2021-01-01 PROCEDURE — 1101F PT FALLS ASSESS-DOCD LE1/YR: CPT | Performed by: INTERNAL MEDICINE

## 2021-01-01 PROCEDURE — G8417 CALC BMI ABV UP PARAM F/U: HCPCS | Performed by: INTERNAL MEDICINE

## 2021-01-01 PROCEDURE — 91301 COVID-19, MRNA, LNP-S, PF, 100MCG/0.5ML DOSE(MODERNA): CPT | Performed by: FAMILY MEDICINE

## 2021-01-01 PROCEDURE — 99442 PR PHYS/QHP TELEPHONE EVALUATION 11-20 MIN: CPT | Performed by: NURSE PRACTITIONER

## 2021-01-01 PROCEDURE — G9717 DOC PT DX DEP/BP F/U NT REQ: HCPCS | Performed by: INTERNAL MEDICINE

## 2021-01-01 PROCEDURE — G8756 NO BP MEASURE DOC: HCPCS | Performed by: INTERNAL MEDICINE

## 2021-01-01 PROCEDURE — 90694 VACC AIIV4 NO PRSRV 0.5ML IM: CPT | Performed by: INTERNAL MEDICINE

## 2021-01-01 PROCEDURE — 0011A PR IMM ADMN SARSCOV2 100 MCG/0.5 ML 1ST DOSE: CPT | Performed by: FAMILY MEDICINE

## 2021-01-01 PROCEDURE — G8752 SYS BP LESS 140: HCPCS | Performed by: INTERNAL MEDICINE

## 2021-01-01 PROCEDURE — 99204 OFFICE O/P NEW MOD 45 MIN: CPT | Performed by: INTERNAL MEDICINE

## 2021-01-01 PROCEDURE — G0008 ADMIN INFLUENZA VIRUS VAC: HCPCS | Performed by: INTERNAL MEDICINE

## 2021-01-01 PROCEDURE — 95806 SLEEP STUDY UNATT&RESP EFFT: CPT | Performed by: INTERNAL MEDICINE

## 2021-01-01 PROCEDURE — 95811 POLYSOM 6/>YRS CPAP 4/> PARM: CPT | Performed by: INTERNAL MEDICINE

## 2021-01-01 PROCEDURE — 0012A COVID-19, MRNA, LNP-S, PF, 100MCG/0.5ML DOSE(MODERNA): CPT | Performed by: FAMILY MEDICINE

## 2021-01-01 PROCEDURE — 74018 RADEX ABDOMEN 1 VIEW: CPT

## 2021-01-01 PROCEDURE — G0439 PPPS, SUBSEQ VISIT: HCPCS | Performed by: INTERNAL MEDICINE

## 2021-01-01 RX ORDER — DONEPEZIL HYDROCHLORIDE 10 MG/1
TABLET, FILM COATED ORAL
Qty: 90 TABLET | Refills: 1 | Status: SHIPPED | OUTPATIENT
Start: 2021-01-01 | End: 2022-01-01

## 2021-01-01 RX ORDER — POLYETHYLENE GLYCOL 3350 17 G/17G
17 POWDER, FOR SOLUTION ORAL DAILY
Qty: 595 G | Refills: 5 | Status: SHIPPED | OUTPATIENT
Start: 2021-01-01

## 2021-01-01 RX ORDER — SILDENAFIL CITRATE 20 MG/1
20 TABLET ORAL 3 TIMES DAILY
COMMUNITY
End: 2022-01-01

## 2021-01-01 RX ORDER — TRAMADOL HYDROCHLORIDE 50 MG/1
50 TABLET ORAL
Qty: 120 TABLET | Refills: 3 | Status: SHIPPED | OUTPATIENT
Start: 2021-01-01 | End: 2021-01-01

## 2021-01-01 RX ORDER — BUPROPION HYDROCHLORIDE 450 MG/1
TABLET, FILM COATED, EXTENDED RELEASE ORAL
Qty: 90 TABLET | Refills: 1 | Status: SHIPPED | OUTPATIENT
Start: 2021-01-01 | End: 2022-01-01

## 2021-01-01 RX ORDER — TADALAFIL 5 MG/1
TABLET ORAL
Qty: 90 TAB | Refills: 1 | Status: SHIPPED | OUTPATIENT
Start: 2021-01-01 | End: 2021-01-01 | Stop reason: ALTCHOICE

## 2021-01-01 RX ORDER — KETOCONAZOLE 20 MG/ML
SHAMPOO TOPICAL
Qty: 120 ML | Refills: 1 | Status: SHIPPED | OUTPATIENT
Start: 2021-01-01

## 2021-01-01 RX ORDER — MAGNESIUM CITRATE
SOLUTION, ORAL ORAL
Qty: 296 ML | Refills: 1 | Status: SHIPPED | OUTPATIENT
Start: 2021-01-01

## 2021-01-01 RX ORDER — LIDOCAINE 50 MG/G
PATCH TOPICAL
Qty: 30 PATCH | Refills: 5 | Status: SHIPPED
Start: 2021-01-01 | End: 2021-01-01 | Stop reason: CLARIF

## 2021-01-01 RX ORDER — TIZANIDINE 4 MG/1
TABLET ORAL
COMMUNITY
Start: 2021-01-01

## 2021-01-01 RX ORDER — MEMANTINE HYDROCHLORIDE 5 MG/1
TABLET ORAL
Qty: 180 TABLET | Refills: 1 | Status: SHIPPED | OUTPATIENT
Start: 2021-01-01 | End: 2022-01-01

## 2021-01-01 RX ORDER — BUPROPION HYDROCHLORIDE 450 MG/1
TABLET, FILM COATED, EXTENDED RELEASE ORAL
Qty: 90 TAB | Refills: 1 | Status: SHIPPED | OUTPATIENT
Start: 2021-01-01 | End: 2021-01-01

## 2021-01-01 RX ORDER — LIDOCAINE 50 MG/G
PATCH TOPICAL
Qty: 30 PATCH | Refills: 5 | Status: SHIPPED | OUTPATIENT
Start: 2021-01-01 | End: 2021-01-01

## 2021-01-01 RX ORDER — KETOCONAZOLE 20 MG/ML
SHAMPOO TOPICAL
Qty: 120 ML | Refills: 5 | Status: SHIPPED | OUTPATIENT
Start: 2021-01-01 | End: 2021-01-01 | Stop reason: ALTCHOICE

## 2021-01-01 RX ORDER — PRAMIPEXOLE DIHYDROCHLORIDE 0.75 MG/1
TABLET ORAL
Qty: 90 TABLET | Refills: 1 | Status: SHIPPED | OUTPATIENT
Start: 2021-01-01 | End: 2022-01-01 | Stop reason: SDUPTHER

## 2021-01-01 RX ORDER — SERTRALINE HYDROCHLORIDE 100 MG/1
TABLET, FILM COATED ORAL
Qty: 180 TABLET | Refills: 1 | Status: SHIPPED | OUTPATIENT
Start: 2021-01-01

## 2021-01-01 RX ORDER — POLYETHYLENE GLYCOL 3350 17 G/17G
17 POWDER, FOR SOLUTION ORAL DAILY
Qty: 595 G | Refills: 5 | Status: SHIPPED | OUTPATIENT
Start: 2021-01-01 | End: 2021-01-01 | Stop reason: SDUPTHER

## 2021-01-01 RX ORDER — MAGNESIUM CITRATE
296 SOLUTION, ORAL ORAL
Qty: 296 ML | Refills: 1 | Status: SHIPPED | OUTPATIENT
Start: 2021-01-01 | End: 2021-01-01

## 2021-01-01 RX ORDER — DONEPEZIL HYDROCHLORIDE 10 MG/1
TABLET, FILM COATED ORAL
Qty: 90 TABLET | Refills: 1 | Status: SHIPPED | OUTPATIENT
Start: 2021-01-01 | End: 2021-01-01

## 2021-01-08 DIAGNOSIS — G25.81 RESTLESS LEG SYNDROME: ICD-10-CM

## 2021-01-08 RX ORDER — PRAMIPEXOLE DIHYDROCHLORIDE 0.75 MG/1
TABLET ORAL
Qty: 90 TAB | Refills: 1 | Status: SHIPPED | OUTPATIENT
Start: 2021-01-08 | End: 2021-01-01 | Stop reason: SDUPTHER

## 2021-01-08 NOTE — TELEPHONE ENCOUNTER
Last visit 12/29/2020 MD Faby Franco   Next appointment 03/30/2021 MD Faby Franco   Previous refill encounter(s)   10/07/2019 Mirapex #90 with 1 refill     Requested Prescriptions     Pending Prescriptions Disp Refills    pramipexole (MIRAPEX) 0.75 mg tablet 90 Tab 1     Sig: Take 1 tab by mouth every night

## 2021-01-27 NOTE — Clinical Note
Thank you for the referral.  I will keep you informed of his progress.   [address],  Alie Sparks

## 2021-01-27 NOTE — PROGRESS NOTES
[address] [address] [place]. [address], [address] Tel.  [phone] Fax. [address] [place], [address] Tel.  [phone] Fax. [phone] [address] [facility] Payal Sotelo  Tel.  [phone] Fax. [phone] Subjective:  
  
 
Telemedicine visit performed with verbal consent of the patient. Patient called and identity confirmed with 2 patient identifers Patient was seen at home Ellie Andersen is a 67 y.o. male who was seen by synchronous (real-time) audio-video technology on 1/27/2021. Consent: 
He and/or his healthcare decision maker is aware that this patient-initiated Telehealth encounter is the equivalent to a face to face encounter in the sleep disorder center and has provided verbal consent to proceed: Yes I was in the office while conducting this encounter. Ellie Andersen is an 67 y.o. male referred for evaluation for a sleep disorder. He complains of snoring associated with feels sleepy during the day, take naps during the day, irregular bedtime and waketime. Symptoms began several years ago, unchanged since that time. He usually can fall asleep in 30 minutes. Family or house members note snoring, take naps during the day, bedtime and waketime very irregular. Yesterday he didn't get out of bed until 6 pm although he agrees that he was not sleeping the whole time. He watches tv in bed. He denies falling asleep while during conversation. Ellie Andersen does wake up frequently at night. He is not bothered by waking up too early and left unable to get back to sleep. He actually sleeps about   hours at night and wakes up about 3 times during the night. He does not work shifts: Jonatan Tamayo Chantelle Kelley indicates he does get too little sleep at night. His bedtime is between midnight and 2 am  . He awakens at irregular times- sometimes spending most of the day in bed and other times he may be up at 8-9 am  . He does take naps. He takes 4 naps a week lasting Hour(s). He has the following observed behaviors: Loud snoring, Light snoring, Twitching of legs or feet, Sleep talking, Kicking with legs;  . 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  
  pramipexole (MIRAPEX) 0.75 mg tablet, Take 1 tab by mouth every night, Disp: 90 Tab, Rfl: 1 
  HYDROcodone-acetaminophen (NORCO)  mg tablet, Take 1 Tab by mouth three (3) times daily for 30 days. Max Daily Amount: 3 Tabs., Disp: 90 Tab, Rfl: 0 
  buPROPion XL (WELLBUTRIN XL) 300 mg XL tablet, TAKE 1 TABLET BY MOUTH EVERY MORNING, Disp: 90 Tab, Rfl: 1 
  fluticasone propionate (FLONASE) 50 mcg/actuation nasal spray, SHAKE LIQUID AND USE 2 SPRAYS INTO EACH NOSTRIL DAILY, Disp: 16 g, Rfl: 3 
  donepeziL (ARICEPT) 10 mg tablet, Take 1 Tab by mouth nightly., Disp: 90 Tab, Rfl: 1 
  sertraline (ZOLOFT) 100 mg tablet, Take 2 Tabs by mouth daily. , Disp: 180 Tab, Rfl: 1 
  memantine (NAMENDA) 5 mg tablet, Take 1 Tab by mouth two (2) times a day., Disp: 180 Tab, Rfl: 1 
  fexofenadine (ALLEGRA) 180 mg tablet, Take 180 mg by mouth daily. , Disp: , Rfl:  
  tadalafiL (Cialis) 5 mg tablet, Take 1 Tab by mouth daily. , Disp: 90 Tab, Rfl: 1 
  sertraline (ZOLOFT) 100 mg tablet, Take 2 Tabs by mouth daily. (Patient taking differently: Take 200 mg by mouth two (2) times a day.), Disp: 180 Tab, Rfl: 1 
  lidocaine (LIDODERM) 5 %, APPLY ONE PATCH TOPICALLY EVERY 24 HOURS AS NEEDED, Disp: 30 Patch, Rfl: 5 
  sertraline (ZOLOFT) 50 mg tablet, TAKE 1 TABLET BY MOUTH DAILY, Disp: 90 Tab, Rfl: 1   HYDROcodone-acetaminophen (NORCO) 7.5-325 mg per tablet, Take  by mouth every four (4) hours as needed. , Disp: , Rfl:  
  cholecalciferol (Vitamin D3) (1000 Units /25 mcg) tablet, Take 1,000 Units by mouth daily. , Disp: , Rfl:  
  HYDROCODONE-ACETAMINOPHEN PO, Take  by mouth., Disp: , Rfl:  
  chlorzoxazone (PARAFON FORTE) 500 mg tablet, Take 500 mg by mouth two (2) times a day., Disp: , Rfl:  
  diphenhydrAMINE (BENADRYL) 25 mg capsule, Take 25 mg by mouth nightly as needed. , Disp: , Rfl:  
  ondansetron (ZOFRAN ODT) 4 mg disintegrating tablet, Take 1 Tab by mouth every eight (8) hours as needed for Nausea., Disp: 60 Tab, Rfl: 5 
  acetaminophen (TYLENOL EXTRA STRENGTH) 500 mg tablet, Take  by mouth BID Mon Wed & Fri., Disp: , Rfl:  
  ibuprofen 200 mg cap, Take  by mouth., Disp: , Rfl:  
  melatonin tab tablet, Take 5 mg by mouth nightly., Disp: , Rfl:  
  multivitamin (ONE DAILY ENERGY) tablet, Take 1 Tab by mouth daily. , Disp: , Rfl:  
  triamcinolone acetonide (KENALOG) 0.1 % topical cream, Apply  to affected area two (2) times a day. use thin layer x 3-5 days at time, Disp: 45 g, Rfl: 2 
  MILK THISTLE PO, Take 1 Tab by mouth two (2) times a day., Disp: , Rfl:  
  terbinafine HCL (LAMISIL) 1 % topical cream, Apply  to affected area two (2) times a day., Disp: 30 g, Rfl: 2 
  VITAMIN B-1, MONONITRATE, 100 mg tablet, TAKE 1 TABLET BY MOUTH DAILY, Disp: 90 Tab, Rfl: 3 
  ketoconazole (NIZORAL) 2 % shampoo, APPLY EXTERNALLY 2 TIMES A WEEK FOR 2 TO 4 WEEKS, Disp: 120 mL, Rfl: 1 
  tamsulosin (FLOMAX) 0.4 mg capsule, Take 0.4 mg by mouth daily. , Disp: , Rfl:  
  loratadine (CLARITIN) 10 mg tablet, Take 10 mg by mouth daily. , Disp: , Rfl:   
 
 He  has a past medical history of AAA (abdominal aortic aneurysm) ([facility] (07/29/2019), Alcohol abuse, in remission, Arthritis, Back pain, Bladder cancer ([facility] C. 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 [facility] (10/2016), Dementia ([facility] Depression, Horseshoe kidney, HTN (hypertension) (12/5/2013), Hyperlipidemia, Liver disease, Recurrent bladder transitional cell carcinoma ([facility] Restless leg syndrome, Screening for colorectal cancer (03/14/2018), Situational anxiety, Spinal stenosis, lumbar, Thrombocytopenia ([facility] Transitional cell carcinoma of left ureter ([facility] and Tubular adenoma of colon. He  has a past surgical history that includes bladder cancer fish; hx urological; hx lumbar laminectomy; and colonoscopy (N/A, 4/2/2020). He family history includes Arthritis in his mother; Cancer in his father; Other in his brother. He  reports that he has been smoking. He has never used smokeless tobacco. He reports current alcohol use of about 1.0 - 2.0 standard drinks of alcohol per week. He reports that he does not use drugs. Review of Systems: 
Constitutional:  No significant weight loss or weight gain Eyes:  No blurred vision CVS:  No significant chest pain Pulm:  No significant shortness of breath GI:  No significant nausea or vomiting :  No significant nocturia Musculoskeletal: +back pain at night, follows with a pain specialist 
Skin:  No significant rashes Neuro:  No significant dizziness Psych:  He is being treated for depression Sleep Review of Systems: notable for intermittent difficulty falling asleep; +frequent awakenings at night;  occasional dreaming noted; no nightmares ; previous early morning headaches but no longer occur; + memory problems;  
 
 
Objective:  
 
Visit Vitals Ht 6' 1\" (1.854 m) Wt 228 lb (103.4 kg) BMI 30.08 kg/m² Vital Signs: (As obtained by patient/caregiver at home) Constitutional: [x] Appears well-developed and well-nourished [x] No apparent distress   
  [] Abnormal - Mental status: [x] Alert and awake  [x] Oriented to person/place/time [x] Able to follow commands   
[] Abnormal - Eyes:   EOM    [x]  Normal    [] Abnormal -  
Sclera  [x]  Normal    [] Abnormal - 
        Discharge [x]  None visible   [] Abnormal - HENT: [x] Normocephalic, atraumatic  [] Abnormal -  
[x] Mouth/Throat: Mucous membranes are moist 
             
External Ears [x] Normal  [] Abnormal - Neck: [x] No visualized mass [] Abnormal - Pulmonary/Chest: [x] Respiratory effort normal   [x] No visualized signs of difficulty breathing or respiratory distress 
      [] Abnormal -  
 
 
Neurological:        [x] No Facial Asymmetry (Cranial nerve 7 motor function) (limited exam due to video visit) [x] No gaze palsy  
     [] Abnormal -   
     
Skin:        [x] No significant exanthematous lesions or discoloration noted on facial skin   
     [] Abnormal - Psychiatric:       [x] Normal Affect [] Abnormal - Other pertinent observable physical exam findings:- 
 
 
 
 
Assessment: ICD-10-CM ICD-9-CM 1. Obstructive sleep apnea (adult) (pediatric)  G47.33 327.23 SLEEP STUDY UNATTENDED, 4 CHANNEL 2. Circadian rhythm sleep disorder, irregular sleep wake type  G47.23 327.33 Plan: * Sleep testing was ordered for initial evaluation. * He was provided information on sleep apnea including coresponding risk factors and the importance of proper treatment. * Treatment options for sleep apnea were reviewed today. Patient agrees to a trial of PAP therapy if indicated. He is agreeable to a PAP titration if found to have significant sleep apnea * Counseling was provided regarding proper sleep hygiene, appropriate sleep schedule, need for sleep environment safety and safe driving. * Effect of sleep disturbance on weight was reviewed. We have recommended a dedicated weight loss through appropriate diet and an exercise regimen as significant weight reduction has been shown to reduce severity of obstructive sleep apnea. * Patient agrees to telephone follow-up by sleep technologist shortly after sleep study to review results and plan final management. 2. Irregular sleep wake schedule - I have advised a regular sleep wake cycle. He will try a bedtime of midnight and out of bed by 8 am.  he  was advised to avoid looking at the clock during the night. Ideally, the clock face should be turned away. he was advised to minimize caffeine use and to avoid caffeine-containing beverages 8 hours prior to bedtime. Naps were discouraged. If he does feel he needs a rest, an after lunch nap (before 2 pm and not exceeding an hour) A regular exercise schedule, at least 3 hours before bedtime, would be beneficial to improving sleep quality. Watching TV, using laptops, tablets and smartphones in the evening was discouraged. he  was advised to keep the bedroom cool and dark All of his questions were addressed. The treatment plan was reviewed with the patient and his wife in detail . . he understands that the lead technologist will be calling him  with the results and assisting with the next step in the treatment plan as outlined today during the consultation with me. All of his questions were addressed. Thank you for allowing us to participate in your patient's medical care. We'll keep you updated on these investigations. Pursuant to the emergency declaration under the [facility], [facility] waiver authority and the [employer] Act, this Virtual  Visit was conducted, with patient's consent, to reduce the patient's risk of exposure to COVID-19 and provide continuity of care for an established patient. Services were provided through a video synchronous discussion virtually to substitute for in-person clinic visit. Jose Estrada MD 
 
Electronically signed by 
 
Braulio Solomon MD 
Diplomate in Sleep Medicine [facility]

## 2021-01-27 NOTE — PATIENT INSTRUCTIONS
217 [facility]., [place]. [address], [address] Tel.  [phone] Fax. 100 [facility] [address], [address] [facility] Tel.  [phone] Fax. [phone] [address] Tel.  [phone] Fax. [phone] Sleep Apnea: After Your Visit Your Care Instructions Sleep apnea occurs when you frequently stop breathing for 10 seconds or longer during sleep. It can be mild to severe, based on the number of times per hour that you stop breathing or have slowed breathing. Blocked or narrowed airways in your nose, mouth, or throat can cause sleep apnea. Your airway can become blocked when your throat muscles and tongue relax during sleep. Sleep apnea is common, occurring in 1 out of 20 individuals. Individuals having any of the following characteristics should be evaluated and treated right away due to high risk and detrimental consequences from untreated sleep apnea: 
1. Obesity 2. Congestive Heart failure 3. Atrial Fibrillation 4. Uncontrolled Hypertension 5. Type II Diabetes 6. Night-time Arrhythmias 7. Stroke 8. Pulmonary Hypertension 9. High-risk Driving Populations (pilots, truck drivers, etc.) 10. Patients Considering Weight-loss Surgery How do you know you have sleep apnea? You probably have sleep apnea if you answer 'yes' to 3 or more of the following questions: S - Have you been told that you Snore? T - Are you often Tired during the day? O - Has anyone Observed you stop breathing while sleeping? P- Do you have (or are being treated for) high blood Pressure? B - Are you obese (Body Mass Index > 35)? A - Is your Age 48years old or older? N - Is your Neck size greater than 16 inches? G - Are you male Gender? A sleep physician can prescribe a breathing device that prevents tissues in the throat from blocking your airway. Or your doctor may recommend using a dental device (oral breathing device) to help keep your airway open. In some cases, surgery may be needed to remove enlarged tissues in the throat. Follow-up care is a key part of your treatment and safety. Be sure to make and go to all appointments, and call your doctor if you are having problems. It's also a good idea to know your test results and keep a list of the medicines you take. How can you care for yourself at home? · Lose weight, if needed. It may reduce the number of times you stop breathing or have slowed breathing. · Go to bed at the same time every night. · Sleep on your side. It may stop mild apnea. If you tend to roll onto your back, sew a pocket in the back of your pajama top. Put a tennis ball into the pocket, and stitch the pocket shut. This will help keep you from sleeping on your back. · Avoid alcohol and medicines such as sleeping pills and sedatives before bed. · Do not smoke. Smoking can make sleep apnea worse. If you need help quitting, talk to your doctor about stop-smoking programs and medicines. These can increase your chances of quitting for good. · Prop up the head of your bed 4 to 6 inches by putting bricks under the legs of the bed. · Treat breathing problems, such as a stuffy nose, caused by a cold or allergies. · Use a continuous positive airway pressure (CPAP) breathing machine if lifestyle changes do not help your apnea and your doctor recommends it. The machine keeps your airway from closing when you sleep. · If CPAP does not help you, ask your doctor whether you should try other breathing machines. A bilevel positive airway pressure machine has two types of air pressureâone for breathing in and one for breathing out. Another device raises or lowers air pressure as needed while you breathe. · If your nose feels dry or bleeds when using one of these machines, talk with your doctor about increasing moisture in the air. A humidifier may help. · If your nose is runny or stuffy from using a breathing machine, talk with your doctor about using decongestants or a corticosteroid nasal spray. When should you call for help? Watch closely for changes in your health, and be sure to contact your doctor if: 
· You still have sleep apnea even though you have made lifestyle changes. · You are thinking of trying a device such as CPAP. · You are having problems using a CPAP or similar machine. Where can you learn more? Go to [employer]. Enter L844 in the search box to learn more about \"Sleep Apnea: After Your Visit. \"  
© 3330-7625 Healthwise, Incorporated. Care instructions adapted under license by [facility] [email or web address] (which disclaims liability or warranty for this information). This care instruction is for use with your licensed healthcare professional. If you have questions about a medical condition or this instruction, always ask your healthcare professional. Alina Fernandes any warranty or liability for your use of this information. PROPER SLEEP HYGIENE What to avoid · Do not have drinks with caffeine, such as coffee or black tea, for 8 hours before bed. · Do not smoke or use other types of tobacco near bedtime. Nicotine is a stimulant and can keep you awake. · Avoid drinking alcohol late in the evening, because it can cause you to wake in the middle of the night. · Do not eat a big meal close to bedtime. If you are hungry, eat a light snack. · Do not drink a lot of water close to bedtime, because the need to urinate may wake you up during the night. · Do not read or watch TV in bed. Use the bed only for sleeping and sexual activity. What to try · Go to bed at the same time every night, and wake up at the same time every morning. Do not take naps during the day. · Keep your bedroom quiet, dark, and cool. · Get regular exercise, but not within 3 to 4 hours of your bedtime. Rich Ambrosio · Sleep on a comfortable pillow and mattress. · If watching the clock makes you anxious, turn it facing away from you so you cannot see the time. · If you worry when you lie down, start a worry book. Well before bedtime, write down your worries, and then set the book and your concerns aside. · Try meditation or other relaxation techniques before you go to bed. · If you cannot fall asleep, get up and go to another room until you feel sleepy. Do something relaxing. Repeat your bedtime routine before you go to bed again. · Make your house quiet and calm about an hour before bedtime. Turn down the lights, turn off the TV, log off the computer, and turn down the volume on music. This can help you relax after a busy day. Drowsy Driving The [place] [address] cites drowsiness as a causing factor in more than 390,499 police reported crashes annually, resulting in 76,000 injuries and 1,500 deaths. Other surveys suggest 55% of people polled have driven while drowsy in the past year, 23% had fallen asleep but not crashed, 3% crashed, and 2% had and accident due to drowsy driving. Who is at risk? Young Drivers: One study of drowsy driving accidents states that 55% of the drivers were under 25 years. Of those, 75% were male. Shift Workers and Travelers: People who work overnight or travel across time zones frequently are at higher risk of experiencing Circadian Rhythm Disorders. They are trying to work and function when their body is programed to sleep. Sleep Deprived: Lack of sleep has a serious impact on your ability to pay attention or focus on a task. Consistently getting less than the average of 8 hours your body needs creates partial or cumulative sleep deprivation. Untreated Sleep Disorders: Sleep Apnea, Narcolepsy, R.L.S., and other sleep disorders (untreated) prevent a person from getting enough restful sleep. This leads to excessive daytime sleepiness and increases the risk for drowsy driving accidents by up to 7 times. Medications / Alcohol: Even over the counter medications can cause drowsiness. Medications that impair a drivers attention should have a warning label. Alcohol naturally makes you sleepy and on its own can cause accidents. Combined with excessive drowsiness its effects are amplified. Signs of Drowsy Driving: * You don't remember driving the last few miles * You may drift out of your garrett * You are unable to focus and your thoughts wander * You may yawn more often than normal 
 * You have difficulty keeping your eyes open / nodding off * Missing traffic signs, speeding, or tailgating Prevention-  
Good sleep hygiene, lifestyle and behavioral choices have the most impact on drowsy driving. There is no substitute for sleep and the average person requires 8 hours nightly. If you find yourself driving drowsy, stop and sleep. Consider the sleep hygiene tips provided during your visit as well. Medication Refill Policy: Refills for all medications require 1 week advance notice. Please have your pharmacy fax a refill request. We are unable to fax, or call in \"controled substance\" medications and you will need to pick these prescriptions up from our office. MyChart Activation Thank you for requesting access to [employer]. Please follow the instructions below to securely access and download your online medical record. [employer]ension allows you to send messages to your doctor, view your test results, renew your prescriptions, schedule appointments, and more. How Do I Sign Up? 1. In your internet browser, go to [email or web address]. 2. Click on the First Time User? Click Here link in the Sign In box. You will see the New Member Sign Up page. 3. Enter your [employer] Access Code exactly as it appears below. You will not need to use this code after youve completed the sign-up process. If you do not sign up before the expiration date, you must request a new code. [employer]mension Access Code: Activation code not generated Current Transmension Status: Active (This is the date your Transmension access code will ) 4. Enter the last four digits of your Social Security Number (xxxx) and Date of Birth (mm/dd/yyyy) as indicated and click Submit. You will be taken to the next sign-up page. 5. Create a Transmension ID. This will be your [employer]mension login ID and cannot be changed, so think of one that is secure and easy to remember. 6. Create a [employer]mension password. You can change your password at any time. 7. Enter your Password Reset Question and Answer. This can be used at a later time if you forget your password. 8. Enter your e-mail address. You will receive e-mail notification when new information is available in [address] 9. Click Sign Up. You can now view and download portions of your medical record. 10. Click the Download Summary menu link to download a portable copy of your medical information. Additional Information If you have questions, please call [phone]. Remember, [employer] is NOT to be used for urgent needs. For medical emergencies, dial 911.

## 2021-02-01 NOTE — TELEPHONE ENCOUNTER
Last visit 12/29/2020 MD Rena Dallas   Next appointment 03/30/2021 MD Varma   Previous refill encounter(s)   02/15/2020 Nizoral Shampoo 2 % #120 ml with 1 refill     Requested Prescriptions     Pending Prescriptions Disp Refills    ketoconazole (NIZORAL) 2 % shampoo 120 mL 1     Sig: APPLY EXTERNALLY 2 TIMES A WEEK FOR 2 TO 4 WEEKS

## 2021-02-02 NOTE — PROGRESS NOTES
S>John D Jorge Schilder is a 67 y.o. male seen today to receive a home sleep testing unit (HST). · Patient was educated on proper hookup and operation of the HST via detailed instruction sheet (per COVID-19 precautions) · Instruction forms with after hours contact and documentation were signed. O>   
There were no vitals taken for this visit. A> 
No diagnosis found. P> 
· General information regarding operations and maintenance of the device was provided. · Follow-up appointment was made to return the [facility] 2/2/21. He will be contacted once the results have been reviewed. · He was asked to contact our office for any problems regarding his home sleep test study.

## 2021-02-10 NOTE — TELEPHONE ENCOUNTER
Results of sleep study in [employer]  Lead tech to convey results to patient  He did HSAT on 2 consecutive nights  The report is from the first night (as during the second night the finger oxygen sensor was off/loose for a large portion of the study)    I recommend that he return to the sleep center for an attended PAP titration where we will be able to find the pressure setting that best controls his sleep apnea and allows him the opportunity to adjust to PAP therapy. Mask options will be presented at this time. Once results reviewed, I will order a PAP device for him to use at home and we will see him in follow-up about 6-8 weeks after initiation of PAP. he will be called with results.

## 2021-02-12 NOTE — TELEPHONE ENCOUNTER
Reviewed sleep study results with patient. He expressed understanding and is willing to proceed with a CPAP Titration. Please schedule titration COVID testing. Patient will need wife to stay with him due to dementia and confusion; fall precautions.     Thanks

## 2021-03-10 NOTE — TELEPHONE ENCOUNTER
Results of sleep study in [employer] [facility] to convey results to patient  Initial Apnea/Hypopnea index was 11.  he elected to proceed with a positive airway pressure trial (CPAP) and a titration study was ordered. Most of the respiratory events were resolved on CPAP 10 cmH2O. He appeared to have difficulty tolerating higher settings. I will order a PAP device for him based on the settings determined by the PAP sleep study. It will start a little lower for comfort and will adjust up during sleep. I will see him in 6-8 weeks after initiating PAP therapy at home to assess treatment response and make further adjustments if necessary. My staff will order PAP and provide you with details/next steps in obtaining the PAP device for your home use. They will also arrange for your follow-up appointment. Order PAP and call patient and let them know which [employer] company they should be hearing from. Schedule for first adherence visit in 6 weeks.

## 2021-03-30 NOTE — PROGRESS NOTES
HPI:  established patient  Presents for f/u mood, sleep, etc    Did sleep study and +LUCHO with AHI of 11 and CPAP at 10 resolved episodes    No CPAP device yet. Increase in wellbutrin   Wife reports no improvement in mood    Scalp better    +/- improvement in foot tinea    Pt has an inability to ejaculate. Has urology appt coming up     Past medical, Social, and Family history reviewed    Prior to Admission medications    Medication Sig Start Date End Date Taking? Authorizing Provider   lidocaine (LIDODERM) 5 % APPLY 1 PATCH TO AFFECTED AREA FOR 12 HOURS IN A 24 HOUR PERIOD AS NEEDED 3/21/21  Yes Lisset Garcia MD   tadalafiL (CIALIS) 5 mg tablet TAKE ONE TABLET BY MOUTH DAILY 3/14/21  Yes Lisset Garcia MD   ketoconazole (NIZORAL) 2 % shampoo APPLY EXTERNALLY 2 TIMES A WEEK FOR 2 TO 4 WEEKS 2/1/21  Yes Lisset Garcia MD   pramipexole (MIRAPEX) 0.75 mg tablet Take 1 tab by mouth every night 1/8/21  Yes Lisset Garcia MD   buPROPion XL (WELLBUTRIN XL) 300 mg XL tablet TAKE 1 TABLET BY MOUTH EVERY MORNING 12/29/20  Yes Lisset Garcia MD   fluticasone propionate (FLONASE) 50 mcg/actuation nasal spray SHAKE LIQUID AND USE 2 SPRAYS INTO EACH NOSTRIL DAILY 11/16/20  Yes Susan Courtney NP   donepeziL (ARICEPT) 10 mg tablet Take 1 Tab by mouth nightly. 11/16/20  Yes Lisset Garcia MD   memantine [facility]) 5 mg tablet Take 1 Tab by mouth two (2) times a day. 10/16/20  Yes Lisset Garcia MD   fexofenadine (ALLEGRA) 180 mg tablet Take 180 mg by mouth daily. Yes Provider, Historical   sertraline (ZOLOFT) 100 mg tablet Take 2 Tabs by mouth daily. Patient taking differently: Take 200 mg by mouth two (2) times a day. 9/16/20  Yes Lisset Garcia MD   HYDROcodone-acetaminophen [facility]) 7.5-325 mg per tablet Take  by mouth every four (4) hours as needed. 4/18/20  Yes Provider, Historical   cholecalciferol (Vitamin D3) (1000 Units /25 mcg) tablet Take 1,000 Units by mouth daily.    Yes Provider, Historical   VITAMIN B-1, MONONITRATE, 100 mg tablet TAKE 1 TABLET BY MOUTH DAILY 2/27/20  Yes Aggie Lucero MD   chlorzoxazone (PARAFON FORTE) 500 mg tablet Take 500 mg by mouth two (2) times a day. Yes Provider, Historical   diphenhydrAMINE (BENADRYL) 25 mg capsule Take 25 mg by mouth nightly as needed. Yes Provider, Historical   acetaminophen (TYLENOL EXTRA STRENGTH) 500 mg tablet Take  by mouth BID Mon Wed & Fri. Yes Provider, Historical   ibuprofen 200 mg cap Take  by mouth. Yes Provider, Historical   melatonin tab tablet Take 5 mg by mouth nightly. Yes Provider, Historical   multivitamin (ONE DAILY ENERGY) tablet Take 1 Tab by mouth daily. Yes Provider, Historical   triamcinolone acetonide (KENALOG) 0.1 % topical cream Apply  to affected area two (2) times a day. use thin layer x 3-5 days at time 5/21/19  Yes Christel Mckeon MD   MILK THISTLE PO Take 1 Tab by mouth two (2) times a day. Yes Provider, Historical   loratadine (CLARITIN) 10 mg tablet Take 10 mg by mouth daily. Yes Provider, Historical   terbinafine HCL (LAMISIL) 1 % topical cream Apply  to affected area two (2) times a day. 12/29/20   Christel Mckeon MD   sertraline (ZOLOFT) 100 mg tablet Take 2 Tabs by mouth daily. 11/16/20   Christel Mckeon MD   sertraline (ZOLOFT) 50 mg tablet TAKE 1 TABLET BY MOUTH DAILY 5/25/20   Christel Mckeon MD   HYDROCODONE-ACETAMINOPHEN PO Take  by mouth. Provider, Historical   ondansetron (ZOFRAN ODT) 4 mg disintegrating tablet Take 1 Tab by mouth every eight (8) hours as needed for Nausea. 9/13/19   Leigh SINGH NP   tamsulosin (FLOMAX) 0.4 mg capsule Take 0.4 mg by mouth daily. Provider, Historical          ROS  Complete ROS reviewed and negative or stable except as noted in HPI. Physical Exam  Vitals signs and nursing note reviewed. Constitutional:       General: He is not in acute distress. HENT:      Head: Normocephalic and atraumatic.       Mouth/Throat: Pharynx: No oropharyngeal exudate. Eyes:      General: No scleral icterus. Pupils: Pupils are equal, round, and reactive to light. Neck:      Musculoskeletal: Normal range of motion and neck supple. Thyroid: No thyromegaly. Vascular: No JVD. Cardiovascular:      Rate and Rhythm: Normal rate and regular rhythm. Heart sounds: Normal heart sounds. No murmur. No friction rub. No gallop. Pulmonary:      Effort: Pulmonary effort is normal. No respiratory distress. Breath sounds: Normal breath sounds. No wheezing or rales. Abdominal:      General: Bowel sounds are normal. There is no distension. Palpations: Abdomen is soft. There is no mass. Tenderness: There is no abdominal tenderness. There is no guarding or rebound. Musculoskeletal: Normal range of motion. Comments: Slow deliberate gait, use of cane required for ambulation. Lymphadenopathy:      Cervical: No cervical adenopathy. Skin:     General: Skin is warm. Neurological:      Mental Status: He is alert and oriented to person, place, and time. Mental status is at baseline. Motor: No abnormal muscle tone. Coordination: Coordination normal.      Comments: +interactive. Prior labs reviewed. Assessment/Plan:    Reviewed that ejaculatory failure is likely related to zoloft       ICD-10-CM ICD-9-CM    1. Recurrent depression (HCC)  F33.9 296.30 buPROPion  mg Tb24   2. Disrupted sleep-wake cycle  G47.20 307.45     F51.8     3. Essential hypertension  I10 401.9    4. Lumbar back pain  M54.5 724.2    5. Hyperlipidemia, unspecified hyperlipidemia type  E78.5 272.4    6. Dementia with behavioral disturbance, unspecified dementia type [facility]  F03.91 294.21    7. LUCHO (obstructive sleep apnea)  G47.33 327.23      Follow-up and Dispositions    · Return in about 3 months (around 6/30/2021), or if symptoms worsen or fail to improve, for sleep, mood.         results and schedule of future studies reviewed with patient  reviewed diet, exercise and weight   cardiovascular risk and specific lipid/LDL goals reviewed  reviewed medications and side effects in detail    Wife to reach out to sleep center re; CPAP   Start CPAP per sleep center  Increase wellbutrin to 450 mg daily  Encouraged to continue zoloft   May need psych eval and management  Continue other current medications        An electronic signature was used to authenticate this note.   -- Omari Luz MD

## 2021-03-30 NOTE — PROGRESS NOTES
RM: 13 Patient is not fasting Chief Complaint Patient presents with  Sleep Problem f/u mood  Depression  
  down on some days Visit Vitals /67 (BP 1 Location: Left upper arm, BP Patient Position: Sitting, BP Cuff Size: Large adult) Pulse 73 Temp (!) 96.3 °F (35.7 °C) (Temporal) Resp 16 Ht 6' 1\" (1.854 m) Wt 224 lb (101.6 kg) SpO2 93% BMI 29.55 kg/m² Recent Travel Screening and Travel History documentation Travel Screening No screening recorded since 03/29/21 0000 Travel History   Travel since 02/28/21 No documented travel since 02/28/21  
  
 
3 most recent PHQ Screens 2/24/2020 Little interest or pleasure in doing things Not at all Feeling down, depressed, irritable, or hopeless Not at all Total Score PHQ 2 0  
 
 
 
  
1. Have you been to the ER, urgent care clinic since your last visit? Hospitalized since your last visit? No 
 
2. Have you seen or consulted any other health care providers outside of the [address] [place] [address] Wei since your last visit? Include any pap smears or colon screening. No  
 
There are no preventive care reminders to display for this patient. Learning Assessment 7/24/2019 PRIMARY LEARNER Patient HIGHEST LEVEL OF EDUCATION - PRIMARY LEARNER  -  
BARRIERS PRIMARY LEARNER NONE  
CO-LEARNER CAREGIVER No  
PRIMARY LANGUAGE ENGLISH  
LEARNER PREFERENCE PRIMARY LISTENING  
ANSWERED BY patient RELATIONSHIP SELF

## 2021-05-14 NOTE — TELEPHONE ENCOUNTER
Patient wife called stating he is having problems with his CPAP device and it is uncomfortable. Patient was transferred to technician.

## 2021-05-14 NOTE — TELEPHONE ENCOUNTER
Patient called and stated that he is new the therapy and that his mask is uncomfortable. Patient was instructed to called the DME company to resolve his mask issue first.  Patient and his spouse expressed understanding.

## 2021-06-16 NOTE — PATIENT INSTRUCTIONS
217 [facility]., [place]. 1668 [facility], [address] Tel.  [phone] Fax. 100 [facility] [address] [place], 200 [address] [facility] Tel.  [phone] Fax. [phone] [address] [place] [place] Tel.  [phone] Fax. [phone] Learning About CPAP for Sleep Apnea What is CPAP? CPAP is a small machine that you use at home every night while you sleep. It increases air pressure in your throat to keep your airway open. When you have sleep apnea, this can help you sleep better so you feel much better. CPAP stands for \"continuous positive airway pressure. \" The CPAP machine will have one of the following: · A mask that covers your nose and mouth · Prongs that fit into your nose · A mask that covers your nose only, the most common type. This type is called NCPAP. The N stands for \"nasal.\" Why is it done? CPAP is usually the best treatment for obstructive sleep apnea. It is the first treatment choice and the most widely used. Your doctor may suggest CPAP if you have: · Moderate to severe sleep apnea. · Sleep apnea and coronary artery disease (CAD) or heart failure. How does it help? · CPAP can help you have more normal sleep, so you feel less sleepy and more alert during the daytime. · CPAP may help keep heart failure or other heart problems from getting worse. · NCPAP may help lower your blood pressure. · If you use CPAP, your bed partner may also sleep better because you are not snoring or restless. What are the side effects? Some people who use CPAP have: · A dry or stuffy nose and a sore throat. · Irritated skin on the face. · Sore eyes. · Bloating. If you have any of these problems, work with your doctor to fix them. Here are some things you can try: · Be sure the mask or nasal prongs fit well. · See if your doctor can adjust the pressure of your CPAP. · If your nose is dry, try a humidifier.  
· If your nose is runny or stuffy, try decongestant medicine or a steroid nasal spray. If these things do not help, you might try a different type of machine. Some machines have air pressure that adjusts on its own. Others have air pressures that are different when you breathe in than when you breathe out. This may reduce discomfort caused by too much pressure in your nose. Where can you learn more? Go to [employer] Enter U131 in the search box to learn more about \"Learning About CPAP for Sleep Apnea. \"  
© 7172-5955 Healthwise, Incorporated. Care instructions adapted under license by [employer] (which disclaims liability or warranty for this information). This care instruction is for use with your licensed healthcare professional. If you have questions about a medical condition or this instruction, always ask your healthcare professional. [address] any warranty or liability for your use of this information. Content Version: 2.5.92215; Last Revised: January 11, 2010 PROPER SLEEP HYGIENE What to avoid · Do not have drinks with caffeine, such as coffee or black tea, for 8 hours before bed. · Do not smoke or use other types of tobacco near bedtime. Nicotine is a stimulant and can keep you awake. · Avoid drinking alcohol late in the evening, because it can cause you to wake in the middle of the night. · Do not eat a big meal close to bedtime. If you are hungry, eat a light snack. · Do not drink a lot of water close to bedtime, because the need to urinate may wake you up during the night. · Do not read or watch TV in bed. Use the bed only for sleeping and sexual activity. What to try · Go to bed at the same time every night, and wake up at the same time every morning. Do not take naps during the day. · Keep your bedroom quiet, dark, and cool. · Get regular exercise, but not within 3 to 4 hours of your bedtime. Cindy Rad · Sleep on a comfortable pillow and mattress.  
· If watching the clock makes you anxious, turn it facing away from you so you cannot see the time. · If you worry when you lie down, start a worry book. Well before bedtime, write down your worries, and then set the book and your concerns aside. · Try meditation or other relaxation techniques before you go to bed. · If you cannot fall asleep, get up and go to another room until you feel sleepy. Do something relaxing. Repeat your bedtime routine before you go to bed again. · Make your house quiet and calm about an hour before bedtime. Turn down the lights, turn off the TV, log off the computer, and turn down the volume on music. This can help you relax after a busy day. Drowsy Driving: The [employer] cites drowsiness as a causing factor in more than 119,368 police reported crashes annually, resulting in 76,000 injuries and 1,500 deaths. Other surveys suggest 55% of people polled have driven while drowsy in the past year, 23% had fallen asleep but not crashed, 3% crashed, and 2% had and accident due to drowsy driving. Who is at risk? Young Drivers: One study of drowsy driving accidents states that 55% of the drivers were under 25 years. Of those, 75% were male. Shift Workers and Travelers: People who work overnight or travel across time zones frequently are at higher risk of experiencing Circadian Rhythm Disorders. They are trying to work and function when their body is programed to sleep. Sleep Deprived: Lack of sleep has a serious impact on your ability to pay attention or focus on a task. Consistently getting less than the average of 8 hours your body needs creates partial or cumulative sleep deprivation. Untreated Sleep Disorders: Sleep Apnea, Narcolepsy, R.L.S., and other sleep disorders (untreated) prevent a person from getting enough restful sleep. This leads to excessive daytime sleepiness and increases the risk for drowsy driving accidents by up to 7 times.  
Medications / Alcohol: Even over the counter medications can cause drowsiness. Medications that impair a drivers attention should have a warning label. Alcohol naturally makes you sleepy and on its own can cause accidents. Combined with excessive drowsiness its effects are amplified. Signs of Drowsy Driving: * You don't remember driving the last few miles * You may drift out of your garrett * You are unable to focus and your thoughts wander * You may yawn more often than normal 
 * You have difficulty keeping your eyes open / nodding off * Missing traffic signs, speeding, or tailgating Prevention-  
Good sleep hygiene, lifestyle and behavioral choices have the most impact on drowsy driving. There is no substitute for sleep and the average person requires 8 hours nightly. If you find yourself driving drowsy, stop and sleep. Consider the sleep hygiene tips provided during your visit as well. Medication Refill Policy: Refills for all medications require 1 week advance notice. Please have your pharmacy fax a refill request. We are unable to fax, or call in \"controled substance\" medications and you will need to pick these prescriptions up from our office. Drivewyze Activation Thank you for requesting access to Drivewyze. Please follow the instructions below to securely access and download your online medical record. Drivewyze allows you to send messages to your doctor, view your test results, renew your prescriptions, schedule appointments, and more. How Do I Sign Up? 1. In your internet browser, go to [email or web address]. 2. Click on the First Time User? Click Here link in the Sign In box. You will see the New Member Sign Up page. 3. Enter your Drivewyze Access Code exactly as it appears below. You will not need to use this code after youve completed the sign-up process. If you do not sign up before the expiration date, you must request a new code. Drivewyze Access Code:  Activation code not generated Current Ixchelsis Status: Active (This is the date your Ixchelsis access code will ) 4. Enter the last four digits of your Social Security Number (xxxx) and Date of Birth (mm/dd/yyyy) as indicated and click Submit. You will be taken to the next sign-up page. 5. Create a [employer] ID. This will be your Ixchelsis login ID and cannot be changed, so think of one that is secure and easy to remember. 6. Create a Ixchelsis password. You can change your password at any time. 7. Enter your Password Reset Question and Answer. This can be used at a later time if you forget your password. 8. Enter your e-mail address. You will receive e-mail notification when new information is available in [address] 9. Click Sign Up. You can now view and download portions of your medical record. 10. Click the Download Summary menu link to download a portable copy of your medical information. Additional Information If you have questions, please call [phone]. Remember, Ixchelsis is NOT to be used for urgent needs. For medical emergencies, dial 911.

## 2021-06-16 NOTE — PROGRESS NOTES
217 [facility] [place]. 1668 [facility], [address] Tel.  [phone] Fax. 100 [facility] [address] [place], 200 [address] [facility] Tel.  [phone] Fax. [phone] [address] Tel.  [phone] Fax. [phone] Gi Tineo (: 1945) is a 67 y.o. male, established patient, seen for positive airway pressure follow-up, he was last seen by Dr. Novella Kayser on 2021, prior notes reviewed in detail. Home sleep test 2021 showed AHI of 11.1/hr with a lowest SpO2 of 86%, duration of SpO2 < 88% 1.7 min. ASSESSMENT/PLAN: 
  ICD-10-CM ICD-9-CM 1. Obstructive sleep apnea (adult) (pediatric)  G47.33 327.23   
2. Adult BMI 26.0-26.9 kg/sq m  Z68.26 V85.22   
 
 
 
AHI = 11.1(2021). On APAP, Respironics :  8-12 cmH2O. Set up 2021. Compliance not met. He is not compliant with PAP therapy although when used PAP shows benefit to the patient and remains necessary for control of his sleep apnea. His device is affected by the Jhon recall and he has not been using it due to intolerance. Follow-up and Dispositions · Return if symptoms worsen or fail to improve. 1. Sleep Apnea - We have discussed the Jhon Respironics device recall, the need to contact the DME to register the device, and I have advised positional therapy if PAP therapy is stopped. We also discussed an oral appliance as an alternative. He will trial positional therapy and stop PAP therapy at this time. * He was asked to contact our office for any problems regarding PAP therapy. 2. Encouraged continued weight management loss program through appropriate diet and exercise regimen as significant weight reduction has been shown to reduce severity of obstructive sleep apnea. He has lost almost 30 pounds since prior visit. SUBJECTIVE/OBJECTIVE: 
 
He  is seen today for follow up on PAP device and reports some problems using the device. The following concerns identified: 
 
Drowsiness no Problems exhaling no Snoring no Forget to put on no Mask Comfortable yes Can't fall asleep no  
Dry Mouth no Mask falls off no Air Leaking yes Frequent awakenings yes He admits that his sleep has not improved on PAP therapy using full face or nasal mask and heated tubing. He is intolerant of the mask and pressure and has not been using the device. He has lost a significant amount of weight and has reduced symptoms. Review of device download indicated: Auto pressure: 8-12 cmH2O; Peak Avg Pressure: 11.1 cmH2O;  Avg. Device Pressure <= 90 %: 9.0 cmH2O Average % Night in Large Leak:  47.7 
% Used Days >= 4 hours: 7. Avg hours used:  2. Therapy Apnea Index averaged over PAP use: 14.1 /hr which reflects not changed sleep breathing condition. [place] Sleepiness Score: 2 and Modified F.O.S.Q. Score Total / 2: 18.5 which reflects improved sleep quality over therapy time. Sleep Review of Systems: notable for Negative difficulty falling asleep; Positive awakenings at night; Negative early morning headaches; Negative memory problems; Negative concentration issues; Negative chest pain; Negative shortness of breath; Negative significant joint pain at night; Negative significant muscle pain at night; Negative rashes or itching; Negative heartburn; Negative significant mood issues; occasioanl naps Vitals reported by patient Patient-Reported Vitals 6/16/2021 Patient-Reported Weight 201 lb Calculated BMI 26 Physical Exam not completed due to audio only visit. Pursuant to the emergency declaration under the [facility], [address] [place] [address] and the [employer] Act, this telephone encounter was conducted with patient's (and/or legal guardian's) consent, to reduce the risk of exposure to COVID-19 and provide necessary medical care.   
  
Services were provided through a telephone call to substitute for in-person encounter. I was in the office while conducting this encounter, patient located at their home or alternate location of their choice. Patient identification was verified at the start of the visit: YES using name and date of birth. Patient's phone number [phone] (home)  was confirmed for accuracy. He gives permission for messages regarding results and appointments to be left at that number. On this date 06/16/21 I have spent 20 minutes reviewing previous notes, test results, and on an audio only visit with the patient discussing the diagnosis and importance of compliance with the treatment plan as well as documenting on the day of the visit. An electronic signature was used to authenticate this note. -- Kelly Ortzi NP, [facility] 
06/16/21

## 2021-06-30 PROBLEM — F10.21 ALCOHOL USE DISORDER, MODERATE, IN EARLY REMISSION, IN CONTROLLED ENVIRONMENT (HCC): Status: RESOLVED | Noted: 2018-09-20 | Resolved: 2021-01-01

## 2021-06-30 NOTE — PROGRESS NOTES
HPI:  established patient  Presents for f/u multiple concerns    HA in the AM  APAP device recalled. Pt unable to tolerate the APAP  Decided to defer APAP for now. Using a wedge pillow. Diarrhea - loose stools   No incontinence reported. Nasal congestion - mostly in the AM    Mood better on increased wellbutrin dose    Wife looking into in home care due to 2 recent falls  No injury. No recent PT    Saw Dr. Lali Robertson re: AAA      Past medical, Social, and Family history reviewed    Prior to Admission medications    Medication Sig Start Date End Date Taking? Authorizing Provider   sildenafiL, pulmonary hypertension, (REVATIO) 20 mg tablet Take 20 mg by mouth three (3) times daily. Yes Provider, Historical   donepeziL (ARICEPT) 10 mg tablet TAKE 1 TABLET BY MOUTH EVERY NIGHT 6/15/21  Yes Breana Mariano MD   buPROPion  mg Tb24 TAKE 1 TABLET BY MOUTH EVERY MORNING 3/30/21  Yes Breana Mariano MD   lidocaine (LIDODERM) 5 % APPLY 1 PATCH TO AFFECTED AREA FOR 12 HOURS IN A 24 HOUR PERIOD AS NEEDED 3/21/21  Yes Breana Mariano MD   pramipexole (MIRAPEX) 0.75 mg tablet Take 1 tab by mouth every night 1/8/21  Yes Breana Mariano MD   fluticasone propionate (FLONASE) 50 mcg/actuation nasal spray SHAKE LIQUID AND USE 2 SPRAYS INTO EACH NOSTRIL DAILY 11/16/20  Yes Jenelle Guerrero NP   memantine (NAMENDA) 5 mg tablet Take 1 Tab by mouth two (2) times a day. 10/16/20  Yes Breana Mariano MD   fexofenadine (ALLEGRA) 180 mg tablet Take 180 mg by mouth daily. Yes Provider, Historical   sertraline (ZOLOFT) 100 mg tablet Take 2 Tabs by mouth daily. Patient taking differently: Take 200 mg by mouth two (2) times a day. 9/16/20  Yes Breana Mariano MD   HYDROcodone-acetaminophen [facility]) 7.5-325 mg per tablet Take  by mouth every four (4) hours as needed. 4/18/20  Yes Provider, Historical   cholecalciferol (Vitamin D3) (1000 Units /25 mcg) tablet Take 1,000 Units by mouth daily.    Yes Provider, Historical   HYDROCODONE-ACETAMINOPHEN PO Take  by mouth. Yes Provider, Historical   chlorzoxazone (PARAFON FORTE) 500 mg tablet Take 500 mg by mouth two (2) times a day. Yes Provider, Historical   diphenhydrAMINE (BENADRYL) 25 mg capsule Take 25 mg by mouth nightly as needed. Yes Provider, Historical   ondansetron (ZOFRAN ODT) 4 mg disintegrating tablet Take 1 Tab by mouth every eight (8) hours as needed for Nausea. 9/13/19  Yes Ulysses Shouts, NP   ibuprofen 200 mg cap Take  by mouth. Yes Provider, Historical   melatonin tab tablet Take 5 mg by mouth nightly. Yes Provider, Historical   multivitamin (ONE DAILY ENERGY) tablet Take 1 Tab by mouth daily. Yes Provider, Historical   MILK THISTLE PO Take 1 Tab by mouth two (2) times a day. Yes Provider, Historical   loratadine (CLARITIN) 10 mg tablet Take 10 mg by mouth daily. Yes Provider, Historical   tadalafiL (CIALIS) 5 mg tablet TAKE ONE TABLET BY MOUTH DAILY  Patient not taking: Reported on 6/30/2021 3/14/21   Nitin Voss MD   ketoconazole (NIZORAL) 2 % shampoo APPLY EXTERNALLY 2 TIMES A WEEK FOR 2 TO 4 WEEKS  Patient not taking: Reported on 6/30/2021 2/1/21   Nitin Voss MD   terbinafine HCL (LAMISIL) 1 % topical cream Apply  to affected area two (2) times a day. Patient not taking: Reported on 6/30/2021 12/29/20   Nitin Voss MD   sertraline (ZOLOFT) 100 mg tablet Take 2 Tabs by mouth daily. Patient not taking: Reported on 6/30/2021 11/16/20   Nitin Voss MD   VITAMIN B-1, MONONITRATE, 100 mg tablet TAKE 1 TABLET BY MOUTH DAILY  Patient not taking: Reported on 6/16/2021 2/27/20   Juan Gillis MD   acetaminophen (TYLENOL EXTRA STRENGTH) 500 mg tablet Take  by mouth BID Mon Wed & Fri. Patient not taking: Reported on 6/30/2021    Provider, Historical   triamcinolone acetonide (KENALOG) 0.1 % topical cream Apply  to affected area two (2) times a day.  use thin layer x 3-5 days at time  Patient not taking: Reported on 6/30/2021 5/21/19   Celso Dandy, MD          ROS  Complete ROS reviewed and negative or stable except as noted in HPI. Physical Exam  Vitals and nursing note reviewed. Constitutional:       General: He is not in acute distress. HENT:      Head: Normocephalic and atraumatic. Mouth/Throat:      Pharynx: No oropharyngeal exudate. Eyes:      General: No scleral icterus. Pupils: Pupils are equal, round, and reactive to light. Neck:      Thyroid: No thyromegaly. Vascular: No JVD. Cardiovascular:      Rate and Rhythm: Normal rate and regular rhythm. Heart sounds: Normal heart sounds. No murmur heard. No friction rub. No gallop. Pulmonary:      Effort: Pulmonary effort is normal. No respiratory distress. Breath sounds: Normal breath sounds. No wheezing or rales. Abdominal:      General: Bowel sounds are normal. There is no distension. Palpations: Abdomen is soft. There is no mass. Tenderness: There is no abdominal tenderness. There is no guarding or rebound. Musculoskeletal:         General: Normal range of motion. Cervical back: Normal range of motion and neck supple. Comments: Slow deliberate gait, use of cane required for ambulation. Lymphadenopathy:      Cervical: No cervical adenopathy. Skin:     General: Skin is warm. Neurological:      Mental Status: He is alert and oriented to person, place, and time. Mental status is at baseline. Motor: No abnormal muscle tone. Coordination: Coordination normal.      Comments: +interactive. Prior labs reviewed. Assessment/Plan:  I still feel LUCHO is causing the AM HAs      ICD-10-CM ICD-9-CM    1. LUCHO (obstructive sleep apnea)  G47.33 327.23 CBC WITH AUTOMATED DIFF   2. Recurrent bladder transitional cell carcinoma (HCC)  C67.9 188.9    3.  Hyperlipidemia, unspecified hyperlipidemia type  U97.0 785.1 METABOLIC PANEL, COMPREHENSIVE      LIPID PANEL   4. Essential hypertension I10 401.9 CBC WITH AUTOMATED DIFF      METABOLIC PANEL, COMPREHENSIVE   5. Recurrent depression (HCC)  F33.9 296.30    6. Lumbar back pain  M54.5 724.2    7. Dementia with behavioral disturbance, unspecified dementia type ([address]  F03.91 294.21    8. Benign prostatic hyperplasia without lower urinary tract symptoms  N40.0 600.00    9. Vitamin D deficiency  E55.9 268.9 VITAMIN D, 25 HYDROXY   10. Abdominal aortic aneurysm (AAA) 3.0 cm to 5.5 cm in diameter in male [facility])  I71.4 441.4    11. Morning headache  R51.9 784.0    12. Diarrhea, unspecified type  R19.7 787.91 XR ABD (KUB)   13. Risk for falls  Z91.81 V15.88 REFERRAL TO PHYSICAL THERAPY   14. S/P lumbar laminectomy  Z98.890 V45.89    15. Spinal stenosis, unspecified spinal region  M48.00 724.00 REFERRAL TO PHYSICAL THERAPY   16. Imbalance  R26.89 781.2 REFERRAL TO PHYSICAL THERAPY   17. Hyperglycemia  R73.9 790.29 HEMOGLOBIN A1C WITH EAG     Follow-up and Dispositions    · Return in about 3 months (around 9/30/2021), or if symptoms worsen or fail to improve, for Medicare Wellness Visit. results and schedule of future studies reviewed with patient  reviewed diet, exercise and weight   cardiovascular risk and specific lipid/LDL goals reviewed  reviewed medications and side effects in detail    US for AAA with Dr. Clay Wheeler - bowel regimen as indicated. Labs at f/u  Continue current medications   Ref for PT - gait, spinal stenosis        An electronic signature was used to authenticate this note.   -- Radha Kerns MD

## 2021-06-30 NOTE — PROGRESS NOTES
RM: 15    Chief Complaint   Patient presents with    Sleep Problem     3 month f/u patient was has stated that pt has had 2 falls this month no injuries occured. patient's wife states that patient wakes up with headaches every morning. patient states he could not adjust to cpap machine and will be returning and curretly uses a wedge pillow    Sinus Infection     constant nasal congestion and constant diarrhea      Visit Vitals  BP (!) 148/79 (BP 1 Location: Left upper arm, BP Patient Position: Sitting, BP Cuff Size: Adult)   Pulse 72   Temp 98.3 °F (36.8 °C) (Oral)   Resp 18   Ht 6' 1\" (1.854 m)   Wt 217 lb 4 oz (98.5 kg)   SpO2 92%   BMI 28.66 kg/m²     Recent Travel Screening and Travel History documentation     Travel Screening     Question   Response    In the last month, have you been in contact with someone who was confirmed or suspected to have Coronavirus / COVID-19? No / Unsure    Have you had a COVID-19 viral test in the last 14 days? No    Do you have any of the following new or worsening symptoms? None of these    Have you traveled internationally or domestically in the last month? No      Travel History   Travel since 05/30/21     No documented travel since 05/30/21                   3 most recent [facility] Screens 2/24/2020   Little interest or pleasure in doing things Not at all   Feeling down, depressed, irritable, or hopeless Not at all   Total Score PHQ 2 0            1. Have you been to the ER, urgent care clinic since your last visit? Hospitalized since your last visit? No    2. Have you seen or consulted any other health care providers outside of the [address] [place] [address] Wei since your last visit? Include any pap smears or colon screening. No     There are no preventive care reminders to display for this patient.      Learning Assessment 7/24/2019   PRIMARY LEARNER Patient   HIGHEST LEVEL OF EDUCATION - PRIMARY LEARNER  -   BARRIERS PRIMARY LEARNER NONE   CO-LEARNER CAREGIVER No   PRIMARY LANGUAGE ENGLISH   LEARNER PREFERENCE PRIMARY LISTENING   ANSWERED BY patient   RELATIONSHIP SELF

## 2021-07-15 NOTE — PROGRESS NOTES
Please notify pt's wife of results    Abdominal Xray confirms the primary issue is constipation and is consistent with the diarrhea being overflow due to the constipation. We need to clean out the colon then maintain daily treatment to prevent a recurrence. miralax clean out - 7 capfuls of miralax in a 32 oz gatorade - drink over a few hours. May need to repeat x 2-3 days depending on results. Then, 1 capful of miralax in 8 oz of fluid daily.

## 2021-07-19 NOTE — PROGRESS NOTES
Spoke with patients wife on phi, advised of results and recommendations. She will ensure pts receives the information and will call if symptoms not improving with colon clean out technique.  Nothing else needed at this time

## 2021-07-28 NOTE — PROGRESS NOTES
[facility] Physical Rehab PT order signed 7/27/21 by provider, faxed 7/28/21 to [phone]; order and confirmation scanned into cc.

## 2021-08-04 NOTE — TELEPHONE ENCOUNTER
My understanding is that it is needed. Stopping it would likely result in more depression. The wellbutrin does not replace the sertraline, rather it supplements it.

## 2021-08-04 NOTE — TELEPHONE ENCOUNTER
----- Message from [facility] sent at 8/4/2021 10:35 AM EDT -----  Regarding: Dr. Prashanth Givens first and last name: Lou Craig      Reason for call:  Meds Question      Callback required yes/no and why:  yes      Best contact number(s):  [phone]      Details to clarify the request:  Pt wanting to confirm that the medication sertraline is no longet needed, please call back to confirm      [facility]

## 2021-08-24 NOTE — PROGRESS NOTES
One [facility] completed / signed 8/23/21 by provider. Completed form scanned into cc. Notified pt's wife, Rufino Ganser, ph# [phone], forms completed, ready for . Forms in bin at .

## 2021-10-18 NOTE — PROGRESS NOTES
HPI:  established patient  Presents for f/u HAs, HTN, mood, falls, etc    Accompanied by wife. C/o constipation   miralax being used. On hydrocodone - stopped this AM.    HAs - 3-4 days per week  CPAP not tolerable    Falls - doing PT - scheduled through November at [address] rec'd. Reviewed home positional and functional arrangement to reduce falls related to swivel and wheeled office chair. Using walker regularly. Pt had a fall in the office today  Trying to navigate into exam room and foot got hung up on the dynamap base and he fell. No head injury, no other c/o of injury. 4.5 cm AAA in August 2021     Inquires re: ED    Past medical, Social, and Family history reviewed    Prior to Admission medications    Medication Sig Start Date End Date Taking? Authorizing Provider   donepeziL (ARICEPT) 10 mg tablet TAKE 1 TABLET BY MOUTH EVERY NIGHT 10/7/21  Yes Javier Lombardi MD   buPROPion HCL (FORFIVO) 450 mg Tb24 XL tablet TAKE 1 TABLET BY MOUTH EVERY MORNING 9/29/21  Yes Javier Lombardi MD   memantine (NAMENDA) 5 mg tablet TAKE 1 TABLET BY MOUTH TWICE DAILY 7/26/21  Yes Javier oLmbardi MD   polyethylene glycol (MIRALAX) 17 gram/dose powder Take 17 g by mouth daily. miralax clean out - 7 capfuls of miralax in a 32 oz gatorade - drink over a few hours. May need to repeat x 2-3 days depending on results. 7/15/21  Yes Javier Lombardi MD   pramipexole (MIRAPEX) 0.75 mg tablet Take 1 tab by mouth every night 7/12/21  Yes Javier Lombardi MD   sildenafiL, pulmonary hypertension, (REVATIO) 20 mg tablet Take 20 mg by mouth three (3) times daily. Yes Provider, Historical   lidocaine (LIDODERM) 5 % APPLY 1 PATCH TO AFFECTED AREA FOR 12 HOURS IN A 24 HOUR PERIOD AS NEEDED 3/21/21  Yes Javier Lombardi MD   fexofenadine (ALLEGRA) 180 mg tablet Take 180 mg by mouth daily. Yes Provider, Historical   sertraline (ZOLOFT) 100 mg tablet Take 2 Tabs by mouth daily.   Patient taking differently: Take 200 mg by mouth two (2) times a day. 9/16/20  Yes Juan David Howard MD   cholecalciferol (Vitamin D3) (1000 Units /25 mcg) tablet Take 1,000 Units by mouth daily. Yes Provider, Historical   ondansetron (ZOFRAN ODT) 4 mg disintegrating tablet Take 1 Tab by mouth every eight (8) hours as needed for Nausea. 9/13/19  Yes Attila Diaz NP   ibuprofen 200 mg cap Take  by mouth. Yes Provider, Historical   melatonin tab tablet Take 5 mg by mouth nightly. Yes Provider, Historical   multivitamin (ONE DAILY ENERGY) tablet Take 1 Tab by mouth daily. Yes Provider, Historical   MILK THISTLE PO Take 1 Tab by mouth two (2) times a day. Yes Provider, Historical   loratadine (CLARITIN) 10 mg tablet Take 10 mg by mouth daily. Yes Provider, Historical   ketoconazole (NIZORAL) 2 % shampoo APPLY EXTERNALLY 2 TIMES A WEEK FOR 2 TO 4 WEEKS  Patient not taking: Reported on 10/18/2021 7/24/21   Attila Diaz NP   fluticasone propionate (FLONASE) 50 mcg/actuation nasal spray SHAKE LIQUID AND USE 2 SPRAYS INTO EACH NOSTRIL DAILY  Patient not taking: Reported on 10/18/2021 11/16/20   Attila Diaz NP   HYDROcodone-acetaminophen (NORCO) 7.5-325 mg per tablet Take  by mouth every four (4) hours as needed. Patient not taking: Reported on 10/18/2021 4/18/20   Provider, Historical   HYDROCODONE-ACETAMINOPHEN PO Take  by mouth. Provider, Historical   chlorzoxazone (PARAFON FORTE) 500 mg tablet Take 500 mg by mouth two (2) times a day. Patient not taking: Reported on 10/18/2021    Provider, Historical   diphenhydrAMINE (BENADRYL) 25 mg capsule Take 25 mg by mouth nightly as needed. Patient not taking: Reported on 10/18/2021    Provider, Historical          ROS  Complete ROS reviewed and negative or stable except as noted in HPI. Physical Exam  Vitals and nursing note reviewed. Constitutional:       General: He is not in acute distress. HENT:      Head: Normocephalic and atraumatic.       Mouth/Throat: Pharynx: No oropharyngeal exudate. Eyes:      General: No scleral icterus. Pupils: Pupils are equal, round, and reactive to light. Neck:      Thyroid: No thyromegaly. Vascular: No JVD. Cardiovascular:      Rate and Rhythm: Normal rate and regular rhythm. Heart sounds: Normal heart sounds. No murmur heard. No friction rub. No gallop. Pulmonary:      Effort: Pulmonary effort is normal. No respiratory distress. Breath sounds: Normal breath sounds. No wheezing or rales. Abdominal:      General: Bowel sounds are normal. There is no distension. Palpations: Abdomen is soft. There is no mass. Tenderness: There is no abdominal tenderness. There is no guarding or rebound. Musculoskeletal:         General: Normal range of motion. Cervical back: Normal range of motion and neck supple. Comments: Slow deliberate gait, use of walker required for ambulation. Lymphadenopathy:      Cervical: No cervical adenopathy. Skin:     General: Skin is warm. Neurological:      Mental Status: He is alert and oriented to person, place, and time. Mental status is at baseline. Motor: No abnormal muscle tone. Coordination: Coordination normal.      Comments: +interactive. Prior labs reviewed. Reviewed prior imaging report      Assessment/Plan:    ICD-10-CM ICD-9-CM    1. Headache disorder  R51.9 784.0    2. Advanced directives, counseling/discussion  Z71.89 V65.49    3. Medicare annual wellness visit, subsequent  Z00.00 V70.0    4. Arthritis  M19.90 716.90 traMADoL (ULTRAM) 50 mg tablet   5. Chronic back pain, unspecified back location, unspecified back pain laterality  M54.9 724.5 traMADoL (ULTRAM) 50 mg tablet    G89.29 338.29    6. Neurogenic claudication (HCC)  G95.19 435.1    7. Spinal stenosis, unspecified spinal region  M48.00 724.00    8. Hyperlipidemia, unspecified hyperlipidemia type  E78.5 272.4    9. LUCHO (obstructive sleep apnea)  G47.33 327.23    10. Essential hypertension  I10 401.9    11. Abdominal aortic aneurysm (AAA) 3.0 cm to 5.5 cm in diameter in male [facility])  I71.4 441.4    12. Other male erectile dysfunction  N52.8 607.84 TESTOSTERONE, FREE & TOTAL      TESTOSTERONE, FREE & TOTAL   13. Constipation, unspecified constipation type  K59.00 564.00      Follow-up and Dispositions    · Return in about 2 months (around 12/18/2021), or if symptoms worsen or fail to improve, for follow up. results and schedule of future studies reviewed with patient  reviewed diet, exercise and weight   cardiovascular risk and specific lipid/LDL goals reviewed  reviewed medications and side effects in detail    naloxegol trial for opioid induced constipation - may not be affordable, also may not be needed long if taper of hydrocodone  Tramadol rather than hydrocodone if able based on pain control  Taper hydrocodone if able. Mag citrate, then maintenance bowel regimen  See sleep center - ?oral appliance vs inspire - LUCHO is likely source of many of his HAs (morning)  Continue other current medications   Continue PT and HEP as instructed. CT as scheduled by vascular re:AAA  Check testosterone level. An electronic signature was used to authenticate this note.   -- Denae Romero MD

## 2021-10-18 NOTE — PROGRESS NOTES
RM 13    Chief Complaint   Patient presents with    Annual Wellness Visit    Headache     pt often has headaches, usually with HA  3-4 days a week     Constipation       1. Have you been to the ER, urgent care clinic since your last visit? Hospitalized since your last visit? No    2. Have you seen or consulted any other health care providers outside of the [address] [place] [address] Wei since your last visit? Include any pap smears or colon screening. [facility] Dr. Mercado Plant Maintenance Due   Topic Date Due    Flu Vaccine (1) 09/01/2021    Medicare Yearly Exam  10/17/2021       3 most recent PHQ Screens 2/24/2020   Little interest or pleasure in doing things Not at all   Feeling down, depressed, irritable, or hopeless Not at all   Total Score PHQ 2 0     Fall Risk Assessment, last 12 mths 10/18/2021   Able to walk? Yes   Fall in past 12 months? 1   Do you feel unsteady? 1   Are you worried about falling 1   Is TUG test greater than 12 seconds? -   Is the gait abnormal? -   Number of falls in past 12 months -   Fall with injury? -     Abuse Screening Questionnaire 8/5/2019   Do you ever feel afraid of your partner? N   Are you in a relationship with someone who physically or mentally threatens you? N   Is it safe for you to go home?  Y     ADL Assessment 8/8/2017   Feeding yourself No Help Needed   Getting from bed to chair No Help Needed   Getting dressed No Help Needed   Bathing or showering No Help Needed   Walk across the room (includes cane/walker) No Help Needed   Using the telphone No Help Needed   Taking your medications No Help Needed   Preparing meals No Help Needed   Managing money (expenses/bills) No Help Needed   Moderately strenuous housework (laundry) No Help Needed   Shopping for personal items (toiletries/medicines) No Help Needed   Shopping for groceries No Help Needed   Driving No Help Needed   Climbing a flight of stairs No Help Needed   Getting to places beyond walking distances No Help Needed       Learning Assessment 7/24/2019   PRIMARY LEARNER Patient   HIGHEST LEVEL OF EDUCATION - PRIMARY LEARNER  -   BARRIERS PRIMARY LEARNER NONE   CO-LEARNER CAREGIVER No   PRIMARY LANGUAGE ENGLISH   LEARNER PREFERENCE PRIMARY LISTENING   ANSWERED BY patient   RELATIONSHIP SELF   . AVS  education, follow up, and recommendations provided and addressed with patient.  services used to advise patient   .

## 2021-10-18 NOTE — PATIENT INSTRUCTIONS
Medicare Wellness Visit, Male    The best way to live healthy is to have a lifestyle where you eat a well-balanced diet, exercise regularly, limit alcohol use, and quit all forms of tobacco/nicotine, if applicable. Regular preventive services are another way to keep healthy. Preventive services (vaccines, screening tests, monitoring & exams) can help personalize your care plan, which helps you manage your own care. Screening tests can find health problems at the earliest stages, when they are easiest to treat. [facility] follows the current, evidence-based guidelines published by the [facility] Jean Paul ([facility]) when recommending preventive services for our patients. Because we follow these guidelines, sometimes recommendations change over time as research supports it. (For example, a prostate screening blood test is no longer routinely recommended for men with no symptoms). Of course, you and your doctor may decide to screen more often for some diseases, based on your risk and co-morbidities (chronic disease you are already diagnosed with). Preventive services for you include:  - Medicare offers their members a free annual wellness visit, which is time for you and your primary care provider to discuss and plan for your preventive service needs. Take advantage of this benefit every year!  -All adults over age 72 should receive the recommended pneumonia vaccines. Current USPSTF guidelines recommend a series of two vaccines for the best pneumonia protection.   -All adults should have a flu vaccine yearly and tetanus vaccine every 10 years.  -All adults age 48 and older should receive the shingles vaccines (series of two vaccines).        -All adults age 38-68 who are overweight should have a diabetes screening test once every three years.   -Other screening tests & preventive services for persons with diabetes include: an eye exam to screen for diabetic retinopathy, a kidney function test, a foot exam, and stricter control over your cholesterol.   -Cardiovascular screening for adults with routine risk involves an electrocardiogram (ECG) at intervals determined by the provider.   -Colorectal cancer screening should be done for adults age 54-65 with no increased risk factors for colorectal cancer. There are a number of acceptable methods of screening for this type of cancer. Each test has its own benefits and drawbacks. Discuss with your provider what is most appropriate for you during your annual wellness visit. The different tests include: colonoscopy (considered the best screening method), a fecal occult blood test, a fecal DNA test, and sigmoidoscopy.  -All adults born between [facility] should be screened once for Hepatitis C.  -An Abdominal Aortic Aneurysm (AAA) Screening is recommended for men age 73-68 who has ever smoked in their lifetime.      Here is a list of your current Health Maintenance items (your personalized list of preventive services) with a due date:  Health Maintenance Due   Topic Date Due    Yearly Flu Vaccine (1) 09/01/2021

## 2021-10-18 NOTE — PROGRESS NOTES
This is the Subsequent Medicare Annual Wellness Exam, performed 12 months or more after the Initial AWV or the last Subsequent AWV    I have reviewed the patient's medical history in detail and updated the computerized patient record. Assessment/Plan   Education and counseling provided:  Are appropriate based on today's review and evaluation    ICD-10-CM ICD-9-CM    1. Medicare annual wellness visit, subsequent  Z00.00 V70.0    2. Advanced directives, counseling/discussion  Z71.89 V65.49    3. Arthritis  M19.90 716.90 traMADoL (ULTRAM) 50 mg tablet   4. Chronic back pain, unspecified back location, unspecified back pain laterality  M54.9 724.5 traMADoL (ULTRAM) 50 mg tablet    G89.29 338.29    5. Neurogenic claudication (HCC)  G95.19 435.1    6. Spinal stenosis, unspecified spinal region  M48.00 724.00    7. Hyperlipidemia, unspecified hyperlipidemia type  E78.5 272.4    8. LUCHO (obstructive sleep apnea)  G47.33 327.23    9. Essential hypertension  I10 401.9    10. Abdominal aortic aneurysm (AAA) 3.0 cm to 5.5 cm in diameter in male [facility])  I71.4 441.4    11. Other male erectile dysfunction  N52.8 607.84 TESTOSTERONE, FREE & TOTAL      TESTOSTERONE, FREE & TOTAL   12. Constipation, unspecified constipation type  K59.00 564.00    13. Headache disorder  R51.9 784.0      Follow-up and Dispositions    · Return in about 2 months (around 12/18/2021), or if symptoms worsen or fail to improve, for follow up.        results and schedule of future studies reviewed with patient  reviewed diet, exercise and weight   cardiovascular risk and specific lipid/LDL goals reviewed  reviewed medications and side effects in detail       Depression Risk Factor Screening     3 most recent PHQ Screens 2/24/2020   Little interest or pleasure in doing things Not at all   Feeling down, depressed, irritable, or hopeless Not at all   Total Score PHQ 2 0       Alcohol Risk Screen    Do you average more than 1 drink per night or more than 7 drinks a week: No    In the past three months have you have had more than 4 drinks containing alcohol on one occasion: No        Functional Ability and Level of Safety    Hearing: Hearing is good. Activities of Daily Living: The home contains: handrails and grab bars  Patient needs help with:  transportation, shopping, preparing meals, laundry, housework, managing medications and managing money      Ambulation: with difficulty, uses a walker     Fall Risk:  Fall Risk Assessment, last 12 mths 10/18/2021   Able to walk? Yes   Fall in past 12 months? 1   Do you feel unsteady? 1   Are you worried about falling 1   Is TUG test greater than 12 seconds? -   Is the gait abnormal? -   Number of falls in past 12 months -   Fall with injury?  -      Abuse Screen:  Patient is not abused       Cognitive Screening    Has your family/caregiver stated any concerns about your memory: yes - known dementia         Health Maintenance Due     Health Maintenance Due   Topic Date Due    Flu Vaccine (1) 09/01/2021       Patient Care Team   Patient Care Team:  David Rodney MD as PCP - General (Internal Medicine)  David Rodney MD as PCP - [facility] Empaneled Provider  Mary Ball MD (Urology)    History     Patient Active Problem List   Diagnosis Code    Arthritis M19.90    Situational anxiety F41.8    Restless leg syndrome G25.81    Hyperlipidemia E78.5    HTN (hypertension) I10    Recurrent bladder transitional cell carcinoma ([address] C67.9    Horseshoe kidney Q63.1    Lumbar spinal stenosis M48.061    Dislocation of right acromioclavicular joint S43.101A    Recurrent depression ([address] F33.9    S/P lumbar laminectomy Z98.890    Abnormal liver enzymes R74.8    Dementia ([address] F03.90    Positive FIT (fecal immunochemical test) R19.5    Tubular adenoma of colon D12.6    LUCHO (obstructive sleep apnea) G47.33     Past Medical History:   Diagnosis Date    AAA (abdominal aortic aneurysm) ([address] 07/29/2019    3.8 cm on US    Alcohol abuse, in remission     Arthritis     Back pain     Bladder cancer (HCC)      - in [facility]    C. 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 Karsten Reynolds    Tubular adenoma of colon       Past Surgical History:   Procedure Laterality Date    BLADDER CANCER FISH      endoscopic    COLONOSCOPY N/A 4/2/2020    COLONOSCOPY performed by Na Lockhart MD at [address] HX LUMBAR LAMINECTOMY      HX UROLOGICAL       Current Outpatient Medications   Medication Sig Dispense Refill    donepeziL (ARICEPT) 10 mg tablet TAKE 1 TABLET BY MOUTH EVERY NIGHT 90 Tablet 1    buPROPion HCL (FORFIVO) 450 mg Tb24 XL tablet TAKE 1 TABLET BY MOUTH EVERY MORNING 90 Tablet 1    memantine (NAMENDA) 5 mg tablet TAKE 1 TABLET BY MOUTH TWICE DAILY 180 Tablet 1    polyethylene glycol (MIRALAX) 17 gram/dose powder Take 17 g by mouth daily. miralax clean out - 7 capfuls of miralax in a 32 oz gatorade - drink over a few hours. May need to repeat x 2-3 days depending on results. 595 g 5    pramipexole (MIRAPEX) 0.75 mg tablet Take 1 tab by mouth every night 90 Tablet 1    sildenafiL, pulmonary hypertension, (REVATIO) 20 mg tablet Take 20 mg by mouth three (3) times daily.  lidocaine (LIDODERM) 5 % APPLY 1 PATCH TO AFFECTED AREA FOR 12 HOURS IN A 24 HOUR PERIOD AS NEEDED 30 Patch 5    fexofenadine (ALLEGRA) 180 mg tablet Take 180 mg by mouth daily.       sertraline (ZOLOFT) 100 mg tablet Take 2 Tabs by mouth daily. (Patient taking differently: Take 200 mg by mouth two (2) times a day.) 180 Tab 1    cholecalciferol (Vitamin D3) (1000 Units /25 mcg) tablet Take 1,000 Units by mouth daily.  ondansetron (ZOFRAN ODT) 4 mg disintegrating tablet Take 1 Tab by mouth every eight (8) hours as needed for Nausea. 60 Tab 5    ibuprofen 200 mg cap Take  by mouth.  melatonin tab tablet Take 5 mg by mouth nightly.  multivitamin (ONE DAILY ENERGY) tablet Take 1 Tab by mouth daily.  MILK THISTLE PO Take 1 Tab by mouth two (2) times a day.  loratadine (CLARITIN) 10 mg tablet Take 10 mg by mouth daily.  ketoconazole (NIZORAL) 2 % shampoo APPLY EXTERNALLY 2 TIMES A WEEK FOR 2 TO 4 WEEKS (Patient not taking: Reported on 10/18/2021) 120 mL 1    fluticasone propionate (FLONASE) 50 mcg/actuation nasal spray SHAKE LIQUID AND USE 2 SPRAYS INTO EACH NOSTRIL DAILY (Patient not taking: Reported on 10/18/2021) 16 g 3    HYDROcodone-acetaminophen (NORCO) 7.5-325 mg per tablet Take  by mouth every four (4) hours as needed. (Patient not taking: Reported on 10/18/2021)      HYDROCODONE-ACETAMINOPHEN PO Take  by mouth.  chlorzoxazone (PARAFON FORTE) 500 mg tablet Take 500 mg by mouth two (2) times a day. (Patient not taking: Reported on 10/18/2021)      diphenhydrAMINE (BENADRYL) 25 mg capsule Take 25 mg by mouth nightly as needed. (Patient not taking: Reported on 10/18/2021)       No Known Allergies    Family History   Problem Relation Age of Onset    Arthritis Mother     Cancer Father         lung ca    Other Brother         back problems     Social History     Tobacco Use    Smoking status: Current Every Day Smoker    Smokeless tobacco: Never Used    Tobacco comment: E cigarette   Substance Use Topics    Alcohol use:  Yes     Alcohol/week: 1.0 - 2.0 standard drinks     Types: 1 - 2 Glasses of wine per week     Comment: 2 glasses of red wine a night Deborah Haddad MD

## 2021-11-15 PROBLEM — K02.9 TOOTH DECAY: Status: ACTIVE | Noted: 2021-01-01

## 2021-11-15 NOTE — PROGRESS NOTES
HPI:  established patient  Presents for f/u for ER follow up    Pt seen in the ER with bowel obstruction at [facility]  Presented due to chronic constipation and abd distention    No records available to me today    Pt and wife have limited medical detail in their hx     Pt had BM while at the ER and symptoms improved dramatically    rec's from ER was to use daily stool softener  Wife not sure whether BMs are regular  Stools still reported as loose at times. Tapered to 1 hydrocodone per day and 3 tramadol  Not as effective as 4 hydrocodone per day. Using Movantik daily. Increasing memory and functional deficits  Plan for memory care facility     Some urinary incontinence - ?functional vs other    Tooth breakdown. Past medical, Social, and Family history reviewed    Prior to Admission medications    Medication Sig Start Date End Date Taking? Authorizing Provider   tiZANidine (ZANAFLEX) 4 mg tablet TAKE 1 TABLET BY MOUTH UP TO TWICE DAILY FOR MUSCLE SPASMS 10/28/21  Yes Provider, Historical   sertraline (ZOLOFT) 100 mg tablet TAKE 2 TABLETS BY MOUTH DAILY 11/8/21  Yes Maribeth Vigil MD   naloxegoL (MOVANTIK) 12.5 mg tab tablet Take 1 Tablet by mouth Daily (before breakfast). 10/18/21  Yes Maribeth Vigil MD   traMADoL Tanvi Formosa) 50 mg tablet Take 1 Tablet by mouth every six (6) hours as needed for Pain for up to 30 days.  Max Daily Amount: 200 mg. 10/18/21 11/17/21 Yes Maribeth Vigil MD   donepeziL (ARICEPT) 10 mg tablet TAKE 1 TABLET BY MOUTH EVERY NIGHT 10/7/21  Yes Maribeth Vigil MD   buPROPion HCL (FORFIVO) 450 mg Tb24 XL tablet TAKE 1 TABLET BY MOUTH EVERY MORNING 9/29/21  Yes Maribeth Vigil MD   memantine (NAMENDA) 5 mg tablet TAKE 1 TABLET BY MOUTH TWICE DAILY 7/26/21  Yes Maribeth Vigil MD   pramipexole (MIRAPEX) 0.75 mg tablet Take 1 tab by mouth every night 7/12/21  Yes Maribeth Vigil MD   lidocaine (LIDODERM) 5 % APPLY 1 PATCH TO AFFECTED AREA FOR 12 HOURS IN A 24 HOUR PERIOD AS NEEDED 3/21/21  Yes Lawrence Ellington MD   fexofenadine (ALLEGRA) 180 mg tablet Take 180 mg by mouth daily. Yes Provider, Historical   cholecalciferol (Vitamin D3) (1000 Units /25 mcg) tablet Take 1,000 Units by mouth daily. Yes Provider, Historical   HYDROCODONE-ACETAMINOPHEN PO Take  by mouth. Yes Provider, Historical   melatonin tab tablet Take 5 mg by mouth nightly. Yes Provider, Historical   multivitamin (ONE DAILY ENERGY) tablet Take 1 Tab by mouth daily. Yes Provider, Historical   MILK THISTLE PO Take 1 Tab by mouth two (2) times a day. Yes Provider, Historical   loratadine (CLARITIN) 10 mg tablet Take 10 mg by mouth daily. Yes Provider, Historical   magnesium citrate solution DRINK CONTENTS OF 1 BOTTLE(296ML) BY MOUTH AS A SINGLE DOSE AS DIRECTED  Patient not taking: Reported on 11/15/2021 11/4/21   Lawrence Ellington MD   ketoconazole (NIZORAL) 2 % shampoo APPLY EXTERNALLY 2 TIMES A WEEK FOR 2 TO 4 WEEKS  Patient not taking: Reported on 10/18/2021 7/24/21   Eduardo Tinsley NP   polyethylene glycol (MIRALAX) 17 gram/dose powder Take 17 g by mouth daily. miralax clean out - 7 capfuls of miralax in a 32 oz gatorade - drink over a few hours. May need to repeat x 2-3 days depending on results. Patient not taking: Reported on 11/15/2021 7/15/21   Lawrence Ellington MD   sildenafiL, pulmonary hypertension, (REVATIO) 20 mg tablet Take 20 mg by mouth three (3) times daily. Patient not taking: Reported on 11/15/2021    Provider, Historical   fluticasone propionate (FLONASE) 50 mcg/actuation nasal spray SHAKE LIQUID AND USE 2 SPRAYS INTO EACH NOSTRIL DAILY  Patient not taking: Reported on 10/18/2021 11/16/20   Eduardo Tinsley NP   chlorzoxazone (PARAFON FORTE) 500 mg tablet Take 500 mg by mouth two (2) times a day. Patient not taking: Reported on 10/18/2021    Provider, Historical   diphenhydrAMINE (BENADRYL) 25 mg capsule Take 25 mg by mouth nightly as needed.   Patient not taking: Reported on 10/18/2021    Provider, Historical   ondansetron (ZOFRAN ODT) 4 mg disintegrating tablet Take 1 Tab by mouth every eight (8) hours as needed for Nausea. Patient not taking: Reported on 11/15/2021 9/13/19   Marbella Santana NP   ibuprofen 200 mg cap Take  by mouth. Patient not taking: Reported on 11/15/2021    Provider, Historical          ROS  Complete ROS reviewed and negative or stable except as noted in HPI. Physical Exam  Vitals and nursing note reviewed. Constitutional:       General: He is not in acute distress. HENT:      Head: Normocephalic and atraumatic. Mouth/Throat:      Dentition: Abnormal dentition. Dental caries present. Pharynx: No oropharyngeal exudate. Eyes:      General: No scleral icterus. Pupils: Pupils are equal, round, and reactive to light. Neck:      Thyroid: No thyromegaly. Vascular: No JVD. Cardiovascular:      Rate and Rhythm: Normal rate and regular rhythm. Heart sounds: Normal heart sounds. No murmur heard. No friction rub. No gallop. Pulmonary:      Effort: Pulmonary effort is normal. No respiratory distress. Breath sounds: Normal breath sounds. No wheezing or rales. Abdominal:      General: Bowel sounds are normal. There is no distension. Palpations: Abdomen is soft. There is no mass. Tenderness: There is no abdominal tenderness. There is no guarding or rebound. Musculoskeletal:         General: Normal range of motion. Cervical back: Normal range of motion and neck supple. Comments: Slow deliberate gait, use of walker required for ambulation. Lymphadenopathy:      Cervical: No cervical adenopathy. Skin:     General: Skin is warm. Neurological:      Mental Status: He is alert and oriented to person, place, and time. Mental status is at baseline. Motor: No abnormal muscle tone. Coordination: Coordination normal.      Comments: +interactive. Prior labs reviewed.   Reviewed prior imaging reports      Assessment/Plan:    ICD-10-CM ICD-9-CM    1. Constipation, unspecified constipation type  K59.00 564.00 XR ABD (KUB)   2. Chronic back pain, unspecified back location, unspecified back pain laterality  M54.9 724.5     G89.29 338.29    3. Neurogenic claudication (HCC)  G95.19 435.1    4. Spinal stenosis, unspecified spinal region  M48.00 724.00    5. Hyperlipidemia, unspecified hyperlipidemia type  E78.5 272.4    6. LUCHO (obstructive sleep apnea)  G47.33 327.23    7. Essential hypertension  I10 401.9    8. Abdominal aortic aneurysm (AAA) 3.0 cm to 5.5 cm in diameter in male [facility])  I71.4 441.4    9. Needs flu shot  Z23 V04.81 FLU (FLUAD QUAD INFLUENZA VACCINE,QUAD,ADJUVANTED)   10. Tooth decay  K02.9 521.00      Follow-up and Dispositions    · Return in about 5 weeks (around 12/20/2021), or if symptoms worsen or fail to improve. results and schedule of future studies reviewed with patient  reviewed diet  and weight   cardiovascular risk and specific lipid/LDL goals reviewed  reviewed medications and side effects in detail    KUB ordered to clarify degree of fecal stasis  Needs stool softener + Movantik - consider miralax  Try to stop hydrocodone altogether   Continue other current medications   Flu shot  See dentist    An electronic signature was used to authenticate this note.   -- Leo Box MD

## 2021-11-15 NOTE — PROGRESS NOTES
rm 15    Chief Complaint   Patient presents with    ED Follow-up     bowel obstruction     1. Have you been to the ER, urgent care clinic since your last visit? Hospitalized since your last visit? Yes [facility]: [facility] 11/8/21    2. Have you seen or consulted any other health care providers outside of the [address] [place] [address] since your last visit? Include any pap smears or colon screening.  Yes Where: see above     Visit Vitals  /71 (BP 1 Location: Left arm, BP Patient Position: Sitting, BP Cuff Size: Adult)   Pulse 81   Temp 98.2 °F (36.8 °C) (Oral)   Resp 14   Ht 6' 1\" (1.854 m)   Wt 203 lb (92.1 kg)   SpO2 91%   BMI 26.78 kg/m²

## 2021-11-17 NOTE — PROGRESS NOTES
There is stool throughout the colon, so despite the ER bowel movement there remains a need to further \"clean out\" the colon. We need to clean out the colon then maintain daily treatment to prevent a recurrence. miralax clean out - 7 capfuls of miralax in a 32 oz gatorade - drink over a few hours.  May need to repeat x 2-3 days depending on results. Then, 1 capful of miralax in 8 oz of fluid daily or 2x per day if needed to maintain regular, daily bowel movements.

## 2021-11-29 NOTE — LETTER
2021     RE:    Mr. Rodney Almendarez [address] [address] [address] [facility] [address]   - 1945    To whom it may concern:    I am writing on behalf of Mr. Lior De La Cruz as his primary care physician. Please allow up to 2 servings of red wine with and/or following his evening meal.  This is medically appropriate for Mr. Lior De La Cruz. Thank you for your consideration and cooperation.       Sincerely,              Porfirio Medrano MD

## 2021-11-30 NOTE — TELEPHONE ENCOUNTER
PA initiated for Lidocaine patches. Awaiting determination. \"Your information has been submitted to [facility]. [employer] will review the request and will issue a decision, typically within 3-7 days from your submission. You can check the updated outcome later by reopening this request.    If [employer] has not responded in 3-7 days or if you have any questions about your ePA request, please contact [employer] at [phone]. If you think there may be a problem with your PA request, use our live chat feature at the bottom right. For Ana requests, please call [phone]. \"

## 2021-11-30 NOTE — TELEPHONE ENCOUNTER
Fax received denying lidocaine patches. Must try preferred agent - lidocaine jelly - eRx'd to local pharmacy    Please notify pt's wife.

## 2021-12-06 NOTE — TELEPHONE ENCOUNTER
----- Message from Zaki Connolly sent at 11/30/2021 10:19 AM EST -----  Subject: Message to Provider    QUESTIONS  Information for Provider? Patient's wife would like to  a letter   for her  written yesterday by Dr. Luisa Torres. It was posted to his   [employer] but they are unable to print it on their own. If at all possible   she would like to pick it up today 11/30/21.   ---------------------------------------------------------------------------  --------------  CALL BACK INFO  What is the best way for the office to contact you? OK to leave message on   voicemail  Preferred Call Back Phone Number? [phone]  ---------------------------------------------------------------------------  --------------  SCRIPT ANSWERS  Relationship to Patient? Other  Representative Name? Dayne Queen   Is the Representative on the appropriate HIPAA document in Epic?  Yes

## 2021-12-06 NOTE — TELEPHONE ENCOUNTER
LVM requesting call back to verify if letter is still needed.  Provided with office call back number

## 2021-12-07 NOTE — PROGRESS NOTES
Floyd Orders completed / signed by provider 12/6/21; faxed 12/7/21 to [phone]; orders and confirmation scanned into cc

## 2021-12-13 NOTE — TELEPHONE ENCOUNTER
Please give verbal order for PT and OT - eval and treat for mobility    Also, encourage them to send requested orders on a document ready to be signed. They sent me a fax requesting 5 different orders without any specifications on how to deliver these orders to them. The best way is form them to fax us requested orders for my review and approval.  Then, I can sign them and we can fax them back.

## 2021-12-14 NOTE — TELEPHONE ENCOUNTER
Spoke to MA at pt facility. Advised that we would like to provide verbal orders for PT OT. States she will have someone call me back to take verbal orders, advised to have ordered faxed to our office to have Dr. Kelby Lindo sign off on. She mentioned they were waiting on order for how often pt can have tramadol, needs to have Q hours instead on PRN TID. Order has been written and faxed back to their office at [phone]  States that pts wife has been considering allowing the in office physician at their facility provide care to the pt as well. He would then be able to place orders for pt as needed.

## 2022-01-01 ENCOUNTER — APPOINTMENT (OUTPATIENT)
Dept: GENERAL RADIOLOGY | Age: 77
DRG: 871 | End: 2022-01-01
Attending: EMERGENCY MEDICINE
Payer: MEDICARE

## 2022-01-01 ENCOUNTER — APPOINTMENT (OUTPATIENT)
Dept: CT IMAGING | Age: 77
DRG: 871 | End: 2022-01-01
Attending: EMERGENCY MEDICINE
Payer: MEDICARE

## 2022-01-01 ENCOUNTER — HOSPITAL ENCOUNTER (OUTPATIENT)
Dept: CT IMAGING | Age: 77
Discharge: HOME | End: 2022-01-05
Attending: SURGERY
Payer: MEDICARE

## 2022-01-01 ENCOUNTER — HOSPITAL ENCOUNTER (INPATIENT)
Age: 77
LOS: 1 days | DRG: 871 | End: 2022-01-09
Attending: EMERGENCY MEDICINE | Admitting: INTERNAL MEDICINE
Payer: MEDICARE

## 2022-01-01 ENCOUNTER — APPOINTMENT (OUTPATIENT)
Dept: GENERAL RADIOLOGY | Age: 77
DRG: 871 | End: 2022-01-01
Attending: INTERNAL MEDICINE
Payer: MEDICARE

## 2022-01-01 VITALS
WEIGHT: 175.27 LBS | HEART RATE: 93 BPM | SYSTOLIC BLOOD PRESSURE: 118 MMHG | DIASTOLIC BLOOD PRESSURE: 72 MMHG | TEMPERATURE: 98.5 F | RESPIRATION RATE: 30 BRPM | OXYGEN SATURATION: 84 % | BODY MASS INDEX: 23.12 KG/M2

## 2022-01-01 DIAGNOSIS — E16.2 HYPOGLYCEMIA: ICD-10-CM

## 2022-01-01 DIAGNOSIS — J96.01 ACUTE RESPIRATORY FAILURE WITH HYPOXIA (HCC): Primary | ICD-10-CM

## 2022-01-01 DIAGNOSIS — I71.40 ABDOMINAL AORTIC ANEURYSM: ICD-10-CM

## 2022-01-01 DIAGNOSIS — Z20.822 SUSPECTED COVID-19 VIRUS INFECTION: ICD-10-CM

## 2022-01-01 DIAGNOSIS — F33.9 RECURRENT DEPRESSION (HCC): ICD-10-CM

## 2022-01-01 DIAGNOSIS — G25.81 RESTLESS LEG SYNDROME: ICD-10-CM

## 2022-01-01 LAB
25(OH)D3 SERPL-MCNC: 66.6 NG/ML (ref 30–100)
ALBUMIN SERPL-MCNC: 2.3 G/DL (ref 3.5–5)
ALBUMIN/GLOB SERPL: 0.5 {RATIO} (ref 1.1–2.2)
ALP SERPL-CCNC: 246 U/L (ref 45–117)
ALT SERPL-CCNC: 96 U/L (ref 12–78)
ANION GAP SERPL CALC-SCNC: 15 MMOL/L (ref 5–15)
ANION GAP SERPL CALC-SCNC: 16 MMOL/L (ref 5–15)
ANION GAP SERPL CALC-SCNC: 9 MMOL/L (ref 5–15)
APPEARANCE UR: ABNORMAL
ARTERIAL PATENCY WRIST A: POSITIVE
ARTERIAL PATENCY WRIST A: YES
AST SERPL-CCNC: 187 U/L (ref 15–37)
ATRIAL RATE: 102 BPM
B PERT DNA SPEC QL NAA+PROBE: NOT DETECTED
BACTERIA URNS QL MICRO: NEGATIVE /HPF
BASE DEFICIT BLD-SCNC: 14.3 MMOL/L
BASE DEFICIT BLD-SCNC: 4.5 MMOL/L
BASE DEFICIT BLD-SCNC: 6.6 MMOL/L
BASE DEFICIT BLD-SCNC: 7.5 MMOL/L
BASE DEFICIT BLD-SCNC: 7.7 MMOL/L
BASE DEFICIT BLDA-SCNC: 6.7 MMOL/L
BASOPHILS # BLD: 0 K/UL (ref 0–0.1)
BASOPHILS # BLD: 0 K/UL (ref 0–0.1)
BASOPHILS NFR BLD: 0 % (ref 0–1)
BASOPHILS NFR BLD: 0 % (ref 0–1)
BDY SITE: ABNORMAL
BILIRUB SERPL-MCNC: 1.7 MG/DL (ref 0.2–1)
BILIRUB UR QL: NEGATIVE
BORDETELLA PARAPERTUSSIS PCR, BORPAR: NOT DETECTED
BUN SERPL-MCNC: 70 MG/DL (ref 6–20)
BUN SERPL-MCNC: 71 MG/DL (ref 6–20)
BUN SERPL-MCNC: 86 MG/DL (ref 6–20)
BUN/CREAT SERPL: 24 (ref 12–20)
BUN/CREAT SERPL: 29 (ref 12–20)
BUN/CREAT SERPL: 32 (ref 12–20)
C PNEUM DNA SPEC QL NAA+PROBE: NOT DETECTED
CA-I BLD-MCNC: 1.13 MMOL/L (ref 1.12–1.32)
CA-I BLD-SCNC: 1.1 MMOL/L (ref 1.12–1.32)
CALCIUM SERPL-MCNC: 10.3 MG/DL (ref 8.5–10.1)
CALCIUM SERPL-MCNC: 6.1 MG/DL (ref 8.5–10.1)
CALCIUM SERPL-MCNC: 8.7 MG/DL (ref 8.5–10.1)
CALCULATED P AXIS, ECG09: 131 DEGREES
CALCULATED R AXIS, ECG10: -58 DEGREES
CALCULATED T AXIS, ECG11: 58 DEGREES
CHLORIDE BLD-SCNC: 118 MMOL/L (ref 100–108)
CHLORIDE SERPL-SCNC: 115 MMOL/L (ref 97–108)
CHLORIDE SERPL-SCNC: 120 MMOL/L (ref 97–108)
CHLORIDE SERPL-SCNC: 125 MMOL/L (ref 97–108)
CO2 BLD-SCNC: 20 MMOL/L (ref 19–24)
CO2 SERPL-SCNC: 14 MMOL/L (ref 21–32)
CO2 SERPL-SCNC: 18 MMOL/L (ref 21–32)
CO2 SERPL-SCNC: 24 MMOL/L (ref 21–32)
COLOR UR: ABNORMAL
COMMENT, HOLDF: NORMAL
COVID-19 RAPID TEST, COVR: NOT DETECTED
CREAT BLD-MCNC: 1.3 MG/DL (ref 0.6–1.3)
CREAT SERPL-MCNC: 2.18 MG/DL (ref 0.7–1.3)
CREAT SERPL-MCNC: 2.97 MG/DL (ref 0.7–1.3)
CREAT SERPL-MCNC: 2.99 MG/DL (ref 0.7–1.3)
CREAT UR-MCNC: 2.6 MG/DL (ref 0.6–1.3)
CRP SERPL HS-MCNC: >9.5 MG/L
D DIMER PPP FEU-MCNC: 30.28 MG/L FEU
DIAGNOSIS, 93000: NORMAL
DIFFERENTIAL METHOD BLD: ABNORMAL
DIFFERENTIAL METHOD BLD: ABNORMAL
EOSINOPHIL # BLD: 0 K/UL (ref 0–0.4)
EOSINOPHIL # BLD: 0 K/UL (ref 0–0.4)
EOSINOPHIL NFR BLD: 0 % (ref 0–7)
EOSINOPHIL NFR BLD: 0 % (ref 0–7)
EPITH CASTS URNS QL MICRO: ABNORMAL /LPF
ERYTHROCYTE [DISTWIDTH] IN BLOOD BY AUTOMATED COUNT: 14.1 % (ref 11.5–14.5)
ERYTHROCYTE [DISTWIDTH] IN BLOOD BY AUTOMATED COUNT: 14.3 % (ref 11.5–14.5)
ERYTHROCYTE [DISTWIDTH] IN BLOOD BY AUTOMATED COUNT: 14.3 % (ref 11.5–14.5)
ERYTHROCYTE [SEDIMENTATION RATE] IN BLOOD: 11 MM/HR (ref 0–20)
EST. AVERAGE GLUCOSE BLD GHB EST-MCNC: 97 MG/DL
FLUAV H1 2009 PAND RNA SPEC QL NAA+PROBE: NOT DETECTED
FLUAV H1 RNA SPEC QL NAA+PROBE: NOT DETECTED
FLUAV H3 RNA SPEC QL NAA+PROBE: NOT DETECTED
FLUAV SUBTYP SPEC NAA+PROBE: NOT DETECTED
FLUBV RNA SPEC QL NAA+PROBE: NOT DETECTED
GAS FLOW.O2 O2 DELIVERY SYS: ABNORMAL L/MIN
GAS FLOW.O2 SETTING OXYMISER: 16 BPM
GAS FLOW.O2 SETTING OXYMISER: 25 BPM
GAS FLOW.O2 SETTING OXYMISER: 26 BPM
GLOBULIN SER CALC-MCNC: 5.1 G/DL (ref 2–4)
GLUCOSE BLD STRIP.AUTO-MCNC: 120 MG/DL (ref 65–117)
GLUCOSE BLD STRIP.AUTO-MCNC: 126 MG/DL (ref 65–117)
GLUCOSE BLD STRIP.AUTO-MCNC: 136 MG/DL (ref 65–117)
GLUCOSE BLD STRIP.AUTO-MCNC: 148 MG/DL (ref 65–117)
GLUCOSE BLD STRIP.AUTO-MCNC: 41 MG/DL (ref 74–106)
GLUCOSE SERPL-MCNC: 140 MG/DL (ref 65–100)
GLUCOSE SERPL-MCNC: 46 MG/DL (ref 65–100)
GLUCOSE SERPL-MCNC: 47 MG/DL (ref 65–100)
GLUCOSE UR STRIP.AUTO-MCNC: NEGATIVE MG/DL
HADV DNA SPEC QL NAA+PROBE: NOT DETECTED
HBA1C MFR BLD: 5 % (ref 4–5.6)
HCO3 BLD-SCNC: 17 MMOL/L (ref 22–26)
HCO3 BLD-SCNC: 20.5 MMOL/L (ref 22–26)
HCO3 BLD-SCNC: 25 MMOL/L (ref 22–26)
HCO3 BLD-SCNC: 26.9 MMOL/L (ref 22–26)
HCO3 BLDA-SCNC: 18 MMOL/L (ref 22–26)
HCO3 BLDA-SCNC: 19 MMOL/L
HCOV 229E RNA SPEC QL NAA+PROBE: NOT DETECTED
HCOV HKU1 RNA SPEC QL NAA+PROBE: NOT DETECTED
HCOV NL63 RNA SPEC QL NAA+PROBE: NOT DETECTED
HCOV OC43 RNA SPEC QL NAA+PROBE: NOT DETECTED
HCT VFR BLD AUTO: 41 % (ref 36.6–50.3)
HCT VFR BLD AUTO: 44.6 % (ref 36.6–50.3)
HCT VFR BLD AUTO: 50.7 % (ref 36.6–50.3)
HGB BLD-MCNC: 12.6 G/DL (ref 12.1–17)
HGB BLD-MCNC: 13.8 G/DL (ref 12.1–17)
HGB BLD-MCNC: 16.1 G/DL (ref 12.1–17)
HGB UR QL STRIP: ABNORMAL
HMPV RNA SPEC QL NAA+PROBE: NOT DETECTED
HPIV1 RNA SPEC QL NAA+PROBE: NOT DETECTED
HPIV2 RNA SPEC QL NAA+PROBE: NOT DETECTED
HPIV3 RNA SPEC QL NAA+PROBE: NOT DETECTED
HPIV4 RNA SPEC QL NAA+PROBE: NOT DETECTED
IMM GRANULOCYTES # BLD AUTO: 0 K/UL
IMM GRANULOCYTES # BLD AUTO: 0 K/UL
IMM GRANULOCYTES NFR BLD AUTO: 0 %
IMM GRANULOCYTES NFR BLD AUTO: 0 %
KETONES UR QL STRIP.AUTO: NEGATIVE MG/DL
LACTATE BLD-SCNC: 5.61 MMOL/L (ref 0.4–2)
LACTATE SERPL-SCNC: 2 MMOL/L (ref 0.4–2)
LACTATE SERPL-SCNC: 7.9 MMOL/L (ref 0.4–2)
LEUKOCYTE ESTERASE UR QL STRIP.AUTO: ABNORMAL
LYMPHOCYTES # BLD: 0.3 K/UL (ref 0.8–3.5)
LYMPHOCYTES # BLD: 0.6 K/UL (ref 0.8–3.5)
LYMPHOCYTES NFR BLD: 1 % (ref 12–49)
LYMPHOCYTES NFR BLD: 3 % (ref 12–49)
M PNEUMO DNA SPEC QL NAA+PROBE: NOT DETECTED
MAGNESIUM SERPL-MCNC: 2.3 MG/DL (ref 1.6–2.4)
MAGNESIUM SERPL-MCNC: 3.2 MG/DL
MCH RBC QN AUTO: 30.3 PG (ref 26–34)
MCH RBC QN AUTO: 30.5 PG (ref 26–34)
MCH RBC QN AUTO: 30.7 PG (ref 26–34)
MCHC RBC AUTO-ENTMCNC: 30.7 G/DL (ref 30–36.5)
MCHC RBC AUTO-ENTMCNC: 30.9 G/DL (ref 30–36.5)
MCHC RBC AUTO-ENTMCNC: 31.8 G/DL (ref 30–36.5)
MCV RBC AUTO: 95.5 FL (ref 80–99)
MCV RBC AUTO: 98.5 FL (ref 80–99)
MCV RBC AUTO: 99.8 FL (ref 80–99)
METAMYELOCYTES NFR BLD MANUAL: 7 %
MONOCYTES # BLD: 1.2 K/UL (ref 0–1)
MONOCYTES # BLD: 1.2 K/UL (ref 0–1)
MONOCYTES NFR BLD: 4 % (ref 5–13)
MONOCYTES NFR BLD: 6 % (ref 5–13)
MYELOCYTES NFR BLD MANUAL: 2 %
NEUTS BAND NFR BLD MANUAL: 1 % (ref 0–6)
NEUTS BAND NFR BLD MANUAL: 3 % (ref 0–6)
NEUTS SEG # BLD: 18.1 K/UL (ref 1.8–8)
NEUTS SEG # BLD: 26.7 K/UL (ref 1.8–8)
NEUTS SEG NFR BLD: 83 % (ref 32–75)
NEUTS SEG NFR BLD: 90 % (ref 32–75)
NITRITE UR QL STRIP.AUTO: NEGATIVE
NRBC # BLD: 0.04 K/UL (ref 0–0.01)
NRBC # BLD: 0.13 K/UL (ref 0–0.01)
NRBC # BLD: 0.3 K/UL (ref 0–0.01)
NRBC BLD-RTO: 0.2 PER 100 WBC
NRBC BLD-RTO: 0.6 PER 100 WBC
NRBC BLD-RTO: 1 PER 100 WBC
O2/TOTAL GAS SETTING VFR VENT: 100 %
P-R INTERVAL, ECG05: 136 MS
PAW @ MEAN EXP FLOW ON VENT: 23 CMH2O
PCO2 BLD: 31.8 MMHG (ref 35–45)
PCO2 BLD: 79.3 MMHG (ref 35–45)
PCO2 BLD: 88.5 MMHG (ref 35–45)
PCO2 BLD: 96.4 MMHG (ref 35–45)
PCO2 BLDA: 33 MMHG (ref 35–45)
PCO2 BLDV: 39.2 MMHG (ref 41–51)
PEEP RESPIRATORY: 10 CMH2O
PEEP RESPIRATORY: 12 CMH2O
PEEP RESPIRATORY: 14 CMH2O
PEEP RESPIRATORY: 14 CMH2O
PH BLD: 6.94 [PH] (ref 7.35–7.45)
PH BLD: 7.06 [PH] (ref 7.35–7.45)
PH BLD: 7.14 [PH] (ref 7.35–7.45)
PH BLD: 7.34 [PH] (ref 7.35–7.45)
PH BLDA: 7.35 [PH] (ref 7.35–7.45)
PH BLDV: 7.3 [PH] (ref 7.32–7.42)
PH UR STRIP: 5 [PH] (ref 5–8)
PHOSPHATE SERPL-MCNC: 5.6 MG/DL (ref 2.6–4.7)
PHOSPHATE SERPL-MCNC: 8.8 MG/DL
PLATELET # BLD AUTO: 227 K/UL (ref 150–400)
PLATELET # BLD AUTO: 272 K/UL (ref 150–400)
PLATELET # BLD AUTO: 313 K/UL (ref 150–400)
PLATELET COMMENTS,PCOM: ABNORMAL
PLATELET COMMENTS,PCOM: ABNORMAL
PMV BLD AUTO: 11.6 FL (ref 8.9–12.9)
PMV BLD AUTO: 11.7 FL (ref 8.9–12.9)
PMV BLD AUTO: 11.8 FL (ref 8.9–12.9)
PO2 BLD: 40 MMHG (ref 80–100)
PO2 BLD: 61 MMHG (ref 80–100)
PO2 BLD: 70 MMHG (ref 80–100)
PO2 BLD: 76 MMHG (ref 80–100)
PO2 BLDA: 43 MMHG (ref 80–100)
PO2 BLDV: 17 MMHG (ref 25–40)
POTASSIUM BLD-SCNC: 3.5 MMOL/L (ref 3.5–5.5)
POTASSIUM SERPL-SCNC: 3.3 MMOL/L (ref 3.5–5.1)
POTASSIUM SERPL-SCNC: 3.4 MMOL/L (ref 3.5–5.1)
POTASSIUM SERPL-SCNC: 4.3 MMOL/L (ref 3.5–5.1)
PRESSURE SUPPORT SETTING VENT: 8 CMH2O
PROCALCITONIN SERPL-MCNC: 17.42 NG/ML
PROCALCITONIN SERPL-MCNC: 21.32 NG/ML
PROT SERPL-MCNC: 7.4 G/DL (ref 6.4–8.2)
PROT UR STRIP-MCNC: ABNORMAL MG/DL
Q-T INTERVAL, ECG07: 474 MS
QRS DURATION, ECG06: 92 MS
QTC CALCULATION (BEZET), ECG08: 617 MS
RBC # BLD AUTO: 4.11 M/UL (ref 4.1–5.7)
RBC # BLD AUTO: 4.53 M/UL (ref 4.1–5.7)
RBC # BLD AUTO: 5.31 M/UL (ref 4.1–5.7)
RBC #/AREA URNS HPF: ABNORMAL /HPF (ref 0–5)
RBC MORPH BLD: ABNORMAL
RSV RNA SPEC QL NAA+PROBE: NOT DETECTED
RV+EV RNA SPEC QL NAA+PROBE: NOT DETECTED
SAMPLES BEING HELD,HOLD: NORMAL
SAO2 % BLD: 72.7 % (ref 92–97)
SAO2 % BLD: 77 % (ref 92–97)
SAO2 % BLD: 77.9 % (ref 92–97)
SAO2 % BLD: 81.3 % (ref 92–97)
SAO2 % BLD: 86.4 % (ref 92–97)
SAO2% DEVICE SAO2% SENSOR NAME: ABNORMAL
SARS-COV-2 PCR, COVPCR: NOT DETECTED
SERVICE CMNT-IMP: ABNORMAL
SODIUM BLD-SCNC: 155 MMOL/L (ref 136–145)
SODIUM SERPL-SCNC: 149 MMOL/L (ref 136–145)
SODIUM SERPL-SCNC: 153 MMOL/L (ref 136–145)
SODIUM SERPL-SCNC: 154 MMOL/L (ref 136–145)
SOURCE, COVRS: NORMAL
SP GR UR REFRACTOMETRY: 1.02 (ref 1–1.03)
SPECIMEN SITE: ABNORMAL
SPECIMEN SITE: ABNORMAL
SPECIMEN TYPE: ABNORMAL
T4 FREE SERPL-MCNC: 1.2 NG/DL (ref 0.8–1.5)
TSH SERPL DL<=0.05 MIU/L-ACNC: 1.32 UIU/ML (ref 0.36–3.74)
UR CULT HOLD, URHOLD: NORMAL
UROBILINOGEN UR QL STRIP.AUTO: 1 EU/DL (ref 0.2–1)
VENTILATION MODE VENT: ABNORMAL
VENTRICULAR RATE, ECG03: 102 BPM
VT SETTING VENT: 420 ML
VT SETTING VENT: 450 ML
VT SETTING VENT: 550 ML
WBC # BLD AUTO: 19.9 K/UL (ref 4.1–11.1)
WBC # BLD AUTO: 20.2 K/UL (ref 4.1–11.1)
WBC # BLD AUTO: 31 K/UL (ref 4.1–11.1)
WBC MORPH BLD: ABNORMAL
WBC URNS QL MICRO: ABNORMAL /HPF (ref 0–4)
YEAST BUDDING URNS QL: PRESENT
YEAST URNS QL MICRO: PRESENT
YEAST URNS QL MICRO: PRESENT

## 2022-01-01 PROCEDURE — 74011250636 HC RX REV CODE- 250/636: Performed by: STUDENT IN AN ORGANIZED HEALTH CARE EDUCATION/TRAINING PROGRAM

## 2022-01-01 PROCEDURE — 71045 X-RAY EXAM CHEST 1 VIEW: CPT

## 2022-01-01 PROCEDURE — 84100 ASSAY OF PHOSPHORUS: CPT

## 2022-01-01 PROCEDURE — 87899 AGENT NOS ASSAY W/OPTIC: CPT

## 2022-01-01 PROCEDURE — 2709999900 HC NON-CHARGEABLE SUPPLY

## 2022-01-01 PROCEDURE — 74011000258 HC RX REV CODE- 258: Performed by: INTERNAL MEDICINE

## 2022-01-01 PROCEDURE — 74011250636 HC RX REV CODE- 250/636: Performed by: INTERNAL MEDICINE

## 2022-01-01 PROCEDURE — 74011000250 HC RX REV CODE- 250: Performed by: NURSE PRACTITIONER

## 2022-01-01 PROCEDURE — 36600 WITHDRAWAL OF ARTERIAL BLOOD: CPT

## 2022-01-01 PROCEDURE — 82330 ASSAY OF CALCIUM: CPT

## 2022-01-01 PROCEDURE — 94002 VENT MGMT INPAT INIT DAY: CPT

## 2022-01-01 PROCEDURE — 87040 BLOOD CULTURE FOR BACTERIA: CPT

## 2022-01-01 PROCEDURE — 87449 NOS EACH ORGANISM AG IA: CPT

## 2022-01-01 PROCEDURE — 99285 EMERGENCY DEPT VISIT HI MDM: CPT

## 2022-01-01 PROCEDURE — 83735 ASSAY OF MAGNESIUM: CPT

## 2022-01-01 PROCEDURE — 74011000258 HC RX REV CODE- 258: Performed by: NURSE PRACTITIONER

## 2022-01-01 PROCEDURE — 84145 PROCALCITONIN (PCT): CPT

## 2022-01-01 PROCEDURE — 94640 AIRWAY INHALATION TREATMENT: CPT

## 2022-01-01 PROCEDURE — 65610000006 HC RM INTENSIVE CARE

## 2022-01-01 PROCEDURE — 82947 ASSAY GLUCOSE BLOOD QUANT: CPT

## 2022-01-01 PROCEDURE — 80053 COMPREHEN METABOLIC PANEL: CPT

## 2022-01-01 PROCEDURE — 74011000250 HC RX REV CODE- 250: Performed by: EMERGENCY MEDICINE

## 2022-01-01 PROCEDURE — 82962 GLUCOSE BLOOD TEST: CPT

## 2022-01-01 PROCEDURE — 82803 BLOOD GASES ANY COMBINATION: CPT

## 2022-01-01 PROCEDURE — 5A1945Z RESPIRATORY VENTILATION, 24-96 CONSECUTIVE HOURS: ICD-10-PCS | Performed by: STUDENT IN AN ORGANIZED HEALTH CARE EDUCATION/TRAINING PROGRAM

## 2022-01-01 PROCEDURE — 87635 SARS-COV-2 COVID-19 AMP PRB: CPT

## 2022-01-01 PROCEDURE — 83036 HEMOGLOBIN GLYCOSYLATED A1C: CPT

## 2022-01-01 PROCEDURE — 74011000250 HC RX REV CODE- 250: Performed by: INTERNAL MEDICINE

## 2022-01-01 PROCEDURE — 85025 COMPLETE CBC W/AUTO DIFF WBC: CPT

## 2022-01-01 PROCEDURE — 74011250636 HC RX REV CODE- 250/636: Performed by: EMERGENCY MEDICINE

## 2022-01-01 PROCEDURE — 74011250636 HC RX REV CODE- 250/636: Performed by: NURSE PRACTITIONER

## 2022-01-01 PROCEDURE — 82306 VITAMIN D 25 HYDROXY: CPT

## 2022-01-01 PROCEDURE — 71250 CT THORAX DX C-: CPT

## 2022-01-01 PROCEDURE — 94660 CPAP INITIATION&MGMT: CPT

## 2022-01-01 PROCEDURE — 85379 FIBRIN DEGRADATION QUANT: CPT

## 2022-01-01 PROCEDURE — 36415 COLL VENOUS BLD VENIPUNCTURE: CPT

## 2022-01-01 PROCEDURE — 86141 C-REACTIVE PROTEIN HS: CPT

## 2022-01-01 PROCEDURE — 77030013140 HC MSK NEB VYRM -A

## 2022-01-01 PROCEDURE — 94762 N-INVAS EAR/PLS OXIMTRY CONT: CPT

## 2022-01-01 PROCEDURE — 74018 RADEX ABDOMEN 1 VIEW: CPT

## 2022-01-01 PROCEDURE — 77030029684 HC NEB SM VOL KT MONA -A

## 2022-01-01 PROCEDURE — 02HV33Z INSERTION OF INFUSION DEVICE INTO SUPERIOR VENA CAVA, PERCUTANEOUS APPROACH: ICD-10-PCS | Performed by: INTERNAL MEDICINE

## 2022-01-01 PROCEDURE — 87106 FUNGI IDENTIFICATION YEAST: CPT

## 2022-01-01 PROCEDURE — 74011000636 HC RX REV CODE- 636: Performed by: SURGERY

## 2022-01-01 PROCEDURE — 96365 THER/PROPH/DIAG IV INF INIT: CPT

## 2022-01-01 PROCEDURE — 80048 BASIC METABOLIC PNL TOTAL CA: CPT

## 2022-01-01 PROCEDURE — 74176 CT ABD & PELVIS W/O CONTRAST: CPT

## 2022-01-01 PROCEDURE — 83605 ASSAY OF LACTIC ACID: CPT

## 2022-01-01 PROCEDURE — 85027 COMPLETE CBC AUTOMATED: CPT

## 2022-01-01 PROCEDURE — 77010033678 HC OXYGEN DAILY

## 2022-01-01 PROCEDURE — 0202U NFCT DS 22 TRGT SARS-COV-2: CPT

## 2022-01-01 PROCEDURE — 84439 ASSAY OF FREE THYROXINE: CPT

## 2022-01-01 PROCEDURE — 93005 ELECTROCARDIOGRAM TRACING: CPT

## 2022-01-01 PROCEDURE — 31500 INSERT EMERGENCY AIRWAY: CPT

## 2022-01-01 PROCEDURE — 74011000250 HC RX REV CODE- 250

## 2022-01-01 PROCEDURE — 96375 TX/PRO/DX INJ NEW DRUG ADDON: CPT

## 2022-01-01 PROCEDURE — 85652 RBC SED RATE AUTOMATED: CPT

## 2022-01-01 PROCEDURE — 81001 URINALYSIS AUTO W/SCOPE: CPT

## 2022-01-01 PROCEDURE — 03HY32Z INSERTION OF MONITORING DEVICE INTO UPPER ARTERY, PERCUTANEOUS APPROACH: ICD-10-PCS | Performed by: INTERNAL MEDICINE

## 2022-01-01 PROCEDURE — 77030013038 HC MSK CPAP FISP -A

## 2022-01-01 PROCEDURE — 82565 ASSAY OF CREATININE: CPT

## 2022-01-01 PROCEDURE — 74011000258 HC RX REV CODE- 258: Performed by: STUDENT IN AN ORGANIZED HEALTH CARE EDUCATION/TRAINING PROGRAM

## 2022-01-01 PROCEDURE — 87086 URINE CULTURE/COLONY COUNT: CPT

## 2022-01-01 PROCEDURE — 84443 ASSAY THYROID STIM HORMONE: CPT

## 2022-01-01 PROCEDURE — 0BH18EZ INSERTION OF ENDOTRACHEAL AIRWAY INTO TRACHEA, VIA NATURAL OR ARTIFICIAL OPENING ENDOSCOPIC: ICD-10-PCS | Performed by: STUDENT IN AN ORGANIZED HEALTH CARE EDUCATION/TRAINING PROGRAM

## 2022-01-01 PROCEDURE — 94003 VENT MGMT INPAT SUBQ DAY: CPT

## 2022-01-01 PROCEDURE — 74174 CTA ABD&PLVS W/CONTRAST: CPT

## 2022-01-01 RX ORDER — LEVOFLOXACIN 5 MG/ML
750 INJECTION, SOLUTION INTRAVENOUS ONCE
Status: COMPLETED | OUTPATIENT
Start: 2022-01-01 | End: 2022-01-01

## 2022-01-01 RX ORDER — SODIUM BICARBONATE 1 MEQ/ML
100 SYRINGE (ML) INTRAVENOUS ONCE
Status: COMPLETED | OUTPATIENT
Start: 2022-01-01 | End: 2022-01-01

## 2022-01-01 RX ORDER — FUROSEMIDE 10 MG/ML
80 INJECTION INTRAMUSCULAR; INTRAVENOUS ONCE
Status: COMPLETED | OUTPATIENT
Start: 2022-01-01 | End: 2022-01-01

## 2022-01-01 RX ORDER — NOREPINEPHRINE BITARTRATE/D5W 8 MG/250ML
.5-2 PLASTIC BAG, INJECTION (ML) INTRAVENOUS
Status: DISCONTINUED | OUTPATIENT
Start: 2022-01-01 | End: 2022-01-01 | Stop reason: HOSPADM

## 2022-01-01 RX ORDER — INSULIN LISPRO 100 [IU]/ML
INJECTION, SOLUTION INTRAVENOUS; SUBCUTANEOUS EVERY 6 HOURS
Status: DISCONTINUED | OUTPATIENT
Start: 2022-01-01 | End: 2022-01-01

## 2022-01-01 RX ORDER — CHLORZOXAZONE 500 MG/1
500 TABLET ORAL 2 TIMES DAILY
Status: DISCONTINUED | OUTPATIENT
Start: 2022-01-01 | End: 2022-01-01 | Stop reason: HOSPADM

## 2022-01-01 RX ORDER — IPRATROPIUM BROMIDE AND ALBUTEROL SULFATE 2.5; .5 MG/3ML; MG/3ML
3 SOLUTION RESPIRATORY (INHALATION)
Status: COMPLETED | OUTPATIENT
Start: 2022-01-01 | End: 2022-01-01

## 2022-01-01 RX ORDER — ACETAMINOPHEN 650 MG/1
650 SUPPOSITORY RECTAL
Status: DISCONTINUED | OUTPATIENT
Start: 2022-01-01 | End: 2022-01-01 | Stop reason: HOSPADM

## 2022-01-01 RX ORDER — DEXAMETHASONE SODIUM PHOSPHATE 4 MG/ML
10 INJECTION, SOLUTION INTRA-ARTICULAR; INTRALESIONAL; INTRAMUSCULAR; INTRAVENOUS; SOFT TISSUE
Status: COMPLETED | OUTPATIENT
Start: 2022-01-01 | End: 2022-01-01

## 2022-01-01 RX ORDER — SODIUM CHLORIDE 9 MG/ML
75 INJECTION, SOLUTION INTRAVENOUS CONTINUOUS
Status: DISCONTINUED | OUTPATIENT
Start: 2022-01-01 | End: 2022-01-01

## 2022-01-01 RX ORDER — ONDANSETRON 2 MG/ML
2 INJECTION INTRAMUSCULAR; INTRAVENOUS
Status: DISCONTINUED | OUTPATIENT
Start: 2022-01-01 | End: 2022-01-01 | Stop reason: HOSPADM

## 2022-01-01 RX ORDER — PROPOFOL 10 MG/ML
0-50 VIAL (ML) INTRAVENOUS
Status: DISCONTINUED | OUTPATIENT
Start: 2022-01-01 | End: 2022-01-01 | Stop reason: HOSPADM

## 2022-01-01 RX ORDER — DONEPEZIL HYDROCHLORIDE 10 MG/1
TABLET, FILM COATED ORAL
Qty: 30 TABLET | Refills: 5 | Status: SHIPPED | OUTPATIENT
Start: 2022-01-01 | End: 2022-01-01

## 2022-01-01 RX ORDER — IPRATROPIUM BROMIDE AND ALBUTEROL SULFATE 2.5; .5 MG/3ML; MG/3ML
3 SOLUTION RESPIRATORY (INHALATION)
Status: DISCONTINUED | OUTPATIENT
Start: 2022-01-01 | End: 2022-01-01 | Stop reason: HOSPADM

## 2022-01-01 RX ORDER — DEXAMETHASONE SODIUM PHOSPHATE 4 MG/ML
10 INJECTION, SOLUTION INTRA-ARTICULAR; INTRALESIONAL; INTRAMUSCULAR; INTRAVENOUS; SOFT TISSUE DAILY
Status: DISCONTINUED | OUTPATIENT
Start: 2022-01-14 | End: 2022-01-01 | Stop reason: SDUPTHER

## 2022-01-01 RX ORDER — LORAZEPAM 2 MG/ML
1 INJECTION INTRAMUSCULAR
Status: DISCONTINUED | OUTPATIENT
Start: 2022-01-01 | End: 2022-01-01 | Stop reason: HOSPADM

## 2022-01-01 RX ORDER — DEXAMETHASONE SODIUM PHOSPHATE 4 MG/ML
10 INJECTION, SOLUTION INTRA-ARTICULAR; INTRALESIONAL; INTRAMUSCULAR; INTRAVENOUS; SOFT TISSUE EVERY 24 HOURS
Status: DISCONTINUED | OUTPATIENT
Start: 2022-01-14 | End: 2022-01-01

## 2022-01-01 RX ORDER — ROCURONIUM BROMIDE 10 MG/ML
100 INJECTION, SOLUTION INTRAVENOUS
Status: COMPLETED | OUTPATIENT
Start: 2022-01-01 | End: 2022-01-01

## 2022-01-01 RX ORDER — DEXTROSE 50 % IN WATER (D50W) INTRAVENOUS SYRINGE
25 ONCE
Status: COMPLETED | OUTPATIENT
Start: 2022-01-01 | End: 2022-01-01

## 2022-01-01 RX ORDER — MEMANTINE HYDROCHLORIDE 5 MG/1
5 TABLET ORAL 2 TIMES DAILY
Status: DISCONTINUED | OUTPATIENT
Start: 2022-01-01 | End: 2022-01-01 | Stop reason: HOSPADM

## 2022-01-01 RX ORDER — PRAMIPEXOLE DIHYDROCHLORIDE 0.75 MG/1
TABLET ORAL
Qty: 90 TABLET | Refills: 1 | Status: SHIPPED | OUTPATIENT
Start: 2022-01-01

## 2022-01-01 RX ORDER — LANOLIN ALCOHOL/MO/W.PET/CERES
5 CREAM (GRAM) TOPICAL
Status: DISCONTINUED | OUTPATIENT
Start: 2022-01-01 | End: 2022-01-01 | Stop reason: HOSPADM

## 2022-01-01 RX ORDER — NOREPINEPHRINE BITARTRATE/D5W 8 MG/250ML
.5-3 PLASTIC BAG, INJECTION (ML) INTRAVENOUS
Status: DISCONTINUED | OUTPATIENT
Start: 2022-01-01 | End: 2022-01-01 | Stop reason: SDUPTHER

## 2022-01-01 RX ORDER — CALCIUM GLUCONATE 20 MG/ML
1 INJECTION, SOLUTION INTRAVENOUS ONCE
Status: DISCONTINUED | OUTPATIENT
Start: 2022-01-01 | End: 2022-01-01 | Stop reason: HOSPADM

## 2022-01-01 RX ORDER — SODIUM BICARBONATE 1 MEQ/ML
SYRINGE (ML) INTRAVENOUS
Status: DISPENSED
Start: 2022-01-01 | End: 2022-01-01

## 2022-01-01 RX ORDER — ETOMIDATE 2 MG/ML
20 INJECTION INTRAVENOUS
Status: COMPLETED | OUTPATIENT
Start: 2022-01-01 | End: 2022-01-01

## 2022-01-01 RX ORDER — SODIUM CHLORIDE 0.9 % (FLUSH) 0.9 %
5-40 SYRINGE (ML) INJECTION AS NEEDED
Status: DISCONTINUED | OUTPATIENT
Start: 2022-01-01 | End: 2022-01-01 | Stop reason: HOSPADM

## 2022-01-01 RX ORDER — MORPHINE SULFATE 2 MG/ML
2 INJECTION, SOLUTION INTRAMUSCULAR; INTRAVENOUS
Status: DISCONTINUED | OUTPATIENT
Start: 2022-01-01 | End: 2022-01-01 | Stop reason: HOSPADM

## 2022-01-01 RX ORDER — MELATONIN
1000 DAILY
Status: DISCONTINUED | OUTPATIENT
Start: 2022-01-01 | End: 2022-01-01 | Stop reason: HOSPADM

## 2022-01-01 RX ORDER — ETOMIDATE 2 MG/ML
INJECTION INTRAVENOUS
Status: COMPLETED
Start: 2022-01-01 | End: 2022-01-01

## 2022-01-01 RX ORDER — BISACODYL 5 MG
5 TABLET, DELAYED RELEASE (ENTERIC COATED) ORAL DAILY PRN
Status: DISCONTINUED | OUTPATIENT
Start: 2022-01-01 | End: 2022-01-01 | Stop reason: HOSPADM

## 2022-01-01 RX ORDER — MAGNESIUM SULFATE 100 %
4 CRYSTALS MISCELLANEOUS AS NEEDED
Status: DISCONTINUED | OUTPATIENT
Start: 2022-01-01 | End: 2022-01-01 | Stop reason: HOSPADM

## 2022-01-01 RX ORDER — DEXTROSE MONOHYDRATE 50 MG/ML
50 INJECTION, SOLUTION INTRAVENOUS CONTINUOUS
Status: DISCONTINUED | OUTPATIENT
Start: 2022-01-01 | End: 2022-01-01 | Stop reason: HOSPADM

## 2022-01-01 RX ORDER — CALCIUM CHLORIDE INJECTION 100 MG/ML
1 INJECTION, SOLUTION INTRAVENOUS ONCE
Status: COMPLETED | OUTPATIENT
Start: 2022-01-01 | End: 2022-01-01

## 2022-01-01 RX ORDER — ROCURONIUM BROMIDE 10 MG/ML
INJECTION, SOLUTION INTRAVENOUS
Status: COMPLETED
Start: 2022-01-01 | End: 2022-01-01

## 2022-01-01 RX ORDER — BUPROPION HYDROCHLORIDE 150 MG/1
150 TABLET, EXTENDED RELEASE ORAL 2 TIMES DAILY
Status: DISCONTINUED | OUTPATIENT
Start: 2022-01-01 | End: 2022-01-01 | Stop reason: HOSPADM

## 2022-01-01 RX ORDER — DEXTROSE 50 % IN WATER (D50W) INTRAVENOUS SYRINGE
50
Status: COMPLETED | OUTPATIENT
Start: 2022-01-01 | End: 2022-01-01

## 2022-01-01 RX ORDER — PROPOFOL 10 MG/ML
INJECTION, EMULSION INTRAVENOUS
Status: DISPENSED
Start: 2022-01-01 | End: 2022-01-01

## 2022-01-01 RX ORDER — DONEPEZIL HYDROCHLORIDE 10 MG/1
10 TABLET, FILM COATED ORAL
Status: DISCONTINUED | OUTPATIENT
Start: 2022-01-01 | End: 2022-01-01 | Stop reason: HOSPADM

## 2022-01-01 RX ORDER — CHLORHEXIDINE GLUCONATE 1.2 MG/ML
15 RINSE ORAL EVERY 12 HOURS
Status: DISCONTINUED | OUTPATIENT
Start: 2022-01-01 | End: 2022-01-01 | Stop reason: HOSPADM

## 2022-01-01 RX ORDER — MORPHINE SULFATE 10 MG/ML
10 INJECTION, SOLUTION INTRAMUSCULAR; INTRAVENOUS ONCE
Status: COMPLETED | OUTPATIENT
Start: 2022-01-01 | End: 2022-01-01

## 2022-01-01 RX ORDER — ACETAMINOPHEN 325 MG/1
650 TABLET ORAL
Status: DISCONTINUED | OUTPATIENT
Start: 2022-01-01 | End: 2022-01-01 | Stop reason: HOSPADM

## 2022-01-01 RX ORDER — IPRATROPIUM BROMIDE AND ALBUTEROL SULFATE 2.5; .5 MG/3ML; MG/3ML
3 SOLUTION RESPIRATORY (INHALATION)
Status: DISCONTINUED | OUTPATIENT
Start: 2022-01-01 | End: 2022-01-01

## 2022-01-01 RX ORDER — BUPROPION HYDROCHLORIDE 450 MG/1
TABLET, FILM COATED, EXTENDED RELEASE ORAL
Qty: 30 TABLET | Refills: 5 | Status: SHIPPED | OUTPATIENT
Start: 2022-01-01

## 2022-01-01 RX ORDER — PRAMIPEXOLE DIHYDROCHLORIDE 0.25 MG/1
0.12 TABLET ORAL
Status: DISCONTINUED | OUTPATIENT
Start: 2022-01-01 | End: 2022-01-01 | Stop reason: HOSPADM

## 2022-01-01 RX ORDER — SODIUM BICARBONATE IN D5W 150/1000ML
PLASTIC BAG, INJECTION (ML) INTRAVENOUS CONTINUOUS
Status: DISCONTINUED | OUTPATIENT
Start: 2022-01-01 | End: 2022-01-01 | Stop reason: CLARIF

## 2022-01-01 RX ORDER — SERTRALINE HYDROCHLORIDE 50 MG/1
200 TABLET, FILM COATED ORAL DAILY
Status: DISCONTINUED | OUTPATIENT
Start: 2022-01-01 | End: 2022-01-01 | Stop reason: HOSPADM

## 2022-01-01 RX ORDER — GLYCOPYRROLATE 0.2 MG/ML
0.2 INJECTION INTRAMUSCULAR; INTRAVENOUS
Status: DISCONTINUED | OUTPATIENT
Start: 2022-01-01 | End: 2022-01-01 | Stop reason: HOSPADM

## 2022-01-01 RX ORDER — HYDROMORPHONE HYDROCHLORIDE 1 MG/ML
1 INJECTION, SOLUTION INTRAMUSCULAR; INTRAVENOUS; SUBCUTANEOUS ONCE
Status: COMPLETED | OUTPATIENT
Start: 2022-01-01 | End: 2022-01-01

## 2022-01-01 RX ORDER — DEXTROSE 50 % IN WATER (D50W) INTRAVENOUS SYRINGE
25-50 AS NEEDED
Status: DISCONTINUED | OUTPATIENT
Start: 2022-01-01 | End: 2022-01-01 | Stop reason: HOSPADM

## 2022-01-01 RX ORDER — MEMANTINE HYDROCHLORIDE 5 MG/1
TABLET ORAL
Qty: 180 TABLET | Refills: 1 | Status: SHIPPED | OUTPATIENT
Start: 2022-01-01

## 2022-01-01 RX ORDER — LORAZEPAM 2 MG/ML
2 INJECTION INTRAMUSCULAR ONCE
Status: COMPLETED | OUTPATIENT
Start: 2022-01-01 | End: 2022-01-01

## 2022-01-01 RX ORDER — DONEPEZIL HYDROCHLORIDE 10 MG/1
TABLET, FILM COATED ORAL
Qty: 30 TABLET | Refills: 5 | Status: SHIPPED | OUTPATIENT
Start: 2022-01-01

## 2022-01-01 RX ORDER — DEXAMETHASONE SODIUM PHOSPHATE 4 MG/ML
20 INJECTION, SOLUTION INTRA-ARTICULAR; INTRALESIONAL; INTRAMUSCULAR; INTRAVENOUS; SOFT TISSUE DAILY
Status: DISCONTINUED | OUTPATIENT
Start: 2022-01-01 | End: 2022-01-01 | Stop reason: SDUPTHER

## 2022-01-01 RX ADMIN — LEVOFLOXACIN 750 MG: 750 INJECTION, SOLUTION INTRAVENOUS at 13:47

## 2022-01-01 RX ADMIN — CALCIUM CHLORIDE 1 G: 100 INJECTION, SOLUTION INTRAVENOUS; INTRAVENTRICULAR at 01:00

## 2022-01-01 RX ADMIN — ROCURONIUM BROMIDE 100 MG: 10 INJECTION, SOLUTION INTRAVENOUS at 22:18

## 2022-01-01 RX ADMIN — SODIUM CHLORIDE 500 ML: 9 INJECTION, SOLUTION INTRAVENOUS at 17:35

## 2022-01-01 RX ADMIN — PIPERACILLIN AND TAZOBACTAM 3.38 G: 3; .375 INJECTION, POWDER, LYOPHILIZED, FOR SOLUTION INTRAVENOUS at 20:01

## 2022-01-01 RX ADMIN — VANCOMYCIN HYDROCHLORIDE 2250 MG: 10 INJECTION, POWDER, LYOPHILIZED, FOR SOLUTION INTRAVENOUS at 20:58

## 2022-01-01 RX ADMIN — HYDROMORPHONE HYDROCHLORIDE 1 MG: 1 INJECTION, SOLUTION INTRAMUSCULAR; INTRAVENOUS; SUBCUTANEOUS at 21:44

## 2022-01-01 RX ADMIN — LORAZEPAM 1 MG: 2 INJECTION INTRAMUSCULAR; INTRAVENOUS at 15:46

## 2022-01-01 RX ADMIN — CHLORHEXIDINE GLUCONATE 15 ML: 1.2 RINSE ORAL at 01:13

## 2022-01-01 RX ADMIN — SODIUM CHLORIDE, PRESERVATIVE FREE 20 MG: 5 INJECTION INTRAVENOUS at 03:44

## 2022-01-01 RX ADMIN — SODIUM BICARBONATE 100 MEQ: 84 INJECTION INTRAVENOUS at 00:38

## 2022-01-01 RX ADMIN — NOREPINEPHRINE BITARTRATE 4 MCG/MIN: 1 INJECTION, SOLUTION, CONCENTRATE INTRAVENOUS at 22:29

## 2022-01-01 RX ADMIN — GLYCOPYRROLATE 0.2 MG: 0.2 INJECTION, SOLUTION INTRAMUSCULAR; INTRAVENOUS at 15:46

## 2022-01-01 RX ADMIN — DEXTROSE MONOHYDRATE 50 ML/HR: 50 INJECTION, SOLUTION INTRAVENOUS at 02:06

## 2022-01-01 RX ADMIN — ROCURONIUM BROMIDE 100 MG: 10 INJECTION INTRAVENOUS at 22:18

## 2022-01-01 RX ADMIN — MORPHINE SULFATE 10 MG: 10 INJECTION INTRAVENOUS at 15:46

## 2022-01-01 RX ADMIN — CHLORHEXIDINE GLUCONATE 15 ML: 1.2 RINSE ORAL at 09:00

## 2022-01-01 RX ADMIN — WATER 2 G: 1 INJECTION INTRAMUSCULAR; INTRAVENOUS; SUBCUTANEOUS at 11:48

## 2022-01-01 RX ADMIN — IPRATROPIUM BROMIDE AND ALBUTEROL SULFATE 3 ML: .5; 3 SOLUTION RESPIRATORY (INHALATION) at 09:07

## 2022-01-01 RX ADMIN — SODIUM CHLORIDE 75 ML/HR: 9 INJECTION, SOLUTION INTRAVENOUS at 20:57

## 2022-01-01 RX ADMIN — DEXTROSE MONOHYDRATE: 5 INJECTION, SOLUTION INTRAVENOUS at 01:11

## 2022-01-01 RX ADMIN — Medication 4 MCG/MIN: at 22:49

## 2022-01-01 RX ADMIN — METHYLPREDNISOLONE SODIUM SUCCINATE 125 MG: 125 INJECTION, POWDER, FOR SOLUTION INTRAMUSCULAR; INTRAVENOUS at 21:42

## 2022-01-01 RX ADMIN — PIPERACILLIN AND TAZOBACTAM 3.38 G: 3; .375 INJECTION, POWDER, LYOPHILIZED, FOR SOLUTION INTRAVENOUS at 01:06

## 2022-01-01 RX ADMIN — IPRATROPIUM BROMIDE AND ALBUTEROL SULFATE 3 ML: .5; 3 SOLUTION RESPIRATORY (INHALATION) at 12:46

## 2022-01-01 RX ADMIN — DEXTROSE MONOHYDRATE 25 G: 500 INJECTION PARENTERAL at 12:06

## 2022-01-01 RX ADMIN — FUROSEMIDE 80 MG: 10 INJECTION, SOLUTION INTRAMUSCULAR; INTRAVENOUS at 21:43

## 2022-01-01 RX ADMIN — SODIUM CHLORIDE, POTASSIUM CHLORIDE, SODIUM LACTATE AND CALCIUM CHLORIDE 1000 ML: 600; 310; 30; 20 INJECTION, SOLUTION INTRAVENOUS at 02:15

## 2022-01-01 RX ADMIN — LORAZEPAM 2 MG: 2 INJECTION INTRAMUSCULAR; INTRAVENOUS at 21:43

## 2022-01-01 RX ADMIN — SODIUM CHLORIDE 1000 ML: 9 INJECTION, SOLUTION INTRAVENOUS at 13:55

## 2022-01-01 RX ADMIN — DEXAMETHASONE SODIUM PHOSPHATE 10 MG: 4 INJECTION, SOLUTION INTRAMUSCULAR; INTRAVENOUS at 17:40

## 2022-01-01 RX ADMIN — ETOMIDATE 20 MG: 2 INJECTION, SOLUTION INTRAVENOUS at 22:17

## 2022-01-01 RX ADMIN — IOPAMIDOL 100 ML: 755 INJECTION, SOLUTION INTRAVENOUS at 10:41

## 2022-01-01 RX ADMIN — DOXYCYCLINE 100 MG: 100 INJECTION, POWDER, LYOPHILIZED, FOR SOLUTION INTRAVENOUS at 22:47

## 2022-01-01 RX ADMIN — DEXTROSE MONOHYDRATE 25 G: 500 INJECTION PARENTERAL at 01:00

## 2022-01-01 RX ADMIN — ETOMIDATE 20 MG: 2 INJECTION INTRAVENOUS at 22:17

## 2022-01-01 RX ADMIN — Medication 4 MCG/MIN: at 00:30

## 2022-01-01 RX ADMIN — SODIUM CHLORIDE 1000 ML: 9 INJECTION, SOLUTION INTRAVENOUS at 11:41

## 2022-01-05 NOTE — TELEPHONE ENCOUNTER
Last visit 11/16/2021 MD Jennifer Krueger   Next appointment Pt canceled 01/10/2022 - Nothing rescheduled   Previous refill encounter(s)   07/12/2021 Mirapex #90 with 1 refill      Requested Prescriptions     Pending Prescriptions Disp Refills    pramipexole (MIRAPEX) 0.75 mg tablet 90 Tablet 1     Sig: Take 1 tab by mouth every night

## 2022-01-08 PROBLEM — A41.9 SEPTIC SHOCK (HCC): Status: ACTIVE | Noted: 2022-01-01

## 2022-01-08 PROBLEM — R65.21 SEPTIC SHOCK (HCC): Status: ACTIVE | Noted: 2022-01-01

## 2022-01-08 PROBLEM — J96.01 ACUTE RESPIRATORY FAILURE WITH HYPOXIA (HCC): Status: ACTIVE | Noted: 2022-01-01

## 2022-01-08 NOTE — PROGRESS NOTES
Day #1 of zosyn  Indication:  aspiration pneumonia  Current regimen:  2.25gm q8h  Abx regimen:    Recent Labs     01/08/22  1113   WBC 19.9*   CREA 2.97*   BUN 71*     Est CrCl: ~26 ml/min  No data recorded. Cultures: pending    Plan: Change to 3.375mg q8h with extended infusion time. If CrCl declines to less than 20ml/min, pharmacy to adjust dose.

## 2022-01-08 NOTE — ED TRIAGE NOTES
Triage: Pt arrives via EMS from a facility. He has dementia at baseline but is normally interactive. Staff at the facility report pt is not interacting this morning. EMS reports pt was hypotensive with an SBP in the 80s. Pt arrives mottled and gray with room air saturations of 66%.

## 2022-01-08 NOTE — H&P
History and Physical          Pt Name  Leigh Justice   Date of Birth 1945   Medical Record Number  [id number]      Age  67 y.o. PCP Angelito Wilson MD   Admit date:  1/8/2022    Room Number  ER15/15  @ [facility]   Date of Service  1/8/2022     Admission Diagnoses:  Septic shock   Aspiration pneumonia       Certification: We are admitting Leigh Justice 67 y.o. male with a principle diagnosis of septic shock   This patient also suffers from other comorbidities listed below. I have a high level of concern for progressive decline in hemodynamics  leading to life threatening complications      Assessment and plan:  Septic shock   Acute hypoxic respiratory failure   Aspiration Pneumonia   Hypernatremia   MERISSA   COVID rapid test negative /Pt completed his COVID immunization including booster shot -see HPI   · Admit to telemetry   · Generous IVF   · Empiric abx with Zosyn+ Vanc(pharmacy protocol)   · BiPAP and Oxygen supplementation   · Aspiration and fall precautions   · Daily CBC, BMP   · Speech evaluation possibly tomorrow when his s/s are better     MERISSA -possibly due to dehydration. ?   · Monitor renal function -if we see no improvement, we will seek guidance from Nephrologist.   · Lozada catheter for short term I/O management     Dementia -chronic   · Will hold PO less necessary meds at this time  · Supportive care. Recurrent falls   Multiple bruises on shins, knees, elbows -pt's wife reported that he has had falls prior to being institutionalized in December 2021. Since then he has had reported multiple falls from unclear reasons.    · PT OT to see him on 01/10/21   · Fall precautions     There is no height or weight on file to calculate BMI. -         CODE STATUS  DNR    I have reviewed DNR paperwork from the facility and confirmed validity of those documents with his wife in person in the ER        Functional Status  Pt is currently a resident of [facility] memory care unit    Surrogate decision maker: Pt's wife    Prophylaxis  Lovenox   Discharge Plan: SNF/LTC,    There are currently no Active Isolations Payor: Lina Thomas / Plan: [address] HMO / Product Type: Managed Care Medicare /    Social issues  Date Comment    01/08/22  Met with pt's wife in the ER room. She provided all of the information. We talked about plan of care and I answered all questions. Prognosis  Fair      Subjective Data         History of Present Illness : The pt was brought from the [facility] with reported turning grey with shortness of breath, low oxygen. Pt was in hypotension and hypoxic state both responded to IVF and BIPAP respectively. Pt's wife reported that he has had recent ER visits and visits with his vascular surgeon within the last weeks. Pt went to another ER where he received several stitches to his scalp from injury sustained after fall   He was apparently fine last night. This morning staff found him hypotensive, in AMS and later above findings were confirmed.       Review of Systems - History obtained from spouse  Psychological ROS: positive for - anxiety  negative for - behavioral disorder or hostility  Respiratory ROS: positive for - shortness of breath  Cardiovascular ROS: negative for - chest pain  Gastrointestinal ROS: negative for - abdominal pain or diarrhea  Genito-Urinary ROS: no dysuria, trouble voiding, or hematuria  Musculoskeletal ROS: positive for - gait disturbance, muscular weakness and Falls   Neurological ROS: no TIA or stroke symptoms  Dermatological ROS: positive for - Bruises from recent falls   ROS       Past Medical History:   Diagnosis Date    AAA (abdominal aortic aneurysm) (HCC) 07/29/2019    3.8 cm on US    Alcohol abuse, in remission     Arthritis     Back pain     Bladder cancer ([facility] [address]      - in [place], [place]    C. difficile colitis 2010    Cancer involving bladder by direct extension from endometrium ([address] 10/2016    Dementia ([facility] [facility]     Depression     Horseshoe kidney     HTN (hypertension) 12/5/2013    Hyperlipidemia     Liver disease     LUCHO (obstructive sleep apnea)     Recurrent bladder transitional cell carcinoma (HCC)     Restless leg syndrome     Screening for colorectal cancer 03/14/2018    cologuard - negative    Situational anxiety     ie air travel    Spinal stenosis, lumbar     Thrombocytopenia (HCC)     Transitional cell carcinoma of left ureter ([facility] [address]     Dr. Gilbert Buitrago    Tubular adenoma of colon          Past Surgical History:   Procedure Laterality Date    BLADDER CANCER FISH      endoscopic    COLONOSCOPY N/A 4/2/2020    COLON[facility] performed by Josetta Dubin, [facility] at [facility] ENDOSCOPY    HX LUMBAR LAMINECTOMY      HX UROLOGICAL           Social History     Tobacco Use    Smoking status: Current Every Day Smoker    Smokeless tobacco: Never Used    Tobacco comment: E cigarette   Substance Use Topics    Alcohol use: Yes     Alcohol/week: 1.0 - 2.0 standard drink     Types: 1 - 2 Glasses of wine per week     Comment: 2 glasses of red wine a night          Family History   Problem Relation Age of Onset    Arthritis Mother     Cancer Father         lung ca    Other Brother         back problems              Objective data     Comment:  Pt is lying in ER bed with BiPAP mask on. His wife is in the room. COVID rapid test is negative. Isolation removed. No data found.    Patient Vitals for the past 24 hrs:   Pulse   01/08/22 1602 94   01/08/22 1429 97   01/08/22 1341 91   01/08/22 1251 96   01/08/22 1241 97      Patient Vitals for the past 24 hrs:   Resp   01/08/22 1602 20      Patient Vitals for the past 24 hrs:   BP   01/08/22 1602 107/71   01/08/22 1414 137/63   01/08/22 1341 100/63   01/08/22 1251 124/65   01/08/22 1241 (!) 116/104        SpO2 Readings from Last 6 Encounters:   01/08/22 90%   11/15/21 91%   10/18/21 94%   06/30/21 92%   03/30/21 93%   03/02/21 92%         O2 Device: BIPAP   There is no height or weight on file to calculate BMI. - Wt Readings from Last 10 Encounters:   11/15/21 92.1 kg (203 lb)   10/18/21 94.3 kg (207 lb 14.4 oz)   06/30/21 98.5 kg (217 lb 4 oz)   03/30/21 101.6 kg (224 lb)   01/27/21 103.4 kg (228 lb)   12/29/20 103.4 kg (228 lb)   10/16/20 103 kg (227 lb)   09/16/20 104.3 kg (230 lb)   05/06/20 104.3 kg (230 lb)   04/02/20 104.3 kg (230 lb)          Physical Exam:             General:  Alert, cooperative,   well noursished,   well developed,   appears stated age    Ears/Eyes:  Hearing intact  Sclera anicteric. Pupils equal   Mouth/Throat:  Mucous membranes normal pink and moist     Neck:     Lungs:  Chest excursion symmetrical   Auscultation B/L Symmetrical with   Vesicular breath sounds    Right lung lower aspect crepitations noted . CVS:  Regular rate and rhythm   no  murmur,   No click, rub or gallop  S1 normal   S2 normal   Pedal pulses  b/l symmetrical   Abdomen:    Soft, non-tender  Bowel sounds normal  No distension   Percussion note tympanitic   Extremities:  Multiple ecchymoses on this shins, knees,   Abrasions galore all over   No cyanosis, jaundice  No edema noted   No sign of DVT/cord like lesion on palpation  No sign of acute trauma   Age appropriate OA changes noted.     Skin:  As above      Lymph nodes:     Musculoskeletal Muscle bulk B/L symmetrical   Neuro Cranial nerves are intact,   motor movement b/l symmetrical,      Psych:  Alert           Medications reviewed     Current Facility-Administered Medications   Medication Dose Route Frequency    iopamidoL (ISOVUE-370) 76 % injection 100 mL  100 mL IntraVENous RAD ONCE    sodium chloride 0.9 % bolus infusion 500 mL  500 mL IntraVENous ONCE    dexamethasone (DECADRON) 4 mg/mL injection 10 mg  10 mg IntraVENous NOW     Current Outpatient Medications   Medication Sig    memantine (NAMENDA) 5 mg tablet TAKE 1 TABLET BY MOUTH TWICE DAILY    buPROPion HCL (FORFIVO) 450 mg Tb24 XL tablet 1 TABLET BY MOUTH EVERY MORNING FOR DEPRESSION    donepeziL (ARICEPT) 10 mg tablet 1 TABLET BY MOUTH EVERY DAY FOR DEMENTIA    pramipexole (MIRAPEX) 0.75 mg tablet Take 1 tab by mouth every night    lidocaine (LIDODERM) 5 % APPLY 1 PATCH TO AFFECTED AREA FOR 12 HOURS IN A 24 HOUR PERIOD AS NEEDED    lidocaine-vit e-aloe vera-olga lidia 2 % topical gel Apply 1 Actuation(s) to affected area three (3) times daily as needed for Pain.  tiZANidine (ZANAFLEX) 4 mg tablet TAKE 1 TABLET BY MOUTH UP TO TWICE DAILY FOR MUSCLE SPASMS    polyethylene glycol (MIRALAX) 17 gram/dose powder Take 17 g by mouth daily. miralax clean out - 7 capfuls of miralax in a 32 oz gatorade - drink over a few hours. May need to repeat x 2-3 days depending on results.  sertraline (ZOLOFT) 100 mg tablet TAKE 2 TABLETS BY MOUTH DAILY    magnesium citrate solution DRINK CONTENTS OF 1 BOTTLE(296ML) BY MOUTH AS A SINGLE DOSE AS DIRECTED (Patient not taking: Reported on 11/15/2021)    ketoconazole (NIZORAL) 2 % shampoo APPLY EXTERNALLY 2 TIMES A WEEK FOR 2 TO 4 WEEKS (Patient not taking: Reported on 10/18/2021)    fluticasone propionate (FLONASE) 50 mcg/actuation nasal spray SHAKE LIQUID AND USE 2 SPRAYS INTO EACH NOSTRIL DAILY (Patient not taking: Reported on 10/18/2021)    fexofenadine (ALLEGRA) 180 mg tablet Take 180 mg by mouth daily.  cholecalciferol (Vitamin D3) (1000 Units /25 mcg) tablet Take 1,000 Units by mouth daily.  chlorzoxazone (PARAFON FORTE) 500 mg tablet Take 500 mg by mouth two (2) times a day. (Patient not taking: Reported on 10/18/2021)    diphenhydrAMINE (BENADRYL) 25 mg capsule Take 25 mg by mouth nightly as needed. (Patient not taking: Reported on 10/18/2021)    ondansetron (ZOFRAN ODT) 4 mg disintegrating tablet Take 1 Tab by mouth every eight (8) hours as needed for Nausea. (Patient not taking: Reported on 11/15/2021)    ibuprofen 200 mg cap Take  by mouth.  (Patient not taking: Reported on 11/15/2021)    melatonin tab tablet Take 5 mg by mouth nightly.  multivitamin (ONE DAILY ENERGY) tablet Take 1 Tab by mouth daily.  MILK THISTLE PO Take 1 Tab by mouth two (2) times a day.  loratadine (CLARITIN) 10 mg tablet Take 10 mg by mouth daily. Relevant other informations: Other medical conditions listed in this following active hospital problem list section; all of these and other pertinent data were taken into consideration when treatment plan is developed and customized to this patient's unique overall circumstances and needs. We have reviewed available old medical records within the constraints of this admission process. Present on Admission:  **None**       Data Review:   Recent Days:  All Micro Results     Procedure Component Value Units Date/Time    COVID-19 RAPID TEST [[id number]] Collected: 01/08/22 1142    Order Status: Completed Specimen: Nasopharyngeal Updated: 01/08/22 1224     Specimen source Nasopharyngeal        COVID-19 rapid test Not detected        Comment: Rapid Abbott ID Now       Rapid NAAT:  The specimen is NEGATIVE for SARS-CoV-2, the novel coronavirus associated with COVID-19. Negative results should be treated as presumptive and, if inconsistent with clinical signs and symptoms or necessary for patient management, should be tested with an alternative molecular assay. Negative results do not preclude SARS-CoV-2 infection and should not be used as the sole basis for patient management decisions. This test has been authorized by the FDA under an Emergency Use Authorization (EUA) for use by authorized laboratories.    Fact sheet for Healthcare Providers: iTendency.uy  Fact sheet for Patients: iTendency.uy       Methodology: Isothermal Nucleic Acid Amplification         CULTURE, BLOOD, PAIRED [[id number]] Collected: 01/08/22 1113    Order Status: Completed Specimen: Blood Updated: 01/08/22 1146          Recent Labs     01/08/22  1113   WBC 19.9*   HGB 16.1   HCT 50.7*        Recent Labs     01/08/22  1113   *   K 3.3*   *   CO2 18*   GLU 47*   BUN 71*   CREA 2.97*   CA 10.3*   ALB 2.3*   TBILI 1.7*   ALT 96*      Lab Results   Component Value Date/Time    TSH 3.16 09/18/2020 09:26 AM         Care Plan discussed with: Patient/Family and Nurse   Other medical conditions are listed in the active hospital problem list section; these and other pertinent data were taken into consideration when the treatment plan was developed and customized to this patient's unique overall circumstances and needs. High complexity decision making was performed for this patient who is at high risk for decompensation with multiple organ involvement. Today total floor/unit time was 65 minutes while caring for this patient and greater than 50% of that time was spent with patient (and/or family) coordinating patients clinical issues.      Yogi Torres MD MPH  FACP                               1/8/2022       __________________________________________________________   FOR SOUND PHYSICIAN ADMINISTRATIVE USE ONLY   MDM       INPT 223      Mukesh Mitchell MD MPH FACP   4:56 PM  1/8/2022     [delayed entry 1/9/2022 ]

## 2022-01-08 NOTE — ED PROVIDER NOTES
Mr. Trini Tyson is a 67yo male who presents to the ER with altered mental status. Per EMS, the patient was seen at his normal state of health earlier today. They came back later and he was not speaking and was less responsive. EMS was unable to get oxygen saturation. He was found to be hypertensive with systolic in the 67N. The patient denies any complaints. The patient's history is limited by his dementia and current mental status. The history was obtained by EMS.            Past Medical History:   Diagnosis Date    AAA (abdominal aortic aneurysm) ([facility] [address] 07/29/2019    3.8 cm on US    Alcohol abuse, in remission     Arthritis     Back pain     Bladder cancer (HCC)      - in [place], [place]    C. difficile colitis 2010    Cancer involving bladder by direct extension from endometrium ([facility] [address] 10/2016    Dementia ([facility] [address]     Depression     Horseshoe kidney     HTN (hypertension) 12/5/2013    Hyperlipidemia     Liver disease     LUCHO (obstructive sleep apnea)     Recurrent bladder transitional cell carcinoma (HCC)     Restless leg syndrome     Screening for colorectal cancer 03/14/2018    cologuard - negative    Situational anxiety     ie air travel    Spinal stenosis, lumbar     Thrombocytopenia (HCC)     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 Cali Oliveira    Tubular adenoma of colon        Past Surgical History:   Procedure Laterality Date    BLADDER CANCER FISH      endoscopic    COLONOSCOPY N/A 4/2/2020    [facility]COPY    HX LUMBAR LAMINECTOMY      HX UROLOGICAL           Family History:   Problem Relation Age of Onset    Arthritis Mother     Cancer Father         lung ca    Other Brother         back problems       Social History     Socioeconomic History    Marital status:      Spouse name: Not on file    Number of children: Not on file    Years of education: Not on file    Highest education level: Not on file Occupational History    Not on file   Tobacco Use    Smoking status: Current Every Day Smoker    Smokeless tobacco: Never Used    Tobacco comment: E cigarette   Vaping Use    Vaping Use: Every day   Substance and Sexual Activity    Alcohol use: Yes     Alcohol/week: 1.0 - 2.0 standard drink     Types: 1 - 2 Glasses of wine per week     Comment: 2 glasses of red wine a night     Drug use: No    Sexual activity: Yes     Partners: Female   Other Topics Concern    Not on file   Social History Narrative    Not on file     Social Determinants of Health     Financial Resource Strain:     Difficulty of Paying Living Expenses: Not on file   Food Insecurity:     Worried About Running Out of Food in the Last Year: Not on file    Ely of Food in the Last Year: Not on file   Transportation Needs:     Lack of Transportation (Medical): Not on file    Lack of Transportation (Non-Medical): Not on file   Physical Activity:     Days of Exercise per Week: Not on file    Minutes of Exercise per Session: Not on file   Stress:     Feeling of Stress : Not on file   Social Connections:     Frequency of Communication with Friends and Family: Not on file    Frequency of Social Gatherings with Friends and Family: Not on file    Attends Sabianist Services: Not on file    Active Member of [address] [place] [address] [employer] or Organizations: Not on file    Attends Club or Organization Meetings: Not on file    Marital Status: Not on file   Intimate Partner Violence:     Fear of Current or Ex-Partner: Not on file    Emotionally Abused: Not on file    Physically Abused: Not on file    Sexually Abused: Not on file   Housing Stability:     Unable to Pay for Housing in the Last Year: Not on file    Number of [place] in the Last Year: Not on file    Unstable Housing in the Last Year: Not on file         ALLERGIES: Patient has no known allergies.     Review of Systems   Unable to perform ROS: Dementia (Mental status change)   All other systems reviewed and are negative. Vitals:    01/08/22 1251 01/08/22 1341 01/08/22 1414 01/08/22 1429   BP: 124/65 100/63 137/63    Pulse: 96 91  97   SpO2: 93%               Physical Exam     Vital signs reviewed. Nursing notes reviewed. Const: Significant distress  Head:  Atraumatic, normocephalic  Eyes:  PERRL, conjunctiva normal, no scleral icterus  Neck:  Supple, trachea midline  Cardiovascular: Regular rate  Resp:  resp distress, increased work of breathing, diffuse coarse breath sounds   abd:  Soft, non-tender, non-distended  MSK:  No pedal edema, normal ROM  Neuro: Awake, able to answer simple questions and follow commands, no cranial nerve defect  Skin: Skin from head to the toes is gray, cyanotic, and cool to the touch  Psych: Not agitated      MDM  Number of Diagnoses or Management Options     Amount and/or Complexity of Data Reviewed  Clinical lab tests: ordered and reviewed  Tests in the radiology section of CPT®: ordered and reviewed  Review and summarize past medical records: yes    Patient Progress  Patient progress: stable         Procedures      Total critical care time spent exclusive of procedures:  35 min. Perfect Serve Consult for Admission  3:15 PM    ED Room Number: ER15/15  Patient Name and age:  Nelli Gage 67 y.o.  male  Working Diagnosis:   1. Acute respiratory failure with hypoxia ([address]    2. Suspected COVID-19 virus infection    3. Hypoglycemia        COVID-19 Suspicion:  yes  Sepsis present:  no  Reassessment needed: no  Code Status:  Do Not Resuscitate  Readmission: no  Isolation Requirements:  yes  Recommended Level of Care: step down  Department:[facility] Adult ED - 21   Other:  DNR, sats still low on BiPAP. Spoke with ICU and thought ok for the floor         Mr. Michelle Camacho is a 67yo male who presents to the ER with complaints of SOB. Is sats have improved but he still does remain hypoxic. I suspect covid. Pt.  To be evaluated for admission by the hospitalist.

## 2022-01-08 NOTE — PROGRESS NOTES
Pharmacist Note - Vancomycin Dosing    Consult provided for this 67 y.o. male for indication of aspiration PNA. Antibiotic regimen(s): Vanc + Zosyn  Patient on vancomycin PTA? NO     Recent Labs     01/08/22  1113   WBC 19.9*   CREA 2.97*   BUN 71*     Frequency of BMP: daily x 3  Height: 185.4 cm  Weight: 92.0 kg (from 11/15/21)  Est CrCl: 23.5 ml/min; UO: -- ml/kg/hr  No data recorded. Cultures:  1/8 blood - pending    MRSA Swab ordered (if applicable)? YES    The plan below is expected to result in a target range of Trough 10-15 mcg/mL    Therapy will be initiated with a loading dose of 2250 mg IV x 1 and plan to dose by levels. Pharmacy to follow patient daily and order levels / make dose adjustments as appropriate.

## 2022-01-09 NOTE — DISCHARGE SUMMARY
[facility]                                                                                         Discharge Summary     Patient: Dharmesh Love       MRN: [id number]       YOB: 1945       Age: 67 y.o. Date of admission:  2022    Date of discharge:  2022    Primary care provider:  Fer Haro MD     Admitting provider:  Yoselin Baez MD    Discharging provider(s): Driscilla Schirmer, NP - Staff [address]     Consultations  · IP CONSULT TO PALLIATIVE CARE - PROVIDER    Procedures      Discharge Condition:       Admission diagnosis  · Septic shock ([facility] [A41.9, R65.21]  · Acute respiratory failure with hypoxia ([facility] [J96.01]    Please refer to the admission history and physical for details on the presenting problem. Final discharge diagnoses and brief hospital course    Patient was admitted with septic shock (UTI vs Pneumonia) and acute hypoxic respiratory failure related to this along with pre-renal MERISSA secondary to hypoperfusion. He was initially intubated despite being DNR per the wifes request. Patient did not wake up, maintain GCS 3, family was here today and decided to withdraw care. He was placed on comfort care measures.          Physical examination at discharge  Visit Vitals  /72   Pulse 93   Temp 98.5 °F (36.9 °C)   Resp 30   Wt 79.5 kg (175 lb 4.3 oz)   SpO2 (!) 84%   BMI 23.12 kg/m²          Recent Labs     22  02422  2255 22  1113   WBC 31.0* 20.2* 19.9*   HGB 13.8 12.6 16.1   HCT 44.6 41.0 50.7*    227 313     Recent Labs     22  0249 22  0124 22  2255 22  1113   *  --  154* 149*   K 3.4*  --  4.3 3.3*   *  --  125* 115*   CO2 24  --  14* 18*   BUN 86*  --  70* 71*   CREA 2.99*  --  2.18* 2.97*   *  --  46* 47*   CA 8.7  --  6.1* 10.3*   MG  --  3.2 2.3  --    PHOS  --  8.8 5.6*  --      Recent Labs     22  1113   *   TP 7.4   ALB 2.3*   GLOB 5.1*     No results for input(s): INR, PTP, APTT, INREXT in the last 72 hours. No results for input(s): FE, TIBC, PSAT, FERR in the last 72 hours. Recent Labs     01/08/22 2124   PH 7.35   PCO2 33*   PO2 43*     No results for input(s): CPK, CKMB in the last 72 hours. No lab exists for component: TROPONINI  No components found for: [place]    Pertinent imaging studies:      ---------------------------------    Chronic Diagnoses:    Problem List as of 1/9/2022 Date Reviewed: 11/23/2021          Codes Class Noted - Resolved    Acute respiratory failure with hypoxia [facility]) ICD-10-CM: J96.01  ICD-9-CM: 518.81  1/8/2022 - Present        Septic shock ([facility] ICD-10-CM: A41.9, R65.21  ICD-9-CM: 038.9, 785.52, 995.92  1/8/2022 - Present        Tooth decay ICD-10-CM: K02.9  ICD-9-CM: 521.00  11/15/2021 - Present        LUCHO (obstructive sleep apnea) ICD-10-CM: A02.84  ICD-9-CM: 327.23  Unknown - Present        Tubular adenoma of colon ICD-10-CM: D12.6  ICD-9-CM: 211.3  Unknown - Present        Positive FIT (fecal immunochemical test) ICD-10-CM: R19.5  ICD-9-CM: 792.1  1/28/2020 - Present    Overview Signed 2/14/2020  4:48 PM by Shirin Bentley MD     Done by [employer], after 74th birthday.  See phone note 2/14/2020             Dementia ([facility] ICD-10-CM: F03.90  ICD-9-CM: 294.20  Unknown - Present        Abnormal liver enzymes ICD-10-CM: R74.8  ICD-9-CM: 790.5  9/20/2018 - Present        S/P lumbar laminectomy ICD-10-CM: Z98.890  ICD-9-CM: V45.89  8/21/2018 - Present        Recurrent depression ([facility] ICD-10-CM: F33.9  ICD-9-CM: 296.30  7/18/2018 - Present        Dislocation of right acromioclavicular joint ICD-10-CM: S43.101A  ICD-9-CM: 831.04  4/16/2018 - Present        Lumbar spinal stenosis ICD-10-CM: M48.061  ICD-9-CM: 724.02  6/7/2017 - Present        Horseshoe kidney ICD-10-CM: Q63.1  ICD-9-CM: 753.3  Unknown - Present        Recurrent bladder transitional cell carcinoma ([facility] ICD-10-CM: C67.9  ICD-9-CM: 188.9  Unknown - Present        HTN (hypertension) ICD-10-CM: I10  ICD-9-CM: 401.9  12/5/2013 - Present        Hyperlipidemia ICD-10-CM: E78.5  ICD-9-CM: 272.4  Unknown - Present        Restless leg syndrome ICD-10-CM: G25.81  ICD-9-CM: 333.94  Unknown - Present        Arthritis ICD-10-CM: M19.90  ICD-9-CM: 716.90  Unknown - Present        Situational anxiety ICD-10-CM: F41.8  ICD-9-CM: 300.09  Unknown - Present    Overview Signed 5/14/2012  9:51 AM by Michelle Segal MD     ie air travel             RESOLVED: Alcohol use disorder, moderate, in early remission, in controlled environment [facility]) ICD-10-CM: F10.21  ICD-9-CM: 305.03  9/20/2018 - 6/30/2021        RESOLVED: Spinal stenosis, lumbar ICD-10-CM: M48.061  ICD-9-CM: 724.02  Unknown - 8/21/2018        RESOLVED: Advanced directives, counseling/discussion ICD-10-CM: Z71.89  ICD-9-CM: V65.49  4/20/2016 - 8/21/2018    Overview Signed 4/20/2016 11:07 AM by Michelle Segal MD     Pt to bring copy             RESOLVED: Bladder cancer ([facility] ICD-10-CM: C67.9  ICD-9-CM: 188.9  Unknown - 8/21/2018    Overview Signed 6/25/2015  3:11 PM by Michelle Segal MD      - in [facility]: Back pain ICD-10-CM: M54.9  ICD-9-CM: 724.5  Unknown - 8/21/2018        RESOLVED: Thrombocytopenia ([facility] ICD-10-CM: D69.6  ICD-9-CM: 287.5  Unknown - 5/6/2015        RESOLVED: Depression ICD-10-CM: F32. A  ICD-9-CM: 687  Unknown - 8/21/2018              Time spent on discharge related activities today greater than 30 minutes.       Signed:      Richard Clements NP   Staff Intensivist  [facility]    1/9/2022   4:39 PM     Dr. Africa Hernandez   Attending        Cc: Michelle Segal MD

## 2022-01-09 NOTE — PROGRESS NOTES
Critical care update    Spoke with wife and daughter at bedside. Updated on patient status including hypotension and need for vasopressors, hypoxia, acidosis, acute kidney injury, pneumonia, UTI, and septic shock. Discussed code status as patient was DNR prior to intubation. Wife at this time would like us to continue full treatment and life saving measures as we are doing at present, however if despite these efforts, if patient heart were to stop then she would like him to pass instead of resuscitation. Code status changed to DNR.      Keaton Logan AGACNP-BC     [address] [facility]

## 2022-01-09 NOTE — H&P
[facility]    ICU TEAM History and Physical    Name: Adrian Mcbride   : 1945   MRN: [id number]   Date: 2022      Progress Note: 2022      Reason for ICU Admission: Acute respiratory failure on mechanical ventilation    HPI:  Adrian Mcbride is a 67 y.o. with a history of dementia, bladder CA, hypertension, restless leg syndrome, hyperlipidemia, horseshoe kidney, infrarenal abdominal aortic aneurysm, lumbar spinal stenosis, and LUCHO who presents to ED with altered mental status. Per EMS report patient was a his usual mental status earlier in the day, later he was not speaking and was less responsive. Was unable to get an oxygen saturation. He was hypotensive with systolic pressures in the 80's. Per chart on arrival to ED, patient was mottled and grey with saturation of 66%. Patient was placed on BiPAP for ventilatory support with FiO2 at 100% as patient was DNR/DNI. CT chest, abdomen and pelvis was done. Extensive groundglass opacities greatest in lower right lobe noted. Infrarenal abdominal aortic aneurysm also increased in size as compared to previous scan. Kidneys without hydronephrosis. Patient was admitted to [facility] under hospitalist service with sepsis/pneumonia. Respiratory status continued to worsen and patient was obtunded on BiPAP. No longer candidate for BiPAP. Wife was called and hospitalist discussed patient status with her. Wife decided to revoke DNR as durable was signed by her as the POA. Patient was then intubated in ED by the hospitalist and critical care was called for patient transfer to ICU. Patient was started on Levophed post intubation for hypotension. Send respiratory viral panel, place patient on droplet plus precautions. Subjective:   Overnight Events:   2022    POD:  * No surgery found *    S/P:     Assessment:     ICU Problems:  1. Acute Hypoxic respiratory failure now intubated   2. Septic shock, possible UTI vs Pneumonia  3. Dementia  4.  COVID PUI - awaiting PCR  5. MERISSA  6. Leukocytosis  7. Hypocalcemia  8. Hypoglycemia    ICU Comprehensive Plan of Care:     Plans for this Shift:   1. Admit to ICU  2. Neuro:   a. Sedate with propofol  3. Pulm:   a. Mechanical ventilation  b. ABG now and repeat in 2 hours  c. Send sputum culture  d. Bronchodilators prn  e. Send respiratory viral panel  4. Cardiac:   a. Telemetry   b. Levophed for map >65mmHg  5. Renal:  a. MERISSA   b. Lozada   c. I&Os  d. Bicarb gtt  e. Fluid recusitation  6. GI:   a. NPO  b. Place NGT/OGT when able and place to LIWS. KUB for placement confirmation. 7. ID:   a. Trend procalcitonin  b. Follow up cultures, sputum, blood and urine. c. Vanc/Zosyn/Doxy started in ED  8. Heme:  a. Trend CBC   b. Check coags  9. Endo:    a. SSI  10. MSK:    a. ROM passive per nursing  b. COVID PCR negative  11. SBP Goal of: > 90 mmHg  12. MAP Goal of: > 65 mmHg  13. Norepinephrine - For above SBP/MAP goals  14. IVFs: Bicarb gtt  15. Transfusion Trigger (Hgb): <7 g/dL  16. Respiratory Goals:  a. Chlorhexidine   b. Optimize PEEP/Ventilation/Oxygenation  c. Goal Tidal Volume 6 cc/kg based on IBW  d. Aim for lung protective ventilation  e. Head of bed > 30 degrees  17. Pulmonary toilet: Duo-Nebs   18. SpO2 Goal: > 92%  19. Keep K>4; Mg>2   20. PT/OT: Na   21. Discussed Plan of Care/Code Status: Do Not Resuscitate  22. Appreciate Consultants Input    23. Discussed Care Plan with Bedside RN  24. Documentation of Current Medications  25.  Rest of Plan Below:    F - Feeding:  No NPO   A - Analgesia: Acetaminophen  S - Sedation: Propofol  T - DVT Prophylaxis: SCD's or Sequential Compression Device   H - Head of Bed: > 30 Degrees  U - Ulcer Prophylaxis: Pepcid (famotidine)   G - Glycemic Control: Insulin PRN SSI  S - Spontaneous Breathing Trial: No  B - Bowel Regimen: Bisacodyl (Dulcolax)  I - Indwelling Catheter:   Tubes: ETT  Lines: Peripheral IV and Central Line  Drains: Lozada Catheter  D - De-escalation of Antibiotics: Vancomycin  Zosyn  vibramycin    Active Problem List:     Problem List  Date Reviewed: 11/23/2021          Codes Class    Acute respiratory failure with hypoxia (HCC) ICD-10-CM: J96.01  ICD-9-CM: 518.81         Septic shock (HCC) ICD-10-CM: A41.9, R65.21  ICD-9-CM: 038.9, 785.52, 995.92         Tooth decay ICD-10-CM: K02.9  ICD-9-CM: 521.00         LUCHO (obstructive sleep apnea) ICD-10-CM: G47.33  ICD-9-CM: 327.23         Tubular adenoma of colon ICD-10-CM: D12.6  ICD-9-CM: 211.3         Positive FIT (fecal immunochemical test) ICD-10-CM: R19.5  ICD-9-CM: 792.1     Overview Signed 2/14/2020  4:48 PM by Fernie Denney MD     Done by [employer], after 74th birthday.  See phone note 2/14/2020             Dementia ([facility] ICD-10-CM: F03.90  ICD-9-CM: 294.20         Abnormal liver enzymes ICD-10-CM: R74.8  ICD-9-CM: 790.5         S/P lumbar laminectomy ICD-10-CM: Z98.890  ICD-9-CM: V45.89         Recurrent depression ([facility] ICD-10-CM: F33.9  ICD-9-CM: 296.30         Dislocation of right acromioclavicular joint ICD-10-CM: S43.101A  ICD-9-CM: 831.04         Lumbar spinal stenosis ICD-10-CM: M48.061  ICD-9-CM: 724.02         Horseshoe kidney ICD-10-CM: Q63.1  ICD-9-CM: 753.3         Recurrent bladder transitional cell carcinoma (HCC) ICD-10-CM: C67.9  ICD-9-CM: 188.9         HTN (hypertension) ICD-10-CM: I10  ICD-9-CM: 401.9         Hyperlipidemia ICD-10-CM: E78.5  ICD-9-CM: 272.4         Restless leg syndrome ICD-10-CM: G25.81  ICD-9-CM: 333.94         Arthritis ICD-10-CM: M19.90  ICD-9-CM: 716.90         Situational anxiety ICD-10-CM: F41.8  ICD-9-CM: 300.09     Overview Signed 5/14/2012  9:51 AM by Rosetta Nick MD     ie air travel                   Past Medical History:      has a past medical history of AAA (abdominal aortic aneurysm) ([facility] (07/29/2019), Alcohol abuse, in remission, Arthritis, Back pain, Bladder cancer ([facility] C. 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 [facility]) (10/2016), Dementia ([facility] Depression, Horseshoe kidney, HTN (hypertension) (12/5/2013), Hyperlipidemia, Liver disease, LUCHO (obstructive sleep apnea), Recurrent bladder transitional cell carcinoma ([facility] Restless leg syndrome, Screening for colorectal cancer (03/14/2018), Situational anxiety, Spinal stenosis, lumbar, Thrombocytopenia ([facility] Transitional cell carcinoma of left ureter ([facility] and Tubular adenoma of colon. Past Surgical History:      has a past surgical history that includes bladder cancer fish; hx urological; hx lumbar laminectomy; and colonoscopy (N/A, 4/2/2020). Home Medications:     Prior to Admission medications    Medication Sig Start Date End Date Taking? Authorizing Provider   memantine (NAMENDA) 5 mg tablet TAKE 1 TABLET BY MOUTH TWICE DAILY 1/5/22   Gaviota Tran MD   buPROPion HCL (FORFIVO) 450 mg Tb24 XL tablet 1 TABLET BY MOUTH EVERY MORNING FOR DEPRESSION 1/5/22   Gaviota Tran MD   donepeziL (ARICEPT) 10 mg tablet 1 TABLET BY MOUTH EVERY DAY FOR DEMENTIA 1/5/22   Gaviota Tran MD   pramipexole (MIRAPEX) 0.75 mg tablet Take 1 tab by mouth every night 1/5/22   Gaviota Tran MD   lidocaine (LIDODERM) 5 % APPLY 1 PATCH TO AFFECTED AREA FOR 12 HOURS IN A 24 HOUR PERIOD AS NEEDED 12/21/21   Gaviota Tran MD   lidocaine-vit e-aloe vera-olga lidia 2 % topical gel Apply 1 Actuation(s) to affected area three (3) times daily as needed for Pain. 11/30/21   Gaviota Tran MD   tiZANidine (ZANAFLEX) 4 mg tablet TAKE 1 TABLET BY MOUTH UP TO TWICE DAILY FOR MUSCLE SPASMS 10/28/21   Provider, Historical   polyethylene glycol (MIRALAX) 17 gram/dose powder Take 17 g by mouth daily. miralax clean out - 7 capfuls of miralax in a 32 oz gatorade - drink over a few hours. May need to repeat x 2-3 days depending on results.  11/15/21   Gaviota Tran MD   sertraline (ZOLOFT) 100 mg tablet TAKE 2 TABLETS BY MOUTH DAILY 11/8/21   Gaviota Tran MD magnesium citrate solution DRINK CONTENTS OF 1 BOTTLE(296ML) BY MOUTH AS A SINGLE DOSE AS DIRECTED  Patient not taking: Reported on 11/15/2021 11/4/21   Gisele Pollard MD   ketoconazole (NIZORAL) 2 % shampoo APPLY EXTERNALLY 2 TIMES A WEEK FOR 2 TO 4 WEEKS  Patient not taking: Reported on 10/18/2021 7/24/21   Rise Fails, NP   sildenafiL, pulmonary hypertension, (REVATIO) 20 mg tablet Take 20 mg by mouth three (3) times daily. Patient not taking: Reported on 11/15/2021  1/8/22  Provider, Historical   fluticasone propionate (FLONASE) 50 mcg/actuation nasal spray SHAKE LIQUID AND USE 2 SPRAYS INTO EACH NOSTRIL DAILY  Patient not taking: Reported on 10/18/2021 11/16/20   Kelly SINGH NP   fexofenadine (ALLEGRA) 180 mg tablet Take 180 mg by mouth daily. Provider, Historical   cholecalciferol (Vitamin D3) (1000 Units /25 mcg) tablet Take 1,000 Units by mouth daily. Provider, Historical   chlorzoxazone (PARAFON FORTE) 500 mg tablet Take 500 mg by mouth two (2) times a day. Patient not taking: Reported on 10/18/2021    Provider, Historical   diphenhydrAMINE (BENADRYL) 25 mg capsule Take 25 mg by mouth nightly as needed. Patient not taking: Reported on 10/18/2021    Provider, Historical   ondansetron (ZOFRAN ODT) 4 mg disintegrating tablet Take 1 Tab by mouth every eight (8) hours as needed for Nausea. Patient not taking: Reported on 11/15/2021 9/13/19   Rise Fails, NP   ibuprofen 200 mg cap Take  by mouth. Patient not taking: Reported on 11/15/2021    Provider, Historical   melatonin tab tablet Take 5 mg by mouth nightly. Provider, Historical   multivitamin (ONE DAILY ENERGY) tablet Take 1 Tab by mouth daily. Provider, Historical   MILK THISTLE PO Take 1 Tab by mouth two (2) times a day. Provider, Historical   loratadine (CLARITIN) 10 mg tablet Take 10 mg by mouth daily.     Provider, Historical       Allergies/Social/Family History:     No Known Allergies   Social History     Tobacco Use  Smoking status: Current Every Day Smoker    Smokeless tobacco: Never Used    Tobacco comment: E cigarette   Substance Use Topics    Alcohol use: Yes     Alcohol/week: 1.0 - 2.0 standard drink     Types: 1 - 2 Glasses of wine per week     Comment: 2 glasses of red wine a night       Family History   Problem Relation Age of Onset    Arthritis Mother     Cancer Father         lung ca    Other Brother         back problems       Review of Systems:     Review of systems not obtained due to patient factors. Objective:   Vital Signs:  Visit Vitals  BP 97/75   Pulse (!) 102   Temp 97.5 °F (36.4 °C)   Resp 18   SpO2 (!) 72%      O2 Device: BIPAP Temp (24hrs), Av.5 °F (36.4 °C), Min:97.5 °F (36.4 °C), Max:97.5 °F (36.4 °C)           Intake/Output:     Intake/Output Summary (Last 24 hours) at 2022 2233  Last data filed at 2022 1835  Gross per 24 hour   Intake 2660 ml   Output    Net 2660 ml       Physical Exam:  General:  appears older than stated age, Intubated and sedated  Eye:  conjunctivae/corneas clear. Pupils are round and reactive bilaterally  Neurologic:  WD to pain in all ext. Sedated. Lymphatic:  Cervical, supraclavicular, and axillary nodes normal.   Neck:  normal and no erythema or exudates noted. Lungs: diminished in bilaterally bases. Heart:  regular rate and rhythm, S1, S2  Abdomen:  soft, non-tender. Bowel sounds normal. No masses,  no organomegaly  Cardiovascular:  Regular rate and rhythm, S1S2 present, without murmur or extra heart sounds, pedal pulses normal and no edema  Skin: Mottled and cyanosis noted in lower peripheral extremities.     LABS AND  DATA: Personally reviewed  Recent Labs     22  1113   WBC 19.9*   HGB 16.1   HCT 50.7*        Recent Labs     22  1113   *   K 3.3*   *   CO2 18*   BUN 71*   CREA 2.97*   GLU 47*   CA 10.3*     Recent Labs     22  1113   *   TP 7.4   ALB 2.3*   GLOB 5.1*     No results for input(s): INR, PTP, APTT, INREXT in the last 72 hours. Recent Labs     01/08/22  1140   PHI 7.34*   PCO2I 31.8*   PO2I 40*   FIO2I 100     No results for input(s): CPK, CKMB, TROIQ, BNPP in the last 72 hours. Hemodynamics:   PAP:   CO:     Wedge:   CI:     CVP:    SVR:       PVR:       Ventilator Settings:  Mode Rate Tidal Volume Pressure FiO2 PEEP            100 %       Peak airway pressure:      Minute ventilation: 48 l/min        MEDS: Reviewed    Imaging:    CXR Results  (Last 48 hours)               01/08/22 1135  XR CHEST PORT Final result    Impression:  Airspace disease in the bilateral lung bases. Narrative:  EXAM: XR CHEST PORT       HISTORY: Chest Pain. COMPARISON: Chest CT 7/20/2014       FINDINGS: Portable AP. Patchy opacities are seen in both lung bases. The heart   size is normal. No pleural effusion. No pneumothorax. The bones are osteopenic. CT Results  (Last 48 hours)               01/08/22 1418  CT CHEST WO CONT Final result    Impression:      1. Extensive bilateral groundglass opacities with superimposed airspace disease   most confluent at the right base compatible with pneumonia   2. 4.1 cm infrarenal abdominal aortic aneurysm minimally increased in size   3. Horseshoe kidney without hydronephrosis           Narrative:  INDICATION: Shortness of breath and hypotension       COMPARISON: Earlier same day and 7/24/2018       TECHNIQUE: Unenhanced axial images were obtained through the chest, abdomen, and   pelvis. Oral contrast was not administered. Sagittal and coronal reformats were   performed. CT dose reduction was achieved through use of a standardized protocol   tailored for this examination and automatic exposure control for dose   modulation. FINDINGS:       THYROID: No nodule. MEDIASTINUM: No mass or lymphadenopathy. HUGH: No mass or lymphadenopathy. THORACIC AORTA: No dissection or aneurysm.  There are vascular calcifications   MAIN PULMONARY ARTERY: Normal in caliber. TRACHEA/BRONCHI: Patent. ESOPHAGUS: No wall thickening or dilatation. HEART: Normal in size. There are coronary artery calcifications   PLEURA: No effusion or pneumothorax. LUNGS: Patchy bilateral groundglass opacifications with superimposed   consolidation at the bilateral lung bases most confluent at the right base   LIVER: No mass or biliary dilatation. GALLBLADDER: Small stone without evidence of acute cholecystitis   SPLEEN: No mass. PANCREAS: No mass or ductal dilatation. ADRENALS: Unremarkable. KIDNEYS: Horseshoe kidney. Atrophy of the left side of the horseshoe kidney but   no hydronephrosis   STOMACH: Unremarkable. SMALL BOWEL: No dilatation or wall thickening. COLON: No dilatation or wall thickening. APPENDIX: Unremarkable. PERITONEUM: No ascites or pneumoperitoneum. RETROPERITONEUM: Infrarenal abdominal aortic aneurysm now measuring 4.1 cm,   slightly enlarged   REPRODUCTIVE ORGANS: Not enlarged   URINARY BLADDER: Small stones layering posteriorly   BONES: No destructive bone lesion. ADDITIONAL COMMENTS: N/A           01/08/22 1418  CT ABD PELV WO CONT Final result    Impression:      1. Extensive bilateral groundglass opacities with superimposed airspace disease   most confluent at the right base compatible with pneumonia   2. 4.1 cm infrarenal abdominal aortic aneurysm minimally increased in size   3. Horseshoe kidney without hydronephrosis           Narrative:  INDICATION: Shortness of breath and hypotension       COMPARISON: Earlier same day and 7/24/2018       TECHNIQUE: Unenhanced axial images were obtained through the chest, abdomen, and   pelvis. Oral contrast was not administered. Sagittal and coronal reformats were   performed. CT dose reduction was achieved through use of a standardized protocol   tailored for this examination and automatic exposure control for dose   modulation. FINDINGS:       THYROID: No nodule.    MEDIASTINUM: No mass or lymphadenopathy. HUGH: No mass or lymphadenopathy. THORACIC AORTA: No dissection or aneurysm. There are vascular calcifications   MAIN PULMONARY ARTERY: Normal in caliber. TRACHEA/BRONCHI: Patent. ESOPHAGUS: No wall thickening or dilatation. HEART: Normal in size. There are coronary artery calcifications   PLEURA: No effusion or pneumothorax. LUNGS: Patchy bilateral groundglass opacifications with superimposed   consolidation at the bilateral lung bases most confluent at the right base   LIVER: No mass or biliary dilatation. GALLBLADDER: Small stone without evidence of acute cholecystitis   SPLEEN: No mass. PANCREAS: No mass or ductal dilatation. ADRENALS: Unremarkable. KIDNEYS: Horseshoe kidney. Atrophy of the left side of the horseshoe kidney but   no hydronephrosis   STOMACH: Unremarkable. SMALL BOWEL: No dilatation or wall thickening. COLON: No dilatation or wall thickening. APPENDIX: Unremarkable. PERITONEUM: No ascites or pneumoperitoneum. RETROPERITONEUM: Infrarenal abdominal aortic aneurysm now measuring 4.1 cm,   slightly enlarged   REPRODUCTIVE ORGANS: Not enlarged   URINARY BLADDER: Small stones layering posteriorly   BONES: No destructive bone lesion. ADDITIONAL COMMENTS: N/A               ECHO:  Pending    Multidisciplinary Rounds Completed:  No    ABCDEF Bundle/Checklist Completed:  Yes    SPECIAL EQUIPMENT  None    DISPOSITION  Stay in ICU    CRITICAL CARE CONSULTANT NOTE  I had a face to face encounter with the patient, reviewed and interpreted patient data including clinical events, labs, images, vital signs, I/O's, and examined patient.   I have discussed the case and the plan and management of the patient's care with the consulting services, the bedside nurses and the respiratory therapist.      NOTE OF PERSONAL INVOLVEMENT IN CARE   This patient has a high probability of imminent, clinically significant deterioration, which requires the highest level of preparedness to intervene urgently. I participated in the decision-making and personally managed or directed the management of the following life and organ supporting interventions that required my frequent assessment to treat or prevent imminent deterioration. I personally spent 90 minutes of critical care time. This is time spent at this critically ill patient's bedside actively involved in patient care as well as the coordination of care and discussions with the patient's family. This does not include any procedural time which has been billed separately.     Leroy Adams [facility]-BC     [address] [facility]

## 2022-01-09 NOTE — ED NOTES
2116 - called and paged Attending MD for persistent hypoxia - unable to improved POX with change on BIPAP;; pending to hear back from providers;;    2130 - Unable to hear back from providers - called a Rapid Response at this time; Attending MD called and spoke with family members - PT made full code at this time;;    [phone] - ET placed - 7.5 25 at the lips;; intubated by [facility] MD;;     2238 - Portable XR at bedside for eval;;    2336 - Portable XR at bedside for NG tube placement;; poor NG tube placement at this time;;    0030 - pt with persistet hypotension / hypoxia while on vent - ICU NP paged and at bedside for eval - NP bagging patient to aide in hypoxia - will also place a central line for further management;;    0136 - family member at bedside - ICU NP speaking with family members regarding plan of care;; pt will be DNR;    0300 - pt's family members remain at bedside for comfort and care; pending for ICU bed;;    0420 - ICU NP, Bryce Patterson NP, at bedside for reeval - family members remain at bedside for comfort and care;;    0500 - TRANSFER - OUT REPORT:    Verbal report given to Carlos Grijalva RN (name) on [facility]  being transferred to CCU ICU (unit) for routine progression of care       Report consisted of patients Situation, Background, Assessment and   Recommendations(SBAR). Information from the following report(s) SBAR, Kardex, ED Summary, Intake/Output and MAR was reviewed with the receiving nurse. Lines:   Peripheral IV 01/08/22 Left Forearm (Active)       Peripheral IV 01/08/22 Left;Posterior Hand (Active)       Peripheral IV 01/08/22 Anterior;Proximal;Right Forearm (Active)       Peripheral IV 01/09/22 Posterior;Right Hand (Active)        Opportunity for questions and clarification was provided.       Patient transported with:   Monitor / RN;;

## 2022-01-09 NOTE — PROGRESS NOTES
Critical Care Documentation    Name: Nelli Gage  YOB: 1945  MRN: [id number]  Admission Date: 1/8/2022 10:59 AM    Date of service: 1/8/2022    Active Diagnoses:    Hospital Problems  Date Reviewed: 11/23/2021          Codes Class Noted POA    Acute respiratory failure with hypoxia [facility]) ICD-10-CM: J96.01  ICD-9-CM: 518.81  1/8/2022 Unknown        Septic shock ([facility] ICD-10-CM: A41.9, R65.21  ICD-9-CM: 038.9, 785.52, 995.92  1/8/2022 Unknown              Critical Illness Complaint:  Shortness of breath, hypoxia, obtunded    Clinical Presentation:  59-year-old male with history of dementia who was admitted to hospital for acute hypoxic respiratory failure secondary to community-acquired pneumonia. Noted to be increasingly obtunded and with increased work of breathing. RRT called on December to evaluate patient at bedside. Patient promptly placed on BiPAP with minimal recovery and work of breathing. Increased obtundation noted at bedside. Called and discussed clinical picture with wife who is surrogate decision-maker. Wife opts for full restorative measures at this time. DNR status is revoked. Physical Exam:   General:   Obtunded   Head:  Normocephalic, without obvious abnormality, atraumatic. Eyes:  Conjunctivae/corneas clear. Throat: Lips, mucosa, and tongue normal.   Neck: Supple, symmetrical, trachea midline. no enlargement/tenderness/nodules, no carotid bruit and no JVD. Lungs:    Coarse breath sounds bilateral crackles   Heart:  Regular rate and rhythm, S1, S2 normal   Abdomen:   Soft, non-tender. Bowel sounds normal. No masses,  No organomegaly. Extremities:  Cold, cyanotic   Pulses: 2+ and symmetric all extremities. Skin:  Mottled skin with poor skin turgor           Data Reviewed: All diagnostic labs and studies have been reviewed.       Medications/Therapies Administered:   Anxiolytics: [  ] yes [  x] no Antiarrhythmics: [  ] yes [x  ] no   Antihypertensives: [  ] yes [ x ] no   IVFs: [ x ] yes [  ] no   Blood Transfusion: [  ] yes [  x] no    Imaging Ordered and Reviewed:   [x  ] CXR    [  ] CT Scan    [  ] MRI    [  ] Ultrasound    Prognosis / Plan of Care Discussed With:  [  ] Patient  [x  ] Family Members / Surrogate Decision-makers (patient unable / incompetent to give history and/or make treatment decisions, and such discussion is medically necessary)  [ x ] Nursing Staff  [ x ] Specialist Physicians        Assessment and Plan:  Acute hypoxic respiratory failure secondary to CAP  Septic shock  -Patient promptly intubated for airway protection  -Broaden antibiotics to Vanco, Zosyn and doxy  -Pressor support with Levophed  -Saline bolus  -Transfer care to ICU    Critical Care Attestation: This patient is unstable and critically ill. Due to a high probability of clinically significant, life threatening deterioration, the patient required my highest level of preparedness to intervene emergently and I personally spent this critical care time directly and personally managing the patient, and was immediately available to the patient. This critical care time included obtaining a history; examining the patient; pulse oximetry; ordering and review of labs/studies; arranging urgent treatment with development of a management plan; evaluation of patient's response to treatment; frequent reassessment; and, discussions with other providers and/or family. This critical care time was performed to assess and manage the high probability of imminent, life-threatening deterioration that could result in multi-organ failure and death. Time Spent:     I personally spent 50 minutes in providing critical care time. It was exclusive of separately billable procedures, treating other patients, and teaching time.     Lavell Nieves MD  1/8/2022  9:50 PM

## 2022-01-09 NOTE — PROGRESS NOTES
0530: Bedside shift change report given to Kary [address] (oncoming nurse) by Yoanna Williamson (offgoing nurse). Report included the following information SBAR, Intake/Output, MAR and Recent Results. Shift Summary: Patient arrived from ER on Levo @ 4mcg, D5 @ 50cc, and Bicarb at 100cc/hr. Patient has no cough, no gag, no withdrawal, ?sluggish reactivity on pupils. Patient has multiple nonhealing wounds on arms, legs, and feet. Feet/knees are mottled but pulses present distally. Wife at bedside, updated by RN.      Primary Nurse Isaias Knowles, RN and Kayce Lr, RN, RN performed a dual skin assessment on this patient Impairment noted- see wound doc flow sheet  Salinas score is 10

## 2022-01-09 NOTE — PROGRESS NOTES
Called for asystole. Pt had no heart tones, no respirations, pupils fixed. Time of death 65 1/9/22. Family at bedside.

## 2022-01-09 NOTE — PROCEDURES
[facility]      Procedure Note - Central Venous Access:   Performed by SUZANNE Leary  Diagnosis: Acute hypoxic respiratory failure    Insertion Date: 01/09/22  Time:1:10 AM  Obtained Consent? no; Emergent  Procedure Location:  ICU. Immediately prior to the procedure, the patient was reevaluated and found suitable for the planned procedure and any planned medications. Immediately prior to the procedure a time out was called to verify the correct patient, procedure, equipment, staff, and marking as appropriate. Central line Bundle:  Full sterile barrier precautions used. 7-Step Sterility Protocol followed. (cap, mask sterile gown, sterile gloves, large sterile sheet, hand hygiene, 2% chlorhexidine for cutaneous antisepsis)    Patient positioned in Trendelenburg?yes   The site was prepped with ChloraPrep and Sterile draping. Catheter inserted into a new site. Using Seldinger technique a Arrow Quad Lumen CVC was placed in the Right, Internal Jugular Vein via direct cannulation with x1 number of attempts for Monitoring, Blood Drawing and IV Access. Ultrasound Guidance was utilized. There was good dark, non-pulsatile blood return in all ports. Femoral Site? no. If Yes, reason femoral site was chosen: NA  Catheter secured. Biopatch in place? yes. Sterile Bio-occlusive dressing placed. The following complications were encountered: None. A follow-up chest x-ray was ordered post procedure. The procedure was tolerated well.           SUZANNE Leary  Critical Care Medicine  [facility]

## 2022-01-09 NOTE — PROGRESS NOTES
0730: Bedside and Verbal shift change report given to Federico Colvin RN (oncoming nurse) by Harmony Paulson RN (offgoing nurse).  Report included the following information SBAR, Kardex, Intake/Output, MAR, Accordion, Recent Results, Med Rec Status, Cardiac Rhythm NSR and Alarm Parameters      0900: RN and MD at bedside to speak with family; pts wife William Marshall and daughter Annalisa Martínez at also at bedside; discussed pts condition and poor prognosis; answered any questions the family had; per family they would like to wait for his other son and daughter to arrive and then proceed with compassionate extubation    1000: RN and NP at bedside; spoke with family and again confirmed decision for compassionate extubation once all family members are present; per NP at this time we will continue to support pt with current IV medications but there will be no escalation of care and at this time pt just to be maintained stable and comfortable until family arrives; ok to hold scheduled meds;

## 2022-01-09 NOTE — PROGRESS NOTES
Spiritual Care Assessment/Progress Note  [facility]      NAME: Galo Jo      MRN: [id number]  AGE: 67 y.o. SEX: male  Scientologist Affiliation: No Caodaism   Language: English     1/9/2022     Total Time (in minutes): 39     Spiritual Assessment begun in [address] through conversation with:         []Patient        [x] Family    [] Friend(s)        Reason for Consult: Family care     Spiritual beliefs: (Please include comment if needed)     [] Identifies with a joaquina tradition:         [] Supported by a joaquina community:            [] Claims no spiritual orientation:           [] Seeking spiritual identity:                [x] Adheres to an individual form of spirituality:           [] Not able to assess:                           Identified resources for coping:      [] Prayer                               [] Music                  [] Guided Imagery     [x] Family/friends                 [] Pet visits     [] Devotional reading                         [] Unknown     [] Other:                                             Interventions offered during this visit: (See comments for more details)          Family/Friend(s):  Affirmation of emotions/emotional suffering,Catharsis/review of pertinent events in supportive environment,Initial Assessment,Life review/legacy     Plan of Care:     [] Support spiritual and/or cultural needs    [] Support AMD and/or advance care planning process      [] Support grieving process   [] Coordinate Rites and/or Rituals    [] Coordination with community clergy   [] No spiritual needs identified at this time   [] Detailed Plan of Care below (See Comments)  [] Make referral to Music Therapy  [] Make referral to Pet Therapy     [] Make referral to Addiction services  [] Make referral to [facility]  [] Make referral to Spiritual Care Partner  [] No future visits requested        [x] Contact Spiritual Care for further referrals     Comments: Provided support for the family of this pt in [facility]. Pt's wife, son, and two daughters were present during this visit. Reviewed pt's chart prior to this visit. Facilitated life review to assess potential support needs or coping strategies. Pt's family offered review of current situation. Provided support as family processed thougths and feelings, including memories of times with the pt. Pt wasn't Restorationist but family requested a blessing be pronounced. 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 [place] of love was offered for this family. Yen Wade MDiv.  Staff   Request  Support/Spiritual Care Services on [phone])

## 2022-01-09 NOTE — ED NOTES
Bedside shift change report given to Jarek Negro RN (oncoming nurse) by Ian Vaca RN (offgoing nurse). Report included the following information SBAR.

## 2022-01-09 NOTE — PROGRESS NOTES
Occupational Therapy    Orders received, acknowledged, and patient chart reviewed. Note patient OT evaluation orders begin 1/10 at 0000. Will follow-up for evaluation tomorrow.     Julieta Blanco, OTR/L

## 2022-01-09 NOTE — PROGRESS NOTES
[facility]    ICU TEAM History and Physical    Name: Alfredo Fishman   : 1945   MRN: [id number]   Date: 2022      Progress Note: 2022      Reason for ICU Admission: Acute respiratory failure on mechanical ventilation    HPI:  Alfredo Fishman is a 67 y.o. with a history of dementia, bladder CA, hypertension, restless leg syndrome, hyperlipidemia, horseshoe kidney, infrarenal abdominal aortic aneurysm, lumbar spinal stenosis, and LUCHO who presents to ED with altered mental status. Per EMS report patient was a his usual mental status earlier in the day, later he was not speaking and was less responsive. Was unable to get an oxygen saturation. He was hypotensive with systolic pressures in the 80's. Per chart on arrival to ED, patient was mottled and grey with saturation of 66%. Patient was placed on BiPAP for ventilatory support with FiO2 at 100% as patient was DNR/DNI. CT chest, abdomen and pelvis was done. Extensive groundglass opacities greatest in lower right lobe noted. Infrarenal abdominal aortic aneurysm also increased in size as compared to previous scan. Kidneys without hydronephrosis. Patient was admitted to [facility] under hospitalist service with sepsis/pneumonia. Respiratory status continued to worsen and patient was obtunded on BiPAP. No longer candidate for BiPAP. Wife was called and hospitalist discussed patient status with her. Wife decided to revoke DNR as durable was signed by her as the POA. Patient was then intubated in ED by the hospitalist and critical care was called for patient transfer to ICU. Patient was started on Levophed post intubation for hypotension. Send respiratory viral panel, place patient on droplet plus precautions. Subjective:   Overnight Events:   2022    POD:  * No surgery found *    S/P:     Assessment:     ICU Problems:  1. Acute Hypoxic respiratory failure now intubated   2. Septic shock, possible UTI vs Pneumonia  3. Dementia  4.  COVID PUI - awaiting PCR  5. MERISSA  6. Leukocytosis  7. Hypocalcemia  8. Hypoglycemia    ICU Comprehensive Plan of Care:     Plans for this Shift:   1. Admit to ICU  2. Neuro:   a. Sedate with propofol  3. Pulm:   a. Mechanical ventilation  b. ABG now and repeat in 2 hours  c. Send sputum culture  d. Bronchodilators prn  e. Send respiratory viral panel  4. Cardiac:   a. Telemetry   b. Levophed for map >65mmHg  5. Renal:  a. MERISSA   b. Lozada   c. I&Os  d. Bicarb gtt  e. Fluid recusitation  6. GI:   a. NPO  b. Place NGT/OGT when able and place to LIWS. KUB for placement confirmation. 7. ID:   a. Trend procalcitonin  b. Follow up cultures, sputum, blood and urine. c. Vanc/Zosyn/Doxy started in ED  8. Heme:  a. Trend CBC   b. Check coags  9. Endo:    a. SSI  10. MSK:    a. ROM passive per nursing    Family waiting on daughter to arrive, patient is to transition to comfort care measures only once she arrives. Maintain current measures until then, can increase on pressor, do not add additional pressors per family request.     b. COVID PCR negative  11. SBP Goal of: > 90 mmHg  12. MAP Goal of: > 65 mmHg  13. Norepinephrine - For above SBP/MAP goals  14. IVFs: Bicarb gtt  15. Transfusion Trigger (Hgb): <7 g/dL  16. Respiratory Goals:  a. Chlorhexidine   b. Optimize PEEP/Ventilation/Oxygenation  c. Goal Tidal Volume 6 cc/kg based on IBW  d. Aim for lung protective ventilation  e. Head of bed > 30 degrees  17. Pulmonary toilet: Duo-Nebs   18. SpO2 Goal: > 92%  19. Keep K>4; Mg>2   20. PT/OT: Na   21. Discussed Plan of Care/Code Status: Do Not Resuscitate  22. Appreciate Consultants Input    23. Discussed Care Plan with Bedside RN  24. Documentation of Current Medications  25.  Rest of Plan Below:    F - Feeding:  No NPO   A - Analgesia: Acetaminophen  S - Sedation: Propofol  T - DVT Prophylaxis: SCD's or Sequential Compression Device   H - Head of Bed: > 30 Degrees  U - Ulcer Prophylaxis: Pepcid (famotidine)   G - Glycemic Control: Insulin PRN SSI  S - Spontaneous Breathing Trial: No  B - Bowel Regimen: Bisacodyl (Dulcolax)  I - Indwelling Catheter:   Tubes: ETT  Lines: Peripheral IV and Central Line  Drains: Lozada Catheter  D - De-escalation of Antibiotics: Vancomycin  Zosyn  vibramycin    Active Problem List:     Problem List  Date Reviewed: 11/23/2021          Codes Class    Acute respiratory failure with hypoxia (HCC) ICD-10-CM: J96.01  ICD-9-CM: 518.81         Septic shock (HCC) ICD-10-CM: A41.9, R65.21  ICD-9-CM: 038.9, 785.52, 995.92         Tooth decay ICD-10-CM: K02.9  ICD-9-CM: 521.00         LUCHO (obstructive sleep apnea) ICD-10-CM: G47.33  ICD-9-CM: 327.23         Tubular adenoma of colon ICD-10-CM: D12.6  ICD-9-CM: 211.3         Positive FIT (fecal immunochemical test) ICD-10-CM: R19.5  ICD-9-CM: 792.1     Overview Signed 2/14/2020  4:48 PM by Ludmila Valencia MD     Done by [employer], after 74th birthday.  See phone note 2/14/2020             Dementia ([facility] ICD-10-CM: F03.90  ICD-9-CM: 294.20         Abnormal liver enzymes ICD-10-CM: R74.8  ICD-9-CM: 790.5         S/P lumbar laminectomy ICD-10-CM: Z98.890  ICD-9-CM: V45.89         Recurrent depression ([facility] ICD-10-CM: F33.9  ICD-9-CM: 296.30         Dislocation of right acromioclavicular joint ICD-10-CM: S43.101A  ICD-9-CM: 831.04         Lumbar spinal stenosis ICD-10-CM: M48.061  ICD-9-CM: 724.02         Horseshoe kidney ICD-10-CM: Q63.1  ICD-9-CM: 753.3         Recurrent bladder transitional cell carcinoma (HCC) ICD-10-CM: C67.9  ICD-9-CM: 188.9         HTN (hypertension) ICD-10-CM: I10  ICD-9-CM: 401.9         Hyperlipidemia ICD-10-CM: E78.5  ICD-9-CM: 272.4         Restless leg syndrome ICD-10-CM: G25.81  ICD-9-CM: 333.94         Arthritis ICD-10-CM: M19.90  ICD-9-CM: 716.90         Situational anxiety ICD-10-CM: F41.8  ICD-9-CM: 300.09     Overview Signed 5/14/2012  9:51 AM by Jun Abarca MD     ie air travel                   Past Medical History:      has a past medical history of AAA (abdominal aortic aneurysm) ([email or web address] (07/29/2019), Alcohol abuse, in remission, Arthritis, Back pain, Bladder cancer ([email or web address] C. 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 ([email or web address] (10/2016), Dementia ([facility] Depression, Horseshoe kidney, HTN (hypertension) (12/5/2013), Hyperlipidemia, Liver disease, LUCHO (obstructive sleep apnea), Recurrent bladder transitional cell carcinoma ([email or web address] Restless leg syndrome, Screening for colorectal cancer (03/14/2018), Situational anxiety, Spinal stenosis, lumbar, Thrombocytopenia ([email or web address] Transitional cell carcinoma of left ureter ([facility] and Tubular adenoma of colon. Past Surgical History:      has a past surgical history that includes bladder cancer fish; hx urological; hx lumbar laminectomy; and colonoscopy (N/A, 4/2/2020). Home Medications:     Prior to Admission medications    Medication Sig Start Date End Date Taking? Authorizing Provider   memantine (NAMENDA) 5 mg tablet TAKE 1 TABLET BY MOUTH TWICE DAILY 1/5/22   Odell Rivero MD   buPROPion HCL (FORFIVO) 450 mg Tb24 XL tablet 1 TABLET BY MOUTH EVERY MORNING FOR DEPRESSION 1/5/22   Odell Rivero MD   donepeziL (ARICEPT) 10 mg tablet 1 TABLET BY MOUTH EVERY DAY FOR DEMENTIA 1/5/22   Odell Rivero MD   pramipexole (MIRAPEX) 0.75 mg tablet Take 1 tab by mouth every night 1/5/22   Odell Rivero MD   lidocaine (LIDODERM) 5 % APPLY 1 PATCH TO AFFECTED AREA FOR 12 HOURS IN A 24 HOUR PERIOD AS NEEDED 12/21/21   Odell Rivero MD   lidocaine-vit e-aloe vera-olga lidia 2 % topical gel Apply 1 Actuation(s) to affected area three (3) times daily as needed for Pain. 11/30/21   Odell Rivero MD   tiZANidine (ZANAFLEX) 4 mg tablet TAKE 1 TABLET BY MOUTH UP TO TWICE DAILY FOR MUSCLE SPASMS 10/28/21   Provider, Historical   polyethylene glycol (MIRALAX) 17 gram/dose powder Take 17 g by mouth daily.  miralax clean out - 7 capfuls of miralax in a 32 oz gatorade - drink over a few hours. May need to repeat x 2-3 days depending on results. 11/15/21   Anya Coley MD   sertraline (ZOLOFT) 100 mg tablet TAKE 2 TABLETS BY MOUTH DAILY 11/8/21   Anya Coley MD   magnesium citrate solution DRINK CONTENTS OF 1 BOTTLE(296ML) BY MOUTH AS A SINGLE DOSE AS DIRECTED  Patient not taking: Reported on 11/15/2021 11/4/21   Anya Coley MD   ketoconazole (NIZORAL) 2 % shampoo APPLY EXTERNALLY 2 TIMES A WEEK FOR 2 TO 4 WEEKS  Patient not taking: Reported on 10/18/2021 7/24/21   Adrian Caal NP   fluticasone propionate (FLONASE) 50 mcg/actuation nasal spray SHAKE LIQUID AND USE 2 SPRAYS INTO EACH NOSTRIL DAILY  Patient not taking: Reported on 10/18/2021 11/16/20   Annelise SINGH NP   fexofenadine (ALLEGRA) 180 mg tablet Take 180 mg by mouth daily. Provider, Historical   cholecalciferol (Vitamin D3) (1000 Units /25 mcg) tablet Take 1,000 Units by mouth daily. Provider, Historical   chlorzoxazone (PARAFON FORTE) 500 mg tablet Take 500 mg by mouth two (2) times a day. Patient not taking: Reported on 10/18/2021    Provider, Historical   diphenhydrAMINE (BENADRYL) 25 mg capsule Take 25 mg by mouth nightly as needed. Patient not taking: Reported on 10/18/2021    Provider, Historical   ondansetron (ZOFRAN ODT) 4 mg disintegrating tablet Take 1 Tab by mouth every eight (8) hours as needed for Nausea. Patient not taking: Reported on 11/15/2021 9/13/19   Adrian Caal NP   ibuprofen 200 mg cap Take  by mouth. Patient not taking: Reported on 11/15/2021    Provider, Historical   melatonin tab tablet Take 5 mg by mouth nightly. Provider, Historical   multivitamin (ONE DAILY ENERGY) tablet Take 1 Tab by mouth daily. Provider, Historical   MILK THISTLE PO Take 1 Tab by mouth two (2) times a day. Provider, Historical   loratadine (CLARITIN) 10 mg tablet Take 10 mg by mouth daily.     Provider, Historical       Allergies/Social/Family History:     No Known Allergies Social History     Tobacco Use    Smoking status: Current Every Day Smoker    Smokeless tobacco: Never Used    Tobacco comment: E cigarette   Substance Use Topics    Alcohol use: Yes     Alcohol/week: 1.0 - 2.0 standard drink     Types: 1 - 2 Glasses of wine per week     Comment: 2 glasses of red wine a night       Family History   Problem Relation Age of Onset    Arthritis Mother     Cancer Father         lung ca    Other Brother         back problems       Review of Systems:     Review of systems not obtained due to patient factors. Objective:   Vital Signs:  Visit Vitals  /67   Pulse 94   Temp 98.9 °F (37.2 °C)   Resp 30   Wt 79.5 kg (175 lb 4.3 oz)   SpO2 90%   BMI 23.12 kg/m²      O2 Device: Endotracheal tube Temp (24hrs), Av.2 °F (36.8 °C), Min:97.5 °F (36.4 °C), Max:98.9 °F (37.2 °C)           Intake/Output:     Intake/Output Summary (Last 24 hours) at 2022 0740  Last data filed at 2022 0600  Gross per 24 hour   Intake 5512.65 ml   Output 725 ml   Net 4787.65 ml       Physical Exam:  General:  appears older than stated age, Intubated and sedated  Eye:  conjunctivae/corneas clear. Pupils are round and reactive bilaterally  Neurologic:  WD to pain in all ext. Sedated. Lymphatic:  Cervical, supraclavicular, and axillary nodes normal.   Neck:  normal and no erythema or exudates noted. Lungs: diminished in bilaterally bases. Heart:  regular rate and rhythm, S1, S2  Abdomen:  soft, non-tender. Bowel sounds normal. No masses,  no organomegaly  Cardiovascular:  Regular rate and rhythm, S1S2 present, without murmur or extra heart sounds, pedal pulses normal and no edema  Skin: Mottled and cyanosis noted in lower peripheral extremities.     LABS AND  DATA: Personally reviewed  Recent Labs     22   WBC 31.0* 20.2*   HGB 13.8 12.6   HCT 44.6 41.0    227     Recent Labs     22  02422  0124 225   *  --  154*   K 3.4*  -- 4.3   *  --  125*   CO2 24  --  14*   BUN 86*  --  70*   CREA 2.99*  --  2.18*   *  --  46*   CA 8.7  --  6.1*   MG  --  3.2 2.3   PHOS  --  8.8 5.6*     Recent Labs     01/08/22  1113   *   TP 7.4   ALB 2.3*   GLOB 5.1*     No results for input(s): INR, PTP, APTT, INREXT, INREXT in the last 72 hours. Recent Labs     01/09/22  0713 01/09/22  0329   PHI 7.14* 7.06*   PCO2I 79.3* 88.5*   PO2I 61* 76*   FIO2I 100 100     No results for input(s): CPK, CKMB, TROIQ, BNPP in the last 72 hours. Hemodynamics:   PAP:   CO:     Wedge:   CI:     CVP:    SVR:       PVR:       Ventilator Settings:  Mode Rate Tidal Volume Pressure FiO2 PEEP   Assist control,Volume control   450 ml    100 % 14 cm H20     Peak airway pressure: 36 cm H2O    Minute ventilation: 12.3 l/min        MEDS: Reviewed    Imaging:    CXR Results  (Last 48 hours)               01/09/22 0114  XR CHEST PORT Final result    Impression:  No pneumothorax following insertion of internal jugular catheter. Narrative:  INDICATION: (R) IJ placement;;       EXAMINATION:  AP CHEST, PORTABLE       COMPARISON: January 8, 2022       FINDINGS: Single AP portable view of the chest demonstrates interval placement   of a right internal jugular venous catheter with tip over the distal superior   vena cava. The cardiomediastinal silhouette is unchanged. There is no   pneumothorax. Persistent bibasilar airspace disease right greater than left. 01/08/22 2247  XR CHEST PORT Final result    Impression:  Endotracheal tube tip satisfactory above the ally. Bibasilar airspace disease. Narrative:  INDICATION: post intubation       EXAMINATION:  AP CHEST, PORTABLE       COMPARISON: Earlier today       FINDINGS: Single AP portable view of the chest demonstrates placement of   endotracheal tube in satisfactory position above the ally. The   cardiomediastinal silhouette is unchanged. No pneumothorax. Persistent bibasilar   airspace disease. 01/08/22 1135  XR CHEST PORT Final result    Impression:  Airspace disease in the bilateral lung bases. Narrative:  EXAM: XR CHEST PORT       HISTORY: Chest Pain. COMPARISON: Chest CT 7/20/2014       FINDINGS: Portable AP. Patchy opacities are seen in both lung bases. The heart   size is normal. No pleural effusion. No pneumothorax. The bones are osteopenic. CT Results  (Last 48 hours)               01/08/22 1418  CT CHEST WO CONT Final result    Impression:      1. Extensive bilateral groundglass opacities with superimposed airspace disease   most confluent at the right base compatible with pneumonia   2. 4.1 cm infrarenal abdominal aortic aneurysm minimally increased in size   3. Horseshoe kidney without hydronephrosis           Narrative:  INDICATION: Shortness of breath and hypotension       COMPARISON: Earlier same day and 7/24/2018       TECHNIQUE: Unenhanced axial images were obtained through the chest, abdomen, and   pelvis. Oral contrast was not administered. Sagittal and coronal reformats were   performed. CT dose reduction was achieved through use of a standardized protocol   tailored for this examination and automatic exposure control for dose   modulation. FINDINGS:       THYROID: No nodule. MEDIASTINUM: No mass or lymphadenopathy. HUGH: No mass or lymphadenopathy. THORACIC AORTA: No dissection or aneurysm. There are vascular calcifications   MAIN PULMONARY ARTERY: Normal in caliber. TRACHEA/BRONCHI: Patent. ESOPHAGUS: No wall thickening or dilatation. HEART: Normal in size. There are coronary artery calcifications   PLEURA: No effusion or pneumothorax. LUNGS: Patchy bilateral groundglass opacifications with superimposed   consolidation at the bilateral lung bases most confluent at the right base   LIVER: No mass or biliary dilatation. GALLBLADDER: Small stone without evidence of acute cholecystitis   SPLEEN: No mass.    PANCREAS: No mass or ductal dilatation. ADRENALS: Unremarkable. KIDNEYS: Horseshoe kidney. Atrophy of the left side of the horseshoe kidney but   no hydronephrosis   STOMACH: Unremarkable. SMALL BOWEL: No dilatation or wall thickening. COLON: No dilatation or wall thickening. APPENDIX: Unremarkable. PERITONEUM: No ascites or pneumoperitoneum. RETROPERITONEUM: Infrarenal abdominal aortic aneurysm now measuring 4.1 cm,   slightly enlarged   REPRODUCTIVE ORGANS: Not enlarged   URINARY BLADDER: Small stones layering posteriorly   BONES: No destructive bone lesion. ADDITIONAL COMMENTS: N/A           01/08/22 1418  CT ABD PELV WO CONT Final result    Impression:      1. Extensive bilateral groundglass opacities with superimposed airspace disease   most confluent at the right base compatible with pneumonia   2. 4.1 cm infrarenal abdominal aortic aneurysm minimally increased in size   3. Horseshoe kidney without hydronephrosis           Narrative:  INDICATION: Shortness of breath and hypotension       COMPARISON: Earlier same day and 7/24/2018       TECHNIQUE: Unenhanced axial images were obtained through the chest, abdomen, and   pelvis. Oral contrast was not administered. Sagittal and coronal reformats were   performed. CT dose reduction was achieved through use of a standardized protocol   tailored for this examination and automatic exposure control for dose   modulation. FINDINGS:       THYROID: No nodule. MEDIASTINUM: No mass or lymphadenopathy. HUGH: No mass or lymphadenopathy. THORACIC AORTA: No dissection or aneurysm. There are vascular calcifications   MAIN PULMONARY ARTERY: Normal in caliber. TRACHEA/BRONCHI: Patent. ESOPHAGUS: No wall thickening or dilatation. HEART: Normal in size. There are coronary artery calcifications   PLEURA: No effusion or pneumothorax.    LUNGS: Patchy bilateral groundglass opacifications with superimposed   consolidation at the bilateral lung bases most confluent at the right base LIVER: No mass or biliary dilatation. GALLBLADDER: Small stone without evidence of acute cholecystitis   SPLEEN: No mass. PANCREAS: No mass or ductal dilatation. ADRENALS: Unremarkable. KIDNEYS: Horseshoe kidney. Atrophy of the left side of the horseshoe kidney but   no hydronephrosis   STOMACH: Unremarkable. SMALL BOWEL: No dilatation or wall thickening. COLON: No dilatation or wall thickening. APPENDIX: Unremarkable. PERITONEUM: No ascites or pneumoperitoneum. RETROPERITONEUM: Infrarenal abdominal aortic aneurysm now measuring 4.1 cm,   slightly enlarged   REPRODUCTIVE ORGANS: Not enlarged   URINARY BLADDER: Small stones layering posteriorly   BONES: No destructive bone lesion. ADDITIONAL COMMENTS: N/A               ECHO:  Pending    Multidisciplinary Rounds Completed:  No    ABCDEF Bundle/Checklist Completed:  Yes    SPECIAL EQUIPMENT  None    DISPOSITION  Stay in ICU    CRITICAL CARE CONSULTANT NOTE  I had a face to face encounter with the patient, reviewed and interpreted patient data including clinical events, labs, images, vital signs, I/O's, and examined patient. I have discussed the case and the plan and management of the patient's care with the consulting services, the bedside nurses and the respiratory therapist.      NOTE OF PERSONAL INVOLVEMENT IN CARE   This patient has a high probability of imminent, clinically significant deterioration, which requires the highest level of preparedness to intervene urgently. I participated in the decision-making and personally managed or directed the management of the following life and organ supporting interventions that required my frequent assessment to treat or prevent imminent deterioration. I personally spent 30 minutes of critical care time. This is time spent at this critically ill patient's bedside actively involved in patient care as well as the coordination of care and discussions with the patient's family.   This does not include any procedural time which has been billed separately.       Sia [facility]-BC     Critical Care Medicine  [facility]

## 2022-01-09 NOTE — PROGRESS NOTES
PT Note:    Orders received and acknowledged. Noted order date for 1/10/2022 and MD note states PT to evaluate on this date. Will follow up Monday as medically appropriate. Thank you.

## 2022-01-09 NOTE — PROGRESS NOTES
Transitions of care:  Patient presented to the ED with increased AMS and decreased responsiveness. Patient admitted with septic shock and aspiration pna. Patient is intubated on a Levophed and Bicarb gtt. Per notes patient has no gag, cough and does not withdraw. Per notes patient is a DNR and family has decided to transition patient to comfort care. Care management spoke with patient's wife and daughter regarding process for choosing a  home. Care management is available. Maire Kocher RN,Care Mangement  Care Management Interventions  PCP Verified by CM:  Yes (Dr Senait Benitez)  Last Visit to PCP: 21  [facility]  Support Systems: Spouse/Significant Other (wife Carine Acevedo [phone])

## 2022-01-09 NOTE — PROCEDURES
Endotracheal Intubation Procedure Note  Indication for endotracheal intubation: impending respiratory failure  Sedation: etomidate  Paralytic: rocuronium  Lidocaine: no  Atropine: no  Equipment: CMAC-D laryngoscope blade. and 6.5mm cuffed endotracheal tube. Cricoid Pressure: no  Number of attempts: 1  ETT location confirmed by by auscultation, by CXR and ETCO2 monitor.     Rylee Sherman MD

## 2022-01-10 LAB
BACTERIA SPEC CULT: ABNORMAL
BACTERIA SPEC CULT: NORMAL
BACTERIA SPEC CULT: NORMAL
CC UR VC: ABNORMAL
L PNEUMO1 AG UR QL IA: NEGATIVE
SERVICE CMNT-IMP: ABNORMAL
SERVICE CMNT-IMP: NORMAL
SPECIMEN SOURCE: NORMAL

## 2022-01-11 LAB
CA-I BLD-SCNC: 1.21 MMOL/L (ref 1.12–1.32)
FLUID CULTURE, SPNG2: NORMAL
ORGANISM ID, SPNG3: NORMAL
PLEASE NOTE, SPNG4: NORMAL
S PNEUM AG SPEC QL LA: NEGATIVE
SPECIMEN SOURCE: NORMAL
SPECIMEN, SPNG1: NORMAL

## 2022-01-13 LAB
BACTERIA SPEC CULT: NORMAL
SERVICE CMNT-IMP: NORMAL

## (undated) DEVICE — FCPS BX HOT RJ4 2.2MMX240CM -- RADIAL JAW 4 BX/40

## (undated) DEVICE — REM POLYHESIVE ADULT PATIENT RETURN ELECTRODE: Brand: VALLEYLAB